# Patient Record
Sex: FEMALE | Race: WHITE | NOT HISPANIC OR LATINO | Employment: UNEMPLOYED | URBAN - METROPOLITAN AREA
[De-identification: names, ages, dates, MRNs, and addresses within clinical notes are randomized per-mention and may not be internally consistent; named-entity substitution may affect disease eponyms.]

---

## 2021-03-25 ENCOUNTER — CONSULT (OUTPATIENT)
Dept: ONCOLOGY | Facility: CLINIC | Age: 51
End: 2021-03-25

## 2021-03-25 ENCOUNTER — LAB (OUTPATIENT)
Dept: LAB | Facility: HOSPITAL | Age: 51
End: 2021-03-25

## 2021-03-25 VITALS
SYSTOLIC BLOOD PRESSURE: 120 MMHG | DIASTOLIC BLOOD PRESSURE: 67 MMHG | BODY MASS INDEX: 36.7 KG/M2 | OXYGEN SATURATION: 95 % | HEIGHT: 64 IN | RESPIRATION RATE: 18 BRPM | TEMPERATURE: 97.5 F | HEART RATE: 90 BPM | WEIGHT: 215 LBS

## 2021-03-25 DIAGNOSIS — Z51.11 ENCOUNTER FOR ANTINEOPLASTIC CHEMOTHERAPY: ICD-10-CM

## 2021-03-25 DIAGNOSIS — C82.85: ICD-10-CM

## 2021-03-25 DIAGNOSIS — C82.85: Primary | Chronic | ICD-10-CM

## 2021-03-25 PROBLEM — C82.95: Chronic | Status: ACTIVE | Noted: 2021-03-25

## 2021-03-25 PROBLEM — R22.41 MASS OF RIGHT THIGH: Status: ACTIVE | Noted: 2021-03-25

## 2021-03-25 PROBLEM — C82.95: Status: ACTIVE | Noted: 2021-03-25

## 2021-03-25 LAB
ALBUMIN SERPL-MCNC: 4.2 G/DL (ref 3.5–5.2)
ALBUMIN/GLOB SERPL: 1.5 G/DL
ALP SERPL-CCNC: 71 U/L (ref 39–117)
ALT SERPL W P-5'-P-CCNC: 12 U/L (ref 1–33)
ANION GAP SERPL CALCULATED.3IONS-SCNC: 10 MMOL/L (ref 5–15)
AST SERPL-CCNC: 13 U/L (ref 1–32)
B2 MICROGLOB SERPL-MCNC: 1.6 MG/L (ref 0.8–2.2)
BILIRUB SERPL-MCNC: <0.2 MG/DL (ref 0–1.2)
BUN SERPL-MCNC: 11 MG/DL (ref 6–20)
BUN/CREAT SERPL: 19.3 (ref 7–25)
CALCIUM SPEC-SCNC: 9.1 MG/DL (ref 8.6–10.5)
CHLORIDE SERPL-SCNC: 102 MMOL/L (ref 98–107)
CO2 SERPL-SCNC: 29 MMOL/L (ref 22–29)
CREAT SERPL-MCNC: 0.57 MG/DL (ref 0.57–1)
CRP SERPL-MCNC: 1.7 MG/DL (ref 0–0.5)
ERYTHROCYTE [DISTWIDTH] IN BLOOD BY AUTOMATED COUNT: 14.6 % (ref 12.3–15.4)
ERYTHROCYTE [SEDIMENTATION RATE] IN BLOOD: 27 MM/HR (ref 0–30)
GFR SERPL CREATININE-BSD FRML MDRD: 111 ML/MIN/1.73
GLOBULIN UR ELPH-MCNC: 2.8 GM/DL
GLUCOSE SERPL-MCNC: 97 MG/DL (ref 65–99)
HCT VFR BLD AUTO: 40.1 % (ref 34–46.6)
HGB BLD-MCNC: 12.8 G/DL (ref 12–15.9)
LDH SERPL-CCNC: 162 U/L (ref 135–214)
LYMPHOCYTES # BLD AUTO: 2.7 10*3/MM3 (ref 0.7–3.1)
LYMPHOCYTES NFR BLD AUTO: 39.7 % (ref 19.6–45.3)
MCH RBC QN AUTO: 29 PG (ref 26.6–33)
MCHC RBC AUTO-ENTMCNC: 31.9 G/DL (ref 31.5–35.7)
MCV RBC AUTO: 90.8 FL (ref 79–97)
MONOCYTES # BLD AUTO: 0.3 10*3/MM3 (ref 0.1–0.9)
MONOCYTES NFR BLD AUTO: 4.2 % (ref 5–12)
NEUTROPHILS NFR BLD AUTO: 3.8 10*3/MM3 (ref 1.7–7)
NEUTROPHILS NFR BLD AUTO: 56.1 % (ref 42.7–76)
PLATELET # BLD AUTO: 366 10*3/MM3 (ref 140–450)
PMV BLD AUTO: 6.5 FL (ref 6–12)
POTASSIUM SERPL-SCNC: 4.1 MMOL/L (ref 3.5–5.2)
PROT SERPL-MCNC: 7 G/DL (ref 6–8.5)
RBC # BLD AUTO: 4.42 10*6/MM3 (ref 3.77–5.28)
SODIUM SERPL-SCNC: 141 MMOL/L (ref 136–145)
URATE SERPL-MCNC: 4.3 MG/DL (ref 2.4–5.7)
WBC # BLD AUTO: 6.8 10*3/MM3 (ref 3.4–10.8)

## 2021-03-25 PROCEDURE — 83615 LACTATE (LD) (LDH) ENZYME: CPT

## 2021-03-25 PROCEDURE — 85025 COMPLETE CBC W/AUTO DIFF WBC: CPT

## 2021-03-25 PROCEDURE — 82232 ASSAY OF BETA-2 PROTEIN: CPT

## 2021-03-25 PROCEDURE — 87350 HEPATITIS BE AG IA: CPT

## 2021-03-25 PROCEDURE — 84550 ASSAY OF BLOOD/URIC ACID: CPT

## 2021-03-25 PROCEDURE — 86704 HEP B CORE ANTIBODY TOTAL: CPT

## 2021-03-25 PROCEDURE — 86707 HEPATITIS BE ANTIBODY: CPT

## 2021-03-25 PROCEDURE — 85652 RBC SED RATE AUTOMATED: CPT

## 2021-03-25 PROCEDURE — 86140 C-REACTIVE PROTEIN: CPT

## 2021-03-25 PROCEDURE — 87340 HEPATITIS B SURFACE AG IA: CPT

## 2021-03-25 PROCEDURE — 99205 OFFICE O/P NEW HI 60 MIN: CPT | Performed by: INTERNAL MEDICINE

## 2021-03-25 PROCEDURE — 36415 COLL VENOUS BLD VENIPUNCTURE: CPT

## 2021-03-25 PROCEDURE — 80053 COMPREHEN METABOLIC PANEL: CPT

## 2021-03-25 PROCEDURE — 99417 PROLNG OP E/M EACH 15 MIN: CPT | Performed by: INTERNAL MEDICINE

## 2021-03-25 PROCEDURE — 86705 HEP B CORE ANTIBODY IGM: CPT

## 2021-03-25 PROCEDURE — 86803 HEPATITIS C AB TEST: CPT

## 2021-03-25 PROCEDURE — 86706 HEP B SURFACE ANTIBODY: CPT

## 2021-03-25 RX ORDER — PIROXICAM 10 MG/1
10 CAPSULE ORAL 2 TIMES DAILY WITH MEALS
COMMUNITY
Start: 2021-02-24 | End: 2022-10-27

## 2021-03-25 RX ORDER — CITALOPRAM 40 MG/1
40 TABLET ORAL DAILY
COMMUNITY
Start: 2021-02-24 | End: 2022-02-01 | Stop reason: ALTCHOICE

## 2021-03-25 RX ORDER — ESTRADIOL 1 MG/1
1 TABLET ORAL DAILY
COMMUNITY
Start: 2021-02-24

## 2021-03-25 RX ORDER — DIAZEPAM 2 MG/1
TABLET ORAL
COMMUNITY
Start: 2020-12-23 | End: 2022-10-27

## 2021-03-25 RX ORDER — BUDESONIDE AND FORMOTEROL FUMARATE DIHYDRATE 160; 4.5 UG/1; UG/1
2 AEROSOL RESPIRATORY (INHALATION) 2 TIMES DAILY
COMMUNITY
Start: 2021-03-17

## 2021-03-25 NOTE — PROGRESS NOTES
CHIEF COMPLAINT: Right thigh lymphoma    REASON FOR REFERRAL: Right thigh lymphoma      RECORDS OBTAINED  Records of the patients history including those obtained from Dr. Gong were reviewed and summarized in detail.    Oncology/Hematology History Overview Note   1.  Right thigh mass  lymphoma    Oncology history timeline:  -2/18/2021 hemoglobin 12.7 with normal white count platelet count differential.  CMP unremarkable.  Urinalysis with microscopic hematuria 20-30 red blood cells per milliliter.  AP single view chest x-ray emergency room [place] showed no acute findings.  CT abdomen pelvis with IV contrast emergency room [place] showed calcified granuloma right lung base, fatty liver infiltration, subcentimeter hypodensity left hepatic lobe too small to categorize, normal size spleen, no adrenal masses, no pancreatic mass, normal kidneys, and no retroperitoneal adenopathy or ascites or pneumoperitoneum.  There was a smooth marginated homogeneous enhancing 4.5 x 5 x 3.3 cm mass in the superficial soft tissues of the anterior upper right thigh.  -2/25/2021 office note from Dr. Gong mentions presentation to emergency room 2/18/2021 complaining of right upper thigh swelling for the preceding week of 2/12/2021.  CT scan abdomen pelvis to include the thighs performed at [facility] emergency room revealing 5 cm smoothly marginated right upper thigh mass below the groin crease suspicious for pathologic lymph node but could be neoplastic of other nature given no prodromal illness, infections, or inflammation.  Recommendation for biopsy was made.  Per Dr. Gong note, she had been sick for the better part of the winter and had been urgent treatment centers told that she had a viral infection flu and coronavirus negative with fevers and chills that subsequently resolved but for fatigue that did not resolve with hypersomnolence as well.  Says she feels hot all the time but no ronny night  sweats.  -3/12/2021 right thigh mass needle core tissue sample extremely small and predominantly crushed limiting adequate evaluation.  Differential diagnosis for this B-cell lymphoma is diffuse large B cell, follicular lymphoma versus other.  Further differentiation could not be performed on this crush small sample.  -3/25/2021 [facility] hematology oncology initial consultation: I, Rodríguez Gerardo, saw for the first time today.  Reviewed the above story with her.  Still fatigued without night sweats or unexplained weight loss.  She has this mass in the soft tissue medial right thigh about 5 cm in size that does seem to move somewhat beneath the skin but also to my exam feels like it is attached to the deeper structures of the thigh as well.  I feel no other adenopathy or hepatosplenomegaly.  I will get a PET scan.  I spoken with Dr. Gong who will look at these images again with the CTs in [place].  If there is clear indication that this is not coming from soft tissue nonnodal structures, then excisional biopsy for adequate tissue diagnosis is important to determine the type of therapy.  This is growing since her biopsy 3/12/2021 but not daily like a very high-grade lymphoma.  To my hand, this does not feel like a typical stas structure and my other concern is, even though this does not venessa with any markers to suggest sarcoma or the like, is to be sure that this is not something other than lymphoma and I have spoken with Dr. Pino who will do desmin stains and other markers before doing an excisional biopsy next Wednesday with Dr. Gong.  If those additional stains suggest sarcomatoid features that can appear in the medial aspect of the thigh such as this, then she probably needs to have excision by Humberto Vera orthopedic oncologist at .  In the meantime, presuming this to be some form of lymphoma which is the highest likelihood based on the pathology from Dr. Pino where this is CD20 positive, CD10  positive, BCL6 40% positive, MUM1 40% positive, CD23 strong and diffuse positive, and BCL-2 positive.  Ki-67 is 50%.  CD30 and only 5%.  C-Myc is weakly expressed in 10% of cells.  BRIAN by TAYLER is negative.  Cyclin D1 negative.  CD5 indeterminate for B and T-cell coexpression.  LEF 1 presumably staining T cells.  Ultimate decision for treatment depends on the specific pathologic subtype and the staging and I will get her prognostic labs going as well.  Given the relatively initial high-grade features of this that might require Adriamycin I will also proceed with echocardiogram.     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  female, referred for right thigh lymphoma history as above.    REVIEW OF SYSTEMS:  Generally fatigued but no fevers or chills or bed drenching night sweats    History reviewed. No pertinent past medical history.  History reviewed. No pertinent surgical history.    Current Outpatient Medications on File Prior to Visit   Medication Sig Dispense Refill   • Advair Diskus 250-50 MCG/DOSE DISKUS      • citalopram (CeleXA) 40 MG tablet Take 40 mg by mouth Daily.     • diazePAM (VALIUM) 2 MG tablet TAKE 1 TABLET 4 TIMES A DAY AS NEEDED FOR ANXIETY     • estradiol (ESTRACE) 1 MG tablet Take 1 mg by mouth Daily.     • piroxicam (FELDENE) 10 MG capsule Take 10 mg by mouth 2 (Two) Times a Day With Meals.     • Symbicort 160-4.5 MCG/ACT inhaler Inhale 2 puffs 2 (Two) Times a Day.       No current facility-administered medications on file prior to visit.       No Known Allergies    Social History     Socioeconomic History   • Marital status: Single     Spouse name: Not on file   • Number of children: Not on file   • Years of education: Not on file   • Highest education level: Not on file   Tobacco Use   • Smoking status: Current  "Every Day Smoker     Packs/day: 1.00     Start date: 1982   • Smokeless tobacco: Never Used       History reviewed. No pertinent family history.    PHYSICAL EXAM:  No palpable cervical axillary or inguinal adenopathy.  Does have 5 cm soft subcutaneous mass medial right thigh that to my hand is not only subcutaneous but feels like it is somewhat attached to the muscular structures.  No palpable hepatosplenomegaly    /67   Pulse 90   Temp 97.5 °F (36.4 °C)   Resp 18   Ht 162.6 cm (64\")   Wt 97.5 kg (215 lb)   SpO2 95%   BMI 36.90 kg/m²     ECOG score: 0           ECOG: (0) Fully Active - Able to Carry On All Pre-disease Performance Without Restriction    No results found for: HGB, HCT, MCV, PLT, WBC, NEUTROABS, LYMPHSABS, MONOSABS, EOSABS, BASOSABS  No results found for: GLUCOSE, BUN, CREATININE, NA, K, CL, CO2, CALCIUM, PROTEINTOT, ALBUMIN, BILITOT, ALKPHOS, AST, ALT      Assessment/Plan     1.  Right thigh mass  lymphoma    Oncology history timeline:  -2/18/2021 hemoglobin 12.7 with normal white count platelet count differential.  CMP unremarkable.  Urinalysis with microscopic hematuria 20-30 red blood cells per milliliter.  AP single view chest x-ray emergency room [place] showed no acute findings.  CT abdomen pelvis with IV contrast emergency room [place] showed calcified granuloma right lung base, fatty liver infiltration, subcentimeter hypodensity left hepatic lobe too small to categorize, normal size spleen, no adrenal masses, no pancreatic mass, normal kidneys, and no retroperitoneal adenopathy or ascites or pneumoperitoneum.  There was a smooth marginated homogeneous enhancing 4.5 x 5 x 3.3 cm mass in the superficial soft tissues of the anterior upper right thigh.  -2/25/2021 office note from Dr. Gong mentions presentation to emergency room 2/18/2021 complaining of right upper thigh swelling for the preceding week of 2/12/2021.  CT scan abdomen pelvis to include the thighs performed at " [place] [facility] room revealing 5 cm smoothly marginated right upper thigh mass below the groin crease suspicious for pathologic lymph node but could be neoplastic of other nature given no prodromal illness, infections, or inflammation.  Recommendation for biopsy was made.  Per Dr. Gong note, she had been sick for the better part of the winter and had been urgent treatment centers told that she had a viral infection flu and coronavirus negative with fevers and chills that subsequently resolved but for fatigue that did not resolve with hypersomnolence as well.  Says she feels hot all the time but no ronny night sweats.  -3/12/2021 right thigh mass needle core tissue sample extremely small and predominantly crushed limiting adequate evaluation.  Differential diagnosis for this B-cell lymphoma is diffuse large B cell, follicular lymphoma versus other.  Further differentiation could not be performed on this crush small sample.  -3/25/2021 [facility] hematology oncology initial consultation: I, Rodríguez Gerardo, saw for the first time today.  Reviewed the above story with her.  Still fatigued without night sweats or unexplained weight loss.  She has this mass in the soft tissue medial right thigh about 5 cm in size that does seem to move somewhat beneath the skin but also to my exam feels like it is attached to the deeper structures of the thigh as well.  I feel no other adenopathy or hepatosplenomegaly.  I will get a PET scan.  I spoken with Dr. Gong who will look at these images again with the CTs in [place].  If there is clear indication that this is not coming from soft tissue nonnodal structures, then excisional biopsy for adequate tissue diagnosis is important to determine the type of therapy.  This is growing since her biopsy 3/12/2021 but not daily like a very high-grade lymphoma.  To my hand, this does not feel like a typical stas structure and my other concern is, even though this does not venessa  with any markers to suggest sarcoma or the like, is to be sure that this is not something other than lymphoma and I have spoken with Dr. Pino who will do desmin stains and other markers before doing an excisional biopsy next Wednesday with Dr. Gong.  If those additional stains suggest sarcomatoid features that can appear in the medial aspect of the thigh such as this, then she probably needs to have excision by Humberto Vera orthopedic oncologist at .  In the meantime, presuming this to be some form of lymphoma which is the highest likelihood based on the pathology from Dr. Pino where this is CD20 positive, CD10 positive, BCL6 40% positive, MUM1 40% positive, CD23 strong and diffuse positive, and BCL-2 positive.  Ki-67 is 50%.  CD30 and only 5%.  C-Myc is weakly expressed in 10% of cells.  BRIAN by TAYLER is negative.  Cyclin D1 negative.  CD5 indeterminate for B and T-cell coexpression.  LEF 1 presumably staining T cells.  Ultimate decision for treatment depends on the specific pathologic subtype and the staging and I will get her prognostic labs going as well.  Given the relatively initial high-grade features of this that might require Adriamycin I will also proceed with echocardiogram.  Total time of care today including reviewing her above data, talking over the work-up with Dr. Gong twice on the phone as well as with Dr. Pino and going over all this with the patient consumed 90 minutes of total patient care time both before, during, and after her visit today.    Rodríguez Gerardo MD    3/25/2021

## 2021-03-26 LAB
HBV CORE AB SERPL QL IA: NEGATIVE
HBV CORE IGM SERPL QL IA: NEGATIVE
HBV E AB SERPL QL IA: NEGATIVE
HBV E AG SERPL QL IA: NEGATIVE
HBV SURFACE AB SER QL: REACTIVE
HBV SURFACE AG SERPL QL IA: NEGATIVE
HCV AB S/CO SERPL IA: <0.1 S/CO RATIO (ref 0–0.9)
LABORATORY COMMENT REPORT: NORMAL

## 2021-03-29 ENCOUNTER — HOSPITAL ENCOUNTER (OUTPATIENT)
Dept: CARDIOLOGY | Facility: HOSPITAL | Age: 51
Discharge: HOME | End: 2021-03-29
Admitting: INTERNAL MEDICINE

## 2021-03-29 VITALS — HEIGHT: 64 IN | BODY MASS INDEX: 36.7 KG/M2 | WEIGHT: 215 LBS

## 2021-03-29 DIAGNOSIS — C82.85: ICD-10-CM

## 2021-03-29 DIAGNOSIS — Z51.11 ENCOUNTER FOR ANTINEOPLASTIC CHEMOTHERAPY: ICD-10-CM

## 2021-03-29 LAB
ASCENDING AORTA: 2.9 CM
BH CV ECHO MEAS - AO MAX PG (FULL): 7.6 MMHG
BH CV ECHO MEAS - AO MAX PG: 14 MMHG
BH CV ECHO MEAS - AO MEAN PG (FULL): 3 MMHG
BH CV ECHO MEAS - AO MEAN PG: 6 MMHG
BH CV ECHO MEAS - AO ROOT AREA (BSA CORRECTED): 1.3
BH CV ECHO MEAS - AO ROOT AREA: 5.7 CM^2
BH CV ECHO MEAS - AO ROOT DIAM: 2.7 CM
BH CV ECHO MEAS - AO V2 MAX: 190 CM/SEC
BH CV ECHO MEAS - AO V2 MEAN: 108 CM/SEC
BH CV ECHO MEAS - AO V2 VTI: 30.7 CM
BH CV ECHO MEAS - ASC AORTA: 2.9 CM
BH CV ECHO MEAS - AVA(I,A): 2.2 CM^2
BH CV ECHO MEAS - AVA(I,D): 2.2 CM^2
BH CV ECHO MEAS - AVA(V,A): 2.1 CM^2
BH CV ECHO MEAS - AVA(V,D): 2.1 CM^2
BH CV ECHO MEAS - BSA(HAYCOCK): 2.1 M^2
BH CV ECHO MEAS - BSA: 2 M^2
BH CV ECHO MEAS - BZI_BMI: 36.9 KILOGRAMS/M^2
BH CV ECHO MEAS - BZI_METRIC_HEIGHT: 162.6 CM
BH CV ECHO MEAS - BZI_METRIC_WEIGHT: 97.5 KG
BH CV ECHO MEAS - EDV(CUBED): 41.1 ML
BH CV ECHO MEAS - EDV(MOD-SP2): 51 ML
BH CV ECHO MEAS - EDV(MOD-SP4): 58 ML
BH CV ECHO MEAS - EDV(TEICH): 49.1 ML
BH CV ECHO MEAS - EF(CUBED): 68.4 %
BH CV ECHO MEAS - EF(MOD-BP): 63 %
BH CV ECHO MEAS - EF(MOD-SP2): 64.7 %
BH CV ECHO MEAS - EF(MOD-SP4): 62.1 %
BH CV ECHO MEAS - EF(TEICH): 61.1 %
BH CV ECHO MEAS - ESV(CUBED): 13 ML
BH CV ECHO MEAS - ESV(MOD-SP2): 18 ML
BH CV ECHO MEAS - ESV(MOD-SP4): 22 ML
BH CV ECHO MEAS - ESV(TEICH): 19.1 ML
BH CV ECHO MEAS - FS: 31.9 %
BH CV ECHO MEAS - IVS/LVPW: 0.92
BH CV ECHO MEAS - IVSD: 1.1 CM
BH CV ECHO MEAS - LA DIMENSION: 2.7 CM
BH CV ECHO MEAS - LA/AO: 1
BH CV ECHO MEAS - LAD MAJOR: 5.3 CM
BH CV ECHO MEAS - LAT PEAK E' VEL: 10 CM/SEC
BH CV ECHO MEAS - LATERAL E/E' RATIO: 10
BH CV ECHO MEAS - LV DIASTOLIC VOL/BSA (35-75): 28.8 ML/M^2
BH CV ECHO MEAS - LV MASS(C)D: 120.5 GRAMS
BH CV ECHO MEAS - LV MASS(C)DI: 59.7 GRAMS/M^2
BH CV ECHO MEAS - LV MAX PG: 6.4 MMHG
BH CV ECHO MEAS - LV MEAN PG: 3 MMHG
BH CV ECHO MEAS - LV SYSTOLIC VOL/BSA (12-30): 10.9 ML/M^2
BH CV ECHO MEAS - LV V1 MAX: 126 CM/SEC
BH CV ECHO MEAS - LV V1 MEAN: 77.4 CM/SEC
BH CV ECHO MEAS - LV V1 VTI: 21.1 CM
BH CV ECHO MEAS - LVIDD: 3.5 CM
BH CV ECHO MEAS - LVIDS: 2.4 CM
BH CV ECHO MEAS - LVLD AP2: 7.3 CM
BH CV ECHO MEAS - LVLD AP4: 7.2 CM
BH CV ECHO MEAS - LVLS AP2: 6.6 CM
BH CV ECHO MEAS - LVLS AP4: 6.3 CM
BH CV ECHO MEAS - LVOT AREA (M): 3.1 CM^2
BH CV ECHO MEAS - LVOT AREA: 3.1 CM^2
BH CV ECHO MEAS - LVOT DIAM: 2 CM
BH CV ECHO MEAS - LVPWD: 1.2 CM
BH CV ECHO MEAS - MED PEAK E' VEL: 10.2 CM/SEC
BH CV ECHO MEAS - MEDIAL E/E' RATIO: 9.8
BH CV ECHO MEAS - MV A MAX VEL: 130 CM/SEC
BH CV ECHO MEAS - MV DEC SLOPE: 372 CM/SEC^2
BH CV ECHO MEAS - MV E MAX VEL: 100 CM/SEC
BH CV ECHO MEAS - MV E/A: 0.77
BH CV ECHO MEAS - MV P1/2T MAX VEL: 101 CM/SEC
BH CV ECHO MEAS - MV P1/2T: 79.5 MSEC
BH CV ECHO MEAS - MVA P1/2T LCG: 2.2 CM^2
BH CV ECHO MEAS - MVA(P1/2T): 2.8 CM^2
BH CV ECHO MEAS - PA ACC SLOPE: 749.5 CM/SEC^2
BH CV ECHO MEAS - PA ACC TIME: 0.12 SEC
BH CV ECHO MEAS - PA PR(ACCEL): 26.1 MMHG
BH CV ECHO MEAS - SI(AO): 87.1 ML/M^2
BH CV ECHO MEAS - SI(CUBED): 13.9 ML/M^2
BH CV ECHO MEAS - SI(LVOT): 32.9 ML/M^2
BH CV ECHO MEAS - SI(MOD-SP2): 16.4 ML/M^2
BH CV ECHO MEAS - SI(MOD-SP4): 17.8 ML/M^2
BH CV ECHO MEAS - SI(TEICH): 14.9 ML/M^2
BH CV ECHO MEAS - SV(AO): 175.8 ML
BH CV ECHO MEAS - SV(CUBED): 28.1 ML
BH CV ECHO MEAS - SV(LVOT): 66.3 ML
BH CV ECHO MEAS - SV(MOD-SP2): 33 ML
BH CV ECHO MEAS - SV(MOD-SP4): 36 ML
BH CV ECHO MEAS - SV(TEICH): 30 ML
BH CV ECHO MEASUREMENTS AVERAGE E/E' RATIO: 9.9
BH CV VAS BP LEFT ARM: NORMAL MMHG
BH CV XLRA - RV BASE: 3.1 CM
BH CV XLRA - RV LENGTH: 6.4 CM
BH CV XLRA - RV MID: 2.5 CM
BH CV XLRA - TDI S': 14.3 CM/SEC
LEFT ATRIUM VOLUME INDEX: 23.3 ML/M^2
LEFT ATRIUM VOLUME: 47 ML
MAXIMAL PREDICTED HEART RATE: 168 BPM
STRESS TARGET HR: 143 BPM

## 2021-03-29 PROCEDURE — 93306 TTE W/DOPPLER COMPLETE: CPT

## 2021-03-29 PROCEDURE — 93306 TTE W/DOPPLER COMPLETE: CPT | Performed by: INTERNAL MEDICINE

## 2021-04-01 ENCOUNTER — HOSPITAL ENCOUNTER (OUTPATIENT)
Dept: PET IMAGING | Facility: HOSPITAL | Age: 51
End: 2021-04-01

## 2021-04-01 ENCOUNTER — DOCUMENTATION (OUTPATIENT)
Dept: ONCOLOGY | Facility: CLINIC | Age: 51
End: 2021-04-01

## 2021-04-02 ENCOUNTER — HOSPITAL ENCOUNTER (OUTPATIENT)
Dept: PET IMAGING | Facility: HOSPITAL | Age: 51
Discharge: HOME | End: 2021-04-02

## 2021-04-02 DIAGNOSIS — Z51.11 ENCOUNTER FOR ANTINEOPLASTIC CHEMOTHERAPY: ICD-10-CM

## 2021-04-02 DIAGNOSIS — C82.85: Chronic | ICD-10-CM

## 2021-04-02 LAB — GLUCOSE BLDC GLUCOMTR-MCNC: 90 MG/DL (ref 70–130)

## 2021-04-02 PROCEDURE — 78815 PET IMAGE W/CT SKULL-THIGH: CPT

## 2021-04-02 PROCEDURE — 82962 GLUCOSE BLOOD TEST: CPT

## 2021-04-02 PROCEDURE — 0 FLUDEOXYGLUCOSE F18 SOLUTION: Performed by: INTERNAL MEDICINE

## 2021-04-02 PROCEDURE — A9552 F18 FDG: HCPCS | Performed by: INTERNAL MEDICINE

## 2021-04-02 RX ADMIN — FLUDEOXYGLUCOSE F18 1 DOSE: 300 INJECTION INTRAVENOUS at 12:34

## 2021-04-05 ENCOUNTER — OFFICE VISIT (OUTPATIENT)
Dept: ONCOLOGY | Facility: CLINIC | Age: 51
End: 2021-04-05

## 2021-04-05 VITALS
SYSTOLIC BLOOD PRESSURE: 137 MMHG | WEIGHT: 214 LBS | RESPIRATION RATE: 16 BRPM | HEART RATE: 89 BPM | OXYGEN SATURATION: 95 % | TEMPERATURE: 98.4 F | HEIGHT: 64 IN | BODY MASS INDEX: 36.54 KG/M2 | DIASTOLIC BLOOD PRESSURE: 72 MMHG

## 2021-04-05 DIAGNOSIS — C83.35 DIFFUSE LARGE B-CELL LYMPHOMA OF LYMPH NODES OF INGUINAL REGION (HCC): Primary | ICD-10-CM

## 2021-04-05 PROCEDURE — 99417 PROLNG OP E/M EACH 15 MIN: CPT | Performed by: INTERNAL MEDICINE

## 2021-04-05 PROCEDURE — 99215 OFFICE O/P EST HI 40 MIN: CPT | Performed by: INTERNAL MEDICINE

## 2021-04-05 RX ORDER — ALLOPURINOL 300 MG/1
300 TABLET ORAL DAILY
Qty: 30 TABLET | Refills: 0 | Status: SHIPPED | OUTPATIENT
Start: 2021-04-05 | End: 2021-04-30

## 2021-04-05 RX ORDER — ACETAMINOPHEN 325 MG/1
650 TABLET ORAL ONCE
Status: CANCELLED | OUTPATIENT
Start: 2021-04-26

## 2021-04-05 RX ORDER — LIDOCAINE AND PRILOCAINE 25; 25 MG/G; MG/G
CREAM TOPICAL AS NEEDED
Qty: 30 G | Refills: 3 | Status: SHIPPED | OUTPATIENT
Start: 2021-04-05 | End: 2021-06-28

## 2021-04-05 RX ORDER — PREDNISONE 50 MG/1
100 TABLET ORAL DAILY
Qty: 10 TABLET | Refills: 5 | Status: SHIPPED | OUTPATIENT
Start: 2021-04-05 | End: 2021-04-10

## 2021-04-05 RX ORDER — SODIUM CHLORIDE 9 MG/ML
250 INJECTION, SOLUTION INTRAVENOUS ONCE
Status: CANCELLED | OUTPATIENT
Start: 2021-04-26

## 2021-04-05 RX ORDER — IBUPROFEN 400 MG/1
TABLET ORAL
COMMUNITY
Start: 2021-03-31 | End: 2022-10-27

## 2021-04-05 RX ORDER — HYDROCODONE BITARTRATE AND ACETAMINOPHEN 7.5; 325 MG/1; MG/1
TABLET ORAL
COMMUNITY
Start: 2021-03-31 | End: 2022-10-27

## 2021-04-05 RX ORDER — MEPERIDINE HYDROCHLORIDE 25 MG/ML
25 INJECTION INTRAMUSCULAR; INTRAVENOUS; SUBCUTANEOUS
Status: CANCELLED | OUTPATIENT
Start: 2021-04-26

## 2021-04-05 RX ORDER — ONDANSETRON HYDROCHLORIDE 8 MG/1
8 TABLET, FILM COATED ORAL 3 TIMES DAILY PRN
Qty: 30 TABLET | Refills: 5 | Status: SHIPPED | OUTPATIENT
Start: 2021-04-05 | End: 2021-06-28

## 2021-04-05 RX ORDER — DOXORUBICIN HYDROCHLORIDE 2 MG/ML
50 INJECTION, SOLUTION INTRAVENOUS ONCE
Status: CANCELLED | OUTPATIENT
Start: 2021-04-26

## 2021-04-05 RX ORDER — FAMOTIDINE 10 MG/ML
20 INJECTION, SOLUTION INTRAVENOUS AS NEEDED
Status: CANCELLED | OUTPATIENT
Start: 2021-04-26

## 2021-04-05 RX ORDER — PALONOSETRON 0.05 MG/ML
0.25 INJECTION, SOLUTION INTRAVENOUS ONCE
Status: CANCELLED | OUTPATIENT
Start: 2021-04-26

## 2021-04-05 RX ORDER — DIPHENHYDRAMINE HYDROCHLORIDE 50 MG/ML
50 INJECTION INTRAMUSCULAR; INTRAVENOUS AS NEEDED
Status: CANCELLED | OUTPATIENT
Start: 2021-04-26

## 2021-04-05 NOTE — PROGRESS NOTES
CHIEF COMPLAINT: Diffuse large B cell non-Hodgkin lymphoma    Problem List:  Oncology/Hematology History Overview Note   1.  Right Medial thigh diffuse large b cell non-hodgkin lymphoma    Oncology history timeline:  -2/18/2021 hemoglobin 12.7 with normal white count platelet count differential.  CMP unremarkable.  Urinalysis with microscopic hematuria 20-30 red blood cells per milliliter.  AP single view chest x-ray emergency room [place] showed no acute findings.  CT abdomen pelvis with IV contrast emergency room [place] showed calcified granuloma right lung base, fatty liver infiltration, subcentimeter hypodensity left hepatic lobe too small to categorize, normal size spleen, no adrenal masses, no pancreatic mass, normal kidneys, and no retroperitoneal adenopathy or ascites or pneumoperitoneum.  There was a smooth marginated homogeneous enhancing 4.5 x 5 x 3.3 cm mass in the superficial soft tissues of the anterior upper right thigh.  -2/25/2021 office note from Dr. Gong mentions presentation to emergency room 2/18/2021 complaining of right upper thigh swelling for the preceding week of 2/12/2021.  CT scan abdomen pelvis to include the thighs performed at [facility] emergency room revealing 5 cm smoothly marginated right upper thigh mass below the groin crease suspicious for pathologic lymph node but could be neoplastic of other nature given no prodromal illness, infections, or inflammation.  Recommendation for biopsy was made.  Per Dr. Gong note, she had been sick for the better part of the winter and had been urgent treatment centers told that she had a viral infection flu and coronavirus negative with fevers and chills that subsequently resolved but for fatigue that did not resolve with hypersomnolence as well.  Says she feels hot all the time but no ronny night sweats.  -3/12/2021 right thigh mass needle core tissue sample extremely small and predominantly crushed limiting adequate  evaluation.  Differential diagnosis for this B-cell lymphoma is diffuse large B cell, follicular lymphoma versus other.  Further differentiation could not be performed on this crush small sample.  -3/25/2021 [facility] hematology oncology initial consultation: I, Rodríguez Gerardo, saw for the first time today.  Reviewed the above story with her.  Still fatigued without night sweats or unexplained weight loss.  She has this mass in the soft tissue medial right thigh about 5 cm in size that does seem to move somewhat beneath the skin but also to my exam feels like it is attached to the deeper structures of the thigh as well.  I feel no other adenopathy or hepatosplenomegaly.  I will get a PET scan.  I spoken with Dr. Gong who will look at these images again with the CTs in [place].  If there is clear indication that this is not coming from soft tissue nonnodal structures, then excisional biopsy for adequate tissue diagnosis is important to determine the type of therapy.  This is growing since her biopsy 3/12/2021 but not daily like a very high-grade lymphoma.  To my hand, this does not feel like a typical stas structure and my other concern is, even though this does not venessa with any markers to suggest sarcoma or the like, is to be sure that this is not something other than lymphoma and I have spoken with Dr. Pino who will do desmin stains and other markers before doing an excisional biopsy next Wednesday with Dr. Gong.  If those additional stains suggest sarcomatoid features that can appear in the medial aspect of the thigh such as this, then she probably needs to have excision by Humberto Vera orthopedic oncologist at .  In the meantime, presuming this to be some form of lymphoma which is the highest likelihood based on the pathology from Dr. Pino where this is CD20 positive, CD10 positive, BCL6 40% positive, MUM1 40% positive, CD23 strong and diffuse positive, and BCL-2 positive.  Ki-67 is 50%.  CD30 and  only 5%.  C-Myc is weakly expressed in 10% of cells.  BRIAN by TAYLER is negative.  Cyclin D1 negative.  CD5 indeterminate for B and T-cell coexpression.  LEF 1 presumably staining T cells.  Ultimate decision for treatment depends on the specific pathologic subtype and the staging and I will get her prognostic labs going as well.  Given the relatively initial high-grade features of this that might require Adriamycin I will also proceed with echocardiogram.  -3/25/2021 CBC normal.  CMP unremarkable.  Sedimentation rate normal 27 with C-reactive protein elevated 1.7 upper limit of normal 0.5.  Beta-2 microglobulin normal 1.6.  Uric acid normal 4.3.  LDH normal 162.  Hepatitis B and C serologies negative.  -3/29/2021 echocardiogram ejection fraction 61 to 65%.  -3/31/21 excisional node biopsy shows diffuse large B cell non-Hodgkin lymphoma  -4/2/2021 PET shows postsurgical changes right proximal thigh and superficial inguinal region adjacent to adenopathy with SUV 4.0.  Right internal iliac lymph node mildly enlarged SUV 2.6 additionally noted.  No other adenopathy outside this region.  No mention of any abnormal marrow signal.  -4/5/2021 [facility], [place], operated by [facility] medical oncology follow-up visit: Per Dr. Arun Garcia pathologist, this is a diffuse large B cell non-Hodgkin lymphoma.  He is not sure whether this is double hit or not yet.  He is not sure if there is a background of follicular lymphoma and hence cannot say whether this is transformed.  Clinically her IPI score is 0 with her age less than 60, normal LDH, ECOG 0, no extranodal involvement on PET, and only stage II at worse per PET with all nodes within the same region.  I will get a bone marrow biopsy.  If this is stage IV, double hit, or has evidence of transition from follicular lymphoma into diffuse large B-cell lymphoma, then I would give R-CHOP x6.  If the marrow is not involved, this is not double hit, and there is no evidence of follicular transformation into diffuse large  "B-cell lymphoma, then I would treat this as a stage II nonbulky diffuse large B-cell lymphoma and consider R-CHOP x3 followed by involved site radiation therapy.  I reviewed her PET and biopsy reports an echocardiogram which shows good ejection fraction.  She will need to get her [employer] Covid vaccine this week, family doctor established for the possibility of prednisone-induced diabetes, CT-guided bone marrow biopsy, chemo preparation visit, and a port placement with Dr. Gong.  We will present her at multidisciplinary tumor board this Friday.  I went into detail regarding the side effects of R-CHOP but she will have these reviewed at her chemo preparation visit.  Though not bulky, I still would treat her with allopurinol.         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 female, here for follow up on management of diffuse large B cell non-Hodgkin lymphoma status post excisional biopsy    History reviewed. No pertinent past medical history.  History reviewed. No pertinent surgical history.    No Known Allergies    Family History and Social History reviewed and changed as necessary    REVIEW OF SYSTEM:   No B symptoms    PHYSICAL EXAM:  No other palpable inguinal axillary cervical adenopathy.  No hepatosplenomegaly.    Vitals:    21   BP: 137/72   Pulse: 89   Resp: 16   Temp: 98.4 °F (36.9 °C)   SpO2: 95%   Weight: 97.1 kg (214 lb)   Height: 162.6 cm (64\")     Vitals:    21   PainSc: 0-No pain          ECOG score: 0           Vitals reviewed.    ECO    Lab Results   Component Value Date    HGB 12.8 2021    HCT 40.1 2021    MCV 90.8 2021     2021    WBC 6.80 2021    NEUTROABS 3.80 2021    LYMPHSABS 2.70 2021    MONOSABS 0.30 2021 "       Lab Results   Component Value Date    GLUCOSE 97 03/25/2021    BUN 11 03/25/2021    CREATININE 0.57 03/25/2021     03/25/2021    K 4.1 03/25/2021     03/25/2021    CO2 29.0 03/25/2021    CALCIUM 9.1 03/25/2021    PROTEINTOT 7.0 03/25/2021    ALBUMIN 4.20 03/25/2021    BILITOT <0.2 03/25/2021    ALKPHOS 71 03/25/2021    AST 13 03/25/2021    ALT 12 03/25/2021             ASSESSMENT & PLAN:  1.  Right Medial thigh diffuse large b cell non-hodgkin lymphoma    Oncology history timeline:  -2/18/2021 hemoglobin 12.7 with normal white count platelet count differential.  CMP unremarkable.  Urinalysis with microscopic hematuria 20-30 red blood cells per milliliter.  AP single view chest x-ray emergency room [place] showed no acute findings.  CT abdomen pelvis with IV contrast emergency room [place] showed calcified granuloma right lung base, fatty liver infiltration, subcentimeter hypodensity left hepatic lobe too small to categorize, normal size spleen, no adrenal masses, no pancreatic mass, normal kidneys, and no retroperitoneal adenopathy or ascites or pneumoperitoneum.  There was a smooth marginated homogeneous enhancing 4.5 x 5 x 3.3 cm mass in the superficial soft tissues of the anterior upper right thigh.  -2/25/2021 office note from Dr. Gong mentions presentation to emergency room 2/18/2021 complaining of right upper thigh swelling for the preceding week of 2/12/2021.  CT scan abdomen pelvis to include the thighs performed at [facility] emergency room revealing 5 cm smoothly marginated right upper thigh mass below the groin crease suspicious for pathologic lymph node but could be neoplastic of other nature given no prodromal illness, infections, or inflammation.  Recommendation for biopsy was made.  Per Dr. Gong note, she had been sick for the better part of the winter and had been urgent treatment centers told that she had a viral infection flu and coronavirus negative with  fevers and chills that subsequently resolved but for fatigue that did not resolve with hypersomnolence as well.  Says she feels hot all the time but no ronny night sweats.  -3/12/2021 right thigh mass needle core tissue sample extremely small and predominantly crushed limiting adequate evaluation.  Differential diagnosis for this B-cell lymphoma is diffuse large B cell, follicular lymphoma versus other.  Further differentiation could not be performed on this crush small sample.  -3/25/2021 [facility], [place], operated by [facility] hematology oncology initial consultation: I, Rodríguez Gerardo, saw for the first time today.  Reviewed the above story with her.  Still fatigued without night sweats or unexplained weight loss.  She has this mass in the soft tissue medial right thigh about 5 cm in size that does seem to move somewhat beneath the skin but also to my exam feels like it is attached to the deeper structures of the thigh as well.  I feel no other adenopathy or hepatosplenomegaly.  I will get a PET scan.  I spoken with Dr. Gong who will look at these images again with the CTs in [place].  If there is clear indication that this is not coming from soft tissue nonnodal structures, then excisional biopsy for adequate tissue diagnosis is important to determine the type of therapy.  This is growing since her biopsy 3/12/2021 but not daily like a very high-grade lymphoma.  To my hand, this does not feel like a typical stas structure and my other concern is, even though this does not venessa with any markers to suggest sarcoma or the like, is to be sure that this is not something other than lymphoma and I have spoken with Dr. Pino who will do desmin stains and other markers before doing an excisional biopsy next Wednesday with Dr. Gong.  If those additional stains suggest sarcomatoid features that can appear in the medial aspect of the thigh such as this, then she probably needs to have excision by Humberto Vera orthopedic oncologist at .  In  the meantime, presuming this to be some form of lymphoma which is the highest likelihood based on the pathology from Dr. Pino where this is CD20 positive, CD10 positive, BCL6 40% positive, MUM1 40% positive, CD23 strong and diffuse positive, and BCL-2 positive.  Ki-67 is 50%.  CD30 and only 5%.  C-Myc is weakly expressed in 10% of cells.  BRIAN by TAYLER is negative.  Cyclin D1 negative.  CD5 indeterminate for B and T-cell coexpression.  LEF 1 presumably staining T cells.  Ultimate decision for treatment depends on the specific pathologic subtype and the staging and I will get her prognostic labs going as well.  Given the relatively initial high-grade features of this that might require Adriamycin I will also proceed with echocardiogram.  -3/25/2021 CBC normal.  CMP unremarkable.  Sedimentation rate normal 27 with C-reactive protein elevated 1.7 upper limit of normal 0.5.  Beta-2 microglobulin normal 1.6.  Uric acid normal 4.3.  LDH normal 162.  Hepatitis B and C serologies negative.  -3/29/2021 echocardiogram ejection fraction 61 to 65%.  -3/31/21 excisional node biopsy shows diffuse large B cell non-Hodgkin lymphoma  -4/2/2021 PET shows postsurgical changes right proximal thigh and superficial inguinal region adjacent to adenopathy with SUV 4.0.  Right internal iliac lymph node mildly enlarged SUV 2.6 additionally noted.  No other adenopathy outside this region.  No mention of any abnormal marrow signal.  -4/5/2021 [facility] medical oncology follow-up visit: Per Dr. Arun Garcia pathologist, this is a diffuse large B cell non-Hodgkin lymphoma.  He is not sure whether this is double hit or not yet.  He is not sure if there is a background of follicular lymphoma and hence cannot say whether this is transformed.  Clinically her IPI score is 0 with her age less than 60, normal LDH, ECOG 0, no extranodal involvement on PET, and only stage II at worse per PET with all nodes within the same region.  I will get a bone marrow  biopsy.  If this is stage IV, double hit, or has evidence of transition from follicular lymphoma into diffuse large B-cell lymphoma, then I would give R-CHOP x6.  If the marrow is not involved, this is not double hit, and there is no evidence of follicular transformation into diffuse large B-cell lymphoma, then I would treat this as a stage II nonbulky diffuse large B-cell lymphoma and consider R-CHOP x3 followed by involved site radiation therapy.  I reviewed her PET and biopsy reports an echocardiogram which shows good ejection fraction.  She will need to get her [employer] Covid vaccine this week, family doctor established for the possibility of prednisone-induced diabetes, CT-guided bone marrow biopsy, chemo preparation visit, and a port placement with Dr. Gong.  We will present her at multidisciplinary tumor board this Friday.  I went into detail regarding the side effects of R-CHOP but she will have these reviewed at her chemo preparation visit.  Though not bulky, I still would treat her with allopurinol.  First course of therapy will occur in our [place] office and if all goes well then the subsequent Rituxan doses can be faster and performed in [place] office. Total time of care today inclusive of time before, during, and after visit, inclusive of time spent discussing with pathologist as well as with patient and my staff to prepare for the above took 80 minutes of patient care time today.  I also left a message regarding this plan with Dr. Gong.  Rodríguez Gerardo MD    04/05/2021

## 2021-04-05 NOTE — H&P (VIEW-ONLY)
CHIEF COMPLAINT: Diffuse large B cell non-Hodgkin lymphoma    Problem List:  Oncology/Hematology History Overview Note   1.  Right Medial thigh diffuse large b cell non-hodgkin lymphoma    Oncology history timeline:  -2/18/2021 hemoglobin 12.7 with normal white count platelet count differential.  CMP unremarkable.  Urinalysis with microscopic hematuria 20-30 red blood cells per milliliter.  AP single view chest x-ray emergency room [place] showed no acute findings.  CT abdomen pelvis with IV contrast emergency room [place] showed calcified granuloma right lung base, fatty liver infiltration, subcentimeter hypodensity left hepatic lobe too small to categorize, normal size spleen, no adrenal masses, no pancreatic mass, normal kidneys, and no retroperitoneal adenopathy or ascites or pneumoperitoneum.  There was a smooth marginated homogeneous enhancing 4.5 x 5 x 3.3 cm mass in the superficial soft tissues of the anterior upper right thigh.  -2/25/2021 office note from Dr. Gong mentions presentation to emergency room 2/18/2021 complaining of right upper thigh swelling for the preceding week of 2/12/2021.  CT scan abdomen pelvis to include the thighs performed at [facility] emergency room revealing 5 cm smoothly marginated right upper thigh mass below the groin crease suspicious for pathologic lymph node but could be neoplastic of other nature given no prodromal illness, infections, or inflammation.  Recommendation for biopsy was made.  Per Dr. Gong note, she had been sick for the better part of the winter and had been urgent treatment centers told that she had a viral infection flu and coronavirus negative with fevers and chills that subsequently resolved but for fatigue that did not resolve with hypersomnolence as well.  Says she feels hot all the time but no ronny night sweats.  -3/12/2021 right thigh mass needle core tissue sample extremely small and predominantly crushed limiting adequate  evaluation.  Differential diagnosis for this B-cell lymphoma is diffuse large B cell, follicular lymphoma versus other.  Further differentiation could not be performed on this crush small sample.  -3/25/2021 [facility] hematology oncology initial consultation: I, Rodríguez Gerardo, saw for the first time today.  Reviewed the above story with her.  Still fatigued without night sweats or unexplained weight loss.  She has this mass in the soft tissue medial right thigh about 5 cm in size that does seem to move somewhat beneath the skin but also to my exam feels like it is attached to the deeper structures of the thigh as well.  I feel no other adenopathy or hepatosplenomegaly.  I will get a PET scan.  I spoken with Dr. Gong who will look at these images again with the CTs in [place].  If there is clear indication that this is not coming from soft tissue nonnodal structures, then excisional biopsy for adequate tissue diagnosis is important to determine the type of therapy.  This is growing since her biopsy 3/12/2021 but not daily like a very high-grade lymphoma.  To my hand, this does not feel like a typical stas structure and my other concern is, even though this does not venessa with any markers to suggest sarcoma or the like, is to be sure that this is not something other than lymphoma and I have spoken with Dr. Pino who will do desmin stains and other markers before doing an excisional biopsy next Wednesday with Dr. Gong.  If those additional stains suggest sarcomatoid features that can appear in the medial aspect of the thigh such as this, then she probably needs to have excision by Humberto Vera orthopedic oncologist at .  In the meantime, presuming this to be some form of lymphoma which is the highest likelihood based on the pathology from Dr. Pino where this is CD20 positive, CD10 positive, BCL6 40% positive, MUM1 40% positive, CD23 strong and diffuse positive, and BCL-2 positive.  Ki-67 is 50%.  CD30 and  only 5%.  C-Myc is weakly expressed in 10% of cells.  BRIAN by TAYLER is negative.  Cyclin D1 negative.  CD5 indeterminate for B and T-cell coexpression.  LEF 1 presumably staining T cells.  Ultimate decision for treatment depends on the specific pathologic subtype and the staging and I will get her prognostic labs going as well.  Given the relatively initial high-grade features of this that might require Adriamycin I will also proceed with echocardiogram.  -3/25/2021 CBC normal.  CMP unremarkable.  Sedimentation rate normal 27 with C-reactive protein elevated 1.7 upper limit of normal 0.5.  Beta-2 microglobulin normal 1.6.  Uric acid normal 4.3.  LDH normal 162.  Hepatitis B and C serologies negative.  -3/29/2021 echocardiogram ejection fraction 61 to 65%.  -3/31/21 excisional node biopsy shows diffuse large B cell non-Hodgkin lymphoma  -4/2/2021 PET shows postsurgical changes right proximal thigh and superficial inguinal region adjacent to adenopathy with SUV 4.0.  Right internal iliac lymph node mildly enlarged SUV 2.6 additionally noted.  No other adenopathy outside this region.  No mention of any abnormal marrow signal.  -4/5/2021 [facility] medical oncology follow-up visit: Per Dr. Arun Garcia pathologist, this is a diffuse large B cell non-Hodgkin lymphoma.  He is not sure whether this is double hit or not yet.  He is not sure if there is a background of follicular lymphoma and hence cannot say whether this is transformed.  Clinically her IPI score is 0 with her age less than 60, normal LDH, ECOG 0, no extranodal involvement on PET, and only stage II at worse per PET with all nodes within the same region.  I will get a bone marrow biopsy.  If this is stage IV, double hit, or has evidence of transition from follicular lymphoma into diffuse large B-cell lymphoma, then I would give R-CHOP x6.  If the marrow is not involved, this is not double hit, and there is no evidence of follicular transformation into diffuse large  "B-cell lymphoma, then I would treat this as a stage II nonbulky diffuse large B-cell lymphoma and consider R-CHOP x3 followed by involved site radiation therapy.  I reviewed her PET and biopsy reports an echocardiogram which shows good ejection fraction.  She will need to get her [employer] Covid vaccine this week, family doctor established for the possibility of prednisone-induced diabetes, CT-guided bone marrow biopsy, chemo preparation visit, and a port placement with Dr. Gong.  We will present her at multidisciplinary tumor board this Friday.  I went into detail regarding the side effects of R-CHOP but she will have these reviewed at her chemo preparation visit.  Though not bulky, I still would treat her with allopurinol.         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 female, here for follow up on management of diffuse large B cell non-Hodgkin lymphoma status post excisional biopsy    History reviewed. No pertinent past medical history.  History reviewed. No pertinent surgical history.    No Known Allergies    Family History and Social History reviewed and changed as necessary    REVIEW OF SYSTEM:   No B symptoms    PHYSICAL EXAM:  No other palpable inguinal axillary cervical adenopathy.  No hepatosplenomegaly.    Vitals:    21   BP: 137/72   Pulse: 89   Resp: 16   Temp: 98.4 °F (36.9 °C)   SpO2: 95%   Weight: 97.1 kg (214 lb)   Height: 162.6 cm (64\")     Vitals:    21   PainSc: 0-No pain          ECOG score: 0           Vitals reviewed.    ECO    Lab Results   Component Value Date    HGB 12.8 2021    HCT 40.1 2021    MCV 90.8 2021     2021    WBC 6.80 2021    NEUTROABS 3.80 2021    LYMPHSABS 2.70 2021    MONOSABS 0.30 2021 "       Lab Results   Component Value Date    GLUCOSE 97 03/25/2021    BUN 11 03/25/2021    CREATININE 0.57 03/25/2021     03/25/2021    K 4.1 03/25/2021     03/25/2021    CO2 29.0 03/25/2021    CALCIUM 9.1 03/25/2021    PROTEINTOT 7.0 03/25/2021    ALBUMIN 4.20 03/25/2021    BILITOT <0.2 03/25/2021    ALKPHOS 71 03/25/2021    AST 13 03/25/2021    ALT 12 03/25/2021             ASSESSMENT & PLAN:  1.  Right Medial thigh diffuse large b cell non-hodgkin lymphoma    Oncology history timeline:  -2/18/2021 hemoglobin 12.7 with normal white count platelet count differential.  CMP unremarkable.  Urinalysis with microscopic hematuria 20-30 red blood cells per milliliter.  AP single view chest x-ray emergency room [place] showed no acute findings.  CT abdomen pelvis with IV contrast emergency room [place] showed calcified granuloma right lung base, fatty liver infiltration, subcentimeter hypodensity left hepatic lobe too small to categorize, normal size spleen, no adrenal masses, no pancreatic mass, normal kidneys, and no retroperitoneal adenopathy or ascites or pneumoperitoneum.  There was a smooth marginated homogeneous enhancing 4.5 x 5 x 3.3 cm mass in the superficial soft tissues of the anterior upper right thigh.  -2/25/2021 office note from Dr. Gong mentions presentation to emergency room 2/18/2021 complaining of right upper thigh swelling for the preceding week of 2/12/2021.  CT scan abdomen pelvis to include the thighs performed at [facility] emergency room revealing 5 cm smoothly marginated right upper thigh mass below the groin crease suspicious for pathologic lymph node but could be neoplastic of other nature given no prodromal illness, infections, or inflammation.  Recommendation for biopsy was made.  Per Dr. Gong note, she had been sick for the better part of the winter and had been urgent treatment centers told that she had a viral infection flu and coronavirus negative with  fevers and chills that subsequently resolved but for fatigue that did not resolve with hypersomnolence as well.  Says she feels hot all the time but no ronny night sweats.  -3/12/2021 right thigh mass needle core tissue sample extremely small and predominantly crushed limiting adequate evaluation.  Differential diagnosis for this B-cell lymphoma is diffuse large B cell, follicular lymphoma versus other.  Further differentiation could not be performed on this crush small sample.  -3/25/2021 [facility] hematology oncology initial consultation: I, Rodríguez Gerardo, saw for the first time today.  Reviewed the above story with her.  Still fatigued without night sweats or unexplained weight loss.  She has this mass in the soft tissue medial right thigh about 5 cm in size that does seem to move somewhat beneath the skin but also to my exam feels like it is attached to the deeper structures of the thigh as well.  I feel no other adenopathy or hepatosplenomegaly.  I will get a PET scan.  I spoken with Dr. Gong who will look at these images again with the CTs in [place].  If there is clear indication that this is not coming from soft tissue nonnodal structures, then excisional biopsy for adequate tissue diagnosis is important to determine the type of therapy.  This is growing since her biopsy 3/12/2021 but not daily like a very high-grade lymphoma.  To my hand, this does not feel like a typical stas structure and my other concern is, even though this does not venessa with any markers to suggest sarcoma or the like, is to be sure that this is not something other than lymphoma and I have spoken with Dr. Pino who will do desmin stains and other markers before doing an excisional biopsy next Wednesday with Dr. Gong.  If those additional stains suggest sarcomatoid features that can appear in the medial aspect of the thigh such as this, then she probably needs to have excision by Humberto Vera orthopedic oncologist at .  In  the meantime, presuming this to be some form of lymphoma which is the highest likelihood based on the pathology from Dr. Pino where this is CD20 positive, CD10 positive, BCL6 40% positive, MUM1 40% positive, CD23 strong and diffuse positive, and BCL-2 positive.  Ki-67 is 50%.  CD30 and only 5%.  C-Myc is weakly expressed in 10% of cells.  BRIAN by TAYLER is negative.  Cyclin D1 negative.  CD5 indeterminate for B and T-cell coexpression.  LEF 1 presumably staining T cells.  Ultimate decision for treatment depends on the specific pathologic subtype and the staging and I will get her prognostic labs going as well.  Given the relatively initial high-grade features of this that might require Adriamycin I will also proceed with echocardiogram.  -3/25/2021 CBC normal.  CMP unremarkable.  Sedimentation rate normal 27 with C-reactive protein elevated 1.7 upper limit of normal 0.5.  Beta-2 microglobulin normal 1.6.  Uric acid normal 4.3.  LDH normal 162.  Hepatitis B and C serologies negative.  -3/29/2021 echocardiogram ejection fraction 61 to 65%.  -3/31/21 excisional node biopsy shows diffuse large B cell non-Hodgkin lymphoma  -4/2/2021 PET shows postsurgical changes right proximal thigh and superficial inguinal region adjacent to adenopathy with SUV 4.0.  Right internal iliac lymph node mildly enlarged SUV 2.6 additionally noted.  No other adenopathy outside this region.  No mention of any abnormal marrow signal.  -4/5/2021 [facility] medical oncology follow-up visit: Per Dr. Arun Garcia pathologist, this is a diffuse large B cell non-Hodgkin lymphoma.  He is not sure whether this is double hit or not yet.  He is not sure if there is a background of follicular lymphoma and hence cannot say whether this is transformed.  Clinically her IPI score is 0 with her age less than 60, normal LDH, ECOG 0, no extranodal involvement on PET, and only stage II at worse per PET with all nodes within the same region.  I will get a bone marrow  biopsy.  If this is stage IV, double hit, or has evidence of transition from follicular lymphoma into diffuse large B-cell lymphoma, then I would give R-CHOP x6.  If the marrow is not involved, this is not double hit, and there is no evidence of follicular transformation into diffuse large B-cell lymphoma, then I would treat this as a stage II nonbulky diffuse large B-cell lymphoma and consider R-CHOP x3 followed by involved site radiation therapy.  I reviewed her PET and biopsy reports an echocardiogram which shows good ejection fraction.  She will need to get her [employer] Covid vaccine this week, family doctor established for the possibility of prednisone-induced diabetes, CT-guided bone marrow biopsy, chemo preparation visit, and a port placement with Dr. Gong.  We will present her at multidisciplinary tumor board this Friday.  I went into detail regarding the side effects of R-CHOP but she will have these reviewed at her chemo preparation visit.  Though not bulky, I still would treat her with allopurinol.  First course of therapy will occur in our [place] office and if all goes well then the subsequent Rituxan doses can be faster and performed in [place] office. Total time of care today inclusive of time before, during, and after visit, inclusive of time spent discussing with pathologist as well as with patient and my staff to prepare for the above took 80 minutes of patient care time today.  I also left a message regarding this plan with Dr. Gong.  Rodríguez Gerardo MD    04/05/2021

## 2021-04-06 ENCOUNTER — TELEPHONE (OUTPATIENT)
Dept: ONCOLOGY | Facility: CLINIC | Age: 51
End: 2021-04-06

## 2021-04-06 NOTE — TELEPHONE ENCOUNTER
Caller: PT    Relationship to patient: PT    Best call back number: [phone]    Chief complaint: PT TO RESCHED 4/7 CHEMO EDU    Type of visit: CHEMO EDU    Requested date: NA    If rescheduling, when is the original appointment: 4/7    Additional notes:

## 2021-04-09 ENCOUNTER — LAB (OUTPATIENT)
Dept: LAB | Facility: HOSPITAL | Age: 51
End: 2021-04-09

## 2021-04-09 ENCOUNTER — OFFICE VISIT (OUTPATIENT)
Dept: ONCOLOGY | Facility: CLINIC | Age: 51
End: 2021-04-09

## 2021-04-09 VITALS
DIASTOLIC BLOOD PRESSURE: 75 MMHG | BODY MASS INDEX: 36.7 KG/M2 | SYSTOLIC BLOOD PRESSURE: 142 MMHG | RESPIRATION RATE: 18 BRPM | TEMPERATURE: 97.7 F | OXYGEN SATURATION: 96 % | HEIGHT: 64 IN | WEIGHT: 215 LBS | HEART RATE: 82 BPM

## 2021-04-09 DIAGNOSIS — C83.35 DIFFUSE LARGE B-CELL LYMPHOMA OF LYMPH NODES OF INGUINAL REGION (HCC): ICD-10-CM

## 2021-04-09 DIAGNOSIS — C83.35 DIFFUSE LARGE B-CELL LYMPHOMA OF LYMPH NODES OF INGUINAL REGION (HCC): Primary | Chronic | ICD-10-CM

## 2021-04-09 LAB
ALBUMIN SERPL-MCNC: 4.2 G/DL (ref 3.5–5.2)
ALBUMIN/GLOB SERPL: 1.4 G/DL
ALP SERPL-CCNC: 74 U/L (ref 39–117)
ALT SERPL W P-5'-P-CCNC: 12 U/L (ref 1–33)
ANION GAP SERPL CALCULATED.3IONS-SCNC: 13 MMOL/L (ref 5–15)
AST SERPL-CCNC: 11 U/L (ref 1–32)
BILIRUB SERPL-MCNC: 0.2 MG/DL (ref 0–1.2)
BUN SERPL-MCNC: 14 MG/DL (ref 6–20)
BUN/CREAT SERPL: 20.9 (ref 7–25)
CALCIUM SPEC-SCNC: 8.5 MG/DL (ref 8.6–10.5)
CHLORIDE SERPL-SCNC: 101 MMOL/L (ref 98–107)
CO2 SERPL-SCNC: 26 MMOL/L (ref 22–29)
CREAT SERPL-MCNC: 0.67 MG/DL (ref 0.57–1)
ERYTHROCYTE [DISTWIDTH] IN BLOOD BY AUTOMATED COUNT: 14.8 % (ref 12.3–15.4)
GFR SERPL CREATININE-BSD FRML MDRD: 92 ML/MIN/1.73
GLOBULIN UR ELPH-MCNC: 3 GM/DL
GLUCOSE SERPL-MCNC: 138 MG/DL (ref 65–99)
HBV SURFACE AB SER RIA-ACNC: REACTIVE
HBV SURFACE AG SERPL QL IA: NORMAL
HCT VFR BLD AUTO: 37.3 % (ref 34–46.6)
HGB BLD-MCNC: 12.4 G/DL (ref 12–15.9)
LYMPHOCYTES # BLD AUTO: 2.9 10*3/MM3 (ref 0.7–3.1)
LYMPHOCYTES NFR BLD AUTO: 35.9 % (ref 19.6–45.3)
MCH RBC QN AUTO: 29.6 PG (ref 26.6–33)
MCHC RBC AUTO-ENTMCNC: 33.1 G/DL (ref 31.5–35.7)
MCV RBC AUTO: 89.5 FL (ref 79–97)
MONOCYTES # BLD AUTO: 0.2 10*3/MM3 (ref 0.1–0.9)
MONOCYTES NFR BLD AUTO: 2.4 % (ref 5–12)
NEUTROPHILS NFR BLD AUTO: 5 10*3/MM3 (ref 1.7–7)
NEUTROPHILS NFR BLD AUTO: 61.7 % (ref 42.7–76)
PLATELET # BLD AUTO: 359 10*3/MM3 (ref 140–450)
PMV BLD AUTO: 6.5 FL (ref 6–12)
POTASSIUM SERPL-SCNC: 3.3 MMOL/L (ref 3.5–5.2)
PROT SERPL-MCNC: 7.2 G/DL (ref 6–8.5)
RBC # BLD AUTO: 4.17 10*6/MM3 (ref 3.77–5.28)
SODIUM SERPL-SCNC: 140 MMOL/L (ref 136–145)
WBC # BLD AUTO: 8.1 10*3/MM3 (ref 3.4–10.8)

## 2021-04-09 PROCEDURE — 36415 COLL VENOUS BLD VENIPUNCTURE: CPT

## 2021-04-09 PROCEDURE — 80053 COMPREHEN METABOLIC PANEL: CPT

## 2021-04-09 PROCEDURE — 87340 HEPATITIS B SURFACE AG IA: CPT

## 2021-04-09 PROCEDURE — 85025 COMPLETE CBC W/AUTO DIFF WBC: CPT

## 2021-04-09 PROCEDURE — 99215 OFFICE O/P EST HI 40 MIN: CPT | Performed by: NURSE PRACTITIONER

## 2021-04-09 PROCEDURE — 86704 HEP B CORE ANTIBODY TOTAL: CPT

## 2021-04-09 PROCEDURE — 86706 HEP B SURFACE ANTIBODY: CPT

## 2021-04-09 NOTE — PROGRESS NOTES
CHEMOTHERAPY PREPARATION    Iliana Gray  [id number]  1970    Subjective   Chief Complaint: Treatment Preparation and Needs Assessment    History of present illness:  Iliana Gray is a 50 y.o. year old female who is here today for chemotherapy preparation and needs assessment. The patient has been diagnosed with Diffuse large b cell lymphoma and is scheduled to begin treatment with Rituxan-CHOP.     Oncology History:    Oncology/Hematology History Overview Note   1.  Right Medial thigh diffuse large b cell non-hodgkin lymphoma    Oncology history timeline:  -2/18/2021 hemoglobin 12.7 with normal white count platelet count differential.  CMP unremarkable.  Urinalysis with microscopic hematuria 20-30 red blood cells per milliliter.  AP single view chest x-ray emergency room [facility] showed no acute findings.  CT abdomen pelvis with IV contrast emergency room [facility] showed calcified granuloma right lung base, fatty liver infiltration, subcentimeter hypodensity left hepatic lobe too small to categorize, normal size spleen, no adrenal masses, no pancreatic mass, normal kidneys, and no retroperitoneal adenopathy or ascites or pneumoperitoneum.  There was a smooth marginated homogeneous enhancing 4.5 x 5 x 3.3 cm mass in the superficial soft tissues of the anterior upper right thigh.  -2/25/2021 office note from Dr. Gong mentions presentation to emergency room 2/18/2021 complaining of right upper thigh swelling for the preceding week of 2/12/2021.  CT scan abdomen pelvis to include the thighs performed at [facility] emergency room revealing 5 cm smoothly marginated right upper thigh mass below the groin crease suspicious for pathologic lymph node but could be neoplastic of other nature given no prodromal illness, infections, or inflammation.  Recommendation for biopsy was made.  Per Dr. Gong note, she had been sick for the better part of the winter and had been urgent treatment  centers told that she had a viral infection flu and coronavirus negative with fevers and chills that subsequently resolved but for fatigue that did not resolve with hypersomnolence as well.  Says she feels hot all the time but no ronny night sweats.  -3/12/2021 right thigh mass needle core tissue sample extremely small and predominantly crushed limiting adequate evaluation.  Differential diagnosis for this B-cell lymphoma is diffuse large B cell, follicular lymphoma versus other.  Further differentiation could not be performed on this crush small sample.  -3/25/2021 [facility] [place] hematology oncology initial consultation: I, Rodríguez Gerardo, saw for the first time today.  Reviewed the above story with her.  Still fatigued without night sweats or unexplained weight loss.  She has this mass in the soft tissue medial right thigh about 5 cm in size that does seem to move somewhat beneath the skin but also to my exam feels like it is attached to the deeper structures of the thigh as well.  I feel no other adenopathy or hepatosplenomegaly.  I will get a PET scan.  I spoken with Dr. Gong who will look at these images again with the CTs in [place].  If there is clear indication that this is not coming from soft tissue nonnodal structures, then excisional biopsy for adequate tissue diagnosis is important to determine the type of therapy.  This is growing since her biopsy 3/12/2021 but not daily like a very high-grade lymphoma.  To my hand, this does not feel like a typical stas structure and my other concern is, even though this does not venessa with any markers to suggest sarcoma or the like, is to be sure that this is not something other than lymphoma and I have spoken with Dr. Pino who will do desmin stains and other markers before doing an excisional biopsy next Wednesday with Dr. Gong.  If those additional stains suggest sarcomatoid features that can appear in the medial aspect of the thigh such as this, then she probably  needs to have excision by Humberto Vera orthopedic oncologist at .  In the meantime, presuming this to be some form of lymphoma which is the highest likelihood based on the pathology from Dr. Pino where this is CD20 positive, CD10 positive, BCL6 40% positive, MUM1 40% positive, CD23 strong and diffuse positive, and BCL-2 positive.  Ki-67 is 50%.  CD30 and only 5%.  C-Myc is weakly expressed in 10% of cells.  BRIAN by TAYLER is negative.  Cyclin D1 negative.  CD5 indeterminate for B and T-cell coexpression.  LEF 1 presumably staining T cells.  Ultimate decision for treatment depends on the specific pathologic subtype and the staging and I will get her prognostic labs going as well.  Given the relatively initial high-grade features of this that might require Adriamycin I will also proceed with echocardiogram.  -3/25/2021 CBC normal.  CMP unremarkable.  Sedimentation rate normal 27 with C-reactive protein elevated 1.7 upper limit of normal 0.5.  Beta-2 microglobulin normal 1.6.  Uric acid normal 4.3.  LDH normal 162.  Hepatitis B and C serologies negative.  -3/29/2021 echocardiogram ejection fraction 61 to 65%.  -3/31/21 excisional node biopsy shows diffuse large B cell non-Hodgkin lymphoma  -4/2/2021 PET shows postsurgical changes right proximal thigh and superficial inguinal region adjacent to adenopathy with SUV 4.0.  Right internal iliac lymph node mildly enlarged SUV 2.6 additionally noted.  No other adenopathy outside this region.  No mention of any abnormal marrow signal.  -4/5/2021 [facility] medical oncology follow-up visit: Per Dr. Arun Garcia pathologist, this is a diffuse large B cell non-Hodgkin lymphoma.  He is not sure whether this is double hit or not yet.  He is not sure if there is a background of follicular lymphoma and hence cannot say whether this is transformed.  Clinically her IPI score is 0 with her age less than 60, normal LDH, ECOG 0, no extranodal involvement on PET, and only stage II at  worse per PET with all nodes within the same region.  I will get a bone marrow biopsy.  If this is stage IV, double hit, or has evidence of transition from follicular lymphoma into diffuse large B-cell lymphoma, then I would give R-CHOP x6.  If the marrow is not involved, this is not double hit, and there is no evidence of follicular transformation into diffuse large B-cell lymphoma, then I would treat this as a stage II nonbulky diffuse large B-cell lymphoma and consider R-CHOP x3 followed by involved site radiation therapy.  I reviewed her PET and biopsy reports an echocardiogram which shows good ejection fraction.  She will need to get her [employer] Covid vaccine this week, family doctor established for the possibility of prednisone-induced diabetes, CT-guided bone marrow biopsy, chemo preparation visit, and a port placement with Dr. Gong.  We will present her at multidisciplinary tumor board this Friday.  I went into detail regarding the side effects of R-CHOP but she will have these reviewed at her chemo preparation visit.  Though not bulky, I still would treat her with allopurinol.  First course of therapy will occur in our [place] office and if all goes well then the subsequent Rituxan doses can be faster and performed in [place] office.         Diffuse large B-cell lymphoma of lymph nodes of inguinal region (CMS/HCC)   3/29/2021 -  Other Event    Echocardiogram  · Left ventricular ejection fraction appears to be 61 - 65%. Left ventricular systolic function is normal.  · Normal global longitudinal strain at -23.5%.  · Left ventricular diastolic function was normal.  · No significant structural or functional valvular disease.     4/2/2021 Imaging    PET/CT IMPRESSION:  Postsurgical changes right proximal thigh and superficial  inguinal region adjacent to adenopathy with abnormal hypermetabolism  maximum SUV 4.0. Right internal iliac lymph node is mildly enlarged with  maximum SUV 2.6  "additionally noted. No soft tissue mass or adenopathy  outside of this region.     4/13/2021 -  Chemotherapy    OP LYMPHOMA R-CHOP RiTUXimab / Cyclophosphamide / DOXOrubicin / VinCRIStine / PredniSONE         The current medication list and allergy list were reviewed and reconciled.     Past Medical History, Past Surgical History, Social History, Family History have been reviewed and are without significant changes except as mentioned.    Review of Systems   Constitutional: Positive for fatigue.   Gastrointestinal: Positive for nausea.       Objective   Physical Exam  Vital Signs: /75   Pulse 82   Temp 97.7 °F (36.5 °C)   Resp 18   Ht 161.9 cm (63.75\")   Wt 97.5 kg (215 lb)   SpO2 96%   BMI 37.19 kg/m²   Vitals:    04/09/21 1109   PainSc: 0-No pain           General Appearance:  alert, cooperative, no apparent distress and appears stated age   Neurologic/Psychiatric: A&O x 3, gait steady, appropriate affect                         ECOG Performance Status: 1 - Symptomatic but completely ambulatory            NEEDS ASSESSMENTS    Genetics  The patient's new diagnosis and family history have been reviewed for genetic counseling needs. A genetic referral is not recommended.         Psychosocial  The patient has completed a PHQ-9 Depression Screening and the Distress Thermometer (DT) today.   PHQ-9 Total Score:  . PHQ-9 results show 5-9 (Mild Depression). The patient scored their distress today as 2 on a scale of 0-10 with 0 being no distress and 10 being extreme distress.   Problems marked by the patient as being an issue for them within the last week include practical problems and physical problems.   Results were reviewed along with psychosocial resources offered by our cancer center. Our oncology social worker will be flagged for a DT score of 4 or above, and a same day call will be made for a score of 9 or 10. A mental health referral is not recommended at this time. The patient is not accepting of a " "referral to [facility].   Copies of patient's questionnaires will be scanned into EMR for details and further reference.    Barriers to care  A barriers form was also completed by the patient today. We discussed services offered by our facility to help her have adequate access to care. The patient was given the name and card for our Oncology Social Worker, Tamia Tavares. Based upon barriers assessment today, the patient will require a follow-up call from the  to further discuss needs.  I placed referral to  as patient requested assistance with gas cards for transportation and information to help her 10 year old granddaughter understand what she is going through.  She is legal guardian and raising her granddaughter.    A copy of the barriers form will also be scanned into EMR for details and further reference.     VAD Assessment  The patient and I discussed planned intervenous chemotherapy as well as other IV treatments that are often needed throughout the course of treatment. These may include, but are not limited to blood transfusions, antibiotics, and IV hydration. The vasculature does not appear to be adequate for multiple peripheral IVs throughout their treatment course. Discussed risks and benefits of VADs. The patient would like to pursue Port-A-Cath insertion prior to initiation of treatment.  Port in place right chest wall.    Advance Care Planning   ACP discussion was declined by the patient. Patient does not have an advance directive, declines further assistance. may consider, I have asked  to re-address at visit  The patient and I discussed advanced care planning, \"Conversations that Matter\".   This service was offered, free of charge, for development of advance directives with a certified ACP facilitator.  The patient does not have an up-to-date advanced directive. This document is not on file with our office. The patient is not interested in an appointment " with one of our facilitators to create or update their advanced directives.         Palliative Care  The patient and I discussed palliative care services. Palliative care is not the same as Hospice care. This is specialized medical care for people living with serious illness with the goal of improving quality of life for the patient and their family. [facility] has partnered with [facility] to offer our patients outpatient palliative care early along with their treatment to assist in coordination of care, symptom management, pain management, and medical decision making.  Oncology criteria for palliative care referral is not met at this time. The patient is not interested in a palliative care consultation.     Additional Referral needs  none      CHEMOTHERAPY EDUCATION    Booklets Given: Chemotherapy and You []  Eating Hints []    Sexuality/Fertility Books []      Chemotherapy/Biotherapy Education Sheets: (list all that apply)  nausea management, acid reflux management, diarrhea management, Cancer resourse contacts information, skin and mouth care and vaccination information                                                                                                                                                                 Chemotherapy Regimen:   Treatment Plans     Name Type Plan Dates Plan Provider         Active    OP LYMPHOMA R-CHOP RiTUXimab / Cyclophosphamide / DOXOrubicin / VinCRIStine / PredniSONE ONCOLOGY TREATMENT  4/5/2021 - Present Rodríguez Gerardo MD                    TOPICS EDUCATION PROVIDED COMMENTS   ANEMIA:  role of RBC, cause, s/s, ways to manage, role of transfusion [x]    THROMBOCYTOPENIA:  role of platelet, cause, s/s, ways to prevent bleeding, things to avoid, when to seek help [x]    NEUTROPENIA:  role of WBC, cause, infection precautions, s/s of infection, when to call MD [x] Discussed Neulasta on body injector   NUTRITION & APPETITE CHANGES:  importance of maintaining  healthy diet & weight, ways to manage to improve intake, dietary consult, exercise regimen [x]    DIARRHEA:  causes, s/s of dehydration, ways to manage, dietary changes, when to call MD [x]    CONSTIPATION:  causes, ways to manage, dietary changes, when to call MD [x]    NAUSEA & VOMITING:  cause, use of antiemetics, dietary changes, when to call MD [x] She has Zofran rx now and I discussed with her that she can take for nausea now prn   MOUTH SORES:  causes, oral care, ways to manage [x]    ALOPECIA:  cause, ways to manage, resources [x]    INFERTILITY & SEXUALITY:  causes, fertility preservation options, sexuality changes, ways to manage, importance of birth control [] History of hysterectomy   NERVOUS SYSTEM CHANGES:  causes, s/s, neuropathies, cognitive changes, ways to manage [x]    PAIN:  causes, ways to manage []    SKIN & NAIL CHANGES:  cause, s/s, ways to manage [x]    ORGAN TOXICITIES:  cause, s/s, need for diagnostic tests, labs, when to notify MD [x]    SURVIVORSHIP:  distress, distress assessment, secondary malignancies, early/late effects, follow-up, social issues, social support []    HOME CARE:  use of spill kits, storing of PO chemo, how to manage bodily fluids [x]    MISCELLANEOUS:  drug interactions, administration, vesicant, et [x]        Assessment and Plan:    Diagnoses and all orders for this visit:    1. Diffuse large B-cell lymphoma of lymph nodes of inguinal region (CMS/HCC) (Primary)  -     Ambulatory Referral to ONC Social Work        Reviewed with patient education allopurinol (she does not remember picking this up, will check when she gets home), prednisone, EMLA cream and Zofran prescriptions sent to pharmacy.      Baseline labs obtained today including Hepatitis B panel, results pending.     I advised the patient that she can take Tylenol or Ibuprofen as needed for aches/pains related to cancer/treatment. I also advised patient she could use Senakot or Miralax as needed for  constipation or Imodium as needed for diarrhea.       I reviewed with the patient the care team members. I also reviewed the option of the urgent care clinic through our oncology office for evaluation and management of symptoms related to treatment.    This was a 55 minute face-to-face visit with 50 minutes spent in  counseling and coordination of care as documented above.   The patient and I have reviewed their new cancer diagnosis and scheduled treatment plan. Needs assessment was completed including genetics, psychosocial needs, barriers to care, VAD evaluation, advanced care planning, and palliative care services. Referrals have been ordered as appropriate based upon our evaluation and patient desires.     Chemotherapy teaching was also completed today as documented above. Adequate time was given to answer all questions to her satisfaction. Patient and family are aware of their care team members and contact information if they have questions or problems throughout the treatment course. Needs assessments and education has been completed. The patient is adequately prepared to begin treatment.     Her bone marrow biopsy is scheduled for Wednesday, we will likely be moving out her follow up and chemotherapy start date 1 week to allow for biopsy and results before beginning therapy.    Pain assessment was performed today as a part of patient’s care. For patients with pain related to surgery, gynecologic malignancy or cancer treatment, the plan is as noted in the assessment/plan.  For patients with pain not related to these issues, they are to seek any further needed care from a more appropriate provider, such as PCP.    I spent 55 minutes caring for Iliana on this date of service. This time includes time spent by me in the following activities: preparing for the visit, obtaining and/or reviewing a separately obtained history, counseling and educating the patient/family/caregiver, referring and communicating with  other health care professionals, documenting information in the medical record and care coordination.     Electronically signed by MAN Suarez on 04/09/21 at 12:37 EDT.

## 2021-04-10 LAB — HBV CORE AB SERPL QL IA: NEGATIVE

## 2021-04-11 ENCOUNTER — APPOINTMENT (OUTPATIENT)
Dept: PREADMISSION TESTING | Facility: HOSPITAL | Age: 51
End: 2021-04-11

## 2021-04-13 ENCOUNTER — TELEPHONE (OUTPATIENT)
Dept: SOCIAL WORK | Facility: HOSPITAL | Age: 51
End: 2021-04-13

## 2021-04-13 ENCOUNTER — TELEPHONE (OUTPATIENT)
Dept: ONCOLOGY | Facility: CLINIC | Age: 51
End: 2021-04-13

## 2021-04-13 NOTE — TELEPHONE ENCOUNTER
Called patient and she states that she thought that appt yesterday was for chemo and had been cancelled.  She is asking if she can use the COVID test that she had done on 4/7 for her port placement or get a rapid test done today. Informed her the COVID test from 4/7 would not be able to be used. Called [facility] to find out if she could have a rapid test done and still keep her appt for tomorrow but they are closed. Discussed with Dr. Gerardo and he states patient will have to get it rescheduled and then chemo will have to be rescheduled.  Called patient back and informed her, she is going to call scheduling tonight and get the appts rescheduled. Will follow up with her tomorrow.

## 2021-04-13 NOTE — TELEPHONE ENCOUNTER
Twila with [facility] left a voicemail patient didn't show up for her COVID test on 4/11/21 and her bone marrow biopsy is tomorrow so this will have to be rescheduled. Please call.

## 2021-04-13 NOTE — TELEPHONE ENCOUNTER
SW called pt to provide assistance with transportation needs. Pt lives in [place] and has been coming to [place] several times a week for various test and appointments.  SW informed her that we can help with a gas card and also connect her with additional resources that can provide more help with transportation expenses.  SW available for ongoing support and resource needs.

## 2021-04-14 ENCOUNTER — HOSPITAL ENCOUNTER (OUTPATIENT)
Dept: CT IMAGING | Facility: HOSPITAL | Age: 51
End: 2021-04-14

## 2021-04-15 ENCOUNTER — MDT ASSESSMENT (OUTPATIENT)
Dept: ONCOLOGY | Facility: CLINIC | Age: 51
End: 2021-04-15

## 2021-04-15 NOTE — PROGRESS NOTES
CANCER CONFERENCE AGENDA  DATE:  2021      SPECIALTY:  Multidisciplinary  PRESENTER:  Rodríguez Gerardo M.D.  SITE:  B-Cell  Lymphoma       HPI:  50 YOWF who presented to an outside ED with right upper thigh swelling with increased fatigue.       REFERRING PROVIDER:  Fam Gong M.D. (General Surgery - [place], [place])       PLACE OF RESIDENCE:  [place], [place]       SOCIAL HISTORY:  Current every day smoker 1 PPD x39 years.  She is not .       FAMILY HISTORY:  Lung cancer (mother & maternal grandmother)       PAST MEDICAL HISTORY:  Non-contributory.       PAST SURGICAL HISTORY:  Total hysterectomy.   NEXT GEN SEQUENCING:      IMAGIN2021 ([facility] - [place], [place]):  CT abdomen/pelvis - There is a 3.3 x 4.5 x 5 cm smoothly-marginated, homogeneously-enhancing mass in the superficial soft tissues of the anterior upper right thigh just below the groin.       2021 ([facility]):  PET w/CT - Mildly enlarged right internal iliac node maximum SUV 2.6 along with superficial inguinal adenopathy right greater than left with postsurgical changes adjacent to hypermetabolic adenopathy right superficial inguinal region maximum SUV 4.0 right and 1.3 left non hypermetabolic left involvement. Edema and fluid collection adjacent to postsurgical changes in the right proximal thigh medially.     CLINICAL STAGE: Waiting for BMB     SURGICAL PROCEDURE:     PATHOLOGY:      TREATMENT PLAN DISCUSSION:      Medical Oncology: RCHOP after BMB. Marrow positive 6 cycles and radiation for residual. Marrow negative 3 cycles-PET-radiation      Radiation Oncology: After chemo      Surgical: Patient will have a bone marrow biopsy next week      Clinical Trials: No      Other Referrals:     Treatment Recommendations/Referrals:     EVIDENCE BASED NATIONAL TREATMENT GUIDELINES:        Please discuss clinical/working stage, TNM, Stage Group, National Treatment Guidelines, and Prognostic Indicators.      Privileged and Confidential Patient  Safety Work Product:  The information contained herein has been compiled as part of the [facility] Patient Safety Evaluation System and is deemed to be a Patient Safety Work Product and is privileged and confidential.  Disclaimer:  Multidisciplinary cancer conferences provide consultative services to formulate an effective treatment plan for patients and include physician representation from medical oncology, radiation oncology, radiology, surgery, and pathology.  AJCC or other appropriate staging is discussed, along with site-specific prognostic indicators and treatment planning used by evidence-based treatment guidelines.  Treatment plans which are discussed during conference may/may not necessarily be followed by the patient's managing physician(s), as there may be other factors taken into consideration which impact the treatment plan.  The specific treatment plan is ultimately determined on an individual basis by the patient and the physician(s) involved in his/her care.

## 2021-04-19 ENCOUNTER — APPOINTMENT (OUTPATIENT)
Dept: PREADMISSION TESTING | Facility: HOSPITAL | Age: 51
End: 2021-04-19

## 2021-04-19 LAB — SARS-COV-2 RNA PNL SPEC NAA+PROBE: NOT DETECTED

## 2021-04-19 PROCEDURE — C9803 HOPD COVID-19 SPEC COLLECT: HCPCS

## 2021-04-19 PROCEDURE — U0004 COV-19 TEST NON-CDC HGH THRU: HCPCS

## 2021-04-21 ENCOUNTER — DOCUMENTATION (OUTPATIENT)
Dept: SOCIAL WORK | Facility: HOSPITAL | Age: 51
End: 2021-04-21

## 2021-04-21 ENCOUNTER — HOSPITAL ENCOUNTER (OUTPATIENT)
Dept: CT IMAGING | Facility: HOSPITAL | Age: 51
Discharge: HOME | End: 2021-04-21
Admitting: INTERNAL MEDICINE

## 2021-04-21 VITALS
OXYGEN SATURATION: 95 % | SYSTOLIC BLOOD PRESSURE: 117 MMHG | WEIGHT: 215 LBS | HEART RATE: 92 BPM | RESPIRATION RATE: 16 BRPM | HEIGHT: 64 IN | DIASTOLIC BLOOD PRESSURE: 71 MMHG | BODY MASS INDEX: 36.7 KG/M2

## 2021-04-21 DIAGNOSIS — C83.35 DIFFUSE LARGE B-CELL LYMPHOMA OF LYMPH NODES OF INGUINAL REGION (HCC): ICD-10-CM

## 2021-04-21 LAB
APTT PPP: 30.3 SECONDS (ref 22–39)
BASOPHILS # BLD AUTO: 0.05 10*3/MM3 (ref 0–0.2)
BASOPHILS NFR BLD AUTO: 0.6 % (ref 0–1.5)
DEPRECATED RDW RBC AUTO: 48.4 FL (ref 37–54)
EOSINOPHIL # BLD AUTO: 0.35 10*3/MM3 (ref 0–0.4)
EOSINOPHIL NFR BLD AUTO: 4.5 % (ref 0.3–6.2)
ERYTHROCYTE [DISTWIDTH] IN BLOOD BY AUTOMATED COUNT: 14.3 % (ref 12.3–15.4)
HCT VFR BLD AUTO: 38.2 % (ref 34–46.6)
HGB BLD-MCNC: 12.2 G/DL (ref 12–15.9)
IMM GRANULOCYTES # BLD AUTO: 0.03 10*3/MM3 (ref 0–0.05)
IMM GRANULOCYTES NFR BLD AUTO: 0.4 % (ref 0–0.5)
INR PPP: 0.99 (ref 0.85–1.16)
LYMPHOCYTES # BLD AUTO: 2.35 10*3/MM3 (ref 0.7–3.1)
LYMPHOCYTES NFR BLD AUTO: 30 % (ref 19.6–45.3)
MCH RBC QN AUTO: 29.8 PG (ref 26.6–33)
MCHC RBC AUTO-ENTMCNC: 31.9 G/DL (ref 31.5–35.7)
MCV RBC AUTO: 93.2 FL (ref 79–97)
MONOCYTES # BLD AUTO: 0.62 10*3/MM3 (ref 0.1–0.9)
MONOCYTES NFR BLD AUTO: 7.9 % (ref 5–12)
NEUTROPHILS NFR BLD AUTO: 4.43 10*3/MM3 (ref 1.7–7)
NEUTROPHILS NFR BLD AUTO: 56.6 % (ref 42.7–76)
NRBC BLD AUTO-RTO: 0 /100 WBC (ref 0–0.2)
PLATELET # BLD AUTO: 316 10*3/MM3 (ref 140–450)
PMV BLD AUTO: 8.7 FL (ref 6–12)
PROTHROMBIN TIME: 12.8 SECONDS (ref 11.4–14.4)
RBC # BLD AUTO: 4.1 10*6/MM3 (ref 3.77–5.28)
WBC # BLD AUTO: 7.83 10*3/MM3 (ref 3.4–10.8)

## 2021-04-21 PROCEDURE — 88341 IMHCHEM/IMCYTCHM EA ADD ANTB: CPT | Performed by: INTERNAL MEDICINE

## 2021-04-21 PROCEDURE — 85610 PROTHROMBIN TIME: CPT | Performed by: RADIOLOGY

## 2021-04-21 PROCEDURE — 99152 MOD SED SAME PHYS/QHP 5/>YRS: CPT

## 2021-04-21 PROCEDURE — 25010000002 FENTANYL CITRATE (PF) 100 MCG/2ML SOLUTION: Performed by: RADIOLOGY

## 2021-04-21 PROCEDURE — 85097 BONE MARROW INTERPRETATION: CPT | Performed by: INTERNAL MEDICINE

## 2021-04-21 PROCEDURE — 88184 FLOWCYTOMETRY/ TC 1 MARKER: CPT

## 2021-04-21 PROCEDURE — 88185 FLOWCYTOMETRY/TC ADD-ON: CPT

## 2021-04-21 PROCEDURE — 88305 TISSUE EXAM BY PATHOLOGIST: CPT | Performed by: INTERNAL MEDICINE

## 2021-04-21 PROCEDURE — 88264 CHROMOSOME ANALYSIS 20-25: CPT

## 2021-04-21 PROCEDURE — 85730 THROMBOPLASTIN TIME PARTIAL: CPT | Performed by: RADIOLOGY

## 2021-04-21 PROCEDURE — 88311 DECALCIFY TISSUE: CPT | Performed by: INTERNAL MEDICINE

## 2021-04-21 PROCEDURE — 88313 SPECIAL STAINS GROUP 2: CPT | Performed by: INTERNAL MEDICINE

## 2021-04-21 PROCEDURE — 88342 IMHCHEM/IMCYTCHM 1ST ANTB: CPT | Performed by: INTERNAL MEDICINE

## 2021-04-21 PROCEDURE — 77012 CT SCAN FOR NEEDLE BIOPSY: CPT

## 2021-04-21 PROCEDURE — 25010000002 MIDAZOLAM PER 1 MG: Performed by: RADIOLOGY

## 2021-04-21 PROCEDURE — 85025 COMPLETE CBC W/AUTO DIFF WBC: CPT | Performed by: RADIOLOGY

## 2021-04-21 PROCEDURE — 88237 TISSUE CULTURE BONE MARROW: CPT

## 2021-04-21 PROCEDURE — 25010000003 LIDOCAINE 1 % SOLUTION: Performed by: RADIOLOGY

## 2021-04-21 RX ORDER — LIDOCAINE HYDROCHLORIDE 10 MG/ML
20 INJECTION, SOLUTION INFILTRATION; PERINEURAL ONCE
Status: COMPLETED | OUTPATIENT
Start: 2021-04-21 | End: 2021-04-21

## 2021-04-21 RX ORDER — MIDAZOLAM HYDROCHLORIDE 1 MG/ML
INJECTION INTRAMUSCULAR; INTRAVENOUS
Status: COMPLETED | OUTPATIENT
Start: 2021-04-21 | End: 2021-04-21

## 2021-04-21 RX ORDER — FENTANYL CITRATE 50 UG/ML
INJECTION, SOLUTION INTRAMUSCULAR; INTRAVENOUS
Status: DISPENSED
Start: 2021-04-21 | End: 2021-04-21

## 2021-04-21 RX ORDER — MIDAZOLAM HYDROCHLORIDE 1 MG/ML
INJECTION INTRAMUSCULAR; INTRAVENOUS
Status: DISPENSED
Start: 2021-04-21 | End: 2021-04-21

## 2021-04-21 RX ORDER — FENTANYL CITRATE 50 UG/ML
INJECTION, SOLUTION INTRAMUSCULAR; INTRAVENOUS
Status: COMPLETED | OUTPATIENT
Start: 2021-04-21 | End: 2021-04-21

## 2021-04-21 RX ADMIN — LIDOCAINE HYDROCHLORIDE 20 ML: 10 INJECTION, SOLUTION INFILTRATION; PERINEURAL at 11:23

## 2021-04-21 RX ADMIN — FENTANYL CITRATE 50 MCG: 0.05 INJECTION, SOLUTION INTRAMUSCULAR; INTRAVENOUS at 11:17

## 2021-04-21 RX ADMIN — MIDAZOLAM HYDROCHLORIDE 2 MG: 1 INJECTION, SOLUTION INTRAMUSCULAR; INTRAVENOUS at 11:15

## 2021-04-21 NOTE — NURSING NOTE
Patient underwent CT guided biopsy with Dr. Zhu performing. Tolerated procedure well with moderate sedation; VSS throughout. Report called to [facility].

## 2021-04-21 NOTE — POST-PROCEDURE NOTE
An immediate patient assessment was done prior to the administration of moderate and/or deep conscious sedation.      Ilianajena Gray      Pre-op Diagnosis:   Right Medial thigh diffuse large b cell non-hodgkin lymphoma  Post-op diagnosis:  Same      Anesthesia: Moderate sedation    ASA SCALE ASSESSMENT:  2-Mild to moderate systemic disease, medically well controlled, with no functional limitation    MALLAMPATI CLASSIFICATION:  2-Able to visualize the soft palate, fauces, uvula. The anterior & posterior tonsilar pillars are hidden by the tongue.    Staff:   Fam Zhu MD      Estimated Blood Loss: Trace    Urine Voided: * No values recorded between 4/21/2021 12:00 AM and 4/21/2021 12:42 PM *    Specimens:                10 mL bone marrow and 11 gauge core      Drains:  None    Findings: Difficult to extract marrow.    Complications: No immediate      Fam Zhu MD     Date: 4/21/2021  Time: 12:42 EDT

## 2021-04-21 NOTE — NURSING NOTE
Pt discharged from ir dept s/p bone marrow biopsy.  Pt tolerated procedure without complications.  Pt indicates readiness for discharge. Discharge instructions reviewed with patient and her daughter both verbalized understanding.  Pt transported to exit via wheelchair per RN.

## 2021-04-21 NOTE — PROGRESS NOTES
Pt called to inform SW that she is in [place] to get her biopsy done and that she would like to have a gas card.  SW provided a $25 gas card to pt.  SW available for ongoing support and resource needs.

## 2021-04-22 ENCOUNTER — DOCUMENTATION (OUTPATIENT)
Dept: NUTRITION | Facility: HOSPITAL | Age: 51
End: 2021-04-22

## 2021-04-22 ENCOUNTER — TELEPHONE (OUTPATIENT)
Dept: INFUSION THERAPY | Facility: HOSPITAL | Age: 51
End: 2021-04-22

## 2021-04-22 NOTE — PROGRESS NOTES
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 (CMS/HCC)   • Encounter for antineoplastic chemotherapy       Food / Nutrition Related History       Hydration Status     Goal:  ounces     Enteral Feeding       Anthropometric Measurements     Height:    Ht Readings from Last 1 Encounters:   21 162.6 cm (64\")       Weight:    Wt Readings from Last 1 Encounters:   21 97.5 kg (215 lb)       BMI:  36.9 - Obese     Weight Change: no weight change reported per chart review    Review of Lab Data (Time Frame - 1 month / 2 month)   Labs reviewed - 21 & 21    Medication Review   MAR reviewed     Nutrition Focused Physical Findings       Nutrition Impact Symptoms   No problems with eating noted per chart review    Physical Activity   Not my normal self, but able to be up and about with fairly normal activities    Current Nutritional Intake     Oral diet:  Regular     Oral nutritional supplements:    Intake: will assess at time of visit     Malnutrition Risk Assessment     Recent weight loss over the past 6 months:  0 = No     Eating poorly because of a decreased appetite:  0 = No    Malnutrition Screening Score:      MST = 0 or 1 Patient not at risk for malnutrition    Nutrition Diagnosis     Problem    Etiology    Signs / Symptoms      Nutrition Intervention   Will plan to met with patient during her chemotherapy infusion appointment for nutritional assessment / education.    Goal   To aid with nutrition impact symptom " management as needed.  To maintain nutritional and hydration status during treatment.    Monitoring / Evaluation   RD will plan to met with patient at upcoming infusion appointment.  RD available to assist prn.    Marie Landeros MS, RD, LD

## 2021-04-26 ENCOUNTER — OFFICE VISIT (OUTPATIENT)
Dept: ONCOLOGY | Facility: CLINIC | Age: 51
End: 2021-04-26

## 2021-04-26 ENCOUNTER — EDUCATION (OUTPATIENT)
Dept: ONCOLOGY | Facility: HOSPITAL | Age: 51
End: 2021-04-26

## 2021-04-26 ENCOUNTER — DOCUMENTATION (OUTPATIENT)
Dept: NUTRITION | Facility: HOSPITAL | Age: 51
End: 2021-04-26

## 2021-04-26 ENCOUNTER — HOSPITAL ENCOUNTER (OUTPATIENT)
Dept: ONCOLOGY | Facility: HOSPITAL | Age: 51
Setting detail: INFUSION SERIES
Discharge: HOME | End: 2021-04-26

## 2021-04-26 VITALS
TEMPERATURE: 97.1 F | BODY MASS INDEX: 36.88 KG/M2 | HEART RATE: 112 BPM | OXYGEN SATURATION: 97 % | DIASTOLIC BLOOD PRESSURE: 71 MMHG | HEIGHT: 64 IN | RESPIRATION RATE: 18 BRPM | SYSTOLIC BLOOD PRESSURE: 133 MMHG | WEIGHT: 216 LBS

## 2021-04-26 VITALS — SYSTOLIC BLOOD PRESSURE: 130 MMHG | TEMPERATURE: 99.1 F | DIASTOLIC BLOOD PRESSURE: 66 MMHG | HEART RATE: 98 BPM

## 2021-04-26 DIAGNOSIS — C83.35 DIFFUSE LARGE B-CELL LYMPHOMA OF LYMPH NODES OF INGUINAL REGION (HCC): Primary | ICD-10-CM

## 2021-04-26 DIAGNOSIS — C83.35 DIFFUSE LARGE B-CELL LYMPHOMA OF LYMPH NODES OF INGUINAL REGION (HCC): Primary | Chronic | ICD-10-CM

## 2021-04-26 DIAGNOSIS — Z51.11 ENCOUNTER FOR ANTINEOPLASTIC CHEMOTHERAPY: ICD-10-CM

## 2021-04-26 DIAGNOSIS — Z45.2 ENCOUNTER FOR CENTRAL LINE CARE: ICD-10-CM

## 2021-04-26 LAB
ALBUMIN SERPL-MCNC: 3.9 G/DL (ref 3.5–5.2)
ALBUMIN/GLOB SERPL: 1.6 G/DL
ALP SERPL-CCNC: 83 U/L (ref 39–117)
ALT SERPL W P-5'-P-CCNC: 10 U/L (ref 1–33)
ANION GAP SERPL CALCULATED.3IONS-SCNC: 11 MMOL/L (ref 5–15)
AST SERPL-CCNC: 13 U/L (ref 1–32)
BILIRUB SERPL-MCNC: 0.2 MG/DL (ref 0–1.2)
BUN SERPL-MCNC: 8 MG/DL (ref 6–20)
BUN/CREAT SERPL: 13.6 (ref 7–25)
CALCIUM SPEC-SCNC: 8.9 MG/DL (ref 8.6–10.5)
CHLORIDE SERPL-SCNC: 103 MMOL/L (ref 98–107)
CO2 SERPL-SCNC: 28 MMOL/L (ref 22–29)
CREAT SERPL-MCNC: 0.59 MG/DL (ref 0.57–1)
GFR SERPL CREATININE-BSD FRML MDRD: 107 ML/MIN/1.73
GLOBULIN UR ELPH-MCNC: 2.4 GM/DL
GLUCOSE SERPL-MCNC: 143 MG/DL (ref 65–99)
POTASSIUM SERPL-SCNC: 3.9 MMOL/L (ref 3.5–5.2)
PROT SERPL-MCNC: 6.3 G/DL (ref 6–8.5)
SODIUM SERPL-SCNC: 142 MMOL/L (ref 136–145)

## 2021-04-26 PROCEDURE — 25010000002 RITUXIMAB 10 MG/ML SOLUTION 50 ML VIAL: Performed by: INTERNAL MEDICINE

## 2021-04-26 PROCEDURE — 25010000002 PALONOSETRON 0.25 MG/5ML SOLUTION PREFILLED SYRINGE: Performed by: INTERNAL MEDICINE

## 2021-04-26 PROCEDURE — 96367 TX/PROPH/DG ADDL SEQ IV INF: CPT

## 2021-04-26 PROCEDURE — 96413 CHEMO IV INFUSION 1 HR: CPT

## 2021-04-26 PROCEDURE — 96411 CHEMO IV PUSH ADDL DRUG: CPT

## 2021-04-26 PROCEDURE — 99215 OFFICE O/P EST HI 40 MIN: CPT | Performed by: INTERNAL MEDICINE

## 2021-04-26 PROCEDURE — 96376 TX/PRO/DX INJ SAME DRUG ADON: CPT

## 2021-04-26 PROCEDURE — 25010000002 DOXORUBICIN PER 10 MG: Performed by: INTERNAL MEDICINE

## 2021-04-26 PROCEDURE — 25010000002 HEPARIN LOCK FLUSH PER 10 UNITS: Performed by: INTERNAL MEDICINE

## 2021-04-26 PROCEDURE — 25010000002 HYDROCORTISONE SODIUM SUCCINATE 100 MG RECONSTITUTED SOLUTION: Performed by: INTERNAL MEDICINE

## 2021-04-26 PROCEDURE — 25010000002 PEGFILGRASTIM 6 MG/0.6ML PREFILLED SYRINGE KIT: Performed by: INTERNAL MEDICINE

## 2021-04-26 PROCEDURE — 96409 CHEMO IV PUSH SNGL DRUG: CPT

## 2021-04-26 PROCEDURE — 25010000002 DEXAMETHASONE SODIUM PHOSPHATE 100 MG/10ML SOLUTION: Performed by: INTERNAL MEDICINE

## 2021-04-26 PROCEDURE — 25010000002 RITUXIMAB 10 MG/ML SOLUTION 10 ML VIAL: Performed by: INTERNAL MEDICINE

## 2021-04-26 PROCEDURE — 25010000002 VINCRISTINE PER 1 MG: Performed by: INTERNAL MEDICINE

## 2021-04-26 PROCEDURE — 96415 CHEMO IV INFUSION ADDL HR: CPT

## 2021-04-26 PROCEDURE — 96417 CHEMO IV INFUS EACH ADDL SEQ: CPT

## 2021-04-26 PROCEDURE — 96375 TX/PRO/DX INJ NEW DRUG ADDON: CPT

## 2021-04-26 PROCEDURE — 25010000002 CYCLOPHOSPHAMIDE PER 100 MG: Performed by: INTERNAL MEDICINE

## 2021-04-26 PROCEDURE — 25010000002 FOSAPREPITANT PER 1 MG: Performed by: INTERNAL MEDICINE

## 2021-04-26 PROCEDURE — 25010000002 DIPHENHYDRAMINE PER 50 MG: Performed by: INTERNAL MEDICINE

## 2021-04-26 PROCEDURE — 80053 COMPREHEN METABOLIC PANEL: CPT

## 2021-04-26 PROCEDURE — 96377 APPLICATON ON-BODY INJECTOR: CPT

## 2021-04-26 RX ORDER — SODIUM CHLORIDE 9 MG/ML
250 INJECTION, SOLUTION INTRAVENOUS ONCE
Status: COMPLETED | OUTPATIENT
Start: 2021-04-26 | End: 2021-04-26

## 2021-04-26 RX ORDER — HEPARIN SODIUM (PORCINE) LOCK FLUSH IV SOLN 100 UNIT/ML 100 UNIT/ML
500 SOLUTION INTRAVENOUS AS NEEDED
Status: DISCONTINUED | OUTPATIENT
Start: 2021-04-26 | End: 2021-04-27 | Stop reason: HOSPADM

## 2021-04-26 RX ORDER — DOXORUBICIN HYDROCHLORIDE 2 MG/ML
50 INJECTION, SOLUTION INTRAVENOUS ONCE
Status: COMPLETED | OUTPATIENT
Start: 2021-04-26 | End: 2021-04-26

## 2021-04-26 RX ORDER — DIPHENHYDRAMINE HYDROCHLORIDE 50 MG/ML
50 INJECTION INTRAMUSCULAR; INTRAVENOUS AS NEEDED
Status: COMPLETED | OUTPATIENT
Start: 2021-04-26 | End: 2021-04-26

## 2021-04-26 RX ORDER — PALONOSETRON 0.05 MG/ML
0.25 INJECTION, SOLUTION INTRAVENOUS ONCE
Status: COMPLETED | OUTPATIENT
Start: 2021-04-26 | End: 2021-04-26

## 2021-04-26 RX ORDER — FAMOTIDINE 10 MG/ML
20 INJECTION, SOLUTION INTRAVENOUS AS NEEDED
Status: COMPLETED | OUTPATIENT
Start: 2021-04-26 | End: 2021-04-26

## 2021-04-26 RX ORDER — MEPERIDINE HYDROCHLORIDE 25 MG/ML
25 INJECTION INTRAMUSCULAR; INTRAVENOUS; SUBCUTANEOUS
Status: DISCONTINUED | OUTPATIENT
Start: 2021-04-26 | End: 2021-04-27 | Stop reason: HOSPADM

## 2021-04-26 RX ORDER — ACETAMINOPHEN 325 MG/1
650 TABLET ORAL ONCE
Status: COMPLETED | OUTPATIENT
Start: 2021-04-26 | End: 2021-04-26

## 2021-04-26 RX ORDER — HEPARIN SODIUM (PORCINE) LOCK FLUSH IV SOLN 100 UNIT/ML 100 UNIT/ML
500 SOLUTION INTRAVENOUS AS NEEDED
Status: CANCELLED | OUTPATIENT
Start: 2021-04-26

## 2021-04-26 RX ADMIN — DEXAMETHASONE SODIUM PHOSPHATE 12 MG: 10 INJECTION, SOLUTION INTRAMUSCULAR; INTRAVENOUS at 15:34

## 2021-04-26 RX ADMIN — PALONOSETRON HYDROCHLORIDE 0.25 MG: 0.05 INJECTION, SOLUTION INTRAVENOUS at 15:32

## 2021-04-26 RX ADMIN — SODIUM CHLORIDE 150 MG: 9 INJECTION, SOLUTION INTRAVENOUS at 15:34

## 2021-04-26 RX ADMIN — ACETAMINOPHEN 650 MG: 325 TABLET ORAL at 08:56

## 2021-04-26 RX ADMIN — HYDROCORTISONE SODIUM SUCCINATE 100 MG: 100 INJECTION, POWDER, FOR SOLUTION INTRAMUSCULAR; INTRAVENOUS at 10:49

## 2021-04-26 RX ADMIN — PEGFILGRASTIM 6 MG: KIT SUBCUTANEOUS at 17:07

## 2021-04-26 RX ADMIN — FAMOTIDINE 20 MG: 10 INJECTION, SOLUTION INTRAVENOUS at 10:50

## 2021-04-26 RX ADMIN — DIPHENHYDRAMINE HYDROCHLORIDE 25 MG: 50 INJECTION INTRAMUSCULAR; INTRAVENOUS at 11:41

## 2021-04-26 RX ADMIN — CYCLOPHOSPHAMIDE 1500 MG: 1 INJECTION, POWDER, FOR SOLUTION INTRAVENOUS; ORAL at 16:33

## 2021-04-26 RX ADMIN — SODIUM CHLORIDE 250 ML: 9 INJECTION, SOLUTION INTRAVENOUS at 08:57

## 2021-04-26 RX ADMIN — VINCRISTINE SULFATE 2 MG: 1 INJECTION, SOLUTION INTRAVENOUS at 16:17

## 2021-04-26 RX ADMIN — DIPHENHYDRAMINE HYDROCHLORIDE 50 MG: 50 INJECTION INTRAMUSCULAR; INTRAVENOUS at 08:57

## 2021-04-26 RX ADMIN — DOXORUBICIN HYDROCHLORIDE 100 MG: 2 INJECTION, SOLUTION INTRAVENOUS at 16:07

## 2021-04-26 RX ADMIN — RITUXIMAB 750 MG: 10 INJECTION, SOLUTION INTRAVENOUS at 09:43

## 2021-04-26 RX ADMIN — Medication 500 UNITS: at 17:08

## 2021-04-26 NOTE — PLAN OF CARE
Outpatient Infusion • [address] [facility] • [address] • [place] [id number] • [phone]      CHEMOTHERAPY EDUCATION SHEET    NAME:  Iliana Gray      : 1970           DATE: 21    Booklets Given: Chemotherapy and You []  Eating Hints []    Sexuality/Fertility Books []     Chemotherapy/Biotherapy Education Sheets: (list all that apply)  Rituximab, cyclophosphamide, doxorubicin, vincristine, prednisone, neulasta onpro                                                                                                                                                                Chemotherapy Regimen:  Rituximab, cyclophosphamide, doxorubicin, vincristine day 1 of 21 day cycles + Neulasta onpro day 2 + prednisone 100mg PO QD days 1-5    TOPICS EDUCATION PROVIDED EDUCATION REINFORCED COMMENTS   ANEMIA:  role of RBC, cause, s/s, ways to manage, role of transfusion [] [x] Discussed role of RBC and risk of anemia. Reviewed s/sxs of anemia, including fatigue.    THROMBOCYTOPENIA:  role of platelet, cause, s/s, ways to prevent bleeding, things to avoid, when to seek help [] [x] Discussed role of platelets and risk of bleeding. Reviewed s/sxs of thrombocytopnia.    NEUTROPENIA:  role of WBC, cause, infection precautions, s/s of infection, when to call MD [] [x] Discussed role of WBC and risk of infection. Counseled on ways to prevent infection and to call MD if temp is >100.4F. Discussed appropriate hand hygiene and avoiding sick contacts.    NUTRITION & APPETITE CHANGES:  importance of maintaining healthy diet & weight, ways to manage to improve intake, dietary consult, exercise regimen [] [x] Discussed risk of decreased appetite.    DIARRHEA:  causes, s/s of dehydration, ways to manage, dietary changes, when to call MD [] [x] Discussed risk of diarrhea and dehydration. Can use OTC loperamide, but call MD if not relieved in 24 hours.   CONSTIPATION:  causes, ways to manage, dietary changes, when to call MD  [] [x] Discussed risk of constipation. Reviewed role of OTC stool softeners and stimulants as needed.    NAUSEA & VOMITING:  cause, use of antiemetics, dietary changes, when to call MD [] [x] Discussed risk of N/V and counseled on use of prn ondansetron.    MOUTH SORES:  causes, oral care, ways to manage [] [x] Discussed risk of mouth sores. Told patient to use a soft bristled tooth brush and that they could use a salt and soda rinse for prevention.    ALOPECIA:  cause, ways to manage, resources [] [x] Discussed risk of hair loss.   INFERTILITY & SEXUALITY:  causes, fertility preservation options, sexuality changes, ways to manage, importance of birth control [] [x] Discussed safe sex practices.    NERVOUS SYSTEM CHANGES:  causes, s/s, neuropathies, cognitive changes, ways to manage [] [x] Discussed risk of peripheral neuropathy as pt received multiple treatments. Informed when to call MD if sxs interrupt daily activities.    PAIN:  causes, ways to manage [] [x] Discussed risk of bone aurora and using claritin to decrease the risk/pain.????   SKIN & NAIL CHANGES:  cause, s/s, ways to manage [] []    ORGAN TOXICITIES:  cause, s/s, need for diagnostic tests, labs, when to notify MD [] [x] Discussed risk of hemorrhagic cystitis, drinking plenty of water, s/sxs, and when to call MD. Discussed red bodily fluids from doxorubicin and differentiated from blood in the urine from cyclophosphamide.   SURVIVORSHIP:  distress, distress assessment, secondary malignancies, early/late effects, follow-up, social issues, social support [] []    HOME CARE:  use of spill kits, storing of PO chemo, how to manage bodily fluids [] [x] Counseled on management of soiled linens and proper flush technique. Discussed how to manage all the side effects at home and advised when to contact the MD office.    MISCELLANEOUS:  drug interactions, administration, vesicant, et [] [x] Discussed length of infusion. Provided pt with a personalized calendar.       Referrals:        Notes:   Discussed aforementioned material with patient in clinic. All questions and concerns addressed. Provided patient with my contact information and instructions to call should additional questions arise.  Provided patient with personalized treatment calendar.       Michelle MarieD Candidate 2021

## 2021-04-26 NOTE — PROGRESS NOTES
"Pt c/o's of itching in ears and throat burning.  Denies any other symptoms.  Rituxan stopped.  Gave emergency meds Solucortef and Pepcid.  Notified Dr. Gerardo as to such with orders to continue to watch and rechallenge if patient improves.[id number] Notified Dr. Gerardo pt improved but still has nasal congestion and \"burning in her throat\".  Per Dr. Gerardo will give 25mg of benadryl.[id number] Pt denies symptoms Rituxan restarted at half the previous rate.  Pt completed infusion without incident.  "

## 2021-04-26 NOTE — PROGRESS NOTES
CHIEF COMPLAINT: Diffuse large B-cell lymphoma    Problem List:  Oncology/Hematology History Overview Note   1.  Right Medial thigh stage IIa nonbulky less than 7.5 cm diffuse large b cell non-hodgkin lymphoma with component of background follicular lymphoma plan [facility]    Oncology history timeline:  -2/18/2021 hemoglobin 12.7 with normal white count platelet count differential.  CMP unremarkable.  Urinalysis with microscopic hematuria 20-30 red blood cells per milliliter.  AP single view chest x-ray emergency room [place] showed no acute findings.  CT abdomen pelvis with IV contrast emergency room [place] showed calcified granuloma right lung base, fatty liver infiltration, subcentimeter hypodensity left hepatic lobe too small to categorize, normal size spleen, no adrenal masses, no pancreatic mass, normal kidneys, and no retroperitoneal adenopathy or ascites or pneumoperitoneum.  There was a smooth marginated homogeneous enhancing 4.5 x 5 x 3.3 cm mass in the superficial soft tissues of the anterior upper right thigh.  -2/25/2021 office note from Dr. Gong mentions presentation to emergency room 2/18/2021 complaining of right upper thigh swelling for the preceding week of 2/12/2021.  CT scan abdomen pelvis to include the thighs performed at [place] regional emergency room revealing 5 cm smoothly marginated right upper thigh mass below the groin crease suspicious for pathologic lymph node but could be neoplastic of other nature given no prodromal illness, infections, or inflammation.  Recommendation for biopsy was made.  Per Dr. Gong note, she had been sick for the better part of the winter and had been urgent treatment centers told that she had a viral infection flu and coronavirus negative with fevers and chills that subsequently resolved but for fatigue that did not resolve with hypersomnolence as well.  Says she feels hot all the time but no ronny night sweats.  -3/12/2021  right thigh mass needle core tissue sample extremely small and predominantly crushed limiting adequate evaluation.  Differential diagnosis for this B-cell lymphoma is diffuse large B cell, follicular lymphoma versus other.  Further differentiation could not be performed on this crush small sample.  -3/25/2021 [facility] hematology oncology initial consultation: I, Rodríguez Gerardo, saw for the first time today.  Reviewed the above story with her.  Still fatigued without night sweats or unexplained weight loss.  She has this mass in the soft tissue medial right thigh about 5 cm in size that does seem to move somewhat beneath the skin but also to my exam feels like it is attached to the deeper structures of the thigh as well.  I feel no other adenopathy or hepatosplenomegaly.  I will get a PET scan.  I spoken with Dr. Gong who will look at these images again with the CTs in [place].  If there is clear indication that this is not coming from soft tissue nonnodal structures, then excisional biopsy for adequate tissue diagnosis is important to determine the type of therapy.  This is growing since her biopsy 3/12/2021 but not daily like a very high-grade lymphoma.  To my hand, this does not feel like a typical stas structure and my other concern is, even though this does not venessa with any markers to suggest sarcoma or the like, is to be sure that this is not something other than lymphoma and I have spoken with Dr. Pino who will do desmin stains and other markers before doing an excisional biopsy next Wednesday with Dr. Gong.  If those additional stains suggest sarcomatoid features that can appear in the medial aspect of the thigh such as this, then she probably needs to have excision by Humberto Vera orthopedic oncologist at .  In the meantime, presuming this to be some form of lymphoma which is the highest likelihood based on the pathology from Dr. Pino where this is CD20 positive, CD10 positive, BCL6 40%  positive, MUM1 40% positive, CD23 strong and diffuse positive, and BCL-2 positive.  Ki-67 is 50%.  CD30 and only 5%.  C-Myc is weakly expressed in 10% of cells.  BRIAN by TAYLER is negative.  Cyclin D1 negative.  CD5 indeterminate for B and T-cell coexpression.  LEF 1 presumably staining T cells.  Ultimate decision for treatment depends on the specific pathologic subtype and the staging and I will get her prognostic labs going as well.  Given the relatively initial high-grade features of this that might require Adriamycin I will also proceed with echocardiogram.  -3/25/2021 CBC normal.  CMP unremarkable.  Sedimentation rate normal 27 with C-reactive protein elevated 1.7 upper limit of normal 0.5.  Beta-2 microglobulin normal 1.6.  Uric acid normal 4.3.  LDH normal 162.  Hepatitis B and C serologies negative.  -3/29/2021 echocardiogram ejection fraction 61 to 65%.  -3/31/21 excisional node biopsy shows diffuse large B cell non-Hodgkin lymphoma  -4/2/2021 PET shows postsurgical changes right proximal thigh and superficial inguinal region adjacent to adenopathy with SUV 4.0.  Right internal iliac lymph node mildly enlarged SUV 2.6 additionally noted.  No other adenopathy outside this region.  No mention of any abnormal marrow signal.  -4/5/2021 [facility] medical oncology follow-up visit: Per Dr. Arun Garcia pathologist, this is a diffuse large B cell non-Hodgkin lymphoma.  He is not sure whether this is double hit or not yet.  He is not sure if there is a background of follicular lymphoma and hence cannot say whether this is transformed.  Clinically her IPI score is 0 with her age less than 60, normal LDH, ECOG 0, no extranodal involvement on PET, and only stage II at worse per PET with all nodes within the same region.  I will get a bone marrow biopsy.  If this is stage IV, double hit, or has evidence of transition from follicular lymphoma into diffuse large B-cell lymphoma, then I would give R-CHOP x6.  If the marrow is  not involved, this is not double hit, and there is no evidence of follicular transformation into diffuse large B-cell lymphoma, then I would treat this as a stage II nonbulky diffuse large B-cell lymphoma and consider R-CHOP x3 followed by involved site radiation therapy.  I reviewed her PET and biopsy reports and echocardiogram which shows good ejection fraction.  She will need to get her [employer] Covid vaccine this week, family doctor established for the possibility of prednisone-induced diabetes, CT-guided bone marrow biopsy, chemo preparation visit, and a port placement with Dr. Gong.  We will present her at multidisciplinary tumor board this Friday.  I went into detail regarding the side effects of R-CHOP but she will have these reviewed at her chemo preparation visit.  Though not bulky, I still would treat her with allopurinol.  First course of therapy will occur in our [place] office and if all goes well then the subsequent Rituxan doses can be faster and performed in [place] office.    -4/14/2021 pathology update Dr. Garcia: Node pathology negative for double hit by FISH    -4/21/2021 bone marrow biopsy negative for lymphomatous involvement with scant stainable iron.      -4/26/2021 [facility] medical oncology follow-up visit: Given lack of marrow involvement, negative for double hit, and possible background follicular lymphoma , I will treat her as stage IIa nonbulky (original tumor 5 cm on CT, I.  E.  Less than 7.5 cm) diffuse large B-cell lymphoma right proximal thigh SUV 4.0 mass excisionally biopsied with mild right internal iliac node enlargement SUV 2.6 as well.  We will treat her with planned R-CHOP x3 followed by involved site radiation therapy.  This was consensus of multidisciplinary tumor board 4/16/2021 as well.  First course [place].  Second course will occur in [place].  With the small component of background follicular lymphoma, there is potential risk of not only relapse of  diffuse large B-cell lymphoma but later relapse from follicular lymphoma and we will keep that in mind in terms of following her up past the usual 3 years post definitive therapy presuming we get a complete remission on subsequent imaging and exams.         Diffuse large B-cell lymphoma of lymph nodes of inguinal region (CMS/HCC)   3/29/2021 -  Other Event    Echocardiogram  · Left ventricular ejection fraction appears to be 61 - 65%. Left ventricular systolic function is normal.  · Normal global longitudinal strain at -23.5%.  · Left ventricular diastolic function was normal.  · No significant structural or functional valvular disease.     4/2/2021 Imaging    PET/CT IMPRESSION:  Postsurgical changes right proximal thigh and superficial  inguinal region adjacent to adenopathy with abnormal hypermetabolism  maximum SUV 4.0. Right internal iliac lymph node is mildly enlarged with  maximum SUV 2.6 additionally noted. No soft tissue mass or adenopathy  outside of this region.     4/21/2021 Biopsy    Marrow biopsy negative     4/26/2021 -  Chemotherapy    OP LYMPHOMA R-CHOP RiTUXimab / Cyclophosphamide / DOXOrubicin / VinCRIStine / PredniSONE     4/26/2021 Cancer Staged    Staging form: Hodgkin And Non-Hodgkin Lymphoma, AJCC 8th Edition  - Clinical: Stage II (Diffuse large B-cell lymphoma) - Signed by Rodríguez Gerardo MD on 4/26/2021         HISTORY OF PRESENT ILLNESS:  The patient is a 50 y.o. female, here for follow up on management of diffuse large B-cell lymphoma status post staging    Past Medical History:   Diagnosis Date   • Anxiety    • Arthritis    • Asthma    • Diffuse large B cell lymphoma (CMS/HCC)    • Sleep apnea     cpap     Past Surgical History:   Procedure Laterality Date   • HYSTERECTOMY     • TUMOR EXCISION Right 04/2021    lymph node removal/biopsy/right inner thigh   • VENOUS ACCESS DEVICE (PORT) INSERTION Right 04/2021       No Known Allergies    Family History and Social History reviewed and changed  "as necessary    REVIEW OF SYSTEM:   No B symptoms    PHYSICAL EXAM:  There is a palpable mass in the medial thigh where there is secondary intention healing from incisional biopsy    Vitals:    04/26/21 0744   BP: 133/71   Pulse: 112   Resp: 18   Temp: 97.1 °F (36.2 °C)   SpO2: 97%   Weight: 98 kg (216 lb)   Height: 162.6 cm (64\")     Vitals:    04/26/21 0744   PainSc: 0-No pain          ECOG score: 0           Vitals reviewed.    ECOG: (0) Fully Active - Able to Carry On All Pre-disease Performance Without Restriction    Lab Results   Component Value Date    HGB 12.2 04/21/2021    HCT 38.2 04/21/2021    MCV 93.2 04/21/2021     04/21/2021    WBC 7.83 04/21/2021    NEUTROABS 4.43 04/21/2021    LYMPHSABS 2.35 04/21/2021    MONOSABS 0.62 04/21/2021    EOSABS 0.35 04/21/2021    BASOSABS 0.05 04/21/2021       Lab Results   Component Value Date    GLUCOSE 138 (H) 04/09/2021    BUN 14 04/09/2021    CREATININE 0.67 04/09/2021     04/09/2021    K 3.3 (L) 04/09/2021     04/09/2021    CO2 26.0 04/09/2021    CALCIUM 8.5 (L) 04/09/2021    PROTEINTOT 7.2 04/09/2021    ALBUMIN 4.20 04/09/2021    BILITOT 0.2 04/09/2021    ALKPHOS 74 04/09/2021    AST 11 04/09/2021    ALT 12 04/09/2021             ASSESSMENT & PLAN:  1.  Right Medial thigh stage IIa nonbulky less than 7.5 cm diffuse large b cell non-hodgkin lymphoma with component of background follicular lymphoma plan for R-CHOP x3 followed by [facility]    Oncology history timeline:  -2/18/2021 hemoglobin 12.7 with normal white count platelet count differential.  CMP unremarkable.  Urinalysis with microscopic hematuria 20-30 red blood cells per milliliter.  AP single view chest x-ray emergency room [place] showed no acute findings.  CT abdomen pelvis with IV contrast emergency room [place] showed calcified granuloma right lung base, fatty liver infiltration, subcentimeter hypodensity left hepatic lobe too small to categorize, normal size spleen, no adrenal masses, " no pancreatic mass, normal kidneys, and no retroperitoneal adenopathy or ascites or pneumoperitoneum.  There was a smooth marginated homogeneous enhancing 4.5 x 5 x 3.3 cm mass in the superficial soft tissues of the anterior upper right thigh.  -2/25/2021 office note from Dr. Gong mentions presentation to emergency room 2/18/2021 complaining of right upper thigh swelling for the preceding week of 2/12/2021.  CT scan abdomen pelvis to include the thighs performed at [facility] emergency room revealing 5 cm smoothly marginated right upper thigh mass below the groin crease suspicious for pathologic lymph node but could be neoplastic of other nature given no prodromal illness, infections, or inflammation.  Recommendation for biopsy was made.  Per Dr. Gong note, she had been sick for the better part of the winter and had been urgent treatment centers told that she had a viral infection flu and coronavirus negative with fevers and chills that subsequently resolved but for fatigue that did not resolve with hypersomnolence as well.  Says she feels hot all the time but no ronny night sweats.  -3/12/2021 right thigh mass needle core tissue sample extremely small and predominantly crushed limiting adequate evaluation.  Differential diagnosis for this B-cell lymphoma is diffuse large B cell, follicular lymphoma versus other.  Further differentiation could not be performed on this crush small sample.  -3/25/2021 [facility] hematology oncology initial consultation: I, Rodríguez Gerardo, saw for the first time today.  Reviewed the above story with her.  Still fatigued without night sweats or unexplained weight loss.  She has this mass in the soft tissue medial right thigh about 5 cm in size that does seem to move somewhat beneath the skin but also to my exam feels like it is attached to the deeper structures of the thigh as well.  I feel no other adenopathy or hepatosplenomegaly.  I will get a PET scan.  I spoken with   Farideh who will look at these images again with the CTs in [place].  If there is clear indication that this is not coming from soft tissue nonnodal structures, then excisional biopsy for adequate tissue diagnosis is important to determine the type of therapy.  This is growing since her biopsy 3/12/2021 but not daily like a very high-grade lymphoma.  To my hand, this does not feel like a typical stas structure and my other concern is, even though this does not venessa with any markers to suggest sarcoma or the like, is to be sure that this is not something other than lymphoma and I have spoken with Dr. Pino who will do desmin stains and other markers before doing an excisional biopsy next Wednesday with Dr. Gong.  If those additional stains suggest sarcomatoid features that can appear in the medial aspect of the thigh such as this, then she probably needs to have excision by Humberto Vera orthopedic oncologist at .  In the meantime, presuming this to be some form of lymphoma which is the highest likelihood based on the pathology from Dr. Pino where this is CD20 positive, CD10 positive, BCL6 40% positive, MUM1 40% positive, CD23 strong and diffuse positive, and BCL-2 positive.  Ki-67 is 50%.  CD30 and only 5%.  C-Myc is weakly expressed in 10% of cells.  BRIAN by TAYLER is negative.  Cyclin D1 negative.  CD5 indeterminate for B and T-cell coexpression.  LEF 1 presumably staining T cells.  Ultimate decision for treatment depends on the specific pathologic subtype and the staging and I will get her prognostic labs going as well.  Given the relatively initial high-grade features of this that might require Adriamycin I will also proceed with echocardiogram.  -3/25/2021 CBC normal.  CMP unremarkable.  Sedimentation rate normal 27 with C-reactive protein elevated 1.7 upper limit of normal 0.5.  Beta-2 microglobulin normal 1.6.  Uric acid normal 4.3.  LDH normal 162.  Hepatitis B and C serologies  negative.  -3/29/2021 echocardiogram ejection fraction 61 to 65%.  -3/31/21 excisional node biopsy shows diffuse large B cell non-Hodgkin lymphoma  -4/2/2021 PET shows postsurgical changes right proximal thigh and superficial inguinal region adjacent to adenopathy with SUV 4.0.  Right internal iliac lymph node mildly enlarged SUV 2.6 additionally noted.  No other adenopathy outside this region.  No mention of any abnormal marrow signal.  -4/5/2021 [facility] oncology follow-up visit: Per Dr. Arun Garcia pathologist, this is a diffuse large B cell non-Hodgkin lymphoma.  He is not sure whether this is double hit or not yet.  He is not sure if there is a background of follicular lymphoma and hence cannot say whether this is transformed.  Clinically her IPI score is 0 with her age less than 60, normal LDH, ECOG 0, no extranodal involvement on PET, and only stage II at worse per PET with all nodes within the same region.  I will get a bone marrow biopsy.  If this is stage IV, double hit, or has evidence of transition from follicular lymphoma into diffuse large B-cell lymphoma, then I would give R-CHOP x6.  If the marrow is not involved, this is not double hit, and there is no evidence of follicular transformation into diffuse large B-cell lymphoma, then I would treat this as a stage II nonbulky diffuse large B-cell lymphoma and consider R-CHOP x3 followed by involved site radiation therapy.  I reviewed her PET and biopsy reports and echocardiogram which shows good ejection fraction.  She will need to get her [employer] Covid vaccine this week, family doctor established for the possibility of prednisone-induced diabetes, CT-guided bone marrow biopsy, chemo preparation visit, and a port placement with Dr. Gong.  We will present her at multidisciplinary tumor board this Friday.  I went into detail regarding the side effects of R-CHOP but she will have these reviewed at her chemo preparation visit.  Though  not bulky, I still would treat her with allopurinol.  First course of therapy will occur in our [place] office and if all goes well then the subsequent Rituxan doses can be faster and performed in [place] office.    -4/14/2021 pathology update Dr. Garcia: Node pathology negative for double hit by FISH    -4/21/2021 bone marrow biopsy negative for lymphomatous involvement with scant stainable iron.      -4/26/2021 [facility] oncology follow-up visit: Given lack of marrow involvement, negative for double hit, and possible background follicular lymphoma , I will treat her as stage IIa nonbulky (original tumor 5 cm on CT, I.  E.  Less than 7.5 cm) diffuse large B-cell lymphoma right proximal thigh SUV 4.0 mass incisionally biopsied (with palpable residua) with mild right internal iliac node enlargement SUV 2.6 as well.  We will treat her with planned R-CHOP x3 followed by involved site radiation therapy.  This was consensus of multidisciplinary tumor board 4/16/2021 as well.  First course [place].  Second course will occur in [place].  With the small component of background follicular lymphoma, there is potential risk of not only relapse of diffuse large B-cell lymphoma but later relapse from follicular lymphoma and we will keep that in mind in terms of following her up past the usual 3 years post definitive therapy presuming we get a complete remission on subsequent imaging and exams.  We will arrange radiation in [place] and I have made referral.  This will not start until after chemotherapy.    Total time of care today inclusive of time spent prior to visit reviewing recommendations from the [facility] clinic, bone marrow biopsy, discussion with Dr. Garcia regarding the node and marrow biopsy and the FISH results on the node and during the visit relating all this information to her including the significance of the background of follicular lymphoma as well as after the visit arranging for and signing for the  treatments and ensuring her understanding of her chemo preparation visit took 60 minutes of patient care time total today    Rodríguez Gerardo MD    04/26/2021

## 2021-04-27 NOTE — PROGRESS NOTES
Onc Nutrition    Patient: Iliana Gray  YOB: 1970    Cancer Dx: Diffuse large b cell lymphoma   Chemotherapy: R-CHOP - every 21 days x 3 cycles (cycle 1 today)  Radiation: once chemo is complete in [place]    Weight - 215#     Met with patient during her initial chemotherapy infusion appointment.  She reports her appetite and oral intake have been normal.  She denies significant nutritional complaints at this time.    Discussed the importance of good nutrition during her treatment course focusing on adequate calorie, protein, nutrient and fluid intake.  Advised her to be consuming smaller more frequent meals/snacks throughout the day to aid with potential nausea management.  Emphasized the importance of protein and its role in the diet; reviewed high protein foods; and recommended she have a protein source at each meal/snack.  Also emphasized the importance of hydration; reviewed good hydrating fluid options; and recommended she drink ~96 ounces daily.  Reviewed the ACS nutrition booklet and suggested she use it as needed to aid with potential nutrition impact symptom management.      Answered her questions and she voiced understanding of information discussed.  She denies the need for further written diet materials at this time.  RD's contact information provided and encouraged to call with questions.  Will follow up as indicated.    Marei Landeros RD  04/27/21

## 2021-04-29 DIAGNOSIS — Z51.11 ENCOUNTER FOR ANTINEOPLASTIC CHEMOTHERAPY: ICD-10-CM

## 2021-04-29 DIAGNOSIS — C83.35 DIFFUSE LARGE B-CELL LYMPHOMA OF LYMPH NODES OF INGUINAL REGION (HCC): Primary | ICD-10-CM

## 2021-04-29 RX ORDER — PROCHLORPERAZINE MALEATE 10 MG
10 TABLET ORAL EVERY 6 HOURS PRN
Qty: 30 TABLET | Refills: 1 | Status: SHIPPED | OUTPATIENT
Start: 2021-04-29 | End: 2022-10-27

## 2021-04-29 NOTE — TELEPHONE ENCOUNTER
Patient called triage reporting she started chemo on Monday for the 1st round and that she is having intense nausea, no vomiting.  Called her to discuss.  She reports she is eating and drinking ok right now, bowels are moving ok but that the zofran is not lasting long enough.  Advised her I discussed with stefan Massey who advised we would send in compazine for her to take and that she can alternate between that and the zofran to keep her nausea under control.  Advised her to call us back if she gets to having vomiting that the medication is not helping or any other concerns.     Please notify patient with received her lab results and her thyroid function tests are within normal limits so she will be staying on the same levothyroxine dosing.  This will be sent into her pharmacist.  Her levothyroxine medication was sent to up [place] mail service pharmacy She needs to adhere to low-fat high-fiber diet closer and exercise regularly in order to improve her cholesterol numbers:  Her total cholesterol was mildly elevated at 224 and her LDL was mildly elevated at 124.  Please try adhering to low-fat high-fiber diet closer and include regular exercise regimen.  She can also add Metamucil once a day which can help lower her cholesterol.  Please continue the same simvastatin dosing at 20 mg nightly.  Arthritic changes were noted in her wrist and thumb on her x-ray; no fracture dislocation was noted in the ankle or wrist some foot spurring was noted.  Please keep your follow-up appointment to go over the labs and x-rays in more detail; I would like her to repeat her cholesterol panel and blood chemistry a few days before her 02/12/2020 appointment for reassessment

## 2021-04-30 DIAGNOSIS — C83.35 DIFFUSE LARGE B-CELL LYMPHOMA OF LYMPH NODES OF INGUINAL REGION (HCC): ICD-10-CM

## 2021-04-30 LAB
CYTO UR: NORMAL
LAB AP ASPIRATE SMEAR: NORMAL
LAB AP CASE REPORT: NORMAL
LAB AP CBC AND DIFFERENTIAL: NORMAL
LAB AP CLINICAL INFORMATION: NORMAL
LAB AP CLOT SECTION: NORMAL
LAB AP CORE BIOPSY: NORMAL
LAB AP CYTOGENETICS REPORT,ADDENDUM: NORMAL
LAB AP DIAGNOSIS COMMENT: NORMAL
LAB AP FLOW CYTOMETRY SUMMARY: NORMAL
PATH REPORT.FINAL DX SPEC: NORMAL
PATH REPORT.GROSS SPEC: NORMAL

## 2021-04-30 RX ORDER — ALLOPURINOL 300 MG/1
TABLET ORAL
Qty: 30 TABLET | Refills: 2 | Status: SHIPPED | OUTPATIENT
Start: 2021-04-30 | End: 2021-06-28

## 2021-05-13 ENCOUNTER — TELEPHONE (OUTPATIENT)
Dept: ONCOLOGY | Facility: CLINIC | Age: 51
End: 2021-05-13

## 2021-05-13 NOTE — TELEPHONE ENCOUNTER
Received call from patient on triage.  Called to discuss.  She advised that she has been having a runny nose, that it is clear, cough, non productive, no fevers and has been going on for about a week.  Denies chest pain, shortness of breath.  Advised that she is not taking any allergy medication.  Discussed with stefan Colbert who advised she can try some OTC allergy medication.  I recommended she either try claritin or zyrtec and to call us back if she starts to notice color to her drainage, starts having a productive cough or gets to feeling worse.  Patient verbalized understanding.

## 2021-05-13 NOTE — TELEPHONE ENCOUNTER
Patient called she is having bad sinus issues and thinks she is heading into getting a sinus infection. She wants to know what she can take or if something can be called in? Please call to discuss.

## 2021-05-13 NOTE — TELEPHONE ENCOUNTER
MAN Fisher and she wants her referred to the triage nurse for assistance. Called the patient back and explained for immediate assistance with any symptoms we have a APRN to take care of those needs. I gave her the number [phone] and press option #1 for assistance. Patient voiced understanding.

## 2021-05-18 ENCOUNTER — HOSPITAL ENCOUNTER (OUTPATIENT)
Dept: ONCOLOGY | Facility: HOSPITAL | Age: 51
Setting detail: INFUSION SERIES
Discharge: HOME | End: 2021-05-18

## 2021-05-18 ENCOUNTER — OFFICE VISIT (OUTPATIENT)
Dept: ONCOLOGY | Facility: CLINIC | Age: 51
End: 2021-05-18

## 2021-05-18 VITALS — TEMPERATURE: 98 F | SYSTOLIC BLOOD PRESSURE: 131 MMHG | HEART RATE: 86 BPM | DIASTOLIC BLOOD PRESSURE: 66 MMHG

## 2021-05-18 VITALS
HEART RATE: 97 BPM | RESPIRATION RATE: 18 BRPM | OXYGEN SATURATION: 96 % | BODY MASS INDEX: 36.19 KG/M2 | HEIGHT: 64 IN | SYSTOLIC BLOOD PRESSURE: 148 MMHG | DIASTOLIC BLOOD PRESSURE: 63 MMHG | TEMPERATURE: 97.5 F | WEIGHT: 212 LBS

## 2021-05-18 DIAGNOSIS — C83.35 DIFFUSE LARGE B-CELL LYMPHOMA OF LYMPH NODES OF INGUINAL REGION (HCC): Primary | ICD-10-CM

## 2021-05-18 DIAGNOSIS — T45.1X5A CHEMOTHERAPY-INDUCED NAUSEA: ICD-10-CM

## 2021-05-18 DIAGNOSIS — R11.0 CHEMOTHERAPY-INDUCED NAUSEA: ICD-10-CM

## 2021-05-18 DIAGNOSIS — Z45.2 ENCOUNTER FOR CENTRAL LINE CARE: ICD-10-CM

## 2021-05-18 DIAGNOSIS — J01.10 ACUTE NON-RECURRENT FRONTAL SINUSITIS: ICD-10-CM

## 2021-05-18 LAB
ALBUMIN SERPL-MCNC: 4 G/DL (ref 3.5–5.2)
ALBUMIN/GLOB SERPL: 1.9 G/DL
ALP SERPL-CCNC: 77 U/L (ref 39–117)
ALT SERPL W P-5'-P-CCNC: 12 U/L (ref 1–33)
ANION GAP SERPL CALCULATED.3IONS-SCNC: 9 MMOL/L (ref 5–15)
AST SERPL-CCNC: 14 U/L (ref 1–32)
BILIRUB SERPL-MCNC: 0.2 MG/DL (ref 0–1.2)
BUN SERPL-MCNC: 9 MG/DL (ref 6–20)
BUN/CREAT SERPL: 15.8 (ref 7–25)
CALCIUM SPEC-SCNC: 9.2 MG/DL (ref 8.6–10.5)
CHLORIDE SERPL-SCNC: 104 MMOL/L (ref 98–107)
CO2 SERPL-SCNC: 30 MMOL/L (ref 22–29)
CREAT SERPL-MCNC: 0.57 MG/DL (ref 0.57–1)
ERYTHROCYTE [DISTWIDTH] IN BLOOD BY AUTOMATED COUNT: 15.7 % (ref 12.3–15.4)
GFR SERPL CREATININE-BSD FRML MDRD: 111 ML/MIN/1.73
GLOBULIN UR ELPH-MCNC: 2.1 GM/DL
GLUCOSE SERPL-MCNC: 133 MG/DL (ref 65–99)
HCT VFR BLD AUTO: 35 % (ref 34–46.6)
HGB BLD-MCNC: 11.3 G/DL (ref 12–15.9)
LYMPHOCYTES # BLD AUTO: 2.3 10*3/MM3 (ref 0.7–3.1)
LYMPHOCYTES NFR BLD AUTO: 35.5 % (ref 19.6–45.3)
MCH RBC QN AUTO: 28.5 PG (ref 26.6–33)
MCHC RBC AUTO-ENTMCNC: 32.4 G/DL (ref 31.5–35.7)
MCV RBC AUTO: 88.1 FL (ref 79–97)
MONOCYTES # BLD AUTO: 0.6 10*3/MM3 (ref 0.1–0.9)
MONOCYTES NFR BLD AUTO: 8.5 % (ref 5–12)
NEUTROPHILS NFR BLD AUTO: 3.7 10*3/MM3 (ref 1.7–7)
NEUTROPHILS NFR BLD AUTO: 56 % (ref 42.7–76)
PLATELET # BLD AUTO: 404 10*3/MM3 (ref 140–450)
PMV BLD AUTO: 6.4 FL (ref 6–12)
POTASSIUM SERPL-SCNC: 3.8 MMOL/L (ref 3.5–5.2)
PROT SERPL-MCNC: 6.1 G/DL (ref 6–8.5)
RBC # BLD AUTO: 3.98 10*6/MM3 (ref 3.77–5.28)
SODIUM SERPL-SCNC: 143 MMOL/L (ref 136–145)
WBC # BLD AUTO: 6.6 10*3/MM3 (ref 3.4–10.8)

## 2021-05-18 PROCEDURE — 25010000002 HEPARIN LOCK FLUSH PER 10 UNITS: Performed by: INTERNAL MEDICINE

## 2021-05-18 PROCEDURE — 25010000002 LORAZEPAM PER 2 MG: Performed by: NURSE PRACTITIONER

## 2021-05-18 PROCEDURE — 96375 TX/PRO/DX INJ NEW DRUG ADDON: CPT

## 2021-05-18 PROCEDURE — 25010000002 PEGFILGRASTIM 6 MG/0.6ML PREFILLED SYRINGE KIT: Performed by: NURSE PRACTITIONER

## 2021-05-18 PROCEDURE — 25010000002 PALONOSETRON 0.25 MG/5ML SOLUTION PREFILLED SYRINGE: Performed by: NURSE PRACTITIONER

## 2021-05-18 PROCEDURE — 25010000002 DEXAMETHASONE SODIUM PHOSPHATE 100 MG/10ML SOLUTION: Performed by: NURSE PRACTITIONER

## 2021-05-18 PROCEDURE — 25010000002 RITUXIMAB 10 MG/ML SOLUTION 10 ML VIAL: Performed by: NURSE PRACTITIONER

## 2021-05-18 PROCEDURE — 96367 TX/PROPH/DG ADDL SEQ IV INF: CPT

## 2021-05-18 PROCEDURE — 96415 CHEMO IV INFUSION ADDL HR: CPT

## 2021-05-18 PROCEDURE — 99214 OFFICE O/P EST MOD 30 MIN: CPT | Performed by: NURSE PRACTITIONER

## 2021-05-18 PROCEDURE — 96417 CHEMO IV INFUS EACH ADDL SEQ: CPT

## 2021-05-18 PROCEDURE — 96376 TX/PRO/DX INJ SAME DRUG ADON: CPT

## 2021-05-18 PROCEDURE — 96366 THER/PROPH/DIAG IV INF ADDON: CPT

## 2021-05-18 PROCEDURE — 80053 COMPREHEN METABOLIC PANEL: CPT | Performed by: INTERNAL MEDICINE

## 2021-05-18 PROCEDURE — 25010000002 DOXORUBICIN PER 10 MG: Performed by: NURSE PRACTITIONER

## 2021-05-18 PROCEDURE — 25010000002 CYCLOPHOSPHAMIDE PER 100 MG: Performed by: NURSE PRACTITIONER

## 2021-05-18 PROCEDURE — 85025 COMPLETE CBC W/AUTO DIFF WBC: CPT | Performed by: INTERNAL MEDICINE

## 2021-05-18 PROCEDURE — 25010000002 VINCRISTINE PER 1 MG: Performed by: NURSE PRACTITIONER

## 2021-05-18 PROCEDURE — 25010000002 DIPHENHYDRAMINE PER 50 MG: Performed by: NURSE PRACTITIONER

## 2021-05-18 PROCEDURE — 96411 CHEMO IV PUSH ADDL DRUG: CPT

## 2021-05-18 PROCEDURE — 96413 CHEMO IV INFUSION 1 HR: CPT

## 2021-05-18 PROCEDURE — 25010000002 FOSAPREPITANT PER 1 MG: Performed by: NURSE PRACTITIONER

## 2021-05-18 PROCEDURE — 25010000002 RITUXIMAB 10 MG/ML SOLUTION 50 ML VIAL: Performed by: NURSE PRACTITIONER

## 2021-05-18 PROCEDURE — 96377 APPLICATON ON-BODY INJECTOR: CPT

## 2021-05-18 RX ORDER — SODIUM CHLORIDE 9 MG/ML
250 INJECTION, SOLUTION INTRAVENOUS ONCE
Status: COMPLETED | OUTPATIENT
Start: 2021-05-18 | End: 2021-05-18

## 2021-05-18 RX ORDER — DIPHENHYDRAMINE HYDROCHLORIDE 50 MG/ML
50 INJECTION INTRAMUSCULAR; INTRAVENOUS AS NEEDED
Status: CANCELLED | OUTPATIENT
Start: 2021-05-18

## 2021-05-18 RX ORDER — PALONOSETRON 0.05 MG/ML
0.25 INJECTION, SOLUTION INTRAVENOUS ONCE
Status: COMPLETED | OUTPATIENT
Start: 2021-05-18 | End: 2021-05-18

## 2021-05-18 RX ORDER — DOXORUBICIN HYDROCHLORIDE 2 MG/ML
50 INJECTION, SOLUTION INTRAVENOUS ONCE
Status: CANCELLED | OUTPATIENT
Start: 2021-05-18

## 2021-05-18 RX ORDER — FAMOTIDINE 10 MG/ML
20 INJECTION, SOLUTION INTRAVENOUS AS NEEDED
Status: CANCELLED | OUTPATIENT
Start: 2021-05-18

## 2021-05-18 RX ORDER — ACETAMINOPHEN 325 MG/1
650 TABLET ORAL ONCE
Status: CANCELLED | OUTPATIENT
Start: 2021-05-18

## 2021-05-18 RX ORDER — SODIUM CHLORIDE 9 MG/ML
250 INJECTION, SOLUTION INTRAVENOUS ONCE
Status: CANCELLED | OUTPATIENT
Start: 2021-05-18

## 2021-05-18 RX ORDER — ACETAMINOPHEN 325 MG/1
650 TABLET ORAL ONCE
Status: COMPLETED | OUTPATIENT
Start: 2021-05-18 | End: 2021-05-18

## 2021-05-18 RX ORDER — DOXORUBICIN HYDROCHLORIDE 2 MG/ML
50 INJECTION, SOLUTION INTRAVENOUS ONCE
Status: COMPLETED | OUTPATIENT
Start: 2021-05-18 | End: 2021-05-18

## 2021-05-18 RX ORDER — MEPERIDINE HYDROCHLORIDE 25 MG/ML
25 INJECTION INTRAMUSCULAR; INTRAVENOUS; SUBCUTANEOUS
Status: CANCELLED | OUTPATIENT
Start: 2021-05-18

## 2021-05-18 RX ORDER — LORAZEPAM 2 MG/ML
1 INJECTION INTRAMUSCULAR EVERY 4 HOURS PRN
Status: CANCELLED
Start: 2021-05-18

## 2021-05-18 RX ORDER — PALONOSETRON 0.05 MG/ML
0.25 INJECTION, SOLUTION INTRAVENOUS ONCE
Status: CANCELLED | OUTPATIENT
Start: 2021-05-18

## 2021-05-18 RX ORDER — AMOXICILLIN AND CLAVULANATE POTASSIUM 875; 125 MG/1; MG/1
1 TABLET, FILM COATED ORAL 2 TIMES DAILY
Qty: 20 TABLET | Refills: 0 | Status: SHIPPED | OUTPATIENT
Start: 2021-05-18 | End: 2021-05-28

## 2021-05-18 RX ORDER — HEPARIN SODIUM (PORCINE) LOCK FLUSH IV SOLN 100 UNIT/ML 100 UNIT/ML
500 SOLUTION INTRAVENOUS AS NEEDED
Status: DISCONTINUED | OUTPATIENT
Start: 2021-05-18 | End: 2021-05-20 | Stop reason: HOSPADM

## 2021-05-18 RX ORDER — HEPARIN SODIUM (PORCINE) LOCK FLUSH IV SOLN 100 UNIT/ML 100 UNIT/ML
500 SOLUTION INTRAVENOUS AS NEEDED
Status: CANCELLED | OUTPATIENT
Start: 2021-05-18

## 2021-05-18 RX ORDER — ALBUTEROL SULFATE 90 UG/1
AEROSOL, METERED RESPIRATORY (INHALATION)
COMMUNITY
Start: 2021-05-17

## 2021-05-18 RX ORDER — LORAZEPAM 2 MG/ML
1 INJECTION INTRAMUSCULAR EVERY 4 HOURS PRN
Status: DISCONTINUED | OUTPATIENT
Start: 2021-05-18 | End: 2021-05-20 | Stop reason: HOSPADM

## 2021-05-18 RX ORDER — OLANZAPINE 10 MG/1
10 TABLET ORAL NIGHTLY
Qty: 30 TABLET | Refills: 0 | Status: SHIPPED | OUTPATIENT
Start: 2021-05-18 | End: 2021-05-20 | Stop reason: SDUPTHER

## 2021-05-18 RX ORDER — PREDNISONE 50 MG/1
TABLET ORAL
COMMUNITY
Start: 2021-05-17 | End: 2022-10-27

## 2021-05-18 RX ADMIN — PALONOSETRON HYDROCHLORIDE 0.25 MG: 0.05 INJECTION, SOLUTION INTRAVENOUS at 13:25

## 2021-05-18 RX ADMIN — VINCRISTINE SULFATE 2 MG: 1 INJECTION, SOLUTION INTRAVENOUS at 14:12

## 2021-05-18 RX ADMIN — SODIUM CHLORIDE 150 MG: 9 INJECTION, SOLUTION INTRAVENOUS at 13:29

## 2021-05-18 RX ADMIN — DIPHENHYDRAMINE HYDROCHLORIDE 50 MG: 50 INJECTION INTRAMUSCULAR; INTRAVENOUS at 09:30

## 2021-05-18 RX ADMIN — RITUXIMAB 750 MG: 10 INJECTION, SOLUTION INTRAVENOUS at 09:49

## 2021-05-18 RX ADMIN — DOXORUBICIN HYDROCHLORIDE 100 MG: 2 INJECTION, SOLUTION INTRAVENOUS at 14:04

## 2021-05-18 RX ADMIN — DEXAMETHASONE SODIUM PHOSPHATE 12 MG: 10 INJECTION, SOLUTION INTRAMUSCULAR; INTRAVENOUS at 13:28

## 2021-05-18 RX ADMIN — HEPARIN 500 UNITS: 100 SYRINGE at 15:01

## 2021-05-18 RX ADMIN — CYCLOPHOSPHAMIDE 1500 MG: 1 INJECTION, POWDER, FOR SOLUTION INTRAVENOUS; ORAL at 14:27

## 2021-05-18 RX ADMIN — ACETAMINOPHEN 650 MG: 325 TABLET ORAL at 09:23

## 2021-05-18 RX ADMIN — PEGFILGRASTIM 6 MG: KIT SUBCUTANEOUS at 14:59

## 2021-05-18 RX ADMIN — LORAZEPAM 1 MG: 2 INJECTION INTRAMUSCULAR; INTRAVENOUS at 13:25

## 2021-05-18 RX ADMIN — SODIUM CHLORIDE 250 ML: 9 INJECTION, SOLUTION INTRAVENOUS at 09:23

## 2021-05-18 NOTE — PROGRESS NOTES
CHIEF COMPLAINT:  1.  Sinus congestion   2.  Nausea   3.  Diffuse large B-cell lymphoma    Problem List:  Oncology/Hematology History Overview Note   1.  Right Medial thigh stage IIa nonbulky less than 7.5 cm diffuse large b cell non-hodgkin lymphoma with component of background follicular lymphoma plan for [facility]    Oncology history timeline:  -2/18/2021 hemoglobin 12.7 with normal white count platelet count differential.  CMP unremarkable.  Urinalysis with microscopic hematuria 20-30 red blood cells per milliliter.  AP single view chest x-ray emergency room [place] showed no acute findings.  CT abdomen pelvis with IV contrast emergency room [place] showed calcified granuloma right lung base, fatty liver infiltration, subcentimeter hypodensity left hepatic lobe too small to categorize, normal size spleen, no adrenal masses, no pancreatic mass, normal kidneys, and no retroperitoneal adenopathy or ascites or pneumoperitoneum.  There was a smooth marginated homogeneous enhancing 4.5 x 5 x 3.3 cm mass in the superficial soft tissues of the anterior upper right thigh.  -2/25/2021 office note from Dr. Gong mentions presentation to emergency room 2/18/2021 complaining of right upper thigh swelling for the preceding week of 2/12/2021.  CT scan abdomen pelvis to include the thighs performed at [facility] emergency room revealing 5 cm smoothly marginated right upper thigh mass below the groin crease suspicious for pathologic lymph node but could be neoplastic of other nature given no prodromal illness, infections, or inflammation.  Recommendation for biopsy was made.  Per Dr. Gong note, she had been sick for the better part of the winter and had been urgent treatment centers told that she had a viral infection flu and coronavirus negative with fevers and chills that subsequently resolved but for fatigue that did not resolve with hypersomnolence as well.  Says she feels hot all the time  but no ronny night sweats.  -3/12/2021 right thigh mass needle core tissue sample extremely small and predominantly crushed limiting adequate evaluation.  Differential diagnosis for this B-cell lymphoma is diffuse large B cell, follicular lymphoma versus other.  Further differentiation could not be performed on this crush small sample.  -3/25/2021 [facility] hematology oncology initial consultation: I, Rodríguez Gerardo, saw for the first time today.  Reviewed the above story with her.  Still fatigued without night sweats or unexplained weight loss.  She has this mass in the soft tissue medial right thigh about 5 cm in size that does seem to move somewhat beneath the skin but also to my exam feels like it is attached to the deeper structures of the thigh as well.  I feel no other adenopathy or hepatosplenomegaly.  I will get a PET scan.  I spoken with Dr. Gong who will look at these images again with the CTs in [place].  If there is clear indication that this is not coming from soft tissue nonnodal structures, then excisional biopsy for adequate tissue diagnosis is important to determine the type of therapy.  This is growing since her biopsy 3/12/2021 but not daily like a very high-grade lymphoma.  To my hand, this does not feel like a typical stas structure and my other concern is, even though this does not venessa with any markers to suggest sarcoma or the like, is to be sure that this is not something other than lymphoma and I have spoken with Dr. Pino who will do desmin stains and other markers before doing an excisional biopsy next Wednesday with Dr. Gong.  If those additional stains suggest sarcomatoid features that can appear in the medial aspect of the thigh such as this, then she probably needs to have excision by Humberto Vera orthopedic oncologist at .  In the meantime, presuming this to be some form of lymphoma which is the highest likelihood based on the pathology from Dr. Pino where this is CD20  positive, CD10 positive, BCL6 40% positive, MUM1 40% positive, CD23 strong and diffuse positive, and BCL-2 positive.  Ki-67 is 50%.  CD30 and only 5%.  C-Myc is weakly expressed in 10% of cells.  BRIAN by TAYLER is negative.  Cyclin D1 negative.  CD5 indeterminate for B and T-cell coexpression.  LEF 1 presumably staining T cells.  Ultimate decision for treatment depends on the specific pathologic subtype and the staging and I will get her prognostic labs going as well.  Given the relatively initial high-grade features of this that might require Adriamycin I will also proceed with echocardiogram.  -3/25/2021 CBC normal.  CMP unremarkable.  Sedimentation rate normal 27 with C-reactive protein elevated 1.7 upper limit of normal 0.5.  Beta-2 microglobulin normal 1.6.  Uric acid normal 4.3.  LDH normal 162.  Hepatitis B and C serologies negative.  -3/29/2021 echocardiogram ejection fraction 61 to 65%.  -3/31/21 excisional node biopsy shows diffuse large B cell non-Hodgkin lymphoma  -4/2/2021 PET shows postsurgical changes right proximal thigh and superficial inguinal region adjacent to adenopathy with SUV 4.0.  Right internal iliac lymph node mildly enlarged SUV 2.6 additionally noted.  No other adenopathy outside this region.  No mention of any abnormal marrow signal.  -4/5/2021 [facility] medical oncology follow-up visit: Per Dr. Arun Garcia pathologist, this is a diffuse large B cell non-Hodgkin lymphoma.  He is not sure whether this is double hit or not yet.  He is not sure if there is a background of follicular lymphoma and hence cannot say whether this is transformed.  Clinically her IPI score is 0 with her age less than 60, normal LDH, ECOG 0, no extranodal involvement on PET, and only stage II at worse per PET with all nodes within the same region.  I will get a bone marrow biopsy.  If this is stage IV, double hit, or has evidence of transition from follicular lymphoma into diffuse large B-cell lymphoma, then I would  give R-CHOP x6.  If the marrow is not involved, this is not double hit, and there is no evidence of follicular transformation into diffuse large B-cell lymphoma, then I would treat this as a stage II nonbulky diffuse large B-cell lymphoma and consider R-CHOP x3 followed by involved site radiation therapy.  I reviewed her PET and biopsy reports and echocardiogram which shows good ejection fraction.  She will need to get her [employer] Covid vaccine this week, family doctor established for the possibility of prednisone-induced diabetes, CT-guided bone marrow biopsy, chemo preparation visit, and a port placement with Dr. Gong.  We will present her at multidisciplinary tumor board this Friday.  I went into detail regarding the side effects of R-CHOP but she will have these reviewed at her chemo preparation visit.  Though not bulky, I still would treat her with allopurinol.  First course of therapy will occur in our [place] office and if all goes well then the subsequent Rituxan doses can be faster and performed in [place] office.    -4/14/2021 pathology update Dr. Garcia: Node pathology negative for double hit by FISH    -4/21/2021 bone marrow biopsy negative for lymphomatous involvement with scant stainable iron.      -4/26/2021 [facility] medical oncology follow-up visit: Given lack of marrow involvement, negative for double hit, and possible background follicular lymphoma , I will treat her as stage IIa nonbulky (original tumor 5 cm on CT, I.  E.  Less than 7.5 cm) diffuse large B-cell lymphoma right proximal thigh SUV 4.0 mass incisionally biopsied (with palpable residua) with mild right internal iliac node enlargement SUV 2.6 as well.  We will treat her with planned R-CHOP x3 followed by involved site radiation therapy.  This was consensus of multidisciplinary tumor board 4/16/2021 as well.  First course [place].  Second course will occur in [place].  With the small component of background  follicular lymphoma, there is potential risk of not only relapse of diffuse large B-cell lymphoma but later relapse from follicular lymphoma and we will keep that in mind in terms of following her up past the usual 3 years post definitive therapy presuming we get a complete remission on subsequent imaging and exams.  We will arrange radiation in [place] and I have made referral.  This will not start until after chemotherapy.         Diffuse large B-cell lymphoma of lymph nodes of inguinal region (CMS/HCC)   3/29/2021 -  Other Event    Echocardiogram  · Left ventricular ejection fraction appears to be 61 - 65%. Left ventricular systolic function is normal.  · Normal global longitudinal strain at -23.5%.  · Left ventricular diastolic function was normal.  · No significant structural or functional valvular disease.     4/2/2021 Imaging    PET/CT IMPRESSION:  Postsurgical changes right proximal thigh and superficial  inguinal region adjacent to adenopathy with abnormal hypermetabolism  maximum SUV 4.0. Right internal iliac lymph node is mildly enlarged with  maximum SUV 2.6 additionally noted. No soft tissue mass or adenopathy  outside of this region.     4/21/2021 Biopsy    Marrow biopsy negative     4/26/2021 -  Chemotherapy    OP LYMPHOMA R-CHOP RiTUXimab / Cyclophosphamide / DOXOrubicin / VinCRIStine / PredniSONE     4/26/2021 Cancer Staged    Staging form: Hodgkin And Non-Hodgkin Lymphoma, AJCC 8th Edition  - Clinical: Stage II (Diffuse large B-cell lymphoma) - Signed by Rodríguez Gerardo MD on 4/26/2021         HISTORY OF PRESENT ILLNESS:  The patient is a 50 y.o. female, here for follow up on management of diffuse large B-cell lymphoma. Iliana reports that she had nausea that was fairly significant for about 6 days after her first treatment. She took Zofran which was not helping and called our office, we sent a prescription for Compazine which helped but did not alleviate her nausea totally. After about 6 days she  "began feeling better however she has since developed what she feels is a sinus infection. She has been taking Zyrtec with not much relief. She has a frontal headache, sinus congestion. No fevers that she is aware of. She is also reporting nausea upon arrival today, she is anxious. Clinically she states that she can no longer feel the mass in her right upper thigh, she does have some firmness where she is healing from her incisional biopsy.    Past Medical History:   Diagnosis Date   • Anxiety    • Arthritis    • Asthma    • Diffuse large B cell lymphoma (CMS/HCC)    • Sleep apnea     cpap     Past Surgical History:   Procedure Laterality Date   • HYSTERECTOMY     • TUMOR EXCISION Right 04/2021    lymph node removal/biopsy/right inner thigh   • VENOUS ACCESS DEVICE (PORT) INSERTION Right 04/2021       No Known Allergies    Family History and Social History reviewed and changed as necessary    REVIEW OF SYSTEM:   Positive for frontal headache and sinus congestion  Positive for nausea    PHYSICAL EXAM:  No palpable mass in the medial thigh as previous, some firmness at site of incisional biopsy that is healing. Band-Aid in place.    Vitals:    05/18/21 0811   BP: 148/63   Pulse: 97   Resp: 18   Temp: 97.5 °F (36.4 °C)   SpO2: 96%   Weight: 96.2 kg (212 lb)   Height: 162.6 cm (64\")     Vitals:    05/18/21 0811   PainSc: 0-No pain          ECOG score: 0         Labs reviewed.  Vitals reviewed.    ECOG: (0) Fully Active - Able to Carry On All Pre-disease Performance Without Restriction    Lab Results   Component Value Date    HGB 11.3 (L) 05/18/2021    HCT 35.0 05/18/2021    MCV 88.1 05/18/2021     05/18/2021    WBC 6.60 05/18/2021    NEUTROABS 3.70 05/18/2021    LYMPHSABS 2.30 05/18/2021    MONOSABS 0.60 05/18/2021    EOSABS 0.35 04/21/2021    BASOSABS 0.05 04/21/2021       Lab Results   Component Value Date    GLUCOSE 143 (H) 04/26/2021    BUN 8 04/26/2021    CREATININE 0.59 04/26/2021     04/26/2021    K " 3.9 04/26/2021     04/26/2021    CO2 28.0 04/26/2021    CALCIUM 8.9 04/26/2021    PROTEINTOT 6.3 04/26/2021    ALBUMIN 3.90 04/26/2021    BILITOT 0.2 04/26/2021    ALKPHOS 83 04/26/2021    AST 13 04/26/2021    ALT 10 04/26/2021             ASSESSMENT & PLAN:  1.  Right Medial thigh stage IIa nonbulky less than 7.5 cm diffuse large b cell non-hodgkin lymphoma with component of background follicular lymphoma plan for R-CHOP x3 followed by [facility]  -4/14/2021 pathology update Dr. Garcia: Node pathology negative for double hit by FISH  -4/21/2021 bone marrow biopsy negative for lymphomatous involvement with scant stainable iron.  2.  Nausea secondary to chemotherapy  3.  Acute sinus infection    Discussion: She is tolerated her first course of treatment with R-CHOP fairly well. She had significant nausea for about 6 days for which she took Zofran and additionally Compazine which helped but she remained nauseated. I have sent a prescription for Zyprexa and instructed patient to take that nightly, she will let us know if nausea persists. She also has some nausea today that is likely anticipatory nausea, I have added Ativan 1 mg to her treatment today and again in 3 weeks. She had mild reaction with her first Rituxan controlled with additional medications therefore we will continue to treat in [place] and not transfer her care to [place]. We will also continue with Rituxan traditional and did not go with the rapid infusion. We will continue today with cycle #2 of a planned 3 courses. Would repeat her PET scan after course 3 and assuming she has had a good response which clinically today she seems to have had a good response as I cannot feel the mass in her right upper thigh, then we would go with involved field radiation as outlined by Dr. Gerardo.    Plan of care outlined by Dr. Gerardo is as follows:   -4/26/2021 [facility] medical oncology follow-up visit: Given lack of marrow involvement, negative for double hit, and  possible background follicular lymphoma , I will treat her as stage IIa nonbulky (original tumor 5 cm on CT, I.  E.  Less than 7.5 cm) diffuse large B-cell lymphoma right proximal thigh SUV 4.0 mass incisionally biopsied (with palpable residua) with mild right internal iliac node enlargement SUV 2.6 as well.  We will treat her with planned R-CHOP x3 followed by involved site radiation therapy.  This was consensus of multidisciplinary tumor board 4/16/2021 as well.  First course [place].  Second course will occur in [place].  With the small component of background follicular lymphoma, there is potential risk of not only relapse of diffuse large B-cell lymphoma but later relapse from follicular lymphoma and we will keep that in mind in terms of following her up past the usual 3 years post definitive therapy presuming we get a complete remission on subsequent imaging and exams.  We will arrange radiation in [place] and I have made referral.  This will not start until after chemotherapy.    Return to clinic in 3 weeks for follow-up.    This was a level 4, moderate MDM visit with 1 or more chronic illness with management of side effects of treatment with nausea, drug therapy requiring intensive monitoring for toxicity with chemotherapy. Also management of acute sinus infection and prescription of antibiotics. Review of labs and ordering of upcoming labs.  Anitra Shelley, APRN    05/18/2021

## 2021-05-20 ENCOUNTER — TELEPHONE (OUTPATIENT)
Dept: ONCOLOGY | Facility: CLINIC | Age: 51
End: 2021-05-20

## 2021-05-20 DIAGNOSIS — C83.35 DIFFUSE LARGE B-CELL LYMPHOMA OF LYMPH NODES OF INGUINAL REGION (HCC): ICD-10-CM

## 2021-05-20 DIAGNOSIS — T45.1X5A CHEMOTHERAPY-INDUCED NAUSEA: ICD-10-CM

## 2021-05-20 DIAGNOSIS — R11.0 CHEMOTHERAPY-INDUCED NAUSEA: ICD-10-CM

## 2021-05-20 RX ORDER — OLANZAPINE 10 MG/1
10 TABLET ORAL NIGHTLY
Qty: 30 TABLET | Refills: 0 | Status: SHIPPED | OUTPATIENT
Start: 2021-05-20

## 2021-05-20 NOTE — TELEPHONE ENCOUNTER
Can see what $ cost using [employer] for olanzapine.  If she cannot pay for, if she is not taking compazine can send in rx for phenergan 12.5 mg po q 6 hr prn, #20, 1 rf.

## 2021-05-20 NOTE — TELEPHONE ENCOUNTER
Found on [facility] that [facility] has medicine for $13.66 for 30. Called patient to see if this would be acceptable. Patient stated yes and to send the script there. I text her the coupon for [facility] to her phone so she can get this price for her medicine. Patient voiced verbal understanding. Sent to MAN Ayoub to co-sign new script to send to [facility].

## 2021-05-20 NOTE — TELEPHONE ENCOUNTER
Caller: PT    Relationship: SELF    Best call back number: [phone]  What medication are you requesting: A DIFFERENT MED FROM OLANZapine (zyPREXA) 10 MG tablet [37184] (Order [id number])    OLANZapine (zyPREXA) 10 MG tablet [[id number]] (Order [id number])    AS HER INS WILL NOT APPROVE THIS MEDICATION     What are your current symptoms: N/A    How long have you been experiencing symptoms: N/A  Have you had these symptoms before:    [] Yes  [x] No    Have you been treated for these symptoms before:   [] Yes  [x] No    If a prescription is needed, what is your preferred pharmacy and phone number:    Pharmacy    [id number] - [place], [place] - [address] - [phone]  - [phone]    [address] [place] [address]   Phone:  [phone]  Fax:  [phone]     Additional notes:  HER INS WILL NOT COVER THE MEDICATION PRESCRIBED SO SHE IS REQUESTING TO SEE IF YOU CAN PROVIDE A DIFFERENT MEDICATION

## 2021-06-08 ENCOUNTER — OFFICE VISIT (OUTPATIENT)
Dept: ONCOLOGY | Facility: CLINIC | Age: 51
End: 2021-06-08

## 2021-06-08 ENCOUNTER — HOSPITAL ENCOUNTER (OUTPATIENT)
Dept: ONCOLOGY | Facility: HOSPITAL | Age: 51
Setting detail: INFUSION SERIES
Discharge: HOME | End: 2021-06-08

## 2021-06-08 ENCOUNTER — TELEPHONE (OUTPATIENT)
Dept: ONCOLOGY | Facility: CLINIC | Age: 51
End: 2021-06-08

## 2021-06-08 VITALS
RESPIRATION RATE: 17 BRPM | HEART RATE: 84 BPM | TEMPERATURE: 97.4 F | SYSTOLIC BLOOD PRESSURE: 125 MMHG | DIASTOLIC BLOOD PRESSURE: 55 MMHG

## 2021-06-08 VITALS
OXYGEN SATURATION: 96 % | DIASTOLIC BLOOD PRESSURE: 78 MMHG | WEIGHT: 217 LBS | BODY MASS INDEX: 37.05 KG/M2 | HEART RATE: 105 BPM | HEIGHT: 64 IN | TEMPERATURE: 98.2 F | RESPIRATION RATE: 18 BRPM | SYSTOLIC BLOOD PRESSURE: 132 MMHG

## 2021-06-08 DIAGNOSIS — Z51.11 ENCOUNTER FOR ANTINEOPLASTIC CHEMOTHERAPY: Primary | ICD-10-CM

## 2021-06-08 DIAGNOSIS — C83.35 DIFFUSE LARGE B-CELL LYMPHOMA OF LYMPH NODES OF INGUINAL REGION (HCC): Primary | Chronic | ICD-10-CM

## 2021-06-08 DIAGNOSIS — C83.35 DIFFUSE LARGE B-CELL LYMPHOMA OF LYMPH NODES OF INGUINAL REGION (HCC): ICD-10-CM

## 2021-06-08 DIAGNOSIS — Z45.2 ENCOUNTER FOR CENTRAL LINE CARE: ICD-10-CM

## 2021-06-08 LAB
ALBUMIN SERPL-MCNC: 4 G/DL (ref 3.5–5.2)
ALBUMIN/GLOB SERPL: 2 G/DL
ALP SERPL-CCNC: 69 U/L (ref 39–117)
ALT SERPL W P-5'-P-CCNC: 15 U/L (ref 1–33)
ANION GAP SERPL CALCULATED.3IONS-SCNC: 9 MMOL/L (ref 5–15)
AST SERPL-CCNC: 15 U/L (ref 1–32)
BILIRUB SERPL-MCNC: <0.2 MG/DL (ref 0–1.2)
BUN SERPL-MCNC: 11 MG/DL (ref 6–20)
BUN/CREAT SERPL: 19 (ref 7–25)
CALCIUM SPEC-SCNC: 9.3 MG/DL (ref 8.6–10.5)
CHLORIDE SERPL-SCNC: 103 MMOL/L (ref 98–107)
CO2 SERPL-SCNC: 29 MMOL/L (ref 22–29)
CREAT SERPL-MCNC: 0.58 MG/DL (ref 0.57–1)
ERYTHROCYTE [DISTWIDTH] IN BLOOD BY AUTOMATED COUNT: 17.4 % (ref 12.3–15.4)
GFR SERPL CREATININE-BSD FRML MDRD: 109 ML/MIN/1.73
GLOBULIN UR ELPH-MCNC: 2 GM/DL
GLUCOSE SERPL-MCNC: 155 MG/DL (ref 65–99)
HCT VFR BLD AUTO: 32.5 % (ref 34–46.6)
HGB BLD-MCNC: 10.6 G/DL (ref 12–15.9)
LYMPHOCYTES # BLD AUTO: 2 10*3/MM3 (ref 0.7–3.1)
LYMPHOCYTES NFR BLD AUTO: 32.1 % (ref 19.6–45.3)
MCH RBC QN AUTO: 29 PG (ref 26.6–33)
MCHC RBC AUTO-ENTMCNC: 32.6 G/DL (ref 31.5–35.7)
MCV RBC AUTO: 88.9 FL (ref 79–97)
MONOCYTES # BLD AUTO: 0.3 10*3/MM3 (ref 0.1–0.9)
MONOCYTES NFR BLD AUTO: 4.1 % (ref 5–12)
NEUTROPHILS NFR BLD AUTO: 4 10*3/MM3 (ref 1.7–7)
NEUTROPHILS NFR BLD AUTO: 63.8 % (ref 42.7–76)
PLATELET # BLD AUTO: 455 10*3/MM3 (ref 140–450)
PMV BLD AUTO: 6.4 FL (ref 6–12)
POTASSIUM SERPL-SCNC: 4 MMOL/L (ref 3.5–5.2)
PROT SERPL-MCNC: 6 G/DL (ref 6–8.5)
RBC # BLD AUTO: 3.66 10*6/MM3 (ref 3.77–5.28)
SODIUM SERPL-SCNC: 141 MMOL/L (ref 136–145)
WBC # BLD AUTO: 6.3 10*3/MM3 (ref 3.4–10.8)

## 2021-06-08 PROCEDURE — 25010000002 RITUXIMAB 10 MG/ML SOLUTION 10 ML VIAL: Performed by: NURSE PRACTITIONER

## 2021-06-08 PROCEDURE — 25010000002 HEPARIN LOCK FLUSH PER 10 UNITS: Performed by: INTERNAL MEDICINE

## 2021-06-08 PROCEDURE — 96375 TX/PRO/DX INJ NEW DRUG ADDON: CPT

## 2021-06-08 PROCEDURE — 96417 CHEMO IV INFUS EACH ADDL SEQ: CPT

## 2021-06-08 PROCEDURE — 25010000002 FOSAPREPITANT PER 1 MG: Performed by: NURSE PRACTITIONER

## 2021-06-08 PROCEDURE — 25010000002 DOXORUBICIN PER 10 MG: Performed by: NURSE PRACTITIONER

## 2021-06-08 PROCEDURE — 96415 CHEMO IV INFUSION ADDL HR: CPT

## 2021-06-08 PROCEDURE — 25010000002 DEXAMETHASONE SODIUM PHOSPHATE 100 MG/10ML SOLUTION: Performed by: NURSE PRACTITIONER

## 2021-06-08 PROCEDURE — 25010000002 PALONOSETRON 0.25 MG/5ML SOLUTION PREFILLED SYRINGE: Performed by: NURSE PRACTITIONER

## 2021-06-08 PROCEDURE — 25010000002 RITUXIMAB 10 MG/ML SOLUTION 50 ML VIAL: Performed by: NURSE PRACTITIONER

## 2021-06-08 PROCEDURE — 80053 COMPREHEN METABOLIC PANEL: CPT

## 2021-06-08 PROCEDURE — 25010000002 DIPHENHYDRAMINE PER 50 MG: Performed by: NURSE PRACTITIONER

## 2021-06-08 PROCEDURE — 99214 OFFICE O/P EST MOD 30 MIN: CPT | Performed by: NURSE PRACTITIONER

## 2021-06-08 PROCEDURE — 96377 APPLICATON ON-BODY INJECTOR: CPT

## 2021-06-08 PROCEDURE — 85025 COMPLETE CBC W/AUTO DIFF WBC: CPT

## 2021-06-08 PROCEDURE — 25010000002 CYCLOPHOSPHAMIDE PER 100 MG: Performed by: NURSE PRACTITIONER

## 2021-06-08 PROCEDURE — 96367 TX/PROPH/DG ADDL SEQ IV INF: CPT

## 2021-06-08 PROCEDURE — 25010000002 LORAZEPAM PER 2 MG: Performed by: NURSE PRACTITIONER

## 2021-06-08 PROCEDURE — 96413 CHEMO IV INFUSION 1 HR: CPT

## 2021-06-08 PROCEDURE — 25010000002 PEGFILGRASTIM 6 MG/0.6ML PREFILLED SYRINGE KIT: Performed by: NURSE PRACTITIONER

## 2021-06-08 PROCEDURE — 96411 CHEMO IV PUSH ADDL DRUG: CPT

## 2021-06-08 PROCEDURE — 25010000002 VINCRISTINE PER 1 MG: Performed by: NURSE PRACTITIONER

## 2021-06-08 RX ORDER — HEPARIN SODIUM (PORCINE) LOCK FLUSH IV SOLN 100 UNIT/ML 100 UNIT/ML
500 SOLUTION INTRAVENOUS AS NEEDED
Status: CANCELLED | OUTPATIENT
Start: 2021-06-08

## 2021-06-08 RX ORDER — LORAZEPAM 2 MG/ML
1 INJECTION INTRAMUSCULAR EVERY 4 HOURS PRN
Status: DISCONTINUED | OUTPATIENT
Start: 2021-06-08 | End: 2021-06-09 | Stop reason: HOSPADM

## 2021-06-08 RX ORDER — HEPARIN SODIUM (PORCINE) LOCK FLUSH IV SOLN 100 UNIT/ML 100 UNIT/ML
500 SOLUTION INTRAVENOUS AS NEEDED
Status: DISCONTINUED | OUTPATIENT
Start: 2021-06-08 | End: 2021-06-09 | Stop reason: HOSPADM

## 2021-06-08 RX ORDER — FAMOTIDINE 10 MG/ML
20 INJECTION, SOLUTION INTRAVENOUS AS NEEDED
Status: CANCELLED | OUTPATIENT
Start: 2021-06-08

## 2021-06-08 RX ORDER — MEPERIDINE HYDROCHLORIDE 25 MG/ML
25 INJECTION INTRAMUSCULAR; INTRAVENOUS; SUBCUTANEOUS
Status: CANCELLED | OUTPATIENT
Start: 2021-06-08

## 2021-06-08 RX ORDER — DIPHENHYDRAMINE HYDROCHLORIDE 50 MG/ML
50 INJECTION INTRAMUSCULAR; INTRAVENOUS AS NEEDED
Status: CANCELLED | OUTPATIENT
Start: 2021-06-08

## 2021-06-08 RX ORDER — SODIUM CHLORIDE 9 MG/ML
250 INJECTION, SOLUTION INTRAVENOUS ONCE
Status: CANCELLED | OUTPATIENT
Start: 2021-06-08

## 2021-06-08 RX ORDER — LORAZEPAM 2 MG/ML
1 INJECTION INTRAMUSCULAR EVERY 4 HOURS PRN
Status: CANCELLED
Start: 2021-06-08

## 2021-06-08 RX ORDER — ACETAMINOPHEN 325 MG/1
650 TABLET ORAL ONCE
Status: CANCELLED | OUTPATIENT
Start: 2021-06-08

## 2021-06-08 RX ORDER — PALONOSETRON 0.05 MG/ML
0.25 INJECTION, SOLUTION INTRAVENOUS ONCE
Status: CANCELLED | OUTPATIENT
Start: 2021-06-08

## 2021-06-08 RX ORDER — DOXORUBICIN HYDROCHLORIDE 2 MG/ML
50 INJECTION, SOLUTION INTRAVENOUS ONCE
Status: CANCELLED | OUTPATIENT
Start: 2021-06-08

## 2021-06-08 RX ORDER — ACETAMINOPHEN 325 MG/1
650 TABLET ORAL ONCE
Status: COMPLETED | OUTPATIENT
Start: 2021-06-08 | End: 2021-06-08

## 2021-06-08 RX ORDER — SODIUM CHLORIDE 9 MG/ML
250 INJECTION, SOLUTION INTRAVENOUS ONCE
Status: COMPLETED | OUTPATIENT
Start: 2021-06-08 | End: 2021-06-08

## 2021-06-08 RX ORDER — DOXORUBICIN HYDROCHLORIDE 2 MG/ML
50 INJECTION, SOLUTION INTRAVENOUS ONCE
Status: COMPLETED | OUTPATIENT
Start: 2021-06-08 | End: 2021-06-08

## 2021-06-08 RX ORDER — PALONOSETRON 0.05 MG/ML
0.25 INJECTION, SOLUTION INTRAVENOUS ONCE
Status: COMPLETED | OUTPATIENT
Start: 2021-06-08 | End: 2021-06-08

## 2021-06-08 RX ADMIN — DOXORUBICIN HYDROCHLORIDE 100 MG: 2 INJECTION, SOLUTION INTRAVENOUS at 13:41

## 2021-06-08 RX ADMIN — SODIUM CHLORIDE 250 ML: 9 INJECTION, SOLUTION INTRAVENOUS at 09:18

## 2021-06-08 RX ADMIN — HEPARIN 500 UNITS: 100 SYRINGE at 14:44

## 2021-06-08 RX ADMIN — DEXAMETHASONE SODIUM PHOSPHATE 12 MG: 10 INJECTION, SOLUTION INTRAMUSCULAR; INTRAVENOUS at 12:42

## 2021-06-08 RX ADMIN — DIPHENHYDRAMINE HYDROCHLORIDE 50 MG: 50 INJECTION INTRAMUSCULAR; INTRAVENOUS at 09:16

## 2021-06-08 RX ADMIN — ACETAMINOPHEN 650 MG: 325 TABLET ORAL at 09:15

## 2021-06-08 RX ADMIN — CYCLOPHOSPHAMIDE 1500 MG: 1 INJECTION, POWDER, FOR SOLUTION INTRAVENOUS; ORAL at 14:09

## 2021-06-08 RX ADMIN — VINCRISTINE SULFATE 2 MG: 1 INJECTION, SOLUTION INTRAVENOUS at 13:54

## 2021-06-08 RX ADMIN — PEGFILGRASTIM 6 MG: KIT SUBCUTANEOUS at 14:46

## 2021-06-08 RX ADMIN — SODIUM CHLORIDE 150 MG: 9 INJECTION, SOLUTION INTRAVENOUS at 12:42

## 2021-06-08 RX ADMIN — RITUXIMAB 750 MG: 10 INJECTION, SOLUTION INTRAVENOUS at 10:05

## 2021-06-08 RX ADMIN — PALONOSETRON HYDROCHLORIDE 0.25 MG: 0.05 INJECTION, SOLUTION INTRAVENOUS at 12:37

## 2021-06-08 RX ADMIN — LORAZEPAM 1 MG: 2 INJECTION INTRAMUSCULAR; INTRAVENOUS at 12:39

## 2021-06-08 NOTE — PROGRESS NOTES
CHIEF COMPLAINT:  1.  Diffuse large B-cell lymphoma  2.  Fatigue    Problem List:  Oncology/Hematology History Overview Note   1.  Right Medial thigh stage IIa nonbulky less than 7.5 cm diffuse large b cell non-hodgkin lymphoma with component of background follicular lymphoma plan for R-CHOP x3 followed by [place] [facility]    Oncology history timeline:  -2/18/2021 hemoglobin 12.7 with normal white count platelet count differential.  CMP unremarkable.  Urinalysis with microscopic hematuria 20-30 red blood cells per milliliter.  AP single view chest x-ray emergency room [place] showed no acute findings.  CT abdomen pelvis with IV contrast emergency room [place] showed calcified granuloma right lung base, fatty liver infiltration, subcentimeter hypodensity left hepatic lobe too small to categorize, normal size spleen, no adrenal masses, no pancreatic mass, normal kidneys, and no retroperitoneal adenopathy or ascites or pneumoperitoneum.  There was a smooth marginated homogeneous enhancing 4.5 x 5 x 3.3 cm mass in the superficial soft tissues of the anterior upper right thigh.  -2/25/2021 office note from Dr. Gong mentions presentation to emergency room 2/18/2021 complaining of right upper thigh swelling for the preceding week of 2/12/2021.  CT scan abdomen pelvis to include the thighs performed at [facility] emergency room revealing 5 cm smoothly marginated right upper thigh mass below the groin crease suspicious for pathologic lymph node but could be neoplastic of other nature given no prodromal illness, infections, or inflammation.  Recommendation for biopsy was made.  Per Dr. Gong note, she had been sick for the better part of the winter and had been urgent treatment centers told that she had a viral infection flu and coronavirus negative with fevers and chills that subsequently resolved but for fatigue that did not resolve with hypersomnolence as well.  Says she feels hot all the time but no ronny night  sweats.  -3/12/2021 right thigh mass needle core tissue sample extremely small and predominantly crushed limiting adequate evaluation.  Differential diagnosis for this B-cell lymphoma is diffuse large B cell, follicular lymphoma versus other.  Further differentiation could not be performed on this crush small sample.  -3/25/2021 [facility] hematology oncology initial consultation: I, Rodríguez Gerardo, saw for the first time today.  Reviewed the above story with her.  Still fatigued without night sweats or unexplained weight loss.  She has this mass in the soft tissue medial right thigh about 5 cm in size that does seem to move somewhat beneath the skin but also to my exam feels like it is attached to the deeper structures of the thigh as well.  I feel no other adenopathy or hepatosplenomegaly.  I will get a PET scan.  I spoken with Dr. Gong who will look at these images again with the CTs in [place].  If there is clear indication that this is not coming from soft tissue nonnodal structures, then excisional biopsy for adequate tissue diagnosis is important to determine the type of therapy.  This is growing since her biopsy 3/12/2021 but not daily like a very high-grade lymphoma.  To my hand, this does not feel like a typical stas structure and my other concern is, even though this does not venessa with any markers to suggest sarcoma or the like, is to be sure that this is not something other than lymphoma and I have spoken with Dr. Pino who will do desmin stains and other markers before doing an excisional biopsy next Wednesday with Dr. Gong.  If those additional stains suggest sarcomatoid features that can appear in the medial aspect of the thigh such as this, then she probably needs to have excision by Humberto Vera orthopedic oncologist at .  In the meantime, presuming this to be some form of lymphoma which is the highest likelihood based on the pathology from Dr. Pino where this is CD20 positive, CD10  positive, BCL6 40% positive, MUM1 40% positive, CD23 strong and diffuse positive, and BCL-2 positive.  Ki-67 is 50%.  CD30 and only 5%.  C-Myc is weakly expressed in 10% of cells.  BRIAN by TAYLER is negative.  Cyclin D1 negative.  CD5 indeterminate for B and T-cell coexpression.  LEF 1 presumably staining T cells.  Ultimate decision for treatment depends on the specific pathologic subtype and the staging and I will get her prognostic labs going as well.  Given the relatively initial high-grade features of this that might require Adriamycin I will also proceed with echocardiogram.  -3/25/2021 CBC normal.  CMP unremarkable.  Sedimentation rate normal 27 with C-reactive protein elevated 1.7 upper limit of normal 0.5.  Beta-2 microglobulin normal 1.6.  Uric acid normal 4.3.  LDH normal 162.  Hepatitis B and C serologies negative.  -3/29/2021 echocardiogram ejection fraction 61 to 65%.  -3/31/21 excisional node biopsy shows diffuse large B cell non-Hodgkin lymphoma  -4/2/2021 PET shows postsurgical changes right proximal thigh and superficial inguinal region adjacent to adenopathy with SUV 4.0.  Right internal iliac lymph node mildly enlarged SUV 2.6 additionally noted.  No other adenopathy outside this region.  No mention of any abnormal marrow signal.  -4/5/2021 [facility] medical oncology follow-up visit: Per Dr. Arun Garcia pathologist, this is a diffuse large B cell non-Hodgkin lymphoma.  He is not sure whether this is double hit or not yet.  He is not sure if there is a background of follicular lymphoma and hence cannot say whether this is transformed.  Clinically her IPI score is 0 with her age less than 60, normal LDH, ECOG 0, no extranodal involvement on PET, and only stage II at worse per PET with all nodes within the same region.  I will get a bone marrow biopsy.  If this is stage IV, double hit, or has evidence of transition from follicular lymphoma into diffuse large B-cell lymphoma, then I would give R-CHOP x6.   If the marrow is not involved, this is not double hit, and there is no evidence of follicular transformation into diffuse large B-cell lymphoma, then I would treat this as a stage II nonbulky diffuse large B-cell lymphoma and consider R-CHOP x3 followed by involved site radiation therapy.  I reviewed her PET and biopsy reports and echocardiogram which shows good ejection fraction.  She will need to get her [employer] Covid vaccine this week, family doctor established for the possibility of prednisone-induced diabetes, CT-guided bone marrow biopsy, chemo preparation visit, and a port placement with Dr. Gong.  We will present her at multidisciplinary tumor board this Friday.  I went into detail regarding the side effects of R-CHOP but she will have these reviewed at her chemo preparation visit.  Though not bulky, I still would treat her with allopurinol.  First course of therapy will occur in our [place] office and if all goes well then the subsequent Rituxan doses can be faster and performed in [place] office.    -4/14/2021 pathology update Dr. Garcia: Node pathology negative for double hit by FISH    -4/21/2021 bone marrow biopsy negative for lymphomatous involvement with scant stainable iron.      -4/26/2021 [facility] medical oncology follow-up visit: Given lack of marrow involvement, negative for double hit, and possible background follicular lymphoma , I will treat her as stage IIa nonbulky (original tumor 5 cm on CT, I.  E.  Less than 7.5 cm) diffuse large B-cell lymphoma right proximal thigh SUV 4.0 mass incisionally biopsied (with palpable residua) with mild right internal iliac node enlargement SUV 2.6 as well.  We will treat her with planned R-CHOP x3 followed by involved site radiation therapy.  This was consensus of multidisciplinary tumor board 4/16/2021 as well.  First course [place].  Second course will occur in [place].  With the small component of background follicular lymphoma, there  is potential risk of not only relapse of diffuse large B-cell lymphoma but later relapse from follicular lymphoma and we will keep that in mind in terms of following her up past the usual 3 years post definitive therapy presuming we get a complete remission on subsequent imaging and exams.  We will arrange radiation in [place] and I have made referral.  This will not start until after chemotherapy.    -5/18/2021 [facility] oncology clinic follow-up: Had moderate amount of nausea after her first cycle, will add Zyprexa to Zofran and Compazine that she is already taking. She was also anxious, added Ativan to her care plan premeds. Prescription for Augmentin sent in for sinus infection. Clinically good response with reduction of right medial thigh mass. We will continue treatment in [place] due to mild infusion reaction with first Rituxan but she was able to complete treatment with additional medications. Today is cycle #2 of a planned 3 courses and then will repeat restaging scan to assess for response and proceed to radiation as outlined above.     Diffuse large B-cell lymphoma of lymph nodes of inguinal region (CMS/HCC)   3/29/2021 -  Other Event    Echocardiogram  · Left ventricular ejection fraction appears to be 61 - 65%. Left ventricular systolic function is normal.  · Normal global longitudinal strain at -23.5%.  · Left ventricular diastolic function was normal.  · No significant structural or functional valvular disease.     4/2/2021 Imaging    PET/CT IMPRESSION:  Postsurgical changes right proximal thigh and superficial  inguinal region adjacent to adenopathy with abnormal hypermetabolism  maximum SUV 4.0. Right internal iliac lymph node is mildly enlarged with  maximum SUV 2.6 additionally noted. No soft tissue mass or adenopathy  outside of this region.     4/21/2021 Biopsy    Marrow biopsy negative     4/26/2021 -  Chemotherapy    OP LYMPHOMA R-CHOP RiTUXimab / Cyclophosphamide / DOXOrubicin / VinCRIStine  "/ PredniSONE     4/26/2021 Cancer Staged    Staging form: Hodgkin And Non-Hodgkin Lymphoma, AJCC 8th Edition  - Clinical: Stage II (Diffuse large B-cell lymphoma) - Signed by Rodríguez Gerardo MD on 4/26/2021         HISTORY OF PRESENT ILLNESS:  The patient is a 50 y.o. female, here for follow up on management of diffuse large B-cell lymphoma.  She tolerated last cycle and had no issues with the Rituxan.  She reports feeling bad this past week but cannot pinpoint any specific symptoms.  She does have increased fatigue and has been sleeping more.  She still has some intermittent nausea that is controlled with the Zofran and Compazine.  She only took 1-2 doses of Zyprexa and stated she did not feel like she needed it.  She is eating and drinking fluids.  Her weight is up 5 pounds.  She reports a mild headache today.  She denies fever, cough, or any acute symptoms.    Past Medical History:   Diagnosis Date   • Anxiety    • Arthritis    • Asthma    • Diffuse large B cell lymphoma (CMS/HCC)    • Sleep apnea     cpap     Past Surgical History:   Procedure Laterality Date   • HYSTERECTOMY     • TUMOR EXCISION Right 04/2021    lymph node removal/biopsy/right inner thigh   • VENOUS ACCESS DEVICE (PORT) INSERTION Right 04/2021       No Known Allergies    Family History and Social History reviewed and changed as necessary    REVIEW OF SYSTEM:   Positive headache  Positive for nausea  Positive for fatigue    PHYSICAL EXAM:  No palpable mass in the medial thigh as previous, some firmness at site of incisional biopsy that is healing.   Lungs clear    Vitals:    06/08/21 0757   BP: 132/78   Pulse: 105   Resp: 18   Temp: 98.2 °F (36.8 °C)   SpO2: 96%   Weight: 98.4 kg (217 lb)   Height: 162.6 cm (64\")     Vitals:    06/08/21 0757   PainSc: 0-No pain  Comment: +H/A, nausea and increased fatigue         Labs reviewed.  Vitals reviewed.    ECOG: (0) Fully Active - Able to Carry On All Pre-disease Performance Without Restriction    Lab " Results   Component Value Date    HGB 10.6 (L) 06/08/2021    HCT 32.5 (L) 06/08/2021    MCV 88.9 06/08/2021     (H) 06/08/2021    WBC 6.30 06/08/2021    NEUTROABS 4.00 06/08/2021    LYMPHSABS 2.00 06/08/2021    MONOSABS 0.30 06/08/2021    EOSABS 0.35 04/21/2021    BASOSABS 0.05 04/21/2021       Lab Results   Component Value Date    GLUCOSE 133 (H) 05/18/2021    BUN 9 05/18/2021    CREATININE 0.57 05/18/2021     05/18/2021    K 3.8 05/18/2021     05/18/2021    CO2 30.0 (H) 05/18/2021    CALCIUM 9.2 05/18/2021    PROTEINTOT 6.1 05/18/2021    ALBUMIN 4.00 05/18/2021    BILITOT 0.2 05/18/2021    ALKPHOS 77 05/18/2021    AST 14 05/18/2021    ALT 12 05/18/2021             ASSESSMENT & PLAN:  1.  Right Medial thigh stage IIa nonbulky less than 7.5 cm diffuse large b cell non-hodgkin lymphoma with component of background follicular lymphoma plan for R-CHOP x3 followed by [facility]  -4/14/2021 pathology update Dr. Garcia: Node pathology negative for double hit by FISH  -4/21/2021 bone marrow biopsy negative for lymphomatous involvement with scant stainable iron.  2.  Nausea secondary to chemotherapy  3.  Acute sinus infection-resolved    Discussion:  She tolerated her last cycle of R-CHOP fairly well.  She states nausea was not as bad as it was with the first cycle.  She had prescription for Zyprexa but only took a couple doses.  She stated she did not feel like she needed it as nausea was controlled with as needed medications.  She reports feeling bad for the past week but no specific symptoms except increased fatigue.  We will proceed with cycle 3 today if counts are adequate.  We will continue with Rituxan traditional and not change to the rapid infusion.  We will repeat PET scan prior to return in 3 weeks and assuming she has had a good response (which she has clinically as I cannot feel the mass in her right upper thigh), then we would go with involved field radiation as outlined by Dr. Gerardo.      Plan of  care outlined by Dr. Gerardo is as follows:   -4/26/2021 [facility] medical oncology follow-up visit: Given lack of marrow involvement, negative for double hit, and possible background follicular lymphoma , I will treat her as stage IIa nonbulky (original tumor 5 cm on CT, I.  E.  Less than 7.5 cm) diffuse large B-cell lymphoma right proximal thigh SUV 4.0 mass incisionally biopsied (with palpable residua) with mild right internal iliac node enlargement SUV 2.6 as well.  We will treat her with planned R-CHOP x3 followed by involved site radiation therapy.  This was consensus of multidisciplinary tumor board 4/16/2021 as well.  First course [place].  Second course will occur in [place].  With the small component of background follicular lymphoma, there is potential risk of not only relapse of diffuse large B-cell lymphoma but later relapse from follicular lymphoma and we will keep that in mind in terms of following her up past the usual 3 years post definitive therapy presuming we get a complete remission on subsequent imaging and exams.  We will arrange radiation in [place] and I have made referral.  This will not start until after chemotherapy.    Return to clinic in 3 weeks for follow-up.    This was a level 4, moderate MDM visit with 1 or more chronic illness with management of side effects of treatment with nausea, drug therapy requiring intensive monitoring for toxicity with chemotherapy. Review of labs and ordering of upcoming labs.    Symone Rutherofrd, MAN   06/08/2021

## 2021-06-08 NOTE — TELEPHONE ENCOUNTER
Discussed with Symone she would like patient to take prednisone when she gets home.  Also discussed if patient could be changed to the rapid infusion today but she ouwld like to keep her on the regular rate for infusion. Called and notified Bobbi.

## 2021-06-08 NOTE — TELEPHONE ENCOUNTER
----- Message from Bobbi Cowart RN sent at 6/8/2021  9:05 AM EDT -----  Regarding: JEANNA/patient just informed me that she did not take her Prednisone today and does not have them with her  Patient just informed me that she did not take her Prednisone today. Please advise if you want any additional steroids today.Thanks Bobbi [phone]

## 2021-06-22 ENCOUNTER — HOSPITAL ENCOUNTER (OUTPATIENT)
Dept: PET IMAGING | Facility: HOSPITAL | Age: 51
Discharge: HOME | End: 2021-06-22

## 2021-06-22 DIAGNOSIS — C83.35 DIFFUSE LARGE B-CELL LYMPHOMA OF LYMPH NODES OF INGUINAL REGION (HCC): Chronic | ICD-10-CM

## 2021-06-22 LAB — GLUCOSE BLDC GLUCOMTR-MCNC: 122 MG/DL (ref 70–130)

## 2021-06-22 PROCEDURE — A9552 F18 FDG: HCPCS | Performed by: NURSE PRACTITIONER

## 2021-06-22 PROCEDURE — 78815 PET IMAGE W/CT SKULL-THIGH: CPT

## 2021-06-22 PROCEDURE — 82962 GLUCOSE BLOOD TEST: CPT

## 2021-06-22 PROCEDURE — 0 FLUDEOXYGLUCOSE F18 SOLUTION: Performed by: NURSE PRACTITIONER

## 2021-06-22 RX ADMIN — FLUDEOXYGLUCOSE F18 1 DOSE: 300 INJECTION INTRAVENOUS at 12:12

## 2021-06-28 ENCOUNTER — APPOINTMENT (OUTPATIENT)
Dept: ONCOLOGY | Facility: HOSPITAL | Age: 51
End: 2021-06-28

## 2021-06-28 ENCOUNTER — OFFICE VISIT (OUTPATIENT)
Dept: ONCOLOGY | Facility: CLINIC | Age: 51
End: 2021-06-28

## 2021-06-28 VITALS
TEMPERATURE: 97.3 F | OXYGEN SATURATION: 95 % | HEIGHT: 64 IN | BODY MASS INDEX: 37.73 KG/M2 | WEIGHT: 221 LBS | DIASTOLIC BLOOD PRESSURE: 73 MMHG | RESPIRATION RATE: 18 BRPM | HEART RATE: 100 BPM | SYSTOLIC BLOOD PRESSURE: 142 MMHG

## 2021-06-28 DIAGNOSIS — C83.35 DIFFUSE LARGE B-CELL LYMPHOMA OF LYMPH NODES OF INGUINAL REGION (HCC): Primary | Chronic | ICD-10-CM

## 2021-06-28 PROCEDURE — 99214 OFFICE O/P EST MOD 30 MIN: CPT | Performed by: INTERNAL MEDICINE

## 2021-06-28 RX ORDER — CETIRIZINE HYDROCHLORIDE 10 MG/1
10 TABLET ORAL DAILY
COMMUNITY
Start: 2021-05-13 | End: 2022-09-27

## 2021-06-28 NOTE — PROGRESS NOTES
CHIEF COMPLAINT: Follow-up lymphoma status post 3 courses Rituxan CHOP    Problem List:  Oncology/Hematology History Overview Note   1.  Right Medial thigh stage IIa nonbulky less than 7.5 cm diffuse large b cell non-hodgkin lymphoma with component of background follicular lymphoma plan for R-CHOP x3 followed by ISRT.  Had CR after 3 courses of R-CHOP x3 ending 6/8/2021 per PET 6/22/2021.    Oncology history timeline:  -2/18/2021 hemoglobin 12.7 with normal white count platelet count differential.  CMP unremarkable.  Urinalysis with microscopic hematuria 20-30 red blood cells per milliliter.  AP single view chest x-ray emergency room [place] showed no acute findings.  CT abdomen pelvis with IV contrast emergency room [facility] showed calcified granuloma right lung base, fatty liver infiltration, subcentimeter hypodensity left hepatic lobe too small to categorize, normal size spleen, no adrenal masses, no pancreatic mass, normal kidneys, and no retroperitoneal adenopathy or ascites or pneumoperitoneum.  There was a smooth marginated homogeneous enhancing 4.5 x 5 x 3.3 cm mass in the superficial soft tissues of the anterior upper right thigh.  -2/25/2021 office note from Dr. Gong mentions presentation to emergency room 2/18/2021 complaining of right upper thigh swelling for the preceding week of 2/12/2021.  CT scan abdomen pelvis to include the thighs performed at [facility] emergency room revealing 5 cm smoothly marginated right upper thigh mass below the groin crease suspicious for pathologic lymph node but could be neoplastic of other nature given no prodromal illness, infections, or inflammation.  Recommendation for biopsy was made.  Per Dr. Gong note, she had been sick for the better part of the winter and had been urgent treatment centers told that she had a viral infection flu and coronavirus negative with fevers and chills that subsequently resolved but for fatigue that did not resolve with  hypersomnolence as well.  Says she feels hot all the time but no ronny night sweats.  -3/12/2021 right thigh mass needle core tissue sample extremely small and predominantly crushed limiting adequate evaluation.  Differential diagnosis for this B-cell lymphoma is diffuse large B cell, follicular lymphoma versus other.  Further differentiation could not be performed on this crush small sample.  -3/25/2021 [facility] hematology oncology initial consultation: I, Rodríguez Gerardo, saw for the first time today.  Reviewed the above story with her.  Still fatigued without night sweats or unexplained weight loss.  She has this mass in the soft tissue medial right thigh about 5 cm in size that does seem to move somewhat beneath the skin but also to my exam feels like it is attached to the deeper structures of the thigh as well.  I feel no other adenopathy or hepatosplenomegaly.  I will get a PET scan.  I spoken with Dr. Gong who will look at these images again with the CTs in [place].  If there is clear indication that this is not coming from soft tissue nonnodal structures, then excisional biopsy for adequate tissue diagnosis is important to determine the type of therapy.  This is growing since her biopsy 3/12/2021 but not daily like a very high-grade lymphoma.  To my hand, this does not feel like a typical stas structure and my other concern is, even though this does not venessa with any markers to suggest sarcoma or the like, is to be sure that this is not something other than lymphoma and I have spoken with Dr. Pino who will do desmin stains and other markers before doing an excisional biopsy next Wednesday with Dr. Gong.  If those additional stains suggest sarcomatoid features that can appear in the medial aspect of the thigh such as this, then she probably needs to have excision by Humberto Vera orthopedic oncologist at .  In the meantime, presuming this to be some form of lymphoma which is the highest likelihood  based on the pathology from Dr. Pino where this is CD20 positive, CD10 positive, BCL6 40% positive, MUM1 40% positive, CD23 strong and diffuse positive, and BCL-2 positive.  Ki-67 is 50%.  CD30 and only 5%.  C-Myc is weakly expressed in 10% of cells.  BRIAN by TAYLER is negative.  Cyclin D1 negative.  CD5 indeterminate for B and T-cell coexpression.  LEF 1 presumably staining T cells.  Ultimate decision for treatment depends on the specific pathologic subtype and the staging and I will get her prognostic labs going as well.  Given the relatively initial high-grade features of this that might require Adriamycin I will also proceed with echocardiogram.  -3/25/2021 CBC normal.  CMP unremarkable.  Sedimentation rate normal 27 with C-reactive protein elevated 1.7 upper limit of normal 0.5.  Beta-2 microglobulin normal 1.6.  Uric acid normal 4.3.  LDH normal 162.  Hepatitis B and C serologies negative.  -3/29/2021 echocardiogram ejection fraction 61 to 65%.  -3/31/21 excisional node biopsy shows diffuse large B cell non-Hodgkin lymphoma  -4/2/2021 PET shows postsurgical changes right proximal thigh and superficial inguinal region adjacent to adenopathy with SUV 4.0.  Right internal iliac lymph node mildly enlarged SUV 2.6 additionally noted.  No other adenopathy outside this region.  No mention of any abnormal marrow signal.  -4/5/2021 [facility] medical oncology follow-up visit: Per Dr. Arun Garcia pathologist, this is a diffuse large B cell non-Hodgkin lymphoma.  He is not sure whether this is double hit or not yet.  He is not sure if there is a background of follicular lymphoma and hence cannot say whether this is transformed.  Clinically her IPI score is 0 with her age less than 60, normal LDH, ECOG 0, no extranodal involvement on PET, and only stage II at worse per PET with all nodes within the same region.  I will get a bone marrow biopsy.  If this is stage IV, double hit, or has evidence of transition from follicular  lymphoma into diffuse large B-cell lymphoma, then I would give R-CHOP x6.  If the marrow is not involved, this is not double hit, and there is no evidence of follicular transformation into diffuse large B-cell lymphoma, then I would treat this as a stage II nonbulky diffuse large B-cell lymphoma and consider R-CHOP x3 followed by involved site radiation therapy.  I reviewed her PET and biopsy reports and echocardiogram which shows good ejection fraction.  She will need to get her [employer] Covid vaccine this week, family doctor established for the possibility of prednisone-induced diabetes, CT-guided bone marrow biopsy, chemo preparation visit, and a port placement with Dr. Gong.  We will present her at multidisciplinary tumor board this Friday.  I went into detail regarding the side effects of R-CHOP but she will have these reviewed at her chemo preparation visit.  Though not bulky, I still would treat her with allopurinol.  First course of therapy will occur in our [place] office and if all goes well then the subsequent Rituxan doses can be faster and performed in [place] office.    -4/14/2021 pathology update Dr. Garcia: Node pathology negative for double hit by FISH    -4/21/2021 bone marrow biopsy negative for lymphomatous involvement with scant stainable iron.      -4/26/2021 [facility] medical oncology follow-up visit: Given lack of marrow involvement, negative for double hit, and possible background follicular lymphoma , I will treat her as stage IIa nonbulky (original tumor 5 cm on CT, I.  E.  Less than 7.5 cm) diffuse large B-cell lymphoma right proximal thigh SUV 4.0 mass incisionally biopsied (with palpable residua) with mild right internal iliac node enlargement SUV 2.6 as well.  We will treat her with planned R-CHOP x3 followed by involved site radiation therapy.  This was consensus of multidisciplinary tumor board 4/16/2021 as well.  First course [place].  Second course will occur in  [place].  With the small component of background follicular lymphoma, there is potential risk of not only relapse of diffuse large B-cell lymphoma but later relapse from follicular lymphoma and we will keep that in mind in terms of following her up past the usual 3 years post definitive therapy presuming we get a complete remission on subsequent imaging and exams.  We will arrange radiation in [place] and I have made referral.  This will not start until after chemotherapy.    -5/13/2021 radiation oncology consultation Dr. Jl Infante for preparation of future involved field radiation therapy    -5/18/2021 [facility] oncology clinic follow-up: Had moderate amount of nausea after her first cycle, will add Zyprexa to Zofran and Compazine that she is already taking. She was also anxious, added Ativan to her care plan premeds. Prescription for Augmentin sent in for sinus infection. Clinically good response with reduction of right medial thigh mass. We will continue treatment in [place] due to mild infusion reaction with first Rituxan but she was able to complete treatment with additional medications. Today is cycle #2 of a planned 3 courses and then will repeat restaging scan to assess for response and proceed to radiation as outlined above.    -6/8/2021 course #3 Rituxan CHOP    -6/22/2021 PET shows resolution of right inguinal and iliac adenopathy.  Negative PET/CT.    -6/28/2021 [facility] medical oncology follow-up visit: I reviewed images and reports of PET as above.  Status post Rituxan CHOP, PET is essentially negative with complete clinical remission.With the small component of background follicular lymphoma, there is potential risk of not only relapse of diffuse large B-cell lymphoma but later relapse from follicular lymphoma and we will keep that in mind in terms of following her up past the usual 3 years post definitive therapy presuming we get a complete remission on subsequent imaging and exams.  We will  arrange radiation in [place] and Dr. Infante saw in mid May.  I called Dr. Infante to get her in to start involved field radiation and we will repeat PET and labs prior to return in 3 months for survivorship visit with my nurse practitioner who will lay out the follow-up according to NCCN guidelines.  Other than for modest anemia hemoglobin in the tens and glucose 150s, no other remarkable findings on CBC and CMP 6/8/2021.     Diffuse large B-cell lymphoma of lymph nodes of inguinal region (CMS/HCC)   3/29/2021 -  Other Event    Echocardiogram  · Left ventricular ejection fraction appears to be 61 - 65%. Left ventricular systolic function is normal.  · Normal global longitudinal strain at -23.5%.  · Left ventricular diastolic function was normal.  · No significant structural or functional valvular disease.     4/2/2021 Imaging    PET/CT IMPRESSION:  Postsurgical changes right proximal thigh and superficial  inguinal region adjacent to adenopathy with abnormal hypermetabolism  maximum SUV 4.0. Right internal iliac lymph node is mildly enlarged with  maximum SUV 2.6 additionally noted. No soft tissue mass or adenopathy  outside of this region.     4/21/2021 Biopsy    Marrow biopsy negative     4/26/2021 -  Chemotherapy    Completed course #3 6/8/2021  OP LYMPHOMA R-CHOP RiTUXimab / Cyclophosphamide / DOXOrubicin / VinCRIStine / PredniSONE     4/26/2021 Cancer Staged    Staging form: Hodgkin And Non-Hodgkin Lymphoma, AJCC 8th Edition  - Clinical: Stage II (Diffuse large B-cell lymphoma) - Signed by Rodríguez Gerardo MD on 4/26/2021 6/22/2021 Imaging     PET shows resolution of right inguinal and iliac adenopathy.  Negative PET/CT.         HISTORY OF PRESENT ILLNESS:  The patient is a 50 y.o. female, here for follow up on management of lymphoma status post 3 courses of Rituxan CHOP with clinical CR on PET.    Past Medical History:   Diagnosis Date   • Anxiety    • Arthritis    • Asthma    • Diffuse large B cell  "lymphoma (CMS/HCC)    • Sleep apnea     cpap     Past Surgical History:   Procedure Laterality Date   • HYSTERECTOMY     • TUMOR EXCISION Right 04/2021    lymph node removal/biopsy/right inner thigh   • VENOUS ACCESS DEVICE (PORT) INSERTION Right 04/2021       No Known Allergies    Family History and Social History reviewed and changed as necessary    REVIEW OF SYSTEM:   No new complaints    PHYSICAL EXAM:  Lungs clear.  No palpable cervical axillary or inguinal adenopathy.    Vitals:    06/28/21 0803   BP: 142/73   Pulse: 100   Resp: 18   Temp: 97.3 °F (36.3 °C)   SpO2: 95%   Weight: 100 kg (221 lb)   Height: 162.6 cm (64\")     Vitals:    06/28/21 0803   PainSc: 0-No pain          ECOG score: 0           Vitals reviewed.    ECOG: (0) Fully Active - Able to Carry On All Pre-disease Performance Without Restriction    Lab Results   Component Value Date    HGB 10.6 (L) 06/08/2021    HCT 32.5 (L) 06/08/2021    MCV 88.9 06/08/2021     (H) 06/08/2021    WBC 6.30 06/08/2021    NEUTROABS 4.00 06/08/2021    LYMPHSABS 2.00 06/08/2021    MONOSABS 0.30 06/08/2021    EOSABS 0.35 04/21/2021    BASOSABS 0.05 04/21/2021       Lab Results   Component Value Date    GLUCOSE 155 (H) 06/08/2021    BUN 11 06/08/2021    CREATININE 0.58 06/08/2021     06/08/2021    K 4.0 06/08/2021     06/08/2021    CO2 29.0 06/08/2021    CALCIUM 9.3 06/08/2021    PROTEINTOT 6.0 06/08/2021    ALBUMIN 4.00 06/08/2021    BILITOT <0.2 06/08/2021    ALKPHOS 69 06/08/2021    AST 15 06/08/2021    ALT 15 06/08/2021             ASSESSMENT & PLAN:  1.  Right Medial thigh stage IIa nonbulky less than 7.5 cm diffuse large b cell non-hodgkin lymphoma with component of background follicular lymphoma plan for R-CHOP x3 followed by ISRT.  Had CR after 3 courses of R-CHOP x3 ending 6/8/2021 per PET 6/22/2021.    Oncology history timeline:  -2/18/2021 hemoglobin 12.7 with normal white count platelet count differential.  CMP unremarkable.  Urinalysis with " microscopic hematuria 20-30 red blood cells per milliliter.  AP single view chest x-ray emergency room [place] showed no acute findings.  CT abdomen pelvis with IV contrast emergency room [place] showed calcified granuloma right lung base, fatty liver infiltration, subcentimeter hypodensity left hepatic lobe too small to categorize, normal size spleen, no adrenal masses, no pancreatic mass, normal kidneys, and no retroperitoneal adenopathy or ascites or pneumoperitoneum.  There was a smooth marginated homogeneous enhancing 4.5 x 5 x 3.3 cm mass in the superficial soft tissues of the anterior upper right thigh.  -2/25/2021 office note from Dr. Gong mentions presentation to emergency room 2/18/2021 complaining of right upper thigh swelling for the preceding week of 2/12/2021.  CT scan abdomen pelvis to include the thighs performed at [facility] emergency room revealing 5 cm smoothly marginated right upper thigh mass below the groin crease suspicious for pathologic lymph node but could be neoplastic of other nature given no prodromal illness, infections, or inflammation.  Recommendation for biopsy was made.  Per Dr. Gong note, she had been sick for the better part of the winter and had been urgent treatment centers told that she had a viral infection flu and coronavirus negative with fevers and chills that subsequently resolved but for fatigue that did not resolve with hypersomnolence as well.  Says she feels hot all the time but no ronny night sweats.  -3/12/2021 right thigh mass needle core tissue sample extremely small and predominantly crushed limiting adequate evaluation.  Differential diagnosis for this B-cell lymphoma is diffuse large B cell, follicular lymphoma versus other.  Further differentiation could not be performed on this crush small sample.  -3/25/2021 [facility] hematology oncology initial consultation: I, Rodríguez Gerardo, saw for the first time today.  Reviewed the above story with her.   Still fatigued without night sweats or unexplained weight loss.  She has this mass in the soft tissue medial right thigh about 5 cm in size that does seem to move somewhat beneath the skin but also to my exam feels like it is attached to the deeper structures of the thigh as well.  I feel no other adenopathy or hepatosplenomegaly.  I will get a PET scan.  I spoken with Dr. Gong who will look at these images again with the CTs in [place].  If there is clear indication that this is not coming from soft tissue nonnodal structures, then excisional biopsy for adequate tissue diagnosis is important to determine the type of therapy.  This is growing since her biopsy 3/12/2021 but not daily like a very high-grade lymphoma.  To my hand, this does not feel like a typical stas structure and my other concern is, even though this does not venessa with any markers to suggest sarcoma or the like, is to be sure that this is not something other than lymphoma and I have spoken with Dr. Pino who will do desmin stains and other markers before doing an excisional biopsy next Wednesday with Dr. Gong.  If those additional stains suggest sarcomatoid features that can appear in the medial aspect of the thigh such as this, then she probably needs to have excision by Humberto Vera orthopedic oncologist at .  In the meantime, presuming this to be some form of lymphoma which is the highest likelihood based on the pathology from Dr. Pino where this is CD20 positive, CD10 positive, BCL6 40% positive, MUM1 40% positive, CD23 strong and diffuse positive, and BCL-2 positive.  Ki-67 is 50%.  CD30 and only 5%.  C-Myc is weakly expressed in 10% of cells.  BRIAN by TAYLER is negative.  Cyclin D1 negative.  CD5 indeterminate for B and T-cell coexpression.  LEF 1 presumably staining T cells.  Ultimate decision for treatment depends on the specific pathologic subtype and the staging and I will get her prognostic labs going as well.  Given the  relatively initial high-grade features of this that might require Adriamycin I will also proceed with echocardiogram.  -3/25/2021 CBC normal.  CMP unremarkable.  Sedimentation rate normal 27 with C-reactive protein elevated 1.7 upper limit of normal 0.5.  Beta-2 microglobulin normal 1.6.  Uric acid normal 4.3.  LDH normal 162.  Hepatitis B and C serologies negative.  -3/29/2021 echocardiogram ejection fraction 61 to 65%.  -3/31/21 excisional node biopsy shows diffuse large B cell non-Hodgkin lymphoma  -4/2/2021 PET shows postsurgical changes right proximal thigh and superficial inguinal region adjacent to adenopathy with SUV 4.0.  Right internal iliac lymph node mildly enlarged SUV 2.6 additionally noted.  No other adenopathy outside this region.  No mention of any abnormal marrow signal.  -4/5/2021 [facility] medical oncology follow-up visit: Per Dr. Arun Garcia pathologist, this is a diffuse large B cell non-Hodgkin lymphoma.  He is not sure whether this is double hit or not yet.  He is not sure if there is a background of follicular lymphoma and hence cannot say whether this is transformed.  Clinically her IPI score is 0 with her age less than 60, normal LDH, ECOG 0, no extranodal involvement on PET, and only stage II at worse per PET with all nodes within the same region.  I will get a bone marrow biopsy.  If this is stage IV, double hit, or has evidence of transition from follicular lymphoma into diffuse large B-cell lymphoma, then I would give R-CHOP x6.  If the marrow is not involved, this is not double hit, and there is no evidence of follicular transformation into diffuse large B-cell lymphoma, then I would treat this as a stage II nonbulky diffuse large B-cell lymphoma and consider R-CHOP x3 followed by involved site radiation therapy.  I reviewed her PET and biopsy reports and echocardiogram which shows good ejection fraction.  She will need to get her [employer] vaccine this week, family doctor  established for the possibility of prednisone-induced diabetes, CT-guided bone marrow biopsy, chemo preparation visit, and a port placement with Dr. Gong.  We will present her at multidisciplinary tumor board this Friday.  I went into detail regarding the side effects of R-CHOP but she will have these reviewed at her chemo preparation visit.  Though not bulky, I still would treat her with allopurinol.  First course of therapy will occur in our [place] office and if all goes well then the subsequent Rituxan doses can be faster and performed in [place] office.    -4/14/2021 pathology update Dr. Garcia: Node pathology negative for double hit by FISH    -4/21/2021 bone marrow biopsy negative for lymphomatous involvement with scant stainable iron.      -4/26/2021 [facility] oncology follow-up visit: Given lack of marrow involvement, negative for double hit, and possible background follicular lymphoma , I will treat her as stage IIa nonbulky (original tumor 5 cm on CT, I.  E.  Less than 7.5 cm) diffuse large B-cell lymphoma right proximal thigh SUV 4.0 mass incisionally biopsied (with palpable residua) with mild right internal iliac node enlargement SUV 2.6 as well.  We will treat her with planned R-CHOP x3 followed by involved site radiation therapy.  This was consensus of multidisciplinary tumor board 4/16/2021 as well.  First course [place].  Second course will occur in [place].  With the small component of background follicular lymphoma, there is potential risk of not only relapse of diffuse large B-cell lymphoma but later relapse from follicular lymphoma and we will keep that in mind in terms of following her up past the usual 3 years post definitive therapy presuming we get a complete remission on subsequent imaging and exams.  We will arrange radiation in [place] and I have made referral.  This will not start until after chemotherapy.    -5/13/2021 radiation oncology consultation Dr. Jl Infante  for preparation of future involved field radiation therapy    -5/18/2021 [facility] oncology clinic follow-up: Had moderate amount of nausea after her first cycle, will add Zyprexa to Zofran and Compazine that she is already taking. She was also anxious, added Ativan to her care plan premeds. Prescription for Augmentin sent in for sinus infection. Clinically good response with reduction of right medial thigh mass. We will continue treatment in [place] due to mild infusion reaction with first Rituxan but she was able to complete treatment with additional medications. Today is cycle #2 of a planned 3 courses and then will repeat restaging scan to assess for response and proceed to radiation as outlined above.    -6/8/2021 course #3 Rituxan CHOP    -6/22/2021 PET shows resolution of right inguinal and iliac adenopathy.  Negative PET/CT.    -6/28/2021 [facility] medical oncology follow-up visit: I reviewed images and reports of PET as above.  Status post Rituxan CHOP, PET is essentially negative with complete clinical remission.With the small component of background follicular lymphoma, there is potential risk of not only relapse of diffuse large B-cell lymphoma but later relapse from follicular lymphoma and we will keep that in mind in terms of following her up past the usual 3 years post definitive therapy presuming we get a complete remission on subsequent imaging and exams.  We will arrange radiation in [place] and Dr. Infante saw in mid May.  I called Dr. Infante to get her in to start involved field radiation and we will repeat PET and labs prior to return in 3 months for survivorship visit with my nurse practitioner who will lay out the follow-up according to NCCN guidelines.  Other than for modest anemia hemoglobin in the tens and glucose 150s, no other remarkable findings on CBC and CMP 6/8/2021.    Total time of care today inclusive of time spent today prior to arrival reviewing images and reports of current  negative PET and discussing care with radiation oncology to coordinate and reviewing her labs and chemistries as outlined above and during visit translating this plan to the patient and for the follow-up plan and after visit arranging for such took 30 minutes of patient care time throughout the day today.  Rodríguez Gerardo MD    06/28/2021

## 2021-07-19 DIAGNOSIS — C83.35 DIFFUSE LARGE B-CELL LYMPHOMA OF LYMPH NODES OF INGUINAL REGION (HCC): ICD-10-CM

## 2021-07-19 RX ORDER — ALLOPURINOL 300 MG/1
TABLET ORAL
Qty: 30 TABLET | Refills: 2 | OUTPATIENT
Start: 2021-07-19

## 2021-07-29 ENCOUNTER — INFUSION (OUTPATIENT)
Dept: ONCOLOGY | Facility: HOSPITAL | Age: 51
End: 2021-07-29

## 2021-07-29 VITALS
DIASTOLIC BLOOD PRESSURE: 62 MMHG | SYSTOLIC BLOOD PRESSURE: 136 MMHG | HEART RATE: 104 BPM | RESPIRATION RATE: 17 BRPM | TEMPERATURE: 97.2 F | BODY MASS INDEX: 37.87 KG/M2 | WEIGHT: 220.6 LBS

## 2021-07-29 DIAGNOSIS — Z45.2 ENCOUNTER FOR CENTRAL LINE CARE: Primary | ICD-10-CM

## 2021-07-29 PROCEDURE — 25010000002 HEPARIN LOCK FLUSH PER 10 UNITS: Performed by: INTERNAL MEDICINE

## 2021-07-29 PROCEDURE — 96523 IRRIG DRUG DELIVERY DEVICE: CPT

## 2021-07-29 RX ORDER — HEPARIN SODIUM (PORCINE) LOCK FLUSH IV SOLN 100 UNIT/ML 100 UNIT/ML
500 SOLUTION INTRAVENOUS AS NEEDED
Status: CANCELLED | OUTPATIENT
Start: 2021-07-29

## 2021-07-29 RX ORDER — HEPARIN SODIUM (PORCINE) LOCK FLUSH IV SOLN 100 UNIT/ML 100 UNIT/ML
500 SOLUTION INTRAVENOUS AS NEEDED
Status: DISCONTINUED | OUTPATIENT
Start: 2021-07-29 | End: 2021-07-29 | Stop reason: HOSPADM

## 2021-07-29 RX ADMIN — HEPARIN 500 UNITS: 100 SYRINGE at 09:50

## 2021-08-26 ENCOUNTER — INFUSION (OUTPATIENT)
Dept: ONCOLOGY | Facility: HOSPITAL | Age: 51
End: 2021-08-26

## 2021-08-26 VITALS
WEIGHT: 220.8 LBS | BODY MASS INDEX: 37.9 KG/M2 | SYSTOLIC BLOOD PRESSURE: 136 MMHG | TEMPERATURE: 98.6 F | HEART RATE: 99 BPM | RESPIRATION RATE: 16 BRPM | DIASTOLIC BLOOD PRESSURE: 60 MMHG

## 2021-08-26 DIAGNOSIS — Z45.2 ENCOUNTER FOR CENTRAL LINE CARE: Primary | ICD-10-CM

## 2021-08-26 PROCEDURE — 25010000002 HEPARIN LOCK FLUSH PER 10 UNITS: Performed by: INTERNAL MEDICINE

## 2021-08-26 PROCEDURE — 96523 IRRIG DRUG DELIVERY DEVICE: CPT

## 2021-08-26 RX ORDER — HEPARIN SODIUM (PORCINE) LOCK FLUSH IV SOLN 100 UNIT/ML 100 UNIT/ML
500 SOLUTION INTRAVENOUS AS NEEDED
Status: DISCONTINUED | OUTPATIENT
Start: 2021-08-26 | End: 2021-08-26 | Stop reason: HOSPADM

## 2021-08-26 RX ORDER — HEPARIN SODIUM (PORCINE) LOCK FLUSH IV SOLN 100 UNIT/ML 100 UNIT/ML
500 SOLUTION INTRAVENOUS AS NEEDED
Status: CANCELLED | OUTPATIENT
Start: 2021-08-26

## 2021-08-26 RX ADMIN — HEPARIN 500 UNITS: 100 SYRINGE at 09:50

## 2021-09-28 ENCOUNTER — INFUSION (OUTPATIENT)
Dept: ONCOLOGY | Facility: HOSPITAL | Age: 51
End: 2021-09-28

## 2021-09-28 VITALS
WEIGHT: 216.4 LBS | RESPIRATION RATE: 18 BRPM | SYSTOLIC BLOOD PRESSURE: 153 MMHG | TEMPERATURE: 98.1 F | BODY MASS INDEX: 37.14 KG/M2 | HEART RATE: 100 BPM | DIASTOLIC BLOOD PRESSURE: 73 MMHG

## 2021-09-28 DIAGNOSIS — Z45.2 ENCOUNTER FOR CENTRAL LINE CARE: ICD-10-CM

## 2021-09-28 DIAGNOSIS — C83.35 DIFFUSE LARGE B-CELL LYMPHOMA OF LYMPH NODES OF INGUINAL REGION (HCC): Primary | Chronic | ICD-10-CM

## 2021-09-28 PROCEDURE — 25010000002 HEPARIN LOCK FLUSH PER 10 UNITS: Performed by: INTERNAL MEDICINE

## 2021-09-28 PROCEDURE — 36591 DRAW BLOOD OFF VENOUS DEVICE: CPT

## 2021-09-28 RX ORDER — HEPARIN SODIUM (PORCINE) LOCK FLUSH IV SOLN 100 UNIT/ML 100 UNIT/ML
500 SOLUTION INTRAVENOUS AS NEEDED
OUTPATIENT
Start: 2021-09-28

## 2021-09-28 RX ORDER — HEPARIN SODIUM (PORCINE) LOCK FLUSH IV SOLN 100 UNIT/ML 100 UNIT/ML
500 SOLUTION INTRAVENOUS AS NEEDED
Status: DISCONTINUED | OUTPATIENT
Start: 2021-09-28 | End: 2021-09-28 | Stop reason: HOSPADM

## 2021-09-28 RX ADMIN — HEPARIN 500 UNITS: 100 SYRINGE at 13:40

## 2021-09-29 LAB
ALBUMIN SERPL-MCNC: 4.1 G/DL (ref 3.8–4.9)
ALBUMIN/GLOB SERPL: 1.7 {RATIO} (ref 1.2–2.2)
ALP SERPL-CCNC: 81 IU/L (ref 44–121)
ALT SERPL-CCNC: 22 IU/L (ref 0–32)
AST SERPL-CCNC: 22 IU/L (ref 0–40)
BASOPHILS # BLD AUTO: 0 X10E3/UL (ref 0–0.2)
BASOPHILS NFR BLD AUTO: 0 %
BILIRUB SERPL-MCNC: <0.2 MG/DL (ref 0–1.2)
BUN SERPL-MCNC: 12 MG/DL (ref 6–24)
BUN/CREAT SERPL: 19 (ref 9–23)
CALCIUM SERPL-MCNC: 9.2 MG/DL (ref 8.7–10.2)
CHLORIDE SERPL-SCNC: 103 MMOL/L (ref 96–106)
CO2 SERPL-SCNC: 19 MMOL/L (ref 20–29)
CREAT SERPL-MCNC: 0.63 MG/DL (ref 0.57–1)
EOSINOPHIL # BLD AUTO: 0.1 X10E3/UL (ref 0–0.4)
EOSINOPHIL NFR BLD AUTO: 2 %
ERYTHROCYTE [DISTWIDTH] IN BLOOD BY AUTOMATED COUNT: 13.4 % (ref 11.7–15.4)
GLOBULIN SER CALC-MCNC: 2.4 G/DL (ref 1.5–4.5)
GLUCOSE SERPL-MCNC: 138 MG/DL (ref 65–99)
HCT VFR BLD AUTO: 36 % (ref 34–46.6)
HGB BLD-MCNC: 12.2 G/DL (ref 11.1–15.9)
IMM GRANULOCYTES # BLD AUTO: 0 X10E3/UL (ref 0–0.1)
IMM GRANULOCYTES NFR BLD AUTO: 0 %
LYMPHOCYTES # BLD AUTO: 1.6 X10E3/UL (ref 0.7–3.1)
LYMPHOCYTES NFR BLD AUTO: 32 %
MCH RBC QN AUTO: 31 PG (ref 26.6–33)
MCHC RBC AUTO-ENTMCNC: 33.9 G/DL (ref 31.5–35.7)
MCV RBC AUTO: 91 FL (ref 79–97)
MONOCYTES # BLD AUTO: 0.3 X10E3/UL (ref 0.1–0.9)
MONOCYTES NFR BLD AUTO: 7 %
NEUTROPHILS # BLD AUTO: 2.9 X10E3/UL (ref 1.4–7)
NEUTROPHILS NFR BLD AUTO: 59 %
PLATELET # BLD AUTO: 322 X10E3/UL (ref 150–450)
POTASSIUM SERPL-SCNC: 3.7 MMOL/L (ref 3.5–5.2)
PROT SERPL-MCNC: 6.5 G/DL (ref 6–8.5)
RBC # BLD AUTO: 3.94 X10E6/UL (ref 3.77–5.28)
SODIUM SERPL-SCNC: 141 MMOL/L (ref 134–144)
WBC # BLD AUTO: 5 X10E3/UL (ref 3.4–10.8)

## 2021-10-14 ENCOUNTER — HOSPITAL ENCOUNTER (OUTPATIENT)
Dept: PET IMAGING | Facility: HOSPITAL | Age: 51
Discharge: HOME | End: 2021-10-14

## 2021-10-14 DIAGNOSIS — C83.35 DIFFUSE LARGE B-CELL LYMPHOMA OF LYMPH NODES OF INGUINAL REGION (HCC): Chronic | ICD-10-CM

## 2021-10-14 LAB — GLUCOSE BLDC GLUCOMTR-MCNC: 111 MG/DL (ref 70–130)

## 2021-10-14 PROCEDURE — A9552 F18 FDG: HCPCS | Performed by: INTERNAL MEDICINE

## 2021-10-14 PROCEDURE — 0 FLUDEOXYGLUCOSE F18 SOLUTION: Performed by: INTERNAL MEDICINE

## 2021-10-14 PROCEDURE — 78815 PET IMAGE W/CT SKULL-THIGH: CPT

## 2021-10-14 PROCEDURE — 82962 GLUCOSE BLOOD TEST: CPT

## 2021-10-14 RX ADMIN — FLUDEOXYGLUCOSE F18 1 DOSE: 300 INJECTION INTRAVENOUS at 09:56

## 2021-10-21 ENCOUNTER — TELEPHONE (OUTPATIENT)
Dept: ONCOLOGY | Facility: CLINIC | Age: 51
End: 2021-10-21

## 2021-10-21 ENCOUNTER — CLINICAL SUPPORT (OUTPATIENT)
Dept: ONCOLOGY | Facility: CLINIC | Age: 51
End: 2021-10-21

## 2021-10-21 VITALS
HEART RATE: 94 BPM | WEIGHT: 222 LBS | HEIGHT: 64 IN | OXYGEN SATURATION: 92 % | RESPIRATION RATE: 18 BRPM | DIASTOLIC BLOOD PRESSURE: 66 MMHG | SYSTOLIC BLOOD PRESSURE: 129 MMHG | TEMPERATURE: 97.6 F | BODY MASS INDEX: 37.9 KG/M2

## 2021-10-21 DIAGNOSIS — C83.35 DIFFUSE LARGE B-CELL LYMPHOMA OF LYMPH NODES OF INGUINAL REGION (HCC): Primary | Chronic | ICD-10-CM

## 2021-10-21 PROCEDURE — 99214 OFFICE O/P EST MOD 30 MIN: CPT | Performed by: NURSE PRACTITIONER

## 2021-10-21 RX ORDER — ONDANSETRON HYDROCHLORIDE 8 MG/1
TABLET, FILM COATED ORAL
COMMUNITY
Start: 2021-09-18

## 2021-10-21 NOTE — PROGRESS NOTES
MEDICAL ONCOLOGY CANCER SURVIVORSHIP VISIT    Iliana Gray  [id number]  1970    Chief Complaint: Non-hodgkin lymphoma      History of present illness:  Iliana Gray is a 51 y.o. year old female who is here today for the Cancer Survivorship visit, see oncology history below.  Iliana currently is feeling well other than for fatigue.  She completed radiation in August with no complications.  She has no new or concerning symptoms, no adenopathy that she is aware of.     Cancer History:   Oncology/Hematology History Overview Note   1.  Right Medial thigh stage IIa nonbulky less than 7.5 cm diffuse large b cell non-hodgkin lymphoma with component of background follicular lymphoma plan for R-CHOP x3 followed by ISRT.  Had CR after 3 courses of R-CHOP x3 ending 6/8/2021 per PET 6/22/2021.    Oncology history timeline:  -2/18/2021 hemoglobin 12.7 with normal white count platelet count differential.  CMP unremarkable.  Urinalysis with microscopic hematuria 20-30 red blood cells per milliliter.  AP single view chest x-ray emergency room [place] showed no acute findings.  CT abdomen pelvis with IV contrast emergency room [place] showed calcified granuloma right lung base, fatty liver infiltration, subcentimeter hypodensity left hepatic lobe too small to categorize, normal size spleen, no adrenal masses, no pancreatic mass, normal kidneys, and no retroperitoneal adenopathy or ascites or pneumoperitoneum.  There was a smooth marginated homogeneous enhancing 4.5 x 5 x 3.3 cm mass in the superficial soft tissues of the anterior upper right thigh.  -2/25/2021 office note from Dr. Gong mentions presentation to emergency room 2/18/2021 complaining of right upper thigh swelling for the preceding week of 2/12/2021.  CT scan abdomen pelvis to include the thighs [facility] revealing 5 cm smoothly marginated right upper thigh mass below the groin crease suspicious for  pathologic lymph node but could be neoplastic of other nature given no prodromal illness, infections, or inflammation.  Recommendation for biopsy was made.  Per Dr. Gong note, she had been sick for the better part of the winter and had been urgent treatment centers told that she had a viral infection flu and coronavirus negative with fevers and chills that subsequently resolved but for fatigue that did not resolve with hypersomnolence as well.  Says she feels hot all the time but no ronny night sweats.  -3/12/2021 right thigh mass needle core tissue sample extremely small and predominantly crushed limiting adequate evaluation.  Differential diagnosis for this B-cell lymphoma is diffuse large B cell, follicular lymphoma versus other.  Further differentiation could not be performed on this crush small sample.  -3/25/2021 [facility] hematology oncology initial consultation: I, Rodríguez Gerardo, saw for the first time today.  Reviewed the above story with her.  Still fatigued without night sweats or unexplained weight loss.  She has this mass in the soft tissue medial right thigh about 5 cm in size that does seem to move somewhat beneath the skin but also to my exam feels like it is attached to the deeper structures of the thigh as well.  I feel no other adenopathy or hepatosplenomegaly.  I will get a PET scan.  I spoken with Dr. Gong who will look at these images again with the CTs in [place].  If there is clear indication that this is not coming from soft tissue nonnodal structures, then excisional biopsy for adequate tissue diagnosis is important to determine the type of therapy.  This is growing since her biopsy 3/12/2021 but not daily like a very high-grade lymphoma.  To my hand, this does not feel like a typical stas structure and my other concern is, even though this does not venessa with any markers to suggest sarcoma or the like, is to be sure that this is not something other than lymphoma and I have spoken with  Dr. Pino who will do desmin stains and other markers before doing an excisional biopsy next Wednesday with Dr. Gong.  If those additional stains suggest sarcomatoid features that can appear in the medial aspect of the thigh such as this, then she probably needs to have excision by Humberto Vera orthopedic oncologist at .  In the meantime, presuming this to be some form of lymphoma which is the highest likelihood based on the pathology from Dr. Pino where this is CD20 positive, CD10 positive, BCL6 40% positive, MUM1 40% positive, CD23 strong and diffuse positive, and BCL-2 positive.  Ki-67 is 50%.  CD30 and only 5%.  C-Myc is weakly expressed in 10% of cells.  BRIAN by TAYLER is negative.  Cyclin D1 negative.  CD5 indeterminate for B and T-cell coexpression.  LEF 1 presumably staining T cells.  Ultimate decision for treatment depends on the specific pathologic subtype and the staging and I will get her prognostic labs going as well.  Given the relatively initial high-grade features of this that might require Adriamycin I will also proceed with echocardiogram.  -3/25/2021 CBC normal.  CMP unremarkable.  Sedimentation rate normal 27 with C-reactive protein elevated 1.7 upper limit of normal 0.5.  Beta-2 microglobulin normal 1.6.  Uric acid normal 4.3.  LDH normal 162.  Hepatitis B and C serologies negative.  -3/29/2021 echocardiogram ejection fraction 61 to 65%.  -3/31/21 excisional node biopsy shows diffuse large B cell non-Hodgkin lymphoma  -4/2/2021 PET shows postsurgical changes right proximal thigh and superficial inguinal region adjacent to adenopathy with SUV 4.0.  Right internal iliac lymph node mildly enlarged SUV 2.6 additionally noted.  No other adenopathy outside this region.  No mention of any abnormal marrow signal.  -4/5/2021 [facility] oncology follow-up visit: Per Dr. Arun Garcia pathologist, this is a diffuse large B cell non-Hodgkin lymphoma.  He is not sure whether this is double hit  or not yet.  He is not sure if there is a background of follicular lymphoma and hence cannot say whether this is transformed.  Clinically her IPI score is 0 with her age less than 60, normal LDH, ECOG 0, no extranodal involvement on PET, and only stage II at worse per PET with all nodes within the same region.  I will get a bone marrow biopsy.  If this is stage IV, double hit, or has evidence of transition from follicular lymphoma into diffuse large B-cell lymphoma, then I would give R-CHOP x6.  If the marrow is not involved, this is not double hit, and there is no evidence of follicular transformation into diffuse large B-cell lymphoma, then I would treat this as a stage II nonbulky diffuse large B-cell lymphoma and consider R-CHOP x3 followed by involved site radiation therapy.  I reviewed her PET and biopsy reports and echocardiogram which shows good ejection fraction.  She will need to get her [employer] Covid vaccine this week, family doctor established for the possibility of prednisone-induced diabetes, CT-guided bone marrow biopsy, chemo preparation visit, and a port placement with Dr. Gong.  We will present her at multidisciplinary tumor board this Friday.  I went into detail regarding the side effects of R-CHOP but she will have these reviewed at her chemo preparation visit.  Though not bulky, I still would treat her with allopurinol.  First course of therapy will occur in our [place] office and if all goes well then the subsequent Rituxan doses can be faster and performed in [place] office.    -4/14/2021 pathology update Dr. Garcia: Node pathology negative for double hit by FISH    -4/21/2021 bone marrow biopsy negative for lymphomatous involvement with scant stainable iron.      -4/26/2021 [facility] medical oncology follow-up visit: Given lack of marrow involvement, negative for double hit, and possible background follicular lymphoma , I will treat her as stage IIa nonbulky (original tumor 5  cm on CT, I.  E.  Less than 7.5 cm) diffuse large B-cell lymphoma right proximal thigh SUV 4.0 mass incisionally biopsied (with palpable residua) with mild right internal iliac node enlargement SUV 2.6 as well.  We will treat her with planned R-CHOP x3 followed by involved site radiation therapy.  This was consensus of multidisciplinary tumor board 4/16/2021 as well.  First course [place].  Second course will occur in [place].  With the small component of background follicular lymphoma, there is potential risk of not only relapse of diffuse large B-cell lymphoma but later relapse from follicular lymphoma and we will keep that in mind in terms of following her up past the usual 3 years post definitive therapy presuming we get a complete remission on subsequent imaging and exams.  We will arrange radiation in [place] and I have made referral.  This will not start until after chemotherapy.    -5/13/2021 radiation oncology consultation Dr. Jl Infante for preparation of future involved field radiation therapy    -5/18/2021 [facility] onc[facility] clinic follow-up: Had moderate amount of nausea after her first cycle, will add Zyprexa to Zofran and Compazine that she is already taking. She was also anxious, added Ativan to her care plan premeds. Prescription for Augmentin sent in for sinus infection. Clinically good response with reduction of right medial thigh mass. We will continue treatment in [place] due to mild infusion reaction with first Rituxan but she was able to complete treatment with additional medications. Today is cycle #2 of a planned 3 courses and then will repeat restaging scan to assess for response and proceed to radiation as outlined above.    -6/8/2021 course #3 Rituxan CHOP    -6/22/2021 PET shows resolution of right inguinal and iliac adenopathy.  Negative PET/CT.    -6/28/2021 [facility] medical oncology follow-up visit: I reviewed images and reports of PET as above.  Status post Rituxan CHOP, PET  is essentially negative with complete clinical remission.With the small component of background follicular lymphoma, there is potential risk of not only relapse of diffuse large B-cell lymphoma but later relapse from follicular lymphoma and we will keep that in mind in terms of following her up past the usual 3 years post definitive therapy presuming we get a complete remission on subsequent imaging and exams.  We will arrange radiation in [place] and Dr. Infante saw in mid May.  I called Dr. Infante to get her in to start involved field radiation and we will repeat PET and labs prior to return in 3 months for survivorship visit with my nurse practitioner who will lay out the follow-up according to NCCN guidelines.  Other than for modest anemia hemoglobin in the tens and glucose 150s, no other remarkable findings on CBC and CMP 6/8/2021.     Diffuse large B-cell lymphoma of lymph nodes of inguinal region (HCC)   3/29/2021 -  Other Event    Echocardiogram  · Left ventricular ejection fraction appears to be 61 - 65%. Left ventricular systolic function is normal.  · Normal global longitudinal strain at -23.5%.  · Left ventricular diastolic function was normal.  · No significant structural or functional valvular disease.     4/2/2021 Imaging    PET/CT IMPRESSION:  Postsurgical changes right proximal thigh and superficial  inguinal region adjacent to adenopathy with abnormal hypermetabolism  maximum SUV 4.0. Right internal iliac lymph node is mildly enlarged with  maximum SUV 2.6 additionally noted. No soft tissue mass or adenopathy  outside of this region.     4/21/2021 Biopsy    Marrow biopsy negative     4/26/2021 - 6/28/2021 Chemotherapy    Completed course #3 6/8/2021  OP LYMPHOMA R-CHOP RiTUXimab / Cyclophosphamide / DOXOrubicin / VinCRIStine / PredniSONE     4/26/2021 Cancer Staged    Staging form: Hodgkin And Non-Hodgkin Lymphoma, AJCC 8th Edition  - Clinical: Stage II (Diffuse large B-cell lymphoma) - Signed  "by Rodríguez Gerardo MD on 4/26/2021 6/22/2021 Imaging     PET shows resolution of right inguinal and iliac adenopathy.  Negative PET/CT.     7/22/2021 - 8/16/2021 Radiation    Radiation OncologyTreatment Course:  Iliana Gray received 36 cGy in 18 fractions to right pelvic and inguinofemoral nodes (involved field) under the care of Dr. Jl Infante.         Past Medical History:   Diagnosis Date   • Anxiety    • Arthritis    • Asthma    • Diffuse large B cell lymphoma (HCC)    • Sleep apnea     cpap       Past Surgical History:   Procedure Laterality Date   • HYSTERECTOMY     • TUMOR EXCISION Right 04/2021    lymph node removal/biopsy/right inner thigh   • VENOUS ACCESS DEVICE (PORT) INSERTION Right 04/2021       MEDICATIONS: The current medication list was reviewed and reconciled.     Allergies:  has No Known Allergies.    History reviewed. No pertinent family history.      Review of Systems  Positive for mild fatigue otherwise no new concerns    Physical Exam  Vital Signs: /66   Pulse 94   Temp 97.6 °F (36.4 °C)   Resp 18   Ht 162.6 cm (64\")   Wt 101 kg (222 lb)   SpO2 92%   BMI 38.11 kg/m²    General Appearance:  alert, cooperative, no apparent distress and appears stated age   Neurologic/Psychiatric: A&O x 3, gait steady, appropriate affect       Neck: Supple, symmetrical, trachea midline, no adenopathy;  No thyromegaly, masses, or tenderness       Lungs:   Clear to auscultation bilaterally; respirations regular, even, and unlabored bilaterally   Heart:  Regular rate and rhythm, no murmurs appreciated   Abdomen:   Soft, non-tender and non-distended   Lymph nodes: No cervical, supraclavicular, axillary or inguinal adenopathy noted         ECOG Performance Status: (0) Fully Active - Able to Carry On All Pre-disease Performance Without Restriction        Assessment and Plan:  Diagnoses and all orders for this visit:    1. Diffuse large B-cell lymphoma of lymph nodes of inguinal region (HCC) " (Primary)  -     CBC & Differential; Future  -     Comprehensive Metabolic Panel; Future  -     CT Chest With Contrast Diagnostic; Future  -     CT Abdomen Pelvis With Contrast; Future  -     Ambulatory Referral to General Surgery        Discussion:    PET scan from 10/14/2021 was negative along with normal CBC and CMP.  She completed radiation in August without complications.  We will get her back to Dr. Gong for port removal.  We will continue surveillance per NCCN guidelines as follows:  -H&P and labs every 3-6 months for 5 years and then annually or as clinically indicated.    -We will do surveillance imaging post completion of treatment with CT chest, abdomen and pelvis with contrast every 6 months for 2 years and then annually or as clinically indicated.    The patient and I have reviewed [facility] personal Survivorship Care Plan in detail. We discussed diagnosis, pathology, histology, all treatments, and ongoing surveillance recommendations. All questions were answered to her satisfaction. The patient is in agreement with our plan for ongoing surveillance as outlined in the plan. A copy of this document was provided at the completion of our visit.  A copy has also been sent to the patient's primary care provider.    Return to clinic in 6 months for ongoing cancer surveillance.    I spent 30 minutes caring for Iliana on this date of service. This time includes time spent by me in the following activities: preparing for the visit, reviewing tests, performing a medically appropriate examination and/or evaluation, counseling and educating the patient/family/caregiver, ordering medications, tests, or procedures and documenting information in the medical record.     Anitra Shelley, APRN  10/21/2021

## 2021-10-21 NOTE — TELEPHONE ENCOUNTER
PT called back [phone] per Tiffanie PT has not been seen there since 2006.  MAN Ayoub notified.  [id number]  ~Shell

## 2021-10-21 NOTE — TELEPHONE ENCOUNTER
PT will look for her appt card and call back with MD information and I will add to Epic.  [phone]  ~Shell

## 2021-10-21 NOTE — TELEPHONE ENCOUNTER
----- Message from MAN Coleman sent at 10/21/2021  1:45 PM EDT -----  Regarding: RE: question  Call Iliana and see if she can recall who she saw.  I thought she told me today it was someone new but she could have been elaborating :)  Thanks.  ----- Message -----  From: Ny Gambino MA  Sent: 10/21/2021   1:21 PM EDT  To: MAN Coleman  Subject: RE: question                                     FYI...She hasn't been seen since 2006.    ~Ny  ----- Message -----  From: Anitra Shelley APRN  Sent: 10/21/2021  12:27 PM EDT  To: Ny Gambino MA  Subject: question                                         Please call [facility] and see who her PCP is and put in Epic.  She could not remember who she was, it was one of their PA's.  Thank you.  Anitra

## 2021-11-01 ENCOUNTER — TELEPHONE (OUTPATIENT)
Dept: ONCOLOGY | Facility: CLINIC | Age: 51
End: 2021-11-01

## 2021-11-01 NOTE — TELEPHONE ENCOUNTER
Provider: MIGUEL    Caller:PRINCESS    Relationship to Patient: SELF    Phone Number: [phone]    Reason for Call: PT CALLED TO CONFIRM SCAN APPT 11/17 AND ALSO CONFIRM IF SHE IS TO RECEIVE SCAN EVERY 6 MONTHS. PLEASE CALL PT BACK TO CLARIFY.

## 2021-11-01 NOTE — TELEPHONE ENCOUNTER
Called and left message for patient informing her she does have a scan scheduled for 11/17/21 but scans should not be scheduled until April 2022 and this would be worked on and she would be contacted back with a new appt. Jaden- Can you work on getting this scheduling fixed?

## 2021-11-17 ENCOUNTER — APPOINTMENT (OUTPATIENT)
Dept: CT IMAGING | Facility: HOSPITAL | Age: 51
End: 2021-11-17

## 2022-01-18 ENCOUNTER — TELEPHONE (OUTPATIENT)
Dept: ONCOLOGY | Facility: CLINIC | Age: 52
End: 2022-01-18

## 2022-01-18 NOTE — TELEPHONE ENCOUNTER
"Called patient back, she reports \" flu like symptoms\" headache, upset stomach, fatigue that has increased in frequency over the last couple of months.  She has tested negative for flu and COVID. She denies fever or night sweats. This is how she initially presented at original diagnosis. Anitra, please advise.   "

## 2022-01-18 NOTE — TELEPHONE ENCOUNTER
Pt calling in stating she has been sick constantly in the last couple of months. She has been tested for covid and flu and both were negative. PT states this is how she used to feel when she was first diagnosed with lymphoma. Pt also wants to know if she needs to be seen before the month of April?, because April is when her ct is scheduled. Advised pt, Caterina can follow up on this.

## 2022-01-18 NOTE — TELEPHONE ENCOUNTER
Contacted patient to inform her that Anitra would like to get scans sooner and have her follow up after scans. Patient verbalized understanding.

## 2022-01-28 ENCOUNTER — HOSPITAL ENCOUNTER (OUTPATIENT)
Dept: CT IMAGING | Facility: HOSPITAL | Age: 52
Discharge: HOME | End: 2022-01-28

## 2022-01-28 ENCOUNTER — LAB (OUTPATIENT)
Dept: LAB | Facility: HOSPITAL | Age: 52
End: 2022-01-28

## 2022-01-28 DIAGNOSIS — C83.35 DIFFUSE LARGE B-CELL LYMPHOMA OF LYMPH NODES OF INGUINAL REGION: ICD-10-CM

## 2022-01-28 DIAGNOSIS — C83.35 DIFFUSE LARGE B-CELL LYMPHOMA OF LYMPH NODES OF INGUINAL REGION: Chronic | ICD-10-CM

## 2022-01-28 LAB
ALBUMIN SERPL-MCNC: 4.3 G/DL (ref 3.5–5.2)
ALBUMIN/GLOB SERPL: 1.6 G/DL
ALP SERPL-CCNC: 73 U/L (ref 39–117)
ALT SERPL W P-5'-P-CCNC: 16 U/L (ref 1–33)
ANION GAP SERPL CALCULATED.3IONS-SCNC: 11 MMOL/L (ref 5–15)
AST SERPL-CCNC: 18 U/L (ref 1–32)
BASOPHILS # BLD AUTO: 0.03 10*3/MM3 (ref 0–0.2)
BASOPHILS NFR BLD AUTO: 0.5 % (ref 0–1.5)
BILIRUB SERPL-MCNC: 0.2 MG/DL (ref 0–1.2)
BUN SERPL-MCNC: 12 MG/DL (ref 6–20)
BUN/CREAT SERPL: 18.5 (ref 7–25)
CALCIUM SPEC-SCNC: 9.4 MG/DL (ref 8.6–10.5)
CHLORIDE SERPL-SCNC: 100 MMOL/L (ref 98–107)
CO2 SERPL-SCNC: 27 MMOL/L (ref 22–29)
CREAT SERPL-MCNC: 0.65 MG/DL (ref 0.57–1)
DEPRECATED RDW RBC AUTO: 49.1 FL (ref 37–54)
EOSINOPHIL # BLD AUTO: 0.12 10*3/MM3 (ref 0–0.4)
EOSINOPHIL NFR BLD AUTO: 2.1 % (ref 0.3–6.2)
ERYTHROCYTE [DISTWIDTH] IN BLOOD BY AUTOMATED COUNT: 14.9 % (ref 12.3–15.4)
GFR SERPL CREATININE-BSD FRML MDRD: 95 ML/MIN/1.73
GLOBULIN UR ELPH-MCNC: 2.7 GM/DL
GLUCOSE SERPL-MCNC: 94 MG/DL (ref 65–99)
HCT VFR BLD AUTO: 42.1 % (ref 34–46.6)
HGB BLD-MCNC: 13.6 G/DL (ref 12–15.9)
IMM GRANULOCYTES # BLD AUTO: 0.02 10*3/MM3 (ref 0–0.05)
IMM GRANULOCYTES NFR BLD AUTO: 0.3 % (ref 0–0.5)
LYMPHOCYTES # BLD AUTO: 2.01 10*3/MM3 (ref 0.7–3.1)
LYMPHOCYTES NFR BLD AUTO: 34.4 % (ref 19.6–45.3)
MCH RBC QN AUTO: 29.3 PG (ref 26.6–33)
MCHC RBC AUTO-ENTMCNC: 32.3 G/DL (ref 31.5–35.7)
MCV RBC AUTO: 90.7 FL (ref 79–97)
MONOCYTES # BLD AUTO: 0.46 10*3/MM3 (ref 0.1–0.9)
MONOCYTES NFR BLD AUTO: 7.9 % (ref 5–12)
NEUTROPHILS NFR BLD AUTO: 3.21 10*3/MM3 (ref 1.7–7)
NEUTROPHILS NFR BLD AUTO: 54.8 % (ref 42.7–76)
NRBC BLD AUTO-RTO: 0 /100 WBC (ref 0–0.2)
PLATELET # BLD AUTO: 334 10*3/MM3 (ref 140–450)
PMV BLD AUTO: 9 FL (ref 6–12)
POTASSIUM SERPL-SCNC: 4.1 MMOL/L (ref 3.5–5.2)
PROT SERPL-MCNC: 7 G/DL (ref 6–8.5)
RBC # BLD AUTO: 4.64 10*6/MM3 (ref 3.77–5.28)
SODIUM SERPL-SCNC: 138 MMOL/L (ref 136–145)
WBC NRBC COR # BLD: 5.85 10*3/MM3 (ref 3.4–10.8)

## 2022-01-28 PROCEDURE — 71260 CT THORAX DX C+: CPT

## 2022-01-28 PROCEDURE — 80053 COMPREHEN METABOLIC PANEL: CPT

## 2022-01-28 PROCEDURE — 85025 COMPLETE CBC W/AUTO DIFF WBC: CPT

## 2022-01-28 PROCEDURE — 25010000002 IOPAMIDOL 61 % SOLUTION: Performed by: NURSE PRACTITIONER

## 2022-01-28 PROCEDURE — 36415 COLL VENOUS BLD VENIPUNCTURE: CPT

## 2022-01-28 PROCEDURE — 74177 CT ABD & PELVIS W/CONTRAST: CPT

## 2022-01-28 RX ADMIN — IOPAMIDOL 85 ML: 612 INJECTION, SOLUTION INTRAVENOUS at 15:18

## 2022-02-01 ENCOUNTER — OFFICE VISIT (OUTPATIENT)
Dept: ONCOLOGY | Facility: CLINIC | Age: 52
End: 2022-02-01

## 2022-02-01 VITALS
HEIGHT: 64 IN | TEMPERATURE: 97.6 F | WEIGHT: 216 LBS | SYSTOLIC BLOOD PRESSURE: 127 MMHG | RESPIRATION RATE: 18 BRPM | OXYGEN SATURATION: 96 % | DIASTOLIC BLOOD PRESSURE: 69 MMHG | HEART RATE: 89 BPM | BODY MASS INDEX: 36.88 KG/M2

## 2022-02-01 DIAGNOSIS — C83.35 DIFFUSE LARGE B-CELL LYMPHOMA OF LYMPH NODES OF INGUINAL REGION: Primary | Chronic | ICD-10-CM

## 2022-02-01 PROCEDURE — 99214 OFFICE O/P EST MOD 30 MIN: CPT | Performed by: INTERNAL MEDICINE

## 2022-02-01 RX ORDER — DIAZEPAM 5 MG/1
5 TABLET ORAL 3 TIMES DAILY PRN
COMMUNITY
Start: 2022-01-21

## 2022-02-01 RX ORDER — ESCITALOPRAM OXALATE 10 MG/1
TABLET ORAL
COMMUNITY
Start: 2021-12-31 | End: 2022-10-11

## 2022-02-01 NOTE — PROGRESS NOTES
CHIEF COMPLAINT: Follow-up nonbulky stage IIa diffuse large B-cell lymphoma status post R-CHOP x3 followed by involved site radiation therapy with CR after 3 cycles of R-CHOP.    Problem List:  Oncology/Hematology History Overview Note   1.  Right Medial thigh stage IIa nonbulky less than 7.5 cm diffuse large b cell non-hodgkin lymphoma with component of background follicular lymphoma plan for R-CHOP x3 followed by ISRT.  Had CR after 3 courses of R-CHOP x3 ending 6/8/2021 per PET 6/22/2021.    Oncology history timeline:  -2/18/2021 hemoglobin 12.7 with normal white count platelet count differential.  CMP unremarkable.  Urinalysis with microscopic hematuria 20-30 red blood cells per milliliter.  AP single view chest x-ray emergency room [place] showed no acute findings.  CT abdomen pelvis with IV contrast emergency room [place] showed calcified granuloma right lung base, fatty liver infiltration, subcentimeter hypodensity left hepatic lobe too small to categorize, normal size spleen, no adrenal masses, no pancreatic mass, normal kidneys, and no retroperitoneal adenopathy or ascites or pneumoperitoneum.  There was a smooth marginated homogeneous enhancing 4.5 x 5 x 3.3 cm mass in the superficial soft tissues of the anterior upper right thigh.  -2/25/2021 office note from Dr. Gong mentions presentation to emergency room 2/18/2021 complaining of right upper thigh swelling for the preceding week of 2/12/2021.  CT scan abdomen pelvis to include the thighs performed at [place] [facility] emergency room revealing 5 cm smoothly marginated right upper thigh mass below the groin crease suspicious for pathologic lymph node but could be neoplastic of other nature given no prodromal illness, infections, or inflammation.  Recommendation for biopsy was made.  Per Dr. Gong note, she had been sick for the better part of the winter and had been urgent treatment centers told that she had a viral infection flu and  coronavirus negative with fevers and chills that subsequently resolved but for fatigue that did not resolve with hypersomnolence as well.  Says she feels hot all the time but no ronny night sweats.  -3/12/2021 right thigh mass needle core tissue sample extremely small and predominantly crushed limiting adequate evaluation.  Differential diagnosis for this B-cell lymphoma is diffuse large B cell, follicular lymphoma versus other.  Further differentiation could not be performed on this crush small sample.  -3/25/2021 [facility] hematology oncology initial consultation: I, Rodríguez Gerardo, saw for the first time today.  Reviewed the above story with her.  Still fatigued without night sweats or unexplained weight loss.  She has this mass in the soft tissue medial right thigh about 5 cm in size that does seem to move somewhat beneath the skin but also to my exam feels like it is attached to the deeper structures of the thigh as well.  I feel no other adenopathy or hepatosplenomegaly.  I will get a PET scan.  I spoken with Dr. Gong who will look at these images again with the CTs in [place].  If there is clear indication that this is not coming from soft tissue nonnodal structures, then excisional biopsy for adequate tissue diagnosis is important to determine the type of therapy.  This is growing since her biopsy 3/12/2021 but not daily like a very high-grade lymphoma.  To my hand, this does not feel like a typical stas structure and my other concern is, even though this does not venessa with any markers to suggest sarcoma or the like, is to be sure that this is not something other than lymphoma and I have spoken with Dr. Pino who will do desmin stains and other markers before doing an excisional biopsy next Wednesday with Dr. Gong.  If those additional stains suggest sarcomatoid features that can appear in the medial aspect of the thigh such as this, then she probably needs to have excision by Humberto Vera  orthopedic oncologist at .  In the meantime, presuming this to be some form of lymphoma which is the highest likelihood based on the pathology from Dr. Pino where this is CD20 positive, CD10 positive, BCL6 40% positive, MUM1 40% positive, CD23 strong and diffuse positive, and BCL-2 positive.  Ki-67 is 50%.  CD30 and only 5%.  C-Myc is weakly expressed in 10% of cells.  BRIAN by TAYLER is negative.  Cyclin D1 negative.  CD5 indeterminate for B and T-cell coexpression.  LEF 1 presumably staining T cells.  Ultimate decision for treatment depends on the specific pathologic subtype and the staging and I will get her prognostic labs going as well.  Given the relatively initial high-grade features of this that might require Adriamycin I will also proceed with echocardiogram.  -3/25/2021 CBC normal.  CMP unremarkable.  Sedimentation rate normal 27 with C-reactive protein elevated 1.7 upper limit of normal 0.5.  Beta-2 microglobulin normal 1.6.  Uric acid normal 4.3.  LDH normal 162.  Hepatitis B and C serologies negative.  -3/29/2021 echocardiogram ejection fraction 61 to 65%.  -3/31/21 excisional node biopsy shows diffuse large B cell non-Hodgkin lymphoma  -4/2/2021 PET shows postsurgical changes right proximal thigh and superficial inguinal region adjacent to adenopathy with SUV 4.0.  Right internal iliac lymph node mildly enlarged SUV 2.6 additionally noted.  No other adenopathy outside this region.  No mention of any abnormal marrow signal.  -4/5/2021 [facility] medical oncology follow-up visit: Per Dr. Arun Garcia pathologist, this is a diffuse large B cell non-Hodgkin lymphoma.  He is not sure whether this is double hit or not yet.  He is not sure if there is a background of follicular lymphoma and hence cannot say whether this is transformed.  Clinically her IPI score is 0 with her age less than 60, normal LDH, ECOG 0, no extranodal involvement on PET, and only stage II at worse per PET with all nodes within the same  region.  I will get a bone marrow biopsy.  If this is stage IV, double hit, or has evidence of transition from follicular lymphoma into diffuse large B-cell lymphoma, then I would give R-CHOP x6.  If the marrow is not involved, this is not double hit, and there is no evidence of follicular transformation into diffuse large B-cell lymphoma, then I would treat this as a stage II nonbulky diffuse large B-cell lymphoma and consider R-CHOP x3 followed by involved site radiation therapy.  I reviewed her PET and biopsy reports and echocardiogram which shows good ejection fraction.  She will need to get her [employer] Covid vaccine this week, family doctor established for the possibility of prednisone-induced diabetes, CT-guided bone marrow biopsy, chemo preparation visit, and a port placement with Dr. Gong.  We will present her at multidisciplinary tumor board this Friday.  I went into detail regarding the side effects of R-CHOP but she will have these reviewed at her chemo preparation visit.  Though not bulky, I still would treat her with allopurinol.  First course of therapy will occur in our [place] office and if all goes well then the subsequent Rituxan doses can be faster and performed in [place] office.    -4/14/2021 pathology update Dr. Garcia: Node pathology negative for double hit by FISH    -4/21/2021 bone marrow biopsy negative for lymphomatous involvement with scant stainable iron.      -4/26/2021 [facility] medical oncology follow-up visit: Given lack of marrow involvement, negative for double hit, and possible background follicular lymphoma , I will treat her as stage IIa nonbulky (original tumor 5 cm on CT, I.  E.  Less than 7.5 cm) diffuse large B-cell lymphoma right proximal thigh SUV 4.0 mass incisionally biopsied (with palpable residua) with mild right internal iliac node enlargement SUV 2.6 as well.  We will treat her with planned R-CHOP x3 followed by involved site radiation therapy.  This  was consensus of multidisciplinary tumor board 4/16/2021 as well.  First course [place].  Second course will occur in [place].  With the small component of background follicular lymphoma, there is potential risk of not only relapse of diffuse large B-cell lymphoma but later relapse from follicular lymphoma and we will keep that in mind in terms of following her up past the usual 3 years post definitive therapy presuming we get a complete remission on subsequent imaging and exams.  We will arrange radiation in [place] and I have made referral.  This will not start until after chemotherapy.    -5/13/2021 radiation oncology consultation Dr. Jl Infante for preparation of future involved field radiation therapy    -5/18/2021 [facility] oncology clinic follow-up: Had moderate amount of nausea after her first cycle, will add Zyprexa to Zofran and Compazine that she is already taking. She was also anxious, added Ativan to her care plan premeds. Prescription for Augmentin sent in for sinus infection. Clinically good response with reduction of right medial thigh mass. We will continue treatment in [place] due to mild infusion reaction with first Rituxan but she was able to complete treatment with additional medications. Today is cycle #2 of a planned 3 courses and then will repeat restaging scan to assess for response and proceed to radiation as outlined above.    -6/8/2021 course #3 Rituxan CHOP    -6/22/2021 PET shows resolution of right inguinal and iliac adenopathy.  Negative PET/CT.    -6/28/2021 [facility] medical oncology follow-up visit: I reviewed images and reports of PET as above.  Status post Rituxan CHOP, PET is essentially negative with complete clinical remission.With the small component of background follicular lymphoma, there is potential risk of not only relapse of diffuse large B-cell lymphoma but later relapse from follicular lymphoma and we will keep that in mind in terms of following her up past the  usual 3 years post definitive therapy presuming we get a complete remission on subsequent imaging and exams.  We will arrange radiation in [place] and Dr. Infante saw in mid May.  I called Dr. Infante to get her in to start involved field radiation and we will repeat PET and labs prior to return in 3 months for survivorship visit with my nurse practitioner who will lay out the follow-up according to NCCN guidelines.  Other than for modest anemia hemoglobin in the tens and glucose 150s, no other remarkable findings on CBC and CMP 6/8/2021.    -6/8/2021 completed course #3 of R-CHOP with resolution of right inguinal and iliac adenopathy on 6/22/2021 PET    -8/16/2021 completed 36 Gray in 18 fractions involved field right pelvic/inguinal femoral nodes Dr. Infante    -10/21/2021 [facility] Oncology clinic follow-up/survivorship visit: PET scan from 10/14/2021 was negative along with normal CBC and CMP.  She completed radiation in August without complications.  We will get her back to Dr. Gong for port removal.  We will continue surveillance per NCCN guidelines as follows:  -H&P and labs every 3-6 months for 5 years and then annually or as clinically indicated.    -We will do surveillance imaging post completion of treatment with CT chest, abdomen and pelvis with contrast every 6 months for 2 years and then annually or as clinically indicated.    -1/28/2022 CT chest abdomen pelvis with contrast shows no adenopathy chest abdomen pelvis and no acute process.  Hepatic steatosis with sigmoid diverticulosis.  Hemoglobin 13.6 with normal CBC and differential.  CMP entirely normal with creatinine 0.65.     Diffuse large B-cell lymphoma of lymph nodes of inguinal region (HCC)   3/29/2021 -  Other Event    Echocardiogram  · Left ventricular ejection fraction appears to be 61 - 65%. Left ventricular systolic function is normal.  · Normal global longitudinal strain at -23.5%.  · Left ventricular diastolic function was  normal.  · No significant structural or functional valvular disease.     4/2/2021 Imaging    PET/CT IMPRESSION:  Postsurgical changes right proximal thigh and superficial  inguinal region adjacent to adenopathy with abnormal hypermetabolism  maximum SUV 4.0. Right internal iliac lymph node is mildly enlarged with  maximum SUV 2.6 additionally noted. No soft tissue mass or adenopathy  outside of this region.     4/21/2021 Biopsy    Marrow biopsy negative     4/26/2021 - 6/28/2021 Chemotherapy    Completed course #3 6/8/2021  OP LYMPHOMA R-CHOP RiTUXimab / Cyclophosphamide / DOXOrubicin / VinCRIStine / PredniSONE     4/26/2021 Cancer Staged    Staging form: Hodgkin And Non-Hodgkin Lymphoma, AJCC 8th Edition  - Clinical: Stage II (Diffuse large B-cell lymphoma) - Signed by Rodríguez Gerardo MD on 4/26/2021 6/22/2021 Imaging     PET shows resolution of right inguinal and iliac adenopathy.  Negative PET/CT.     7/22/2021 - 8/16/2021 Radiation    Radiation OncologyTreatment Course:  Iliana Gray received 36 cGy in 18 fractions to right pelvic and inguinofemoral nodes (involved field) under the care of Dr. Jl Infante.     10/14/2021 Imaging    PET/CT: Stable negative PET/CT with no new hypermetabolic adenopathy to suggest lymphomatous disease progression.     1/28/2022 Imaging    CT chest abdomen pelvis with contrast shows no adenopathy chest abdomen pelvis and no acute process.  Hepatic steatosis with sigmoid diverticulosis.  Hemoglobin 13.6 with normal CBC and differential.  CMP entirely normal with creatinine 0.65         HISTORY OF PRESENT ILLNESS:  The patient is a 51 y.o. female, here for follow up on management of follow-up nonbulky diffuse large B cell stage IIa cancer status post R-CHOP x3 with clinical CR on PET followed by involved field radiation therapy.  Now back for routine imaging follow-up without evidence of recurrence on current CT chest abdomen pelvis and unremarkable CBC and CMP.    Past  "Medical History:   Diagnosis Date   • Anxiety    • Arthritis    • Asthma    • Diffuse large B cell lymphoma (HCC)    • Sleep apnea     cpap     Past Surgical History:   Procedure Laterality Date   • HYSTERECTOMY     • TUMOR EXCISION Right 04/2021    lymph node removal/biopsy/right inner thigh   • VENOUS ACCESS DEVICE (PORT) INSERTION Right 04/2021       No Known Allergies    Family History and Social History reviewed and changed as necessary    REVIEW OF SYSTEM:   No new somatic complaints.    PHYSICAL EXAM:  No palpable cervical axillary or inguinal adenopathy.  No palpable hepatosplenomegaly.  No rashes.    Vitals:    02/01/22 1138   BP: 127/69   Pulse: 89   Resp: 18   Temp: 97.6 °F (36.4 °C)   SpO2: 96%   Weight: 98 kg (216 lb)   Height: 162.6 cm (64\")     Vitals:    02/01/22 1138   PainSc: 0-No pain          ECOG score: 0           Vitals reviewed.    ECOG: (0) Fully Active - Able to Carry On All Pre-disease Performance Without Restriction    Lab Results   Component Value Date    HGB 13.6 01/28/2022    HCT 42.1 01/28/2022    MCV 90.7 01/28/2022     01/28/2022    WBC 5.85 01/28/2022    NEUTROABS 3.21 01/28/2022    LYMPHSABS 2.01 01/28/2022    MONOSABS 0.46 01/28/2022    EOSABS 0.12 01/28/2022    BASOSABS 0.03 01/28/2022       Lab Results   Component Value Date    GLUCOSE 94 01/28/2022    BUN 12 01/28/2022    CREATININE 0.65 01/28/2022     01/28/2022    K 4.1 01/28/2022     01/28/2022    CO2 27.0 01/28/2022    CALCIUM 9.4 01/28/2022    PROTEINTOT 7.0 01/28/2022    ALBUMIN 4.30 01/28/2022    BILITOT 0.2 01/28/2022    ALKPHOS 73 01/28/2022    AST 18 01/28/2022    ALT 16 01/28/2022             ASSESSMENT & PLAN:  1.  Right Medial thigh stage IIa nonbulky less than 7.5 cm diffuse large b cell non-hodgkin lymphoma with component of background follicular lymphoma plan for R-CHOP x3 followed by ISRT.  Had CR after 3 courses of R-CHOP x3 ending 6/8/2021 per PET 6/22/2021.    Oncology history " timeline:  -2/18/2021 hemoglobin 12.7 with normal white count platelet count differential.  CMP unremarkable.  Urinalysis with microscopic hematuria 20-30 red blood cells per milliliter.  AP single view chest x-ray emergency room [place] showed no acute findings.  CT abdomen pelvis with IV contrast emergency room [place] showed calcified granuloma right lung base, fatty liver infiltration, subcentimeter hypodensity left hepatic lobe too small to categorize, normal size spleen, no adrenal masses, no pancreatic mass, normal kidneys, and no retroperitoneal adenopathy or ascites or pneumoperitoneum.  There was a smooth marginated homogeneous enhancing 4.5 x 5 x 3.3 cm mass in the superficial soft tissues of the anterior upper right thigh.  -2/25/2021 office note from Dr. Gong mentions presentation to emergency room 2/18/2021 complaining of right upper thigh swelling for the preceding week of 2/12/2021.  CT scan abdomen pelvis to include the thighs performed at [facility] emergency room revealing 5 cm smoothly marginated right upper thigh mass below the groin crease suspicious for pathologic lymph node but could be neoplastic of other nature given no prodromal illness, infections, or inflammation.  Recommendation for biopsy was made.  Per Dr. Gong note, she had been sick for the better part of the winter and had been urgent treatment centers told that she had a viral infection flu and coronavirus negative with fevers and chills that subsequently resolved but for fatigue that did not resolve with hypersomnolence as well.  Says she feels hot all the time but no ronny night sweats.  -3/12/2021 right thigh mass needle core tissue sample extremely small and predominantly crushed limiting adequate evaluation.  Differential diagnosis for this B-cell lymphoma is diffuse large B cell, follicular lymphoma versus other.  Further differentiation could not be performed on this crush small sample.  -3/25/2021  [facility] hematology oncology initial consultation: I, Rodríguez Gerardo, saw for the first time today.  Reviewed the above story with her.  Still fatigued without night sweats or unexplained weight loss.  She has this mass in the soft tissue medial right thigh about 5 cm in size that does seem to move somewhat beneath the skin but also to my exam feels like it is attached to the deeper structures of the thigh as well.  I feel no other adenopathy or hepatosplenomegaly.  I will get a PET scan.  I spoken with Dr. Gong who will look at these images again with the CTs in [place].  If there is clear indication that this is not coming from soft tissue nonnodal structures, then excisional biopsy for adequate tissue diagnosis is important to determine the type of therapy.  This is growing since her biopsy 3/12/2021 but not daily like a very high-grade lymphoma.  To my hand, this does not feel like a typical stas structure and my other concern is, even though this does not venessa with any markers to suggest sarcoma or the like, is to be sure that this is not something other than lymphoma and I have spoken with Dr. Pino who will do desmin stains and other markers before doing an excisional biopsy next Wednesday with Dr. Gong.  If those additional stains suggest sarcomatoid features that can appear in the medial aspect of the thigh such as this, then she probably needs to have excision by Humberto Vera orthopedic oncologist at .  In the meantime, presuming this to be some form of lymphoma which is the highest likelihood based on the pathology from Dr. Pino where this is CD20 positive, CD10 positive, BCL6 40% positive, MUM1 40% positive, CD23 strong and diffuse positive, and BCL-2 positive.  Ki-67 is 50%.  CD30 and only 5%.  C-Myc is weakly expressed in 10% of cells.  BRIAN by TAYLER is negative.  Cyclin D1 negative.  CD5 indeterminate for B and T-cell coexpression.  LEF 1 presumably staining T cells.  Ultimate decision  for treatment depends on the specific pathologic subtype and the staging and I will get her prognostic labs going as well.  Given the relatively initial high-grade features of this that might require Adriamycin I will also proceed with echocardiogram.  -3/25/2021 CBC normal.  CMP unremarkable.  Sedimentation rate normal 27 with C-reactive protein elevated 1.7 upper limit of normal 0.5.  Beta-2 microglobulin normal 1.6.  Uric acid normal 4.3.  LDH normal 162.  Hepatitis B and C serologies negative.  -3/29/2021 echocardiogram ejection fraction 61 to 65%.  -3/31/21 excisional node biopsy shows diffuse large B cell non-Hodgkin lymphoma  -4/2/2021 PET shows postsurgical changes right proximal thigh and superficial inguinal region adjacent to adenopathy with SUV 4.0.  Right internal iliac lymph node mildly enlarged SUV 2.6 additionally noted.  No other adenopathy outside this region.  No mention of any abnormal marrow signal.  -4/5/2021 [facility] oncology follow-up visit: Per Dr. Arun Garcia pathologist, this is a diffuse large B cell non-Hodgkin lymphoma.  He is not sure whether this is double hit or not yet.  He is not sure if there is a background of follicular lymphoma and hence cannot say whether this is transformed.  Clinically her IPI score is 0 with her age less than 60, normal LDH, ECOG 0, no extranodal involvement on PET, and only stage II at worse per PET with all nodes within the same region.  I will get a bone marrow biopsy.  If this is stage IV, double hit, or has evidence of transition from follicular lymphoma into diffuse large B-cell lymphoma, then I would give R-CHOP x6.  If the marrow is not involved, this is not double hit, and there is no evidence of follicular transformation into diffuse large B-cell lymphoma, then I would treat this as a stage II nonbulky diffuse large B-cell lymphoma and consider R-CHOP x3 followed by involved site radiation therapy.  I reviewed her PET and biopsy reports and  echocardiogram which shows good ejection fraction.  She will need to get her [employer] Covid vaccine this week, family doctor established for the possibility of prednisone-induced diabetes, CT-guided bone marrow biopsy, chemo preparation visit, and a port placement with Dr. Gong.  We will present her at multidisciplinary tumor board this Friday.  I went into detail regarding the side effects of R-CHOP but she will have these reviewed at her chemo preparation visit.  Though not bulky, I still would treat her with allopurinol.  First course of therapy will occur in our [place] office and if all goes well then the subsequent Rituxan doses can be faster and performed in [place] office.    -4/14/2021 pathology update Dr. Garcia: Node pathology negative for double hit by FISH    -4/21/2021 bone marrow biopsy negative for lymphomatous involvement with scant stainable iron.      -4/26/2021 [facility] medical oncology follow-up visit: Given lack of marrow involvement, negative for double hit, and possible background follicular lymphoma , I will treat her as stage IIa nonbulky (original tumor 5 cm on CT, I.  E.  Less than 7.5 cm) diffuse large B-cell lymphoma right proximal thigh SUV 4.0 mass incisionally biopsied (with palpable residua) with mild right internal iliac node enlargement SUV 2.6 as well.  We will treat her with planned R-CHOP x3 followed by involved site radiation therapy.  This was consensus of multidisciplinary tumor board 4/16/2021 as well.  First course [place].  Second course will occur in [place].  With the small component of background follicular lymphoma, there is potential risk of not only relapse of diffuse large B-cell lymphoma but later relapse from follicular lymphoma and we will keep that in mind in terms of following her up past the usual 3 years post definitive therapy presuming we get a complete remission on subsequent imaging and exams.  We will arrange radiation in [place]  and I have made referral.  This will not start until after chemotherapy.    -5/13/2021 radiation oncology consultation Dr. Jl Infante for preparation of future involved field radiation therapy    -5/18/2021 [facility] oncology clinic follow-up: Had moderate amount of nausea after her first cycle, will add Zyprexa to Zofran and Compazine that she is already taking. She was also anxious, added Ativan to her care plan premeds. Prescription for Augmentin sent in for sinus infection. Clinically good response with reduction of right medial thigh mass. We will continue treatment in [place] due to mild infusion reaction with first Rituxan but she was able to complete treatment with additional medications. Today is cycle #2 of a planned 3 courses and then will repeat restaging scan to assess for response and proceed to radiation as outlined above.    -6/8/2021 course #3 Rituxan CHOP    -6/22/2021 PET shows resolution of right inguinal and iliac adenopathy.  Negative PET/CT.    -6/28/2021 [facility] medical oncology follow-up visit: I reviewed images and reports of PET as above.  Status post Rituxan CHOP, PET is essentially negative with complete clinical remission.With the small component of background follicular lymphoma, there is potential risk of not only relapse of diffuse large B-cell lymphoma but later relapse from follicular lymphoma and we will keep that in mind in terms of following her up past the usual 3 years post definitive therapy presuming we get a complete remission on subsequent imaging and exams.  We will arrange radiation in [place] and Dr. Infante saw in mid May.  I called Dr. Infante to get her in to start involved field radiation and we will repeat PET and labs prior to return in 3 months for survivorship visit with my nurse practitioner who will lay out the follow-up according to NCCN guidelines.  Other than for modest anemia hemoglobin in the tens and glucose 150s, no other remarkable findings on CBC  and CMP 6/8/2021.    -6/8/2021 completed course #3 of R-CHOP with resolution of right inguinal and iliac adenopathy on 6/22/2021 PET    -8/16/2021 completed 36 Gray in 18 fractions involved field right pelvic/inguinal femoral nodes Dr. Infante    -10/21/2021 [facility] Oncology clinic follow-up/survivorship visit: PET scan from 10/14/2021 was negative along with normal CBC and CMP.  She completed radiation in August without complications.  We will get her back to Dr. Gong for port removal.  We will continue surveillance per NCCN guidelines as follows:  -H&P and labs every 3-6 months for 5 years and then annually or as clinically indicated.    -We will do surveillance imaging post completion of treatment with CT chest, abdomen and pelvis with contrast every 6 months for 2 years and then annually or as clinically indicated.    -1/28/2022 CT chest abdomen pelvis with contrast shows no adenopathy chest abdomen pelvis and no acute process.  Hepatic steatosis with sigmoid diverticulosis.  Hemoglobin 13.6 with normal CBC and differential.  CMP entirely normal with creatinine 0.65.    -2/1/2022 [facility] medical oncology follow-up visit: I reviewed images and reports of scans as outlined above as well as labs.  No evidence of recurrence.  As outlined by my nurse practitioner survivorship visit, she will get an H&P every 3 to 6 months out to April 2026 and then annually thereafter and we will do surveillance CT chest abdomen pelvis every 6 months until April 2023 and then annually as clinically indicated.  As I have stated previously, we would want to continue to follow her clinically past the usual 2 years of radiographic follow-up for signs or symptoms of follicular lymphoma as there was a small background follicular lymphomatous component that could recur late.  She will see my nurse practitioner back in 6 months with CTs just prior to return.  No further CBC or CMP in the absence of signs or symptoms given normalization  of her counts.    Total time of care inclusive of time spent today prior to patient's arrival reviewing interval notes from my nurse practitioner as well as scans and labs as outlined above and images thereof and during visit translating this to the patient and after visit instituting the plan as outlined above took 30 minutes of patient care time throughout the day today.  Rodríguez Gerardo MD    02/01/2022

## 2022-04-12 ENCOUNTER — APPOINTMENT (OUTPATIENT)
Dept: CT IMAGING | Facility: HOSPITAL | Age: 52
End: 2022-04-12

## 2022-07-28 ENCOUNTER — HOSPITAL ENCOUNTER (OUTPATIENT)
Dept: CT IMAGING | Facility: HOSPITAL | Age: 52
Discharge: HOME | End: 2022-07-28
Admitting: INTERNAL MEDICINE

## 2022-07-28 DIAGNOSIS — C83.35 DIFFUSE LARGE B-CELL LYMPHOMA OF LYMPH NODES OF INGUINAL REGION: Chronic | ICD-10-CM

## 2022-07-28 PROCEDURE — 71260 CT THORAX DX C+: CPT

## 2022-07-28 PROCEDURE — 25010000002 IOPAMIDOL 61 % SOLUTION: Performed by: INTERNAL MEDICINE

## 2022-07-28 PROCEDURE — 74177 CT ABD & PELVIS W/CONTRAST: CPT

## 2022-07-28 RX ADMIN — IOPAMIDOL 98 ML: 612 INJECTION, SOLUTION INTRAVENOUS at 10:33

## 2022-08-04 ENCOUNTER — OFFICE VISIT (OUTPATIENT)
Dept: ONCOLOGY | Facility: CLINIC | Age: 52
End: 2022-08-04

## 2022-08-04 VITALS
OXYGEN SATURATION: 94 % | DIASTOLIC BLOOD PRESSURE: 60 MMHG | BODY MASS INDEX: 37.22 KG/M2 | TEMPERATURE: 98.6 F | RESPIRATION RATE: 20 BRPM | HEART RATE: 91 BPM | HEIGHT: 64 IN | SYSTOLIC BLOOD PRESSURE: 124 MMHG | WEIGHT: 218 LBS

## 2022-08-04 DIAGNOSIS — Z85.72 HISTORY OF DIFFUSE LARGE B-CELL LYMPHOMA: Primary | ICD-10-CM

## 2022-08-04 DIAGNOSIS — R15.2 DEFECATION URGENCY: ICD-10-CM

## 2022-08-04 DIAGNOSIS — R21 RASH IN ADULT: ICD-10-CM

## 2022-08-04 DIAGNOSIS — R53.82 CHRONIC FATIGUE: ICD-10-CM

## 2022-08-04 DIAGNOSIS — R11.0 NAUSEA: ICD-10-CM

## 2022-08-04 PROCEDURE — 99215 OFFICE O/P EST HI 40 MIN: CPT | Performed by: NURSE PRACTITIONER

## 2022-08-04 NOTE — PROGRESS NOTES
CHIEF COMPLAINT:  1.  Fatigue   2.  Itchy rash on hands and feet   3.  Nausea   4.  Loose stool and urgency   5.  History of lymphoma      Problem List:  Oncology/Hematology History Overview Note   1.  Right Medial thigh stage IIa nonbulky less than 7.5 cm diffuse large b cell non-hodgkin lymphoma with component of background follicular lymphoma plan for R-CHOP x3 followed by ISRT.  Had CR after 3 courses of R-CHOP x3 ending 6/8/2021 per PET 6/22/2021.    Oncology history timeline:  -2/18/2021 hemoglobin 12.7 with normal white count platelet count differential.  CMP unremarkable.  Urinalysis with microscopic hematuria 20-30 red blood cells per milliliter.  AP single view chest x-ray emergency room [place] showed no acute findings.  CT abdomen pelvis with IV contrast emergency room [place] showed calcified granuloma right lung base, fatty liver infiltration, subcentimeter hypodensity left hepatic lobe too small to categorize, normal size spleen, no adrenal masses, no pancreatic mass, normal kidneys, and no retroperitoneal adenopathy or ascites or pneumoperitoneum.  There was a smooth marginated homogeneous enhancing 4.5 x 5 x 3.3 cm mass in the superficial soft tissues of the anterior upper right thigh.  -2/25/2021 office note from Dr. Gong mentions presentation to emergency room 2/18/2021 complaining of right upper thigh swelling for the preceding week of 2/12/2021.  CT scan abdomen pelvis to include the thighs performed at [facility] emergency room revealing 5 cm smoothly marginated right upper thigh mass below the groin crease suspicious for pathologic lymph node but could be neoplastic of other nature given no prodromal illness, infections, or inflammation.  Recommendation for biopsy was made.  Per Dr. Gong note, she had been sick for the better part of the winter and had been urgent treatment centers told that she had a viral infection flu and coronavirus negative with fevers and chills that  subsequently resolved but for fatigue that did not resolve with hypersomnolence as well.  Says she feels hot all the time but no ronny night sweats.  -3/12/2021 right thigh mass needle core tissue sample extremely small and predominantly crushed limiting adequate evaluation.  Differential diagnosis for this B-cell lymphoma is diffuse large B cell, follicular lymphoma versus other.  Further differentiation could not be performed on this crush small sample.  -3/25/2021 [facility] hematology oncology initial consultation: I, Rodríguez Gerardo, saw for the first time today.  Reviewed the above story with her.  Still fatigued without night sweats or unexplained weight loss.  She has this mass in the soft tissue medial right thigh about 5 cm in size that does seem to move somewhat beneath the skin but also to my exam feels like it is attached to the deeper structures of the thigh as well.  I feel no other adenopathy or hepatosplenomegaly.  I will get a PET scan.  I spoken with Dr. Gong who will look at these images again with the CTs in [place].  If there is clear indication that this is not coming from soft tissue nonnodal structures, then excisional biopsy for adequate tissue diagnosis is important to determine the type of therapy.  This is growing since her biopsy 3/12/2021 but not daily like a very high-grade lymphoma.  To my hand, this does not feel like a typical stas structure and my other concern is, even though this does not venessa with any markers to suggest sarcoma or the like, is to be sure that this is not something other than lymphoma and I have spoken with Dr. Pino who will do desmin stains and other markers before doing an excisional biopsy next Wednesday with Dr. Gong.  If those additional stains suggest sarcomatoid features that can appear in the medial aspect of the thigh such as this, then she probably needs to have excision by Humberto Vera orthopedic oncologist at .  In the meantime, presuming  this to be some form of lymphoma which is the highest likelihood based on the pathology from Dr. Pino where this is CD20 positive, CD10 positive, BCL6 40% positive, MUM1 40% positive, CD23 strong and diffuse positive, and BCL-2 positive.  Ki-67 is 50%.  CD30 and only 5%.  C-Myc is weakly expressed in 10% of cells.  BRIAN by TAYLER is negative.  Cyclin D1 negative.  CD5 indeterminate for B and T-cell coexpression.  LEF 1 presumably staining T cells.  Ultimate decision for treatment depends on the specific pathologic subtype and the staging and I will get her prognostic labs going as well.  Given the relatively initial high-grade features of this that might require Adriamycin I will also proceed with echocardiogram.  -3/25/2021 CBC normal.  CMP unremarkable.  Sedimentation rate normal 27 with C-reactive protein elevated 1.7 upper limit of normal 0.5.  Beta-2 microglobulin normal 1.6.  Uric acid normal 4.3.  LDH normal 162.  Hepatitis B and C serologies negative.  -3/29/2021 echocardiogram ejection fraction 61 to 65%.  -3/31/21 excisional node biopsy shows diffuse large B cell non-Hodgkin lymphoma  -4/2/2021 PET shows postsurgical changes right proximal thigh and superficial inguinal region adjacent to adenopathy with SUV 4.0.  Right internal iliac lymph node mildly enlarged SUV 2.6 additionally noted.  No other adenopathy outside this region.  No mention of any abnormal marrow signal.  -4/5/2021 [facility] medical oncology follow-up visit: Per Dr. Arun Garcia pathologist, this is a diffuse large B cell non-Hodgkin lymphoma.  He is not sure whether this is double hit or not yet.  He is not sure if there is a background of follicular lymphoma and hence cannot say whether this is transformed.  Clinically her IPI score is 0 with her age less than 60, normal LDH, ECOG 0, no extranodal involvement on PET, and only stage II at worse per PET with all nodes within the same region.  I will get a bone marrow biopsy.  If this is stage  IV, double hit, or has evidence of transition from follicular lymphoma into diffuse large B-cell lymphoma, then I would give R-CHOP x6.  If the marrow is not involved, this is not double hit, and there is no evidence of follicular transformation into diffuse large B-cell lymphoma, then I would treat this as a stage II nonbulky diffuse large B-cell lymphoma and consider R-CHOP x3 followed by involved site radiation therapy.  I reviewed her PET and biopsy reports and echocardiogram which shows good ejection fraction.  She will need to get her [employer] Covid vaccine this week, family doctor established for the possibility of prednisone-induced diabetes, CT-guided bone marrow biopsy, chemo preparation visit, and a port placement with Dr. Gong.  We will present her at multidisciplinary tumor board this Friday.  I went into detail regarding the side effects of R-CHOP but she will have these reviewed at her chemo preparation visit.  Though not bulky, I still would treat her with allopurinol.  First course of therapy will occur in our [place] office and if all goes well then the subsequent Rituxan doses can be faster and performed in [place] office.    -4/14/2021 pathology update Dr. Garcia: Node pathology negative for double hit by FISH    -4/21/2021 bone marrow biopsy negative for lymphomatous involvement with scant stainable iron.      -4/26/2021 [facility] medical oncology follow-up visit: Given lack of marrow involvement, negative for double hit, and possible background follicular lymphoma , I will treat her as stage IIa nonbulky (original tumor 5 cm on CT, I.  E.  Less than 7.5 cm) diffuse large B-cell lymphoma right proximal thigh SUV 4.0 mass incisionally biopsied (with palpable residua) with mild right internal iliac node enlargement SUV 2.6 as well.  We will treat her with planned R-CHOP x3 followed by involved site radiation therapy.  This was consensus of multidisciplinary tumor board 4/16/2021 as  well.  First course [place].  Second course will occur in [place].  With the small component of background follicular lymphoma, there is potential risk of not only relapse of diffuse large B-cell lymphoma but later relapse from follicular lymphoma and we will keep that in mind in terms of following her up past the usual 3 years post definitive therapy presuming we get a complete remission on subsequent imaging and exams.  We will arrange radiation in [place] and I have made referral.  This will not start until after chemotherapy.    -5/13/2021 radiation oncology consultation Dr. Jl Infante for preparation of future involved field radiation therapy    -5/18/2021 [facility] oncology clinic follow-up: Had moderate amount of nausea after her first cycle, will add Zyprexa to Zofran and Compazine that she is already taking. She was also anxious, added Ativan to her care plan premeds. Prescription for Augmentin sent in for sinus infection. Clinically good response with reduction of right medial thigh mass. We will continue treatment in [place] due to mild infusion reaction with first Rituxan but she was able to complete treatment with additional medications. Today is cycle #2 of a planned 3 courses and then will repeat restaging scan to assess for response and proceed to radiation as outlined above.    -6/8/2021 course #3 Rituxan CHOP    -6/22/2021 PET shows resolution of right inguinal and iliac adenopathy.  Negative PET/CT.    -6/28/2021 [facility] medical oncology follow-up visit: I reviewed images and reports of PET as above.  Status post Rituxan CHOP, PET is essentially negative with complete clinical remission.With the small component of background follicular lymphoma, there is potential risk of not only relapse of diffuse large B-cell lymphoma but later relapse from follicular lymphoma and we will keep that in mind in terms of following her up past the usual 3 years post definitive therapy presuming we get a  complete remission on subsequent imaging and exams.  We will arrange radiation in [place] and Dr. Infante saw in mid May.  I called Dr. Infante to get her in to start involved field radiation and we will repeat PET and labs prior to return in 3 months for survivorship visit with my nurse practitioner who will lay out the follow-up according to NCCN guidelines.  Other than for modest anemia hemoglobin in the tens and glucose 150s, no other remarkable findings on CBC and CMP 6/8/2021.    -6/8/2021 completed course #3 of R-CHOP with resolution of right inguinal and iliac adenopathy on 6/22/2021 PET    -8/16/2021 completed 36 Gray in 18 fractions involved field right pelvic/inguinal femoral nodes Dr. Infante    -10/21/2021 [facility] follow-up/survivorship visit: PET scan from 10/14/2021 was negative along with normal CBC and CMP.  She completed radiation in August without complications.  We will get her back to Dr. Gong for port removal.  We will continue surveillance per NCCN guidelines as follows:  -H&P and labs every 3-6 months for 5 years and then annually or as clinically indicated.    -We will do surveillance imaging post completion of treatment with CT chest, abdomen and pelvis with contrast every 6 months for 2 years and then annually or as clinically indicated.    -1/28/2022 CT chest abdomen pelvis with contrast shows no adenopathy chest abdomen pelvis and no acute process.  Hepatic steatosis with sigmoid diverticulosis.  Hemoglobin 13.6 with normal CBC and differential.  CMP entirely normal with creatinine 0.65.    -2/1/2022 [facility] medical oncology follow-up visit: I reviewed images and reports of scans as outlined above as well as labs.  No evidence of recurrence.  As outlined by my nurse practitioner survivorship visit, she will get an H&P every 3 to 6 months out to April 2026 and then annually thereafter and we will do surveillance CT chest abdomen pelvis every 6 months until April 2023  and then annually as clinically indicated.  As I have stated previously, we would want to continue to follow her clinically past the usual 2 years of radiographic follow-up for signs or symptoms of follicular lymphoma as there was a small background follicular lymphomatous component that could recur late.  She will see my nurse practitioner back in 6 months with CTs just prior to return.  No further CBC or CMP in the absence of signs or symptoms given normalization of her counts.    -7/28/2022 CT chest, abdomen and pelvis with no evidence of lymphadenopathy in the chest, abdomen or pelvis, diffuse hepatic steatosis, sigmoid diverticulosis.     Diffuse large B-cell lymphoma of lymph nodes of inguinal region (HCC)   3/29/2021 -  Other Event    Echocardiogram  · Left ventricular ejection fraction appears to be 61 - 65%. Left ventricular systolic function is normal.  · Normal global longitudinal strain at -23.5%.  · Left ventricular diastolic function was normal.  · No significant structural or functional valvular disease.     4/2/2021 Imaging    PET/CT IMPRESSION:  Postsurgical changes right proximal thigh and superficial  inguinal region adjacent to adenopathy with abnormal hypermetabolism  maximum SUV 4.0. Right internal iliac lymph node is mildly enlarged with  maximum SUV 2.6 additionally noted. No soft tissue mass or adenopathy  outside of this region.     4/21/2021 Biopsy    Marrow biopsy negative     4/26/2021 - 6/28/2021 Chemotherapy    Completed course #3 6/8/2021  OP LYMPHOMA R-CHOP RiTUXimab / Cyclophosphamide / DOXOrubicin / VinCRIStine / PredniSONE     4/26/2021 Cancer Staged    Staging form: Hodgkin And Non-Hodgkin Lymphoma, AJCC 8th Edition  - Clinical: Stage II (Diffuse large B-cell lymphoma) - Signed by Rodríguez Gerardo MD on 4/26/2021 6/22/2021 Imaging     PET shows resolution of right inguinal and iliac adenopathy.  Negative PET/CT.     7/22/2021 - 8/16/2021 Radiation    Radiation OncologyTreatment  "Course:  Iliana Gray received 36 cGy in 18 fractions to right pelvic and inguinofemoral nodes (involved field) under the care of Dr. Jl Infante.     10/14/2021 Imaging    PET/CT: Stable negative PET/CT with no new hypermetabolic adenopathy to suggest lymphomatous disease progression.     1/28/2022 Imaging    CT chest abdomen pelvis with contrast shows no adenopathy chest abdomen pelvis and no acute process.  Hepatic steatosis with sigmoid diverticulosis.  Hemoglobin 13.6 with normal CBC and differential.  CMP entirely normal with creatinine 0.65     7/28/2022 Imaging    CT chest, abdomen and pelvis:  1. No evidence of lymphadenopathy in the chest abdomen or pelvis.  2. Diffuse hepatic steatosis.  3. Sigmoid diverticulosis.  4. Hysterectomy  5. Postoperative changes of prior right inguinal lymph node resection.         HISTORY OF PRESENT ILLNESS:  The patient is a 51 y.o. female, here for follow up on management of history of nonbulky diffuse large B cell stage IIa cancer status post R-CHOP x3 with clinical CR on PET followed by involved field radiation therapy, completed all treatment 8/2021.  Iliana reports that she has not felt well for the past year even though she has been off of all treatment for her lymphoma.  She states that she is \"tired of being tired\".  She reports a \"sick feeling\" most of the time.  She has very little energy, she reports mild nausea.  Appetite is unchanged, her weight is stable.  She has had no fevers or chills, no bed drenching night sweats.  No lymphadenopathy that she is aware of although she did think that she felt something under her left arm more recently.  She states that she is up-to-date on mammography this year and it was normal.  She has mild abdominal discomfort, she reports loose stool with urgency.  No blood in her stool or dark tarry stool.  No recent labs.  She also reports ongoing itchy rash/patch on the top of her hands and top of her feet.  Has not seen her " "PCP nor has she seen a dermatologist.  She reports that she has more bad days than good days.    Past Medical History:   Diagnosis Date   • Anxiety    • Arthritis    • Asthma    • Diffuse large B cell lymphoma (HCC)    • Sleep apnea     cpap     Past Surgical History:   Procedure Laterality Date   • HYSTERECTOMY     • TUMOR EXCISION Right 04/2021    lymph node removal/biopsy/right inner thigh   • VENOUS ACCESS DEVICE (PORT) INSERTION Right 04/2021       No Known Allergies    Family History and Social History reviewed and changed as necessary    REVIEW OF SYSTEM:   Positive for fatigue  Positive for rash  Positive for nausea  Positive for loose stool and urgency    PHYSICAL EXAM:  Well-developed, well-nourished female in no distress.  BMI 37.4  No cervical, supraclavicular, axillary or inguinal adenopathy palpable on exam  Abdomen is protruding, firm, nontender  Skin with plaque type lesions on the dorsum of the right hand and right foot, some erythema that appears to be from the trauma of scratching    Vitals:    08/04/22 1324   BP: 124/60   Pulse: 91   Resp: 20   Temp: 98.6 °F (37 °C)   SpO2: 94%   Weight: 98.9 kg (218 lb)   Height: 162.6 cm (64\")     Vitals:    08/04/22 1324   PainSc: 0-No pain          ECOG score: 0           Vitals reviewed.  CT scans from 7/28/2022 reviewed.    ECOG: (0) Fully Active - Able to Carry On All Pre-disease Performance Without Restriction    Lab Results   Component Value Date    HGB 13.6 01/28/2022    HCT 42.1 01/28/2022    MCV 90.7 01/28/2022     01/28/2022    WBC 5.85 01/28/2022    NEUTROABS 3.21 01/28/2022    LYMPHSABS 2.01 01/28/2022    MONOSABS 0.46 01/28/2022    EOSABS 0.12 01/28/2022    BASOSABS 0.03 01/28/2022       Lab Results   Component Value Date    GLUCOSE 94 01/28/2022    BUN 12 01/28/2022    CREATININE 0.65 01/28/2022     01/28/2022    K 4.1 01/28/2022     01/28/2022    CO2 27.0 01/28/2022    CALCIUM 9.4 01/28/2022    PROTEINTOT 7.0 01/28/2022    " ALBUMIN 4.30 01/28/2022    BILITOT 0.2 01/28/2022    ALKPHOS 73 01/28/2022    AST 18 01/28/2022    ALT 16 01/28/2022     CT Chest With Contrast Diagnostic    Result Date: 7/28/2022   1. No evidence of lymphadenopathy in the chest abdomen or pelvis. 2. Diffuse hepatic steatosis. 3. Sigmoid diverticulosis. 4. Hysterectomy 5. Postoperative changes of prior right inguinal lymph node resection.  This report was finalized on 7/28/2022 11:01 AM by Orlando Langston MD.      CT Abdomen Pelvis With Contrast    Result Date: 7/28/2022   1. No evidence of lymphadenopathy in the chest abdomen or pelvis. 2. Diffuse hepatic steatosis. 3. Sigmoid diverticulosis. 4. Hysterectomy 5. Postoperative changes of prior right inguinal lymph node resection.  This report was finalized on 7/28/2022 11:01 AM by Orlando Langston MD.            ASSESSMENT & PLAN:  1.  History of lymphoma: right medial thigh stage IIa nonbulky less than 7.5 cm diffuse large b cell non-hodgkin lymphoma with component of background follicular lymphoma plan for R-CHOP x3 followed by ISRT.  Had CR after 3 courses of R-CHOP x3 ending 6/8/2021 per PET 6/22/2021, completed radiation to the right pelvic and inguinal femoral nodes 8/16/2021.  2.  Skin rash, possible eczema versus psoriasis  3.  Chronic fatigue  4.  Nausea  5.  Loose stool with urgency      Oncology history timeline:  -2/18/2021 hemoglobin 12.7 with normal white count platelet count differential.  CMP unremarkable.  Urinalysis with microscopic hematuria 20-30 red blood cells per milliliter.  AP single view chest x-ray emergency room [place] showed no acute findings.  CT abdomen pelvis with IV contrast emergency room [place] showed calcified granuloma right lung base, fatty liver infiltration, subcentimeter hypodensity left hepatic lobe too small to categorize, normal size spleen, no adrenal masses, no pancreatic mass, normal kidneys, and no retroperitoneal adenopathy or ascites or pneumoperitoneum.  There was a  smooth marginated homogeneous enhancing 4.5 x 5 x 3.3 cm mass in the superficial soft tissues of the anterior upper right thigh.  -2/25/2021 office note from Dr. Gong mentions presentation to emergency room 2/18/2021 complaining of right upper thigh swelling for the preceding week of 2/12/2021.  CT scan abdomen pelvis to include the thighs performed at [facility] emergency room revealing 5 cm smoothly marginated right upper thigh mass below the groin crease suspicious for pathologic lymph node but could be neoplastic of other nature given no prodromal illness, infections, or inflammation.  Recommendation for biopsy was made.  Per Dr. Gong note, she had been sick for the better part of the winter and had been urgent treatment centers told that she had a viral infection flu and coronavirus negative with fevers and chills that subsequently resolved but for fatigue that did not resolve with hypersomnolence as well.  Says she feels hot all the time but no ronny night sweats.  -3/12/2021 right thigh mass needle core tissue sample extremely small and predominantly crushed limiting adequate evaluation.  Differential diagnosis for this B-cell lymphoma is diffuse large B cell, follicular lymphoma versus other.  Further differentiation could not be performed on this crush small sample.  -3/25/2021 [facility] hematology oncology initial consultation: I, Rodríguez Gerardo, saw for the first time today.  Reviewed the above story with her.  Still fatigued without night sweats or unexplained weight loss.  She has this mass in the soft tissue medial right thigh about 5 cm in size that does seem to move somewhat beneath the skin but also to my exam feels like it is attached to the deeper structures of the thigh as well.  I feel no other adenopathy or hepatosplenomegaly.  I will get a PET scan.  I spoken with Dr. Gong who will look at these images again with the CTs in [place].  If there is clear indication that this is not  coming from soft tissue nonnodal structures, then excisional biopsy for adequate tissue diagnosis is important to determine the type of therapy.  This is growing since her biopsy 3/12/2021 but not daily like a very high-grade lymphoma.  To my hand, this does not feel like a typical stas structure and my other concern is, even though this does not venessa with any markers to suggest sarcoma or the like, is to be sure that this is not something other than lymphoma and I have spoken with Dr. Pino who will do desmin stains and other markers before doing an excisional biopsy next Wednesday with Dr. Gong.  If those additional stains suggest sarcomatoid features that can appear in the medial aspect of the thigh such as this, then she probably needs to have excision by Humberto Vera orthopedic oncologist at .  In the meantime, presuming this to be some form of lymphoma which is the highest likelihood based on the pathology from Dr. Pino where this is CD20 positive, CD10 positive, BCL6 40% positive, MUM1 40% positive, CD23 strong and diffuse positive, and BCL-2 positive.  Ki-67 is 50%.  CD30 and only 5%.  C-Myc is weakly expressed in 10% of cells.  BRIAN by TAYLER is negative.  Cyclin D1 negative.  CD5 indeterminate for B and T-cell coexpression.  LEF 1 presumably staining T cells.  Ultimate decision for treatment depends on the specific pathologic subtype and the staging and I will get her prognostic labs going as well.  Given the relatively initial high-grade features of this that might require Adriamycin I will also proceed with echocardiogram.  -3/25/2021 CBC normal.  CMP unremarkable.  Sedimentation rate normal 27 with C-reactive protein elevated 1.7 upper limit of normal 0.5.  Beta-2 microglobulin normal 1.6.  Uric acid normal 4.3.  LDH normal 162.  Hepatitis B and C serologies negative.  -3/29/2021 echocardiogram ejection fraction 61 to 65%.  -3/31/21 excisional node biopsy shows diffuse large B cell  non-Hodgkin lymphoma  -4/2/2021 PET shows postsurgical changes right proximal thigh and superficial inguinal region adjacent to adenopathy with SUV 4.0.  Right internal iliac lymph node mildly enlarged SUV 2.6 additionally noted.  No other adenopathy outside this region.  No mention of any abnormal marrow signal.  -4/5/2021 [facility] medical oncology follow-up visit: Per Dr. Arun Garcia pathologist, this is a diffuse large B cell non-Hodgkin lymphoma.  He is not sure whether this is double hit or not yet.  He is not sure if there is a background of follicular lymphoma and hence cannot say whether this is transformed.  Clinically her IPI score is 0 with her age less than 60, normal LDH, ECOG 0, no extranodal involvement on PET, and only stage II at worse per PET with all nodes within the same region.  I will get a bone marrow biopsy.  If this is stage IV, double hit, or has evidence of transition from follicular lymphoma into diffuse large B-cell lymphoma, then I would give R-CHOP x6.  If the marrow is not involved, this is not double hit, and there is no evidence of follicular transformation into diffuse large B-cell lymphoma, then I would treat this as a stage II nonbulky diffuse large B-cell lymphoma and consider R-CHOP x3 followed by involved site radiation therapy.  I reviewed her PET and biopsy reports and echocardiogram which shows good ejection fraction.  She will need to get her [employer] Covid vaccine this week, family doctor established for the possibility of prednisone-induced diabetes, CT-guided bone marrow biopsy, chemo preparation visit, and a port placement with Dr. Gong.  We will present her at multidisciplinary tumor board this Friday.  I went into detail regarding the side effects of R-CHOP but she will have these reviewed at her chemo preparation visit.  Though not bulky, I still would treat her with allopurinol.  First course of therapy will occur in our [place] office and if all goes  well then the subsequent Rituxan doses can be faster and performed in [place] office.    -4/14/2021 pathology update Dr. Garcia: Node pathology negative for double hit by FISH    -4/21/2021 bone marrow biopsy negative for lymphomatous involvement with scant stainable iron.      -4/26/2021 [facility] follow-up visit: Given lack of marrow involvement, negative for double hit, and possible background follicular lymphoma , I will treat her as stage IIa nonbulky (original tumor 5 cm on CT, I.  E.  Less than 7.5 cm) diffuse large B-cell lymphoma right proximal thigh SUV 4.0 mass incisionally biopsied (with palpable residua) with mild right internal iliac node enlargement SUV 2.6 as well.  We will treat her with planned R-CHOP x3 followed by involved site radiation therapy.  This was consensus of multidisciplinary tumor board 4/16/2021 as well.  First course [place].  Second course will occur in [place].  With the small component of background follicular lymphoma, there is potential risk of not only relapse of diffuse large B-cell lymphoma but later relapse from follicular lymphoma and we will keep that in mind in terms of following her up past the usual 3 years post definitive therapy presuming we get a complete remission on subsequent imaging and exams.  We will arrange radiation in [place] and I have made referral.  This will not start until after chemotherapy.    -5/13/2021 radiation oncology consultation Dr. Jl Infante for preparation of future involved field radiation therapy    -5/18/2021 [facility] clinic follow-up: Had moderate amount of nausea after her first cycle, will add Zyprexa to Zofran and Compazine that she is already taking. She was also anxious, added Ativan to her care plan premeds. Prescription for Augmentin sent in for sinus infection. Clinically good response with reduction of right medial thigh mass. We will continue treatment in [place] due to mild infusion reaction  with first Rituxan but she was able to complete treatment with additional medications. Today is cycle #2 of a planned 3 courses and then will repeat restaging scan to assess for response and proceed to radiation as outlined above.    -6/8/2021 course #3 Rituxan CHOP    -6/22/2021 PET shows resolution of right inguinal and iliac adenopathy.  Negative PET/CT.    -6/28/2021 [facility] medical oncology follow-up visit: I reviewed images and reports of PET as above.  Status post Rituxan CHOP, PET is essentially negative with complete clinical remission.With the small component of background follicular lymphoma, there is potential risk of not only relapse of diffuse large B-cell lymphoma but later relapse from follicular lymphoma and we will keep that in mind in terms of following her up past the usual 3 years post definitive therapy presuming we get a complete remission on subsequent imaging and exams.  We will arrange radiation in [place] and Dr. Infante saw in mid May.  I called Dr. Infante to get her in to start involved field radiation and we will repeat PET and labs prior to return in 3 months for survivorship visit with my nurse practitioner who will lay out the follow-up according to NCCN guidelines.  Other than for modest anemia hemoglobin in the tens and glucose 150s, no other remarkable findings on CBC and CMP 6/8/2021.    -6/8/2021 completed course #3 of R-CHOP with resolution of right inguinal and iliac adenopathy on 6/22/2021 PET    -8/16/2021 completed 36 Gray in 18 fractions involved field right pelvic/inguinal femoral nodes Dr. Infante    -10/21/2021 [facility] Oncology clinic follow-up/survivorship visit: PET scan from 10/14/2021 was negative along with normal CBC and CMP.  She completed radiation in August without complications.  We will get her back to Dr. Gong for port removal.  We will continue surveillance per NCCN guidelines as follows:  -H&P and labs every 3-6 months for 5 years and then annually  or as clinically indicated.    -We will do surveillance imaging post completion of treatment with CT chest, abdomen and pelvis with contrast every 6 months for 2 years and then annually or as clinically indicated.    -1/28/2022 CT chest abdomen pelvis with contrast shows no adenopathy chest abdomen pelvis and no acute process.  Hepatic steatosis with sigmoid diverticulosis.  Hemoglobin 13.6 with normal CBC and differential.  CMP entirely normal with creatinine 0.65.    -2/1/2022 [facility] medical oncology follow-up visit: I reviewed images and reports of scans as outlined above as well as labs.  No evidence of recurrence.  As outlined by my nurse practitioner survivorship visit, she will get an H&P every 3 to 6 months out to April 2026 and then annually thereafter and we will do surveillance CT chest abdomen pelvis every 6 months until April 2023 and then annually as clinically indicated.  As I have stated previously, we would want to continue to follow her clinically past the usual 2 years of radiographic follow-up for signs or symptoms of follicular lymphoma as there was a small background follicular lymphomatous component that could recur late.  She will see my nurse practitioner back in 6 months with CTs just prior to return.  No further CBC or CMP in the absence of signs or symptoms given normalization of her counts.    - 8/4/2022 [facility] Oncology clinic follow-up: Iliana has been feeling poorly since treatment ended a year ago for her history of lymphoma.  Current CT chest, abdomen and pelvis show no evidence of disease recurrence.  Her symptoms unfortunately have not improved since treatment ended and she is having more bad days than good.  She has a rash on the dorsum of her hands and foot that appear to be possibly eczema versus psoriasis.  We will get her to dermatology for further evaluation, I have asked her to call her insurance company to let us know the name of a provider that she would like to see and  then we can put in a referral.  She has ongoing fatigue that has not improved with time, she is unable to do most activities without having to rest for the remainder of the day afterwards.  She also has nausea and loose stool with urgency.  I will get her to gastroenterology for an EGD and colonoscopy to rule out any GI involvement of her lymphoma as it did have follicular component.  I will also get labs today with CBC, CMP, we will also check HARRIS in light of her rash, will check inflammatory markers with sedimentation rate and CRP and an LDH.  We will see her back in a few months to go over her labs and GI evaluation.  She does have a history of hepatic steatosis shown on all prior CTs therefore would benefit from GI evaluation regarding that also, has had normal LFTs on prior labs.      I spent 40 minutes caring for Iliana on this date of service. This time includes time spent by me in the following activities: preparing for the visit, reviewing tests, performing a medically appropriate examination and/or evaluation, counseling and educating the patient/family/caregiver, ordering medications, tests, or procedures, referring and communicating with other health care professionals, documenting information in the medical record, care coordination and plan of care discussed with Dr. Gerardo today also.     Anitra Shelley, APRN    08/04/2022

## 2022-08-12 ENCOUNTER — TELEPHONE (OUTPATIENT)
Dept: ONCOLOGY | Facility: CLINIC | Age: 52
End: 2022-08-12

## 2022-08-12 NOTE — TELEPHONE ENCOUNTER
Caller: Iliana Gray    Relationship: Self    Best call back number: [phone]    What is the best time to reach you: ANYTIME    Who are you requesting to speak with (clinical staff, provider,  specific staff member): JAMIE BONILLA APRN OR NURSE    What was the call regarding: PT STATED SHE HAD TO CONTACT HER INS CO TO FIND A DERMATOLOGIST THAT ACCEPTED HER INS. SHE FOUND ONE AND CALLED TO CONFIRM THEY ARE ACCEPTING NEW PTS. SHE JUST NEEDS A NEW REF SENT.    PLEASE SEND TO: DR AHMET TSANG WITH [facility] PHONE # [phone] AND FAX # [phone].     PT WOULD LIKE A CALLBACK TO CONF THAT REFERRAL WAS SENT.     Do you require a callback: YES

## 2022-09-01 DIAGNOSIS — Z12.11 ENCOUNTER FOR SCREENING COLONOSCOPY: Primary | ICD-10-CM

## 2022-09-19 ENCOUNTER — OUTSIDE FACILITY SERVICE (OUTPATIENT)
Dept: GASTROENTEROLOGY | Facility: CLINIC | Age: 52
End: 2022-09-19

## 2022-09-19 PROCEDURE — 43239 EGD BIOPSY SINGLE/MULTIPLE: CPT | Performed by: INTERNAL MEDICINE

## 2022-09-19 PROCEDURE — 45390 COLONOSCOPY W/RESECTION: CPT | Performed by: INTERNAL MEDICINE

## 2022-09-19 PROCEDURE — 45385 COLONOSCOPY W/LESION REMOVAL: CPT | Performed by: INTERNAL MEDICINE

## 2022-09-19 PROCEDURE — 88305 TISSUE EXAM BY PATHOLOGIST: CPT | Performed by: INTERNAL MEDICINE

## 2022-09-19 PROCEDURE — 45388 COLONOSCOPY W/ABLATION: CPT | Performed by: INTERNAL MEDICINE

## 2022-09-20 ENCOUNTER — LAB REQUISITION (OUTPATIENT)
Dept: LAB | Facility: HOSPITAL | Age: 52
End: 2022-09-20

## 2022-09-20 DIAGNOSIS — R11.0 NAUSEA: ICD-10-CM

## 2022-09-20 DIAGNOSIS — D12.2 BENIGN NEOPLASM OF ASCENDING COLON: ICD-10-CM

## 2022-09-20 DIAGNOSIS — D12.5 BENIGN NEOPLASM OF SIGMOID COLON: ICD-10-CM

## 2022-09-20 DIAGNOSIS — K44.9 DIAPHRAGMATIC HERNIA WITHOUT OBSTRUCTION OR GANGRENE: ICD-10-CM

## 2022-09-20 DIAGNOSIS — D12.3 BENIGN NEOPLASM OF TRANSVERSE COLON: ICD-10-CM

## 2022-09-20 DIAGNOSIS — K31.89 OTHER DISEASES OF STOMACH AND DUODENUM: ICD-10-CM

## 2022-09-20 DIAGNOSIS — R14.0 ABDOMINAL DISTENSION (GASEOUS): ICD-10-CM

## 2022-09-20 DIAGNOSIS — Z12.11 ENCOUNTER FOR SCREENING FOR MALIGNANT NEOPLASM OF COLON: ICD-10-CM

## 2022-09-20 DIAGNOSIS — K64.8 OTHER HEMORRHOIDS: ICD-10-CM

## 2022-09-20 DIAGNOSIS — K21.00 GASTRO-ESOPHAGEAL REFLUX DISEASE WITH ESOPHAGITIS, WITHOUT BLEEDING: ICD-10-CM

## 2022-09-21 LAB
CYTO UR: NORMAL
LAB AP CASE REPORT: NORMAL
LAB AP CLINICAL INFORMATION: NORMAL
PATH REPORT.FINAL DX SPEC: NORMAL
PATH REPORT.GROSS SPEC: NORMAL

## 2022-09-26 ENCOUNTER — TELEPHONE (OUTPATIENT)
Dept: ONCOLOGY | Facility: CLINIC | Age: 52
End: 2022-09-26

## 2022-09-26 NOTE — TELEPHONE ENCOUNTER
----- Message from Sandra ADEEL Quiroz sent at 9/26/2022 11:59 AM EDT -----  Regarding: MIGUEL-CAN THIS BE A VIDEO VISIT PT HAS COVID  Contact: [phone]  Patient called she has an appointment tomorrow and she tested positive for COVID today can this be a video visit? She really wants to get results.

## 2022-09-26 NOTE — TELEPHONE ENCOUNTER
Called and confirmed patient is mildly symptomatic. Will switch appt to mychart video visit. Patient verbalized understanding.

## 2022-09-27 ENCOUNTER — TELEMEDICINE (OUTPATIENT)
Dept: ONCOLOGY | Facility: CLINIC | Age: 52
End: 2022-09-27

## 2022-09-27 VITALS — HEIGHT: 64 IN | WEIGHT: 220 LBS | BODY MASS INDEX: 37.56 KG/M2

## 2022-09-27 DIAGNOSIS — R93.89 ABNORMAL CT SCAN: ICD-10-CM

## 2022-09-27 DIAGNOSIS — R53.83 OTHER FATIGUE: ICD-10-CM

## 2022-09-27 DIAGNOSIS — C83.35 DIFFUSE LARGE B-CELL LYMPHOMA OF LYMPH NODES OF INGUINAL REGION: Primary | Chronic | ICD-10-CM

## 2022-09-27 PROCEDURE — 99214 OFFICE O/P EST MOD 30 MIN: CPT | Performed by: INTERNAL MEDICINE

## 2022-09-27 RX ORDER — LEVOCETIRIZINE DIHYDROCHLORIDE 5 MG/1
5 TABLET, FILM COATED ORAL DAILY
COMMUNITY
Start: 2022-08-18

## 2022-09-27 NOTE — PROGRESS NOTES
Telehealth follow-up visit  This was an audio and video enabled telemedicine encounter.  Done for COVID-19 risk reduction.  Verbal consent given.  Video attempted and failed.  Switch to audio.  CHIEF COMPLAINT: Generalized waxing and waning severe fatigue as bad as she had from the beginning of her diagnosis    Problem List:  Oncology/Hematology History Overview Note   1.  Right Medial thigh stage IIa nonbulky less than 7.5 cm diffuse large b cell non-hodgkin lymphoma with component of background follicular lymphoma plan for R-CHOP x3 followed by ISRT.  Had CR after 3 courses of R-CHOP x3 ending 6/8/2021 per PET 6/22/2021.  Fatigue and rash (eczema per dermatology) starting around August 2022 with negative CT chest abdomen pelvis.    Oncology history timeline:  -2/18/2021 hemoglobin 12.7 with normal white count platelet count differential.  CMP unremarkable.  Urinalysis with microscopic hematuria 20-30 red blood cells per milliliter.  AP single view chest x-ray emergency room [place] showed no acute findings.  CT abdomen pelvis with IV contrast emergency room [place] showed calcified granuloma right lung base, fatty liver infiltration, subcentimeter hypodensity left hepatic lobe too small to categorize, normal size spleen, no adrenal masses, no pancreatic mass, normal kidneys, and no retroperitoneal adenopathy or ascites or pneumoperitoneum.  There was a smooth marginated homogeneous enhancing 4.5 x 5 x 3.3 cm mass in the superficial soft tissues of the anterior upper right thigh.  -2/25/2021 office note from Dr. Gong mentions presentation to emergency room 2/18/2021 complaining of right upper thigh swelling for the preceding week of 2/12/2021.  CT scan abdomen pelvis to include the thighs performed at [facility] emergency room revealing 5 cm smoothly marginated right upper thigh mass below the groin crease suspicious for pathologic lymph node but could be neoplastic of other nature given no  prodromal illness, infections, or inflammation.  Recommendation for biopsy was made.  Per Dr. Gong note, she had been sick for the better part of the winter and had been urgent treatment centers told that she had a viral infection flu and coronavirus negative with fevers and chills that subsequently resolved but for fatigue that did not resolve with hypersomnolence as well.  Says she feels hot all the time but no ronny night sweats.  -3/12/2021 right thigh mass needle core tissue sample extremely small and predominantly crushed limiting adequate evaluation.  Differential diagnosis for this B-cell lymphoma is diffuse large B cell, follicular lymphoma versus other.  Further differentiation could not be performed on this crush small sample.  -3/25/2021 [facility] hematology oncology initial consultation: I, Rodríguez Gerardo, saw for the first time today.  Reviewed the above story with her.  Still fatigued without night sweats or unexplained weight loss.  She has this mass in the soft tissue medial right thigh about 5 cm in size that does seem to move somewhat beneath the skin but also to my exam feels like it is attached to the deeper structures of the thigh as well.  I feel no other adenopathy or hepatosplenomegaly.  I will get a PET scan.  I spoken with Dr. Gong who will look at these images again with the CTs in [place].  If there is clear indication that this is not coming from soft tissue nonnodal structures, then excisional biopsy for adequate tissue diagnosis is important to determine the type of therapy.  This is growing since her biopsy 3/12/2021 but not daily like a very high-grade lymphoma.  To my hand, this does not feel like a typical stas structure and my other concern is, even though this does not venessa with any markers to suggest sarcoma or the like, is to be sure that this is not something other than lymphoma and I have spoken with Dr. Pino who will do desmin stains and other markers before doing an  excisional biopsy next Wednesday with Dr. Gong.  If those additional stains suggest sarcomatoid features that can appear in the medial aspect of the thigh such as this, then she probably needs to have excision by Humberto Vera orthopedic oncologist at .  In the meantime, presuming this to be some form of lymphoma which is the highest likelihood based on the pathology from Dr. Pino where this is CD20 positive, CD10 positive, BCL6 40% positive, MUM1 40% positive, CD23 strong and diffuse positive, and BCL-2 positive.  Ki-67 is 50%.  CD30 and only 5%.  C-Myc is weakly expressed in 10% of cells.  BRIAN by TAYLER is negative.  Cyclin D1 negative.  CD5 indeterminate for B and T-cell coexpression.  LEF 1 presumably staining T cells.  Ultimate decision for treatment depends on the specific pathologic subtype and the staging and I will get her prognostic labs going as well.  Given the relatively initial high-grade features of this that might require Adriamycin I will also proceed with echocardiogram.  -3/25/2021 CBC normal.  CMP unremarkable.  Sedimentation rate normal 27 with C-reactive protein elevated 1.7 upper limit of normal 0.5.  Beta-2 microglobulin normal 1.6.  Uric acid normal 4.3.  LDH normal 162.  Hepatitis B and C serologies negative.  -3/29/2021 echocardiogram ejection fraction 61 to 65%.  -3/31/21 excisional node biopsy shows diffuse large B cell non-Hodgkin lymphoma  -4/2/2021 PET shows postsurgical changes right proximal thigh and superficial inguinal region adjacent to adenopathy with SUV 4.0.  Right internal iliac lymph node mildly enlarged SUV 2.6 additionally noted.  No other adenopathy outside this region.  No mention of any abnormal marrow signal.  -4/5/2021 [facility] medical oncology follow-up visit: Per Dr. Arun Garcia pathologist, this is a diffuse large B cell non-Hodgkin lymphoma.  He is not sure whether this is double hit or not yet.  He is not sure if there is a background of follicular  lymphoma and hence cannot say whether this is transformed.  Clinically her IPI score is 0 with her age less than 60, normal LDH, ECOG 0, no extranodal involvement on PET, and only stage II at worse per PET with all nodes within the same region.  I will get a bone marrow biopsy.  If this is stage IV, double hit, or has evidence of transition from follicular lymphoma into diffuse large B-cell lymphoma, then I would give R-CHOP x6.  If the marrow is not involved, this is not double hit, and there is no evidence of follicular transformation into diffuse large B-cell lymphoma, then I would treat this as a stage II nonbulky diffuse large B-cell lymphoma and consider R-CHOP x3 followed by involved site radiation therapy.  I reviewed her PET and biopsy reports and echocardiogram which shows good ejection fraction.  She will need to get her [employer] Covid vaccine this week, family doctor established for the possibility of prednisone-induced diabetes, CT-guided bone marrow biopsy, chemo preparation visit, and a port placement with Dr. Gong.  We will present her at multidisciplinary tumor board this Friday.  I went into detail regarding the side effects of R-CHOP but she will have these reviewed at her chemo preparation visit.  Though not bulky, I still would treat her with allopurinol.  First course of therapy will occur in our [place] office and if all goes well then the subsequent Rituxan doses can be faster and performed in [place] office.    -4/14/2021 pathology update Dr. Garcia: Node pathology negative for double hit by FISH    -4/21/2021 bone marrow biopsy negative for lymphomatous involvement with scant stainable iron.      -4/26/2021 [facility] medical oncology follow-up visit: Given lack of marrow involvement, negative for double hit, and possible background follicular lymphoma , I will treat her as stage IIa nonbulky (original tumor 5 cm on CT, I.  E.  Less than 7.5 cm) diffuse large B-cell lymphoma  right proximal thigh SUV 4.0 mass incisionally biopsied (with palpable residua) with mild right internal iliac node enlargement SUV 2.6 as well.  We will treat her with planned R-CHOP x3 followed by involved site radiation therapy.  This was consensus of multidisciplinary tumor board 4/16/2021 as well.  First course [place].  Second course will occur in [place].  With the small component of background follicular lymphoma, there is potential risk of not only relapse of diffuse large B-cell lymphoma but later relapse from follicular lymphoma and we will keep that in mind in terms of following her up past the usual 3 years post definitive therapy presuming we get a complete remission on subsequent imaging and exams.  We will arrange radiation in [place] and I have made referral.  This will not start until after chemotherapy.    -5/13/2021 radiation oncology consultation Dr. Jl Infante for preparation of future involved field radiation therapy    -5/18/2021 [facility] follow-up: Had moderate amount of nausea after her first cycle, will add Zyprexa to Zofran and Compazine that she is already taking. She was also anxious, added Ativan to her care plan premeds. Prescription for Augmentin sent in for sinus infection. Clinically good response with reduction of right medial thigh mass. We will continue treatment in [place] due to mild infusion reaction with first Rituxan but she was able to complete treatment with additional medications. Today is cycle #2 of a planned 3 courses and then will repeat restaging scan to assess for response and proceed to radiation as outlined above.    -6/8/2021 course #3 Rituxan CHOP    -6/22/2021 PET shows resolution of right inguinal and iliac adenopathy.  Negative PET/CT.    -6/28/2021 [facility] medical oncology follow-up visit: I reviewed images and reports of PET as above.  Status post Rituxan CHOP, PET is essentially negative with complete clinical remission.With the  small component of background follicular lymphoma, there is potential risk of not only relapse of diffuse large B-cell lymphoma but later relapse from follicular lymphoma and we will keep that in mind in terms of following her up past the usual 3 years post definitive therapy presuming we get a complete remission on subsequent imaging and exams.  We will arrange radiation in [place] and Dr. Infante saw in mid May.  I called Dr. Infante to get her in to start involved field radiation and we will repeat PET and labs prior to return in 3 months for survivorship visit with my nurse practitioner who will lay out the follow-up according to NCCN guidelines.  Other than for modest anemia hemoglobin in the tens and glucose 150s, no other remarkable findings on CBC and CMP 6/8/2021.    -6/8/2021 completed course #3 of R-CHOP with resolution of right inguinal and iliac adenopathy on 6/22/2021 PET    -8/16/2021 completed 36 Gray in 18 fractions involved field right pelvic/inguinal femoral nodes Dr. Infante    -10/21/2021 [facility] follow-up/survivorship visit: PET scan from 10/14/2021 was negative along with normal CBC and CMP.  She completed radiation in August without complications.  We will get her back to Dr. Gong for port removal.  We will continue surveillance per NCCN guidelines as follows:  -H&P and labs every 3-6 months for 5 years and then annually or as clinically indicated.    -We will do surveillance imaging post completion of treatment with CT chest, abdomen and pelvis with contrast every 6 months for 2 years and then annually or as clinically indicated.    -1/28/2022 CT chest abdomen pelvis with contrast shows no adenopathy chest abdomen pelvis and no acute process.  Hepatic steatosis with sigmoid diverticulosis.  Hemoglobin 13.6 with normal CBC and differential.  CMP entirely normal with creatinine 0.65.    -2/1/2022 [facility] oncology follow-up visit: I reviewed images and reports of  scans as outlined above as well as labs.  No evidence of recurrence.  As outlined by my nurse practitioner survivorship visit, she will get an H&P every 3 to 6 months out to April 2026 and then annually thereafter and we will do surveillance CT chest abdomen pelvis every 6 months until April 2023 and then annually as clinically indicated.  As I have stated previously, we would want to continue to follow her clinically past the usual 2 years of radiographic follow-up for signs or symptoms of follicular lymphoma as there was a small background follicular lymphomatous component that could recur late.  She will see my nurse practitioner back in 6 months with CTs just prior to return.  No further CBC or CMP in the absence of signs or symptoms given normalization of her counts.    -7/28/2022 CT chest, abdomen and pelvis with no evidence of lymphadenopathy in the chest, abdomen or pelvis, diffuse hepatic steatosis, sigmoid diverticulosis.    - 8/4/2022 [facility] follow-up: Iliana has been feeling poorly since treatment ended a year ago for her history of lymphoma.  Current CT chest, abdomen and pelvis show no evidence of disease recurrence.  Her symptoms unfortunately have not improved since treatment ended and she is having more bad days than good.  She has a rash on the dorsum of her hands and foot that appear to be possibly eczema versus psoriasis.  We will get her to dermatology for further evaluation, I have asked her to call her insurance company to let us know the name of a provider that she would like to see and then we can put in a referral.  She has ongoing fatigue that has not improved with time, she is unable to do most activities without having to rest for the remainder of the day afterwards.  She also has nausea and loose stool with urgency.  I will get her to gastroenterology for an EGD and colonoscopy to rule out any GI involvement of her lymphoma as it did have follicular component.  I will  also get labs today with CBC, CMP, we will also check HARRIS in light of her rash, will check inflammatory markers with sedimentation rate and CRP and an LDH.  We will see her back in a few months to go over her labs and GI evaluation.  She does have a history of hepatic steatosis shown on all prior CTs therefore would benefit from GI evaluation regarding that also, has had normal LFTs on prior labs.      - 8/4/2022 data: CMP unremarkable.  LDH normal 174.  CRP mildly elevated 1.5 upper limit 0.9.  Sedimentation rate normal 9.  CBC normal with normal differential.  HARRIS negative    -9/19/2022 EGD and colonoscopy Mani [place] random colon biopsy negative.  Transverse colon polyp sessile serrated adenoma.  Duodenal biopsy with normal architecture.  Stomach biopsy mild chronic inactive gastritis with no dysplasia or metaplasia or H. pylori.  There was a lot of stool in the entire colon interfering with visualization.  Internal hemorrhoids.  EGD showed no gross lesions.    -9/27/2022 [facility] oncology follow-up: No clear-cut evidence of lymphoma on CTs or EGD and colonoscopy and labs are unremarkable and negative HARRIS.  Her dermatologist told her she had eczema.  Nonetheless she is still periodically so fatigued that she ends up going to the bed for 2 or 3 days and then rebounds and feels normal for a while and then this waxing and waning of performance status and fatigue continues.  We will get a PET through her thighs as that was the original site of her disease.  I will also have her get EBV and CMV and HIV and thyroid functions.     Diffuse large B-cell lymphoma of lymph nodes of inguinal region (HCC)   3/29/2021 -  Other Event    Echocardiogram  · Left ventricular ejection fraction appears to be 61 - 65%. Left ventricular systolic function is normal.  · Normal global longitudinal strain at -23.5%.  · Left ventricular diastolic function was normal.  · No significant structural or functional valvular disease.      4/2/2021 Imaging    PET/CT IMPRESSION:  Postsurgical changes right proximal thigh and superficial  inguinal region adjacent to adenopathy with abnormal hypermetabolism  maximum SUV 4.0. Right internal iliac lymph node is mildly enlarged with  maximum SUV 2.6 additionally noted. No soft tissue mass or adenopathy  outside of this region.     4/21/2021 Biopsy    Marrow biopsy negative     4/26/2021 - 6/28/2021 Chemotherapy    Completed course #3 6/8/2021  OP LYMPHOMA R-CHOP RiTUXimab / Cyclophosphamide / DOXOrubicin / VinCRIStine / PredniSONE     4/26/2021 Cancer Staged    Staging form: Hodgkin And Non-Hodgkin Lymphoma, AJCC 8th Edition  - Clinical: Stage II (Diffuse large B-cell lymphoma) - Signed by Rodríguez Gerardo MD on 4/26/2021 6/22/2021 Imaging     PET shows resolution of right inguinal and iliac adenopathy.  Negative PET/CT.     7/22/2021 - 8/16/2021 Radiation    Radiation OncologyTreatment Course:  Iliana Gray received 36 cGy in 18 fractions to right pelvic and inguinofemoral nodes (involved field) under the care of Dr. Jl Infante.     10/14/2021 Imaging    PET/CT: Stable negative PET/CT with no new hypermetabolic adenopathy to suggest lymphomatous disease progression.     1/28/2022 Imaging    CT chest abdomen pelvis with contrast shows no adenopathy chest abdomen pelvis and no acute process.  Hepatic steatosis with sigmoid diverticulosis.  Hemoglobin 13.6 with normal CBC and differential.  CMP entirely normal with creatinine 0.65     7/28/2022 Imaging    CT chest, abdomen and pelvis:  1. No evidence of lymphadenopathy in the chest abdomen or pelvis.  2. Diffuse hepatic steatosis.  3. Sigmoid diverticulosis.  4. Hysterectomy  5. Postoperative changes of prior right inguinal lymph node resection.         HISTORY OF PRESENT ILLNESS:  The patient is a 52 y.o. female, here for follow up on management of history of lymphoma with eczema and severe fatigue waxing and waning    Past Medical History:  "  Diagnosis Date   • Anxiety    • Arthritis    • Asthma    • Diffuse large B cell lymphoma (HCC)    • Sleep apnea     cpap     Past Surgical History:   Procedure Laterality Date   • HYSTERECTOMY     • TUMOR EXCISION Right 04/2021    lymph node removal/biopsy/right inner thigh   • VENOUS ACCESS DEVICE (PORT) INSERTION Right 04/2021       No Known Allergies    Family History and Social History reviewed and changed as necessary    REVIEW OF SYSTEM:   Other than the severe cryptogenic and debilitating fatigue no new complaints.  No bed drenching night sweats.  No fevers or chills.    PHYSICAL EXAM:  Phone visit    Vitals:    09/27/22 0954   Weight: 99.8 kg (220 lb)   Height: 162.6 cm (64\")     Vitals:    09/27/22 0954   PainSc: 0-No pain          Vitals reviewed.        Lab Results   Component Value Date    HGB 13.6 01/28/2022    HCT 42.1 01/28/2022    MCV 90.7 01/28/2022     01/28/2022    WBC 5.85 01/28/2022    NEUTROABS 3.21 01/28/2022    LYMPHSABS 2.01 01/28/2022    MONOSABS 0.46 01/28/2022    EOSABS 0.12 01/28/2022    BASOSABS 0.03 01/28/2022       Lab Results   Component Value Date    GLUCOSE 94 01/28/2022    BUN 12 01/28/2022    CREATININE 0.65 01/28/2022     01/28/2022    K 4.1 01/28/2022     01/28/2022    CO2 27.0 01/28/2022    CALCIUM 9.4 01/28/2022    PROTEINTOT 7.0 01/28/2022    ALBUMIN 4.30 01/28/2022    BILITOT 0.2 01/28/2022    ALKPHOS 73 01/28/2022    AST 18 01/28/2022    ALT 16 01/28/2022             ASSESSMENT & PLAN:  1.  Right Medial thigh stage IIa nonbulky less than 7.5 cm diffuse large b cell non-hodgkin lymphoma with component of background follicular lymphoma plan for R-CHOP x3 followed by ISRT.  Had CR after 3 courses of R-CHOP x3 ending 6/8/2021 per PET 6/22/2021.  Fatigue and rash starting around August 2022 with negative CT chest abdomen pelvis.    Oncology history timeline:  -2/18/2021 hemoglobin 12.7 with normal white count platelet count differential.  CMP unremarkable.  " Urinalysis with microscopic hematuria 20-30 red blood cells per milliliter.  AP single view chest x-ray emergency room [place] showed no acute findings.  CT abdomen pelvis with IV contrast emergency room [place] showed calcified granuloma right lung base, fatty liver infiltration, subcentimeter hypodensity left hepatic lobe too small to categorize, normal size spleen, no adrenal masses, no pancreatic mass, normal kidneys, and no retroperitoneal adenopathy or ascites or pneumoperitoneum.  There was a smooth marginated homogeneous enhancing 4.5 x 5 x 3.3 cm mass in the superficial soft tissues of the anterior upper right thigh.  -2/25/2021 office note from Dr. Gong mentions presentation to emergency room 2/18/2021 complaining of right upper thigh swelling for the preceding week of 2/12/2021.  CT scan abdomen pelvis to include the thighs performed at [facility] emergency room revealing 5 cm smoothly marginated right upper thigh mass below the groin crease suspicious for pathologic lymph node but could be neoplastic of other nature given no prodromal illness, infections, or inflammation.  Recommendation for biopsy was made.  Per Dr. Gong note, she had been sick for the better part of the winter and had been urgent treatment centers told that she had a viral infection flu and coronavirus negative with fevers and chills that subsequently resolved but for fatigue that did not resolve with hypersomnolence as well.  Says she feels hot all the time but no ronny night sweats.  -3/12/2021 right thigh mass needle core tissue sample extremely small and predominantly crushed limiting adequate evaluation.  Differential diagnosis for this B-cell lymphoma is diffuse large B cell, follicular lymphoma versus other.  Further differentiation could not be performed on this crush small sample.  -3/25/2021 [facility] hematology oncology initial consultation: I, Rodríguez Gerardo, saw for the first time today.  Reviewed the above  story with her.  Still fatigued without night sweats or unexplained weight loss.  She has this mass in the soft tissue medial right thigh about 5 cm in size that does seem to move somewhat beneath the skin but also to my exam feels like it is attached to the deeper structures of the thigh as well.  I feel no other adenopathy or hepatosplenomegaly.  I will get a PET scan.  I spoken with Dr. Gong who will look at these images again with the CTs in [place].  If there is clear indication that this is not coming from soft tissue nonnodal structures, then excisional biopsy for adequate tissue diagnosis is important to determine the type of therapy.  This is growing since her biopsy 3/12/2021 but not daily like a very high-grade lymphoma.  To my hand, this does not feel like a typical stas structure and my other concern is, even though this does not venessa with any markers to suggest sarcoma or the like, is to be sure that this is not something other than lymphoma and I have spoken with Dr. Pino who will do desmin stains and other markers before doing an excisional biopsy next Wednesday with Dr. Gong.  If those additional stains suggest sarcomatoid features that can appear in the medial aspect of the thigh such as this, then she probably needs to have excision by Humberto Vera orthopedic oncologist at .  In the meantime, presuming this to be some form of lymphoma which is the highest likelihood based on the pathology from Dr. Pino where this is CD20 positive, CD10 positive, BCL6 40% positive, MUM1 40% positive, CD23 strong and diffuse positive, and BCL-2 positive.  Ki-67 is 50%.  CD30 and only 5%.  C-Myc is weakly expressed in 10% of cells.  BRIAN by TAYLER is negative.  Cyclin D1 negative.  CD5 indeterminate for B and T-cell coexpression.  LEF 1 presumably staining T cells.  Ultimate decision for treatment depends on the specific pathologic subtype and the staging and I will get her prognostic labs going as  well.  Given the relatively initial high-grade features of this that might require Adriamycin I will also proceed with echocardiogram.  -3/25/2021 CBC normal.  CMP unremarkable.  Sedimentation rate normal 27 with C-reactive protein elevated 1.7 upper limit of normal 0.5.  Beta-2 microglobulin normal 1.6.  Uric acid normal 4.3.  LDH normal 162.  Hepatitis B and C serologies negative.  -3/29/2021 echocardiogram ejection fraction 61 to 65%.  -3/31/21 excisional node biopsy shows diffuse large B cell non-Hodgkin lymphoma  -4/2/2021 PET shows postsurgical changes right proximal thigh and superficial inguinal region adjacent to adenopathy with SUV 4.0.  Right internal iliac lymph node mildly enlarged SUV 2.6 additionally noted.  No other adenopathy outside this region.  No mention of any abnormal marrow signal.  -4/5/2021 [facility] medical oncology follow-up visit: Per Dr. Arun Garcia pathologist, this is a diffuse large B cell non-Hodgkin lymphoma.  He is not sure whether this is double hit or not yet.  He is not sure if there is a background of follicular lymphoma and hence cannot say whether this is transformed.  Clinically her IPI score is 0 with her age less than 60, normal LDH, ECOG 0, no extranodal involvement on PET, and only stage II at worse per PET with all nodes within the same region.  I will get a bone marrow biopsy.  If this is stage IV, double hit, or has evidence of transition from follicular lymphoma into diffuse large B-cell lymphoma, then I would give R-CHOP x6.  If the marrow is not involved, this is not double hit, and there is no evidence of follicular transformation into diffuse large B-cell lymphoma, then I would treat this as a stage II nonbulky diffuse large B-cell lymphoma and consider R-CHOP x3 followed by involved site radiation therapy.  I reviewed her PET and biopsy reports and echocardiogram which shows good ejection fraction.  She will need to get her Ivaluaid vaccine this  week, family doctor established for the possibility of prednisone-induced diabetes, CT-guided bone marrow biopsy, chemo preparation visit, and a port placement with Dr. Gong.  We will present her at multidisciplinary tumor board this Friday.  I went into detail regarding the side effects of R-CHOP but she will have these reviewed at her chemo preparation visit.  Though not bulky, I still would treat her with allopurinol.  First course of therapy will occur in our [place] office and if all goes well then the subsequent Rituxan doses can be faster and performed in [place] office.    -4/14/2021 pathology update Dr. Garcia: Node pathology negative for double hit by FISH    -4/21/2021 bone marrow biopsy negative for lymphomatous involvement with scant stainable iron.      -4/26/2021 [facility] oncology follow-up visit: Given lack of marrow involvement, negative for double hit, and possible background follicular lymphoma , I will treat her as stage IIa nonbulky (original tumor 5 cm on CT, I.  E.  Less than 7.5 cm) diffuse large B-cell lymphoma right proximal thigh SUV 4.0 mass incisionally biopsied (with palpable residua) with mild right internal iliac node enlargement SUV 2.6 as well.  We will treat her with planned R-CHOP x3 followed by involved site radiation therapy.  This was consensus of multidisciplinary tumor board 4/16/2021 as well.  First course [place].  Second course will occur in [place].  With the small component of background follicular lymphoma, there is potential risk of not only relapse of diffuse large B-cell lymphoma but later relapse from follicular lymphoma and we will keep that in mind in terms of following her up past the usual 3 years post definitive therapy presuming we get a complete remission on subsequent imaging and exams.  We will arrange radiation in [place] and I have made referral.  This will not start until after chemotherapy.    -5/13/2021 radiation oncology  consultation Dr. Jl Infante for preparation of future involved field radiation therapy    -5/18/2021 [facility] oncology clinic follow-up: Had moderate amount of nausea after her first cycle, will add Zyprexa to Zofran and Compazine that she is already taking. She was also anxious, added Ativan to her care plan premeds. Prescription for Augmentin sent in for sinus infection. Clinically good response with reduction of right medial thigh mass. We will continue treatment in [place] due to mild infusion reaction with first Rituxan but she was able to complete treatment with additional medications. Today is cycle #2 of a planned 3 courses and then will repeat restaging scan to assess for response and proceed to radiation as outlined above.    -6/8/2021 course #3 Rituxan CHOP    -6/22/2021 PET shows resolution of right inguinal and iliac adenopathy.  Negative PET/CT.    -6/28/2021 [facility] medical oncology follow-up visit: I reviewed images and reports of PET as above.  Status post Rituxan CHOP, PET is essentially negative with complete clinical remission.With the small component of background follicular lymphoma, there is potential risk of not only relapse of diffuse large B-cell lymphoma but later relapse from follicular lymphoma and we will keep that in mind in terms of following her up past the usual 3 years post definitive therapy presuming we get a complete remission on subsequent imaging and exams.  We will arrange radiation in [place] and Dr. Infante saw in mid May.  I called Dr. Infante to get her in to start involved field radiation and we will repeat PET and labs prior to return in 3 months for survivorship visit with my nurse practitioner who will lay out the follow-up according to NCCN guidelines.  Other than for modest anemia hemoglobin in the tens and glucose 150s, no other remarkable findings on CBC and CMP 6/8/2021.    -6/8/2021 completed course #3 of R-CHOP with resolution of right inguinal and iliac  adenopathy on 6/22/2021 PET    -8/16/2021 completed 36 Gray in 18 fractions involved field right pelvic/inguinal femoral nodes Dr. Infante    -10/21/2021 [facility] Oncology clinic follow-up/survivorship visit: PET scan from 10/14/2021 was negative along with normal CBC and CMP.  She completed radiation in August without complications.  We will get her back to Dr. Gong for port removal.  We will continue surveillance per NCCN guidelines as follows:  -H&P and labs every 3-6 months for 5 years and then annually or as clinically indicated.    -We will do surveillance imaging post completion of treatment with CT chest, abdomen and pelvis with contrast every 6 months for 2 years and then annually or as clinically indicated.    -1/28/2022 CT chest abdomen pelvis with contrast shows no adenopathy chest abdomen pelvis and no acute process.  Hepatic steatosis with sigmoid diverticulosis.  Hemoglobin 13.6 with normal CBC and differential.  CMP entirely normal with creatinine 0.65.    -2/1/2022 [facility] medical oncology follow-up visit: I reviewed images and reports of scans as outlined above as well as labs.  No evidence of recurrence.  As outlined by my nurse practitioner survivorship visit, she will get an H&P every 3 to 6 months out to April 2026 and then annually thereafter and we will do surveillance CT chest abdomen pelvis every 6 months until April 2023 and then annually as clinically indicated.  As I have stated previously, we would want to continue to follow her clinically past the usual 2 years of radiographic follow-up for signs or symptoms of follicular lymphoma as there was a small background follicular lymphomatous component that could recur late.  She will see my nurse practitioner back in 6 months with CTs just prior to return.  No further CBC or CMP in the absence of signs or symptoms given normalization of her counts.    -7/28/2022 CT chest, abdomen and pelvis with no evidence of lymphadenopathy in the  chest, abdomen or pelvis, diffuse hepatic steatosis, sigmoid diverticulosis.    - 8/4/2022 [facility] follow-up: Iliana has been feeling poorly since treatment ended a year ago for her history of lymphoma.  Current CT chest, abdomen and pelvis show no evidence of disease recurrence.  Her symptoms unfortunately have not improved since treatment ended and she is having more bad days than good.  She has a rash on the dorsum of her hands and foot that appear to be possibly eczema versus psoriasis.  We will get her to dermatology for further evaluation, I have asked her to call her insurance company to let us know the name of a provider that she would like to see and then we can put in a referral.  She has ongoing fatigue that has not improved with time, she is unable to do most activities without having to rest for the remainder of the day afterwards.  She also has nausea and loose stool with urgency.  I will get her to gastroenterology for an EGD and colonoscopy to rule out any GI involvement of her lymphoma as it did have follicular component.  I will also get labs today with CBC, CMP, we will also check HARRIS in light of her rash, will check inflammatory markers with sedimentation rate and CRP and an LDH.  We will see her back in a few months to go over her labs and GI evaluation.  She does have a history of hepatic steatosis shown on all prior CTs therefore would benefit from GI evaluation regarding that also, has had normal LFTs on prior labs.      - 8/4/2022 data: CMP unremarkable.  LDH normal 174.  CRP mildly elevated 1.5 upper limit 0.9.  Sedimentation rate normal 9.  CBC normal with normal differential.  HARRIS negative    -9/19/2022 EGD and colonoscopy Mani [facility] random colon biopsy negative.  Transverse colon polyp sessile serrated adenoma.  Duodenal biopsy with normal architecture.  Stomach biopsy mild chronic inactive gastritis with no dysplasia or metaplasia or H. pylori.  There was a lot of stool  in the entire colon interfering with visualization.  Internal hemorrhoids.  EGD showed no gross lesions.    -9/27/2022 [facility] oncology follow-up: No clear-cut evidence of lymphoma on CTs or EGD and colonoscopy and labs are unremarkable and negative HARRIS.  Her dermatologist told her she had eczema.  Nonetheless she is still periodically so fatigued that she ends up going to the bed for 2 or 3 days and then rebounds and feels normal for a while and then this waxing and waning of performance status and fatigue continues.  We will get a PET through her thighs as that was the original site of her disease.  I will also have her get EBV and CMV and HIV and thyroid function.    Total time of care today inclusive of time spent today prior to her arrival reviewing interval labs and endoscopy reports and during visit translating that and interviewing her as to ongoing signs or symptoms and after visit instituting these plans took 30 minutes of patient care time throughout the day today.    09/27/2022

## 2022-10-06 ENCOUNTER — TELEPHONE (OUTPATIENT)
Dept: ONCOLOGY | Facility: CLINIC | Age: 52
End: 2022-10-06

## 2022-10-06 NOTE — TELEPHONE ENCOUNTER
Caller: Iliana Gray    Relationship to patient: Self    Best call back number: [phone]    Type of visit: FOLLOW UP    Requested date: ANY IN [place]    If rescheduling, when is the original appointment: 10/11     Additional notes: PLEASE CALL ONCE R/S.

## 2022-10-07 ENCOUNTER — HOSPITAL ENCOUNTER (OUTPATIENT)
Dept: PET IMAGING | Facility: HOSPITAL | Age: 52
Discharge: HOME | End: 2022-10-07

## 2022-10-07 DIAGNOSIS — R93.89 ABNORMAL CT SCAN: ICD-10-CM

## 2022-10-07 DIAGNOSIS — C83.35 DIFFUSE LARGE B-CELL LYMPHOMA OF LYMPH NODES OF INGUINAL REGION: Chronic | ICD-10-CM

## 2022-10-07 LAB — GLUCOSE BLDC GLUCOMTR-MCNC: 118 MG/DL (ref 70–130)

## 2022-10-07 PROCEDURE — 78816 PET IMAGE W/CT FULL BODY: CPT

## 2022-10-07 PROCEDURE — 82962 GLUCOSE BLOOD TEST: CPT

## 2022-10-07 PROCEDURE — 0 FLUDEOXYGLUCOSE F18 SOLUTION: Performed by: INTERNAL MEDICINE

## 2022-10-07 PROCEDURE — A9552 F18 FDG: HCPCS | Performed by: INTERNAL MEDICINE

## 2022-10-07 RX ADMIN — FLUDEOXYGLUCOSE F18 1 DOSE: 300 INJECTION INTRAVENOUS at 12:46

## 2022-10-11 ENCOUNTER — OFFICE VISIT (OUTPATIENT)
Dept: ONCOLOGY | Facility: CLINIC | Age: 52
End: 2022-10-11

## 2022-10-11 VITALS
SYSTOLIC BLOOD PRESSURE: 141 MMHG | OXYGEN SATURATION: 97 % | DIASTOLIC BLOOD PRESSURE: 90 MMHG | BODY MASS INDEX: 36.7 KG/M2 | TEMPERATURE: 98.9 F | HEIGHT: 64 IN | WEIGHT: 215 LBS | HEART RATE: 96 BPM | RESPIRATION RATE: 18 BRPM

## 2022-10-11 DIAGNOSIS — R22.40 MASS OF THIGH, UNSPECIFIED LATERALITY: ICD-10-CM

## 2022-10-11 DIAGNOSIS — C83.35 DIFFUSE LARGE B-CELL LYMPHOMA OF LYMPH NODES OF INGUINAL REGION: Primary | Chronic | ICD-10-CM

## 2022-10-11 PROCEDURE — 99214 OFFICE O/P EST MOD 30 MIN: CPT | Performed by: INTERNAL MEDICINE

## 2022-10-11 RX ORDER — CITALOPRAM 20 MG/1
20 TABLET ORAL DAILY
COMMUNITY
Start: 2022-10-02

## 2022-10-11 NOTE — PROGRESS NOTES
CHIEF COMPLAINT: Eczema and fatigue    Problem List:  Oncology/Hematology History Overview Note   1.  Right Medial thigh stage IIa nonbulky less than 7.5 cm diffuse large b cell non-hodgkin lymphoma with component of background follicular lymphoma plan for R-CHOP x3 followed by ISRT.  Had CR after 3 courses of R-CHOP x3 ending 6/8/2021 per PET 6/22/2021.  Fatigue and rash (eczema per dermatology) starting around August 2022 with negative CT chest abdomen pelvis.    Oncology history timeline:  -2/18/2021 hemoglobin 12.7 with normal white count platelet count differential.  CMP unremarkable.  Urinalysis with microscopic hematuria 20-30 red blood cells per milliliter.  AP single view chest x-ray emergency room [place] showed no acute findings.  CT abdomen pelvis with IV contrast emergency room [place] showed calcified granuloma right lung base, fatty liver infiltration, subcentimeter hypodensity left hepatic lobe too small to categorize, normal size spleen, no adrenal masses, no pancreatic mass, normal kidneys, and no retroperitoneal adenopathy or ascites or pneumoperitoneum.  There was a smooth marginated homogeneous enhancing 4.5 x 5 x 3.3 cm mass in the superficial soft tissues of the anterior upper right thigh.  -2/25/2021 office note from Dr. Gong mentions presentation to emergency room 2/18/2021 complaining of right upper thigh swelling for the preceding week of 2/12/2021.  CT scan abdomen pelvis to include the thighs performed at [facility] emergency room revealing 5 cm smoothly marginated right upper thigh mass below the groin crease suspicious for pathologic lymph node but could be neoplastic of other nature given no prodromal illness, infections, or inflammation.  Recommendation for biopsy was made.  Per Dr. Gong note, she had been sick for the better part of the winter and had been urgent treatment centers told that she had a viral infection flu and coronavirus negative with fevers and  chills that subsequently resolved but for fatigue that did not resolve with hypersomnolence as well.  Says she feels hot all the time but no ronny night sweats.  -3/12/2021 right thigh mass needle core tissue sample extremely small and predominantly crushed limiting adequate evaluation.  Differential diagnosis for this B-cell lymphoma is diffuse large B cell, follicular lymphoma versus other.  Further differentiation could not be performed on this crush small sample.  -3/25/2021 [facility] hematology oncology initial consultation: I, Rodríguez Gerardo, saw for the first time today.  Reviewed the above story with her.  Still fatigued without night sweats or unexplained weight loss.  She has this mass in the soft tissue medial right thigh about 5 cm in size that does seem to move somewhat beneath the skin but also to my exam feels like it is attached to the deeper structures of the thigh as well.  I feel no other adenopathy or hepatosplenomegaly.  I will get a PET scan.  I spoken with Dr. Gong who will look at these images again with the CTs in [place].  If there is clear indication that this is not coming from soft tissue nonnodal structures, then excisional biopsy for adequate tissue diagnosis is important to determine the type of therapy.  This is growing since her biopsy 3/12/2021 but not daily like a very high-grade lymphoma.  To my hand, this does not feel like a typical stas structure and my other concern is, even though this does not venessa with any markers to suggest sarcoma or the like, is to be sure that this is not something other than lymphoma and I have spoken with Dr. Pion who will do desmin stains and other markers before doing an excisional biopsy next Wednesday with Dr. Gong.  If those additional stains suggest sarcomatoid features that can appear in the medial aspect of the thigh such as this, then she probably needs to have excision by Humberto Vera orthopedic oncologist at .  In the  meantime, presuming this to be some form of lymphoma which is the highest likelihood based on the pathology from Dr. Pino where this is CD20 positive, CD10 positive, BCL6 40% positive, MUM1 40% positive, CD23 strong and diffuse positive, and BCL-2 positive.  Ki-67 is 50%.  CD30 and only 5%.  C-Myc is weakly expressed in 10% of cells.  BRIAN by TAYLER is negative.  Cyclin D1 negative.  CD5 indeterminate for B and T-cell coexpression.  LEF 1 presumably staining T cells.  Ultimate decision for treatment depends on the specific pathologic subtype and the staging and I will get her prognostic labs going as well.  Given the relatively initial high-grade features of this that might require Adriamycin I will also proceed with echocardiogram.  -3/25/2021 CBC normal.  CMP unremarkable.  Sedimentation rate normal 27 with C-reactive protein elevated 1.7 upper limit of normal 0.5.  Beta-2 microglobulin normal 1.6.  Uric acid normal 4.3.  LDH normal 162.  Hepatitis B and C serologies negative.  -3/29/2021 echocardiogram ejection fraction 61 to 65%.  -3/31/21 excisional node biopsy shows diffuse large B cell non-Hodgkin lymphoma  -4/2/2021 PET shows postsurgical changes right proximal thigh and superficial inguinal region adjacent to adenopathy with SUV 4.0.  Right internal iliac lymph node mildly enlarged SUV 2.6 additionally noted.  No other adenopathy outside this region.  No mention of any abnormal marrow signal.  -4/5/2021 [facility] medical oncology follow-up visit: Per Dr. Arun Garcia pathologist, this is a diffuse large B cell non-Hodgkin lymphoma.  He is not sure whether this is double hit or not yet.  He is not sure if there is a background of follicular lymphoma and hence cannot say whether this is transformed.  Clinically her IPI score is 0 with her age less than 60, normal LDH, ECOG 0, no extranodal involvement on PET, and only stage II at worse per PET with all nodes within the same region.  I will get a bone marrow  biopsy.  If this is stage IV, double hit, or has evidence of transition from follicular lymphoma into diffuse large B-cell lymphoma, then I would give R-CHOP x6.  If the marrow is not involved, this is not double hit, and there is no evidence of follicular transformation into diffuse large B-cell lymphoma, then I would treat this as a stage II nonbulky diffuse large B-cell lymphoma and consider R-CHOP x3 followed by involved site radiation therapy.  I reviewed her PET and biopsy reports and echocardiogram which shows good ejection fraction.  She will need to get her [employer] Covid vaccine this week, family doctor established for the possibility of prednisone-induced diabetes, CT-guided bone marrow biopsy, chemo preparation visit, and a port placement with Dr. Gong.  We will present her at multidisciplinary tumor board this Friday.  I went into detail regarding the side effects of R-CHOP but she will have these reviewed at her chemo preparation visit.  Though not bulky, I still would treat her with allopurinol.  First course of therapy will occur in our [place] office and if all goes well then the subsequent Rituxan doses can be faster and performed in [place] office.    -4/14/2021 pathology update Dr. Garcia: Node pathology negative for double hit by FISH    -4/21/2021 bone marrow biopsy negative for lymphomatous involvement with scant stainable iron.      -4/26/2021 [facility] medical oncology follow-up visit: Given lack of marrow involvement, negative for double hit, and possible background follicular lymphoma , I will treat her as stage IIa nonbulky (original tumor 5 cm on CT, I.  E.  Less than 7.5 cm) diffuse large B-cell lymphoma right proximal thigh SUV 4.0 mass incisionally biopsied (with palpable residua) with mild right internal iliac node enlargement SUV 2.6 as well.  We will treat her with planned R-CHOP x3 followed by involved site radiation therapy.  This was consensus of multidisciplinary  tumor board 4/16/2021 as well.  First course [place].  Second course will occur in [place].  With the small component of background follicular lymphoma, there is potential risk of not only relapse of diffuse large B-cell lymphoma but later relapse from follicular lymphoma and we will keep that in mind in terms of following her up past the usual 3 years post definitive therapy presuming we get a complete remission on subsequent imaging and exams.  We will arrange radiation in [place] and I have made referral.  This will not start until after chemotherapy.    -5/13/2021 radiation oncology consultation Dr. Jl Infante for preparation of future involved field radiation therapy    -5/18/2021 [facility] oncology clinic follow-up: Had moderate amount of nausea after her first cycle, will add Zyprexa to Zofran and Compazine that she is already taking. She was also anxious, added Ativan to her care plan premeds. Prescription for Augmentin sent in for sinus infection. Clinically good response with reduction of right medial thigh mass. We will continue treatment in [place] due to mild infusion reaction with first Rituxan but she was able to complete treatment with additional medications. Today is cycle #2 of a planned 3 courses and then will repeat restaging scan to assess for response and proceed to radiation as outlined above.    -6/8/2021 course #3 Rituxan CHOP    -6/22/2021 PET shows resolution of right inguinal and iliac adenopathy.  Negative PET/CT.    -6/28/2021 [facility] medical oncology follow-up visit: I reviewed images and reports of PET as above.  Status post Rituxan CHOP, PET is essentially negative with complete clinical remission.With the small component of background follicular lymphoma, there is potential risk of not only relapse of diffuse large B-cell lymphoma but later relapse from follicular lymphoma and we will keep that in mind in terms of following her up past the usual 3 years post definitive  therapy presuming we get a complete remission on subsequent imaging and exams.  We will arrange radiation in [place] and Dr. Infante saw in mid May.  I called Dr. Infante to get her in to start involved field radiation and we will repeat PET and labs prior to return in 3 months for survivorship visit with my nurse practitioner who will lay out the follow-up according to NCCN guidelines.  Other than for modest anemia hemoglobin in the tens and glucose 150s, no other remarkable findings on CBC and CMP 6/8/2021.    -6/8/2021 completed course #3 of R-CHOP with resolution of right inguinal and iliac adenopathy on 6/22/2021 PET    -8/16/2021 completed 36 Gray in 18 fractions involved field right pelvic/inguinal femoral nodes Dr. Infante    -10/21/2021 [facility] Oncology clinic follow-up/survivorship visit: PET scan from 10/14/2021 was negative along with normal CBC and CMP.  She completed radiation in August without complications.  We will get her back to Dr. Gong for port removal.  We will continue surveillance per NCCN guidelines as follows:  -H&P and labs every 3-6 months for 5 years and then annually or as clinically indicated.    -We will do surveillance imaging post completion of treatment with CT chest, abdomen and pelvis with contrast every 6 months for 2 years and then annually or as clinically indicated.    -1/28/2022 CT chest abdomen pelvis with contrast shows no adenopathy chest abdomen pelvis and no acute process.  Hepatic steatosis with sigmoid diverticulosis.  Hemoglobin 13.6 with normal CBC and differential.  CMP entirely normal with creatinine 0.65.    -2/1/2022 [facility] medical oncology follow-up visit: I reviewed images and reports of scans as outlined above as well as labs.  No evidence of recurrence.  As outlined by my nurse practitioner survivorship visit, she will get an H&P every 3 to 6 months out to April 2026 and then annually thereafter and we will do surveillance CT chest abdomen pelvis  every 6 months until April 2023 and then annually as clinically indicated.  As I have stated previously, we would want to continue to follow her clinically past the usual 2 years of radiographic follow-up for signs or symptoms of follicular lymphoma as there was a small background follicular lymphomatous component that could recur late.  She will see my nurse practitioner back in 6 months with CTs just prior to return.  No further CBC or CMP in the absence of signs or symptoms given normalization of her counts.    -7/28/2022 CT chest, abdomen and pelvis with no evidence of lymphadenopathy in the chest, abdomen or pelvis, diffuse hepatic steatosis, sigmoid diverticulosis.    - 8/4/2022 [facility] Oncology clinic follow-up: Iliana has been feeling poorly since treatment ended a year ago for her history of lymphoma.  Current CT chest, abdomen and pelvis show no evidence of disease recurrence.  Her symptoms unfortunately have not improved since treatment ended and she is having more bad days than good.  She has a rash on the dorsum of her hands and foot that appear to be possibly eczema versus psoriasis.  We will get her to dermatology for further evaluation, I have asked her to call her insurance company to let us know the name of a provider that she would like to see and then we can put in a referral.  She has ongoing fatigue that has not improved with time, she is unable to do most activities without having to rest for the remainder of the day afterwards.  She also has nausea and loose stool with urgency.  I will get her to gastroenterology for an EGD and colonoscopy to rule out any GI involvement of her lymphoma as it did have follicular component.  I will also get labs today with CBC, CMP, we will also check HARRIS in light of her rash, will check inflammatory markers with sedimentation rate and CRP and an LDH.  We will see her back in a few months to go over her labs and GI evaluation.  She does have a history of  hepatic steatosis shown on all prior CTs therefore would benefit from GI evaluation regarding that also, has had normal LFTs on prior labs.      - 8/4/2022 data: CMP unremarkable.  LDH normal 174.  CRP mildly elevated 1.5 upper limit 0.9.  Sedimentation rate normal 9.  CBC normal with normal differential.  HARRIS negative    -9/19/2022 EGD and colonoscopy Mani Cleveland random colon biopsy negative.  Transverse colon polyp sessile serrated adenoma.  Duodenal biopsy with normal architecture.  Stomach biopsy mild chronic inactive gastritis with no dysplasia or metaplasia or H. pylori.  There was a lot of stool in the entire colon interfering with visualization.  Internal hemorrhoids.  EGD showed no gross lesions.    -9/27/2022 [facility] medical oncology follow-up: No clear-cut evidence of lymphoma on CTs or EGD and colonoscopy and labs are unremarkable and negative HARRIS.  Her dermatologist told her she had eczema.  Nonetheless she is still periodically so fatigued that she ends up going to the bed for 2 or 3 days and then rebounds and feels normal for a while and then this waxing and waning of performance status and fatigue continues.  We will get a PET through her thighs as that was the original site of her disease.  I will also have her get EBV and CMV and HIV and thyroid functions.    -10/7/2022 whole-body PET is essentially negative.  There is a solitary borderline focus SUV 2.8 near the left lung apex without CT correlate favored to be infectious/inflammatory change.  No hypermetabolism in particular of the lower extremities where her original disease resided.    -10/11/2022 [facility] medical oncology follow-up: States she had COVID September 24 which was before I last saw her but did not know that when last I saw her.  She did not get her EBV, CMV, HIV, thyroid functions but says she is now getting over the COVID as well as some sinusitis and she thinks that the fatigue.  Her PET was entirely negative..  We will continue  to check her whole-body PET until April 2023 which is when we will see her back and then we will do that annually only as clinically indicated on annual H&P.     Diffuse large B-cell lymphoma of lymph nodes of inguinal region (HCC)   3/29/2021 -  Other Event    Echocardiogram  · Left ventricular ejection fraction appears to be 61 - 65%. Left ventricular systolic function is normal.  · Normal global longitudinal strain at -23.5%.  · Left ventricular diastolic function was normal.  · No significant structural or functional valvular disease.     4/2/2021 Imaging    PET/CT IMPRESSION:  Postsurgical changes right proximal thigh and superficial  inguinal region adjacent to adenopathy with abnormal hypermetabolism  maximum SUV 4.0. Right internal iliac lymph node is mildly enlarged with  maximum SUV 2.6 additionally noted. No soft tissue mass or adenopathy  outside of this region.     4/21/2021 Biopsy    Marrow biopsy negative     4/26/2021 - 6/28/2021 Chemotherapy    Completed course #3 6/8/2021  OP LYMPHOMA R-CHOP RiTUXimab / Cyclophosphamide / DOXOrubicin / VinCRIStine / PredniSONE     4/26/2021 Cancer Staged    Staging form: Hodgkin And Non-Hodgkin Lymphoma, AJCC 8th Edition  - Clinical: Stage II (Diffuse large B-cell lymphoma) - Signed by Rodríguez Gerardo MD on 4/26/2021 6/22/2021 Imaging     PET shows resolution of right inguinal and iliac adenopathy.  Negative PET/CT.     7/22/2021 - 8/16/2021 Radiation    Radiation OncologyTreatment Course:  Iliana Gray received 36 cGy in 18 fractions to right pelvic and inguinofemoral nodes (involved field) under the care of Dr. Jl Infante.     10/14/2021 Imaging    PET/CT: Stable negative PET/CT with no new hypermetabolic adenopathy to suggest lymphomatous disease progression.     1/28/2022 Imaging    CT chest abdomen pelvis with contrast shows no adenopathy chest abdomen pelvis and no acute process.  Hepatic steatosis with sigmoid diverticulosis.  Hemoglobin 13.6  "with normal CBC and differential.  CMP entirely normal with creatinine 0.65     7/28/2022 Imaging    CT chest, abdomen and pelvis:  1. No evidence of lymphadenopathy in the chest abdomen or pelvis.  2. Diffuse hepatic steatosis.  3. Sigmoid diverticulosis.  4. Hysterectomy  5. Postoperative changes of prior right inguinal lymph node resection.         HISTORY OF PRESENT ILLNESS:  The patient is a 52 y.o. female, here for follow up on management of eczema and fatigue    Past Medical History:   Diagnosis Date   • Anxiety    • Arthritis    • Asthma    • Diffuse large B cell lymphoma (HCC)    • Sleep apnea     cpap     Past Surgical History:   Procedure Laterality Date   • HYSTERECTOMY     • TUMOR EXCISION Right 04/2021    lymph node removal/biopsy/right inner thigh   • VENOUS ACCESS DEVICE (PORT) INSERTION Right 04/2021       No Known Allergies    Family History and Social History reviewed and changed as necessary    REVIEW OF SYSTEM:   Eczema and fatigue    PHYSICAL EXAM:  Eczema on her dorsum of her hands and forearms.  No palpable cervical axillary or inguinal adenopathy.    Vitals:    10/11/22 1033   BP: 141/90   Pulse: 96   Resp: 18   Temp: 98.9 °F (37.2 °C)   SpO2: 97%   Weight: 97.5 kg (215 lb)   Height: 162.6 cm (64\")     Vitals:    10/11/22 1033   PainSc: 0-No pain          ECOG score: 0           Vitals reviewed.    ECOG: (0) Fully Active - Able to Carry On All Pre-disease Performance Without Restriction    Lab Results   Component Value Date    HGB 13.6 01/28/2022    HCT 42.1 01/28/2022    MCV 90.7 01/28/2022     01/28/2022    WBC 5.85 01/28/2022    NEUTROABS 3.21 01/28/2022    LYMPHSABS 2.01 01/28/2022    MONOSABS 0.46 01/28/2022    EOSABS 0.12 01/28/2022    BASOSABS 0.03 01/28/2022       Lab Results   Component Value Date    GLUCOSE 94 01/28/2022    BUN 12 01/28/2022    CREATININE 0.65 01/28/2022     01/28/2022    K 4.1 01/28/2022     01/28/2022    CO2 27.0 01/28/2022    CALCIUM 9.4 " 01/28/2022    PROTEINTOT 7.0 01/28/2022    ALBUMIN 4.30 01/28/2022    BILITOT 0.2 01/28/2022    ALKPHOS 73 01/28/2022    AST 18 01/28/2022    ALT 16 01/28/2022             ASSESSMENT & PLAN:  1.  Right Medial thigh stage IIa nonbulky less than 7.5 cm diffuse large b cell non-hodgkin lymphoma with component of background follicular lymphoma plan for R-CHOP x3 followed by ISRT.  Had CR after 3 courses of R-CHOP x3 ending 6/8/2021 per PET 6/22/2021.  Fatigue and rash (eczema per dermatology) starting around August 2022 with negative CT chest abdomen pelvis.    Oncology history timeline:  -2/18/2021 hemoglobin 12.7 with normal white count platelet count differential.  CMP unremarkable.  Urinalysis with microscopic hematuria 20-30 red blood cells per milliliter.  AP single view chest x-ray emergency room [place] showed no acute findings.  CT abdomen pelvis with IV contrast emergency room [place] [facility] showed calcified granuloma right lung base, fatty liver infiltration, subcentimeter hypodensity left hepatic lobe too small to categorize, normal size spleen, no adrenal masses, no pancreatic mass, normal kidneys, and no retroperitoneal adenopathy or ascites or pneumoperitoneum.  There was a smooth marginated homogeneous enhancing 4.5 x 5 x 3.3 cm mass in the superficial soft tissues of the anterior upper right thigh.  -2/25/2021 office note from Dr. Gong mentions presentation to emergency room 2/18/2021 complaining of right upper thigh swelling for the preceding week of 2/12/2021.  CT scan abdomen pelvis to include the thighs performed at [facility] emergency room revealing 5 cm smoothly marginated right upper thigh mass below the groin crease suspicious for pathologic lymph node but could be neoplastic of other nature given no prodromal illness, infections, or inflammation.  Recommendation for biopsy was made.  Per Dr. Gong note, she had been sick for the better part of the winter and had been urgent  treatment centers told that she had a viral infection flu and coronavirus negative with fevers and chills that subsequently resolved but for fatigue that did not resolve with hypersomnolence as well.  Says she feels hot all the time but no ronny night sweats.  -3/12/2021 right thigh mass needle core tissue sample extremely small and predominantly crushed limiting adequate evaluation.  Differential diagnosis for this B-cell lymphoma is diffuse large B cell, follicular lymphoma versus other.  Further differentiation could not be performed on this crush small sample.  -3/25/2021 [facility] hematology oncology initial consultation: I, Rodríguez Gerardo, saw for the first time today.  Reviewed the above story with her.  Still fatigued without night sweats or unexplained weight loss.  She has this mass in the soft tissue medial right thigh about 5 cm in size that does seem to move somewhat beneath the skin but also to my exam feels like it is attached to the deeper structures of the thigh as well.  I feel no other adenopathy or hepatosplenomegaly.  I will get a PET scan.  I spoken with Dr. Gong who will look at these images again with the CTs in [place].  If there is clear indication that this is not coming from soft tissue nonnodal structures, then excisional biopsy for adequate tissue diagnosis is important to determine the type of therapy.  This is growing since her biopsy 3/12/2021 but not daily like a very high-grade lymphoma.  To my hand, this does not feel like a typical stas structure and my other concern is, even though this does not venessa with any markers to suggest sarcoma or the like, is to be sure that this is not something other than lymphoma and I have spoken with Dr. Pino who will do desmin stains and other markers before doing an excisional biopsy next Wednesday with Dr. Gong.  If those additional stains suggest sarcomatoid features that can appear in the medial aspect of the thigh such as this, then she  probably needs to have excision by Humberto Vera orthopedic oncologist at .  In the meantime, presuming this to be some form of lymphoma which is the highest likelihood based on the pathology from Dr. Pino where this is CD20 positive, CD10 positive, BCL6 40% positive, MUM1 40% positive, CD23 strong and diffuse positive, and BCL-2 positive.  Ki-67 is 50%.  CD30 and only 5%.  C-Myc is weakly expressed in 10% of cells.  BRIAN by TAYLER is negative.  Cyclin D1 negative.  CD5 indeterminate for B and T-cell coexpression.  LEF 1 presumably staining T cells.  Ultimate decision for treatment depends on the specific pathologic subtype and the staging and I will get her prognostic labs going as well.  Given the relatively initial high-grade features of this that might require Adriamycin I will also proceed with echocardiogram.  -3/25/2021 CBC normal.  CMP unremarkable.  Sedimentation rate normal 27 with C-reactive protein elevated 1.7 upper limit of normal 0.5.  Beta-2 microglobulin normal 1.6.  Uric acid normal 4.3.  LDH normal 162.  Hepatitis B and C serologies negative.  -3/29/2021 echocardiogram ejection fraction 61 to 65%.  -3/31/21 excisional node biopsy shows diffuse large B cell non-Hodgkin lymphoma  -4/2/2021 PET shows postsurgical changes right proximal thigh and superficial inguinal region adjacent to adenopathy with SUV 4.0.  Right internal iliac lymph node mildly enlarged SUV 2.6 additionally noted.  No other adenopathy outside this region.  No mention of any abnormal marrow signal.  -4/5/2021 [facility] medical oncology follow-up visit: Per Dr. Arun Garcia pathologist, this is a diffuse large B cell non-Hodgkin lymphoma.  He is not sure whether this is double hit or not yet.  He is not sure if there is a background of follicular lymphoma and hence cannot say whether this is transformed.  Clinically her IPI score is 0 with her age less than 60, normal LDH, ECOG 0, no extranodal involvement on PET, and only stage  II at worse per PET with all nodes within the same region.  I will get a bone marrow biopsy.  If this is stage IV, double hit, or has evidence of transition from follicular lymphoma into diffuse large B-cell lymphoma, then I would give R-CHOP x6.  If the marrow is not involved, this is not double hit, and there is no evidence of follicular transformation into diffuse large B-cell lymphoma, then I would treat this as a stage II nonbulky diffuse large B-cell lymphoma and consider R-CHOP x3 followed by involved site radiation therapy.  I reviewed her PET and biopsy reports and echocardiogram which shows good ejection fraction.  She will need to get her [employer] Covid vaccine this week, family doctor established for the possibility of prednisone-induced diabetes, CT-guided bone marrow biopsy, chemo preparation visit, and a port placement with Dr. Gong.  We will present her at multidisciplinary tumor board this Friday.  I went into detail regarding the side effects of R-CHOP but she will have these reviewed at her chemo preparation visit.  Though not bulky, I still would treat her with allopurinol.  First course of therapy will occur in our [place] office and if all goes well then the subsequent Rituxan doses can be faster and performed in [place] office.    -4/14/2021 pathology update Dr. Garcia: Node pathology negative for double hit by FISH    -4/21/2021 bone marrow biopsy negative for lymphomatous involvement with scant stainable iron.      -4/26/2021 [facility] medical oncology follow-up visit: Given lack of marrow involvement, negative for double hit, and possible background follicular lymphoma , I will treat her as stage IIa nonbulky (original tumor 5 cm on CT, I.  E.  Less than 7.5 cm) diffuse large B-cell lymphoma right proximal thigh SUV 4.0 mass incisionally biopsied (with palpable residua) with mild right internal iliac node enlargement SUV 2.6 as well.  We will treat her with planned R-CHOP x3  followed by involved site radiation therapy.  This was consensus of multidisciplinary tumor board 4/16/2021 as well.  First course [place].  Second course will occur in [place].  With the small component of background follicular lymphoma, there is potential risk of not only relapse of diffuse large B-cell lymphoma but later relapse from follicular lymphoma and we will keep that in mind in terms of following her up past the usual 3 years post definitive therapy presuming we get a complete remission on subsequent imaging and exams.  We will arrange radiation in [place] and I have made referral.  This will not start until after chemotherapy.    -5/13/2021 radiation oncology consultation Dr. Jl Infanet for preparation of future involved field radiation therapy    -5/18/2021 [facility] oncology clinic follow-up: Had moderate amount of nausea after her first cycle, will add Zyprexa to Zofran and Compazine that she is already taking. She was also anxious, added Ativan to her care plan premeds. Prescription for Augmentin sent in for sinus infection. Clinically good response with reduction of right medial thigh mass. We will continue treatment in [place] due to mild infusion reaction with first Rituxan but she was able to complete treatment with additional medications. Today is cycle #2 of a planned 3 courses and then will repeat restaging scan to assess for response and proceed to radiation as outlined above.    -6/8/2021 course #3 Rituxan CHOP    -6/22/2021 PET shows resolution of right inguinal and iliac adenopathy.  Negative PET/CT.    -6/28/2021 [facility] medical oncology follow-up visit: I reviewed images and reports of PET as above.  Status post Rituxan CHOP, PET is essentially negative with complete clinical remission.With the small component of background follicular lymphoma, there is potential risk of not only relapse of diffuse large B-cell lymphoma but later relapse from follicular lymphoma and we will keep  that in mind in terms of following her up past the usual 3 years post definitive therapy presuming we get a complete remission on subsequent imaging and exams.  We will arrange radiation in [place] and Dr. Infante saw in mid May.  I called Dr. Infante to get her in to start involved field radiation and we will repeat PET and labs prior to return in 3 months for survivorship visit with my nurse practitioner who will lay out the follow-up according to NCCN guidelines.  Other than for modest anemia hemoglobin in the tens and glucose 150s, no other remarkable findings on CBC and CMP 6/8/2021.    -6/8/2021 completed course #3 of R-CHOP with resolution of right inguinal and iliac adenopathy on 6/22/2021 PET    -8/16/2021 completed 36 Gray in 18 fractions involved field right pelvic/inguinal femoral nodes Dr. Infante    -10/21/2021 [facility] Oncology clinic follow-up/survivorship visit: PET scan from 10/14/2021 was negative along with normal CBC and CMP.  She completed radiation in August without complications.  We will get her back to Dr. Gong for port removal.  We will continue surveillance per NCCN guidelines as follows:  -H&P and labs every 3-6 months for 5 years and then annually or as clinically indicated.    -We will do surveillance imaging post completion of treatment with CT chest, abdomen and pelvis with contrast every 6 months for 2 years and then annually or as clinically indicated.    -1/28/2022 CT chest abdomen pelvis with contrast shows no adenopathy chest abdomen pelvis and no acute process.  Hepatic steatosis with sigmoid diverticulosis.  Hemoglobin 13.6 with normal CBC and differential.  CMP entirely normal with creatinine 0.65.    -2/1/2022 [facility] medical oncology follow-up visit: I reviewed images and reports of scans as outlined above as well as labs.  No evidence of recurrence.  As outlined by my nurse practitioner survivorship visit, she will get an H&P every 3 to 6 months out to April 2026 and  then annually thereafter and we will do surveillance CT chest abdomen pelvis every 6 months until April 2023 and then annually as clinically indicated.  As I have stated previously, we would want to continue to follow her clinically past the usual 2 years of radiographic follow-up for signs or symptoms of follicular lymphoma as there was a small background follicular lymphomatous component that could recur late.  She will see my nurse practitioner back in 6 months with CTs just prior to return.  No further CBC or CMP in the absence of signs or symptoms given normalization of her counts.    -7/28/2022 CT chest, abdomen and pelvis with no evidence of lymphadenopathy in the chest, abdomen or pelvis, diffuse hepatic steatosis, sigmoid diverticulosis.    - 8/4/2022 [facility] Oncology clinic follow-up: Iliana has been feeling poorly since treatment ended a year ago for her history of lymphoma.  Current CT chest, abdomen and pelvis show no evidence of disease recurrence.  Her symptoms unfortunately have not improved since treatment ended and she is having more bad days than good.  She has a rash on the dorsum of her hands and foot that appear to be possibly eczema versus psoriasis.  We will get her to dermatology for further evaluation, I have asked her to call her insurance company to let us know the name of a provider that she would like to see and then we can put in a referral.  She has ongoing fatigue that has not improved with time, she is unable to do most activities without having to rest for the remainder of the day afterwards.  She also has nausea and loose stool with urgency.  I will get her to gastroenterology for an EGD and colonoscopy to rule out any GI involvement of her lymphoma as it did have follicular component.  I will also get labs today with CBC, CMP, we will also check HARRIS in light of her rash, will check inflammatory markers with sedimentation rate and CRP and an LDH.  We will see her back in a few  months to go over her labs and GI evaluation.  She does have a history of hepatic steatosis shown on all prior CTs therefore would benefit from GI evaluation regarding that also, has had normal LFTs on prior labs.      - 8/4/2022 data: CMP unremarkable.  LDH normal 174.  CRP mildly elevated 1.5 upper limit 0.9.  Sedimentation rate normal 9.  CBC normal with normal differential.  HARRIS negative    -9/19/2022 EGD and colonoscopy [facility] random colon biopsy negative.  Transverse colon polyp sessile serrated adenoma.  Duodenal biopsy with normal architecture.  Stomach biopsy mild chronic inactive gastritis with no dysplasia or metaplasia or H. pylori.  There was a lot of stool in the entire colon interfering with visualization.  Internal hemorrhoids.  EGD showed no gross lesions.    -9/27/2022 [facility] medical oncology follow-up: No clear-cut evidence of lymphoma on CTs or EGD and colonoscopy and labs are unremarkable and negative HARRIS.  Her dermatologist told her she had eczema.  Nonetheless she is still periodically so fatigued that she ends up going to the bed for 2 or 3 days and then rebounds and feels normal for a while and then this waxing and waning of performance status and fatigue continues.  We will get a PET through her thighs as that was the original site of her disease.  I will also have her get EBV and CMV and HIV and thyroid functions.    -10/7/2022 whole-body PET is essentially negative.  There is a solitary borderline focus SUV 2.8 near the left lung apex without CT correlate favored to be infectious/inflammatory change.  No hypermetabolism in particular of the lower extremities where her original disease resided.    -10/11/2022 [facility] [place] medical oncology follow-up: States she had COVID September 24 which was before I last saw her but did not know that when last I saw her.  She did not get her EBV, CMV, HIV, thyroid functions but says she is now getting over the COVID as well as some sinusitis and she  thinks that the fatigue.  Her PET was entirely negative..  We will continue to check her whole body PET until April 2023 which is when we will see her back and then we will do that annually only as clinically indicated on annual H&P.  Her only complaint today besides mild fatigue is eczema for which she is taking topical therapy.    Total time of care today inclusive of time spent today prior to patient's arrival reviewing interval images and reports and during visit translating that in her full information to her and interviewing her as to signs or symptoms of her lymphoma history and treatment chronic side effects potentials and other somatic complaints as outlined and after visit setting up follow-up testing as outlined took 30 minutes of patient care time throughout the day today.  Rodríguez Gerardo MD    10/11/2022

## 2022-10-24 ENCOUNTER — OFFICE VISIT (OUTPATIENT)
Dept: FAMILY MEDICINE CLINIC | Facility: CLINIC | Age: 52
End: 2022-10-24

## 2022-10-24 VITALS
WEIGHT: 218.1 LBS | OXYGEN SATURATION: 98 % | RESPIRATION RATE: 15 BRPM | HEART RATE: 102 BPM | TEMPERATURE: 98 F | SYSTOLIC BLOOD PRESSURE: 120 MMHG | DIASTOLIC BLOOD PRESSURE: 80 MMHG | BODY MASS INDEX: 37.24 KG/M2 | HEIGHT: 64 IN

## 2022-10-24 DIAGNOSIS — Z13.1 SCREENING FOR DIABETES MELLITUS: ICD-10-CM

## 2022-10-24 DIAGNOSIS — U09.9 POST-COVID-19 CONDITION: ICD-10-CM

## 2022-10-24 DIAGNOSIS — G47.33 OBSTRUCTIVE SLEEP APNEA: ICD-10-CM

## 2022-10-24 DIAGNOSIS — Z13.220 SCREENING FOR HYPERLIPIDEMIA: ICD-10-CM

## 2022-10-24 DIAGNOSIS — R53.83 OTHER FATIGUE: Primary | ICD-10-CM

## 2022-10-24 DIAGNOSIS — Z11.59 NEED FOR HEPATITIS C SCREENING TEST: ICD-10-CM

## 2022-10-24 PROCEDURE — 99214 OFFICE O/P EST MOD 30 MIN: CPT | Performed by: PHYSICIAN ASSISTANT

## 2022-10-24 NOTE — PROGRESS NOTES
Patient Office Visit      Patient Name: Iliana Gray  : 1970   MRN: [id number]     Chief Complaint:    Chief Complaint   Patient presents with   • Fatigue   • Headache   • Recent COVID       History of Present Illness: Iliana Gray is a 52 y.o. female who is here today planing of persistent fatigue, headache, intermittent sore throat since she had COVID over a month ago.  She is already been treated with an antibiotic and a steroid Dosepak and still does not feel any better.  The provider at urgent care told her she thought she was a COVID long-haul her and recommend she be evaluated by her primary care provider.  Patient is also under the care of of oncology for history of lymphoma.  Patient last saw her oncologist Dr. Gerardo on 2022.  Recently had a negative PET scan but Dr. Gerardo had ordered an EBV, CMV, HIV, thyroid functions but she never got that blood work done.    Subjective      Review of Systems:         Past Medical History:   Past Medical History:   Diagnosis Date   • Anxiety    • Arthritis    • Asthma    • Diffuse large B cell lymphoma (HCC)    • Sleep apnea     cpap       Past Surgical History:   Past Surgical History:   Procedure Laterality Date   • HYSTERECTOMY     • TUMOR EXCISION Right 2021    lymph node removal/biopsy/right inner thigh   • VENOUS ACCESS DEVICE (PORT) INSERTION Right 2021       Family History: History reviewed. No pertinent family history.    Social History:   Social History     Socioeconomic History   • Marital status:    Tobacco Use   • Smoking status: Every Day     Packs/day: 1.00     Years: 41.00     Pack years: 41.00     Types: Cigarettes     Start date:    • Smokeless tobacco: Never   Vaping Use   • Vaping Use: Never used   Substance and Sexual Activity   • Alcohol use: Never   • Drug use: Never   • Sexual activity: Yes     Partners: Male       Allergies:   No Known Allergies    Objective     Physical Exam:  Vital  "Signs:   Vitals:    10/24/22 1537   BP: 120/80   BP Location: Right arm   Patient Position: Sitting   Cuff Size: Large Adult   Pulse: 102   Resp: 15   Temp: 98 °F (36.7 °C)   TempSrc: Temporal   SpO2: 98%   Weight: 98.9 kg (218 lb 1.6 oz)   Height: 162.6 cm (64\")     Body mass index is 37.44 kg/m².          Physical Exam  Constitutional:       Appearance: She is obese.   Cardiovascular:      Rate and Rhythm: Normal rate and regular rhythm.   Pulmonary:      Effort: Pulmonary effort is normal.      Breath sounds: Normal breath sounds.   Musculoskeletal:      Cervical back: Normal range of motion and neck supple.   Lymphadenopathy:      Cervical: No cervical adenopathy.   Neurological:      General: No focal deficit present.   Psychiatric:         Thought Content: Thought content normal.         Judgment: Judgment normal.         Procedures    Assessment / Plan      Assessment/Plan:   Diagnoses and all orders for this visit:    1. Other fatigue (Primary)  Assessment & Plan:  We will go ahead and get labs and have her follow-up in 2 to 3 days.    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    Orders:  -     CBC & Differential; Future  -     Comprehensive Metabolic Panel; Future  -     Vitamin B12; Future  -     Folate; Future  -     TSH; Future  -     T4, Free; Future  -     EBV Antibody Profile; Future  -     CMV IgG IgM; Future  -     HIV-1/O/2 Ag/Ab w Reflex; Future  -     C-reactive protein; Future    3. Screening for diabetes mellitus  -     Hemoglobin A1c; Future    4. Screening for hyperlipidemia  -    "  Lipid Panel; Future    5. Need for hepatitis C screening test  -     Hepatitis C Antibody; Future    6. Obstructive sleep apnea  Assessment & Plan:  Notes compliance with CPAP           Medications:     Current Outpatient Medications:   •  Advair Diskus 250-50 MCG/DOSE DISKUS, 2 puffs 2 (Two) Times a Day., Disp: , Rfl:   •  citalopram (CeleXA) 20 MG tablet, Take 1 tablet by mouth Daily., Disp: , Rfl:   •  diazePAM (VALIUM) 2 MG tablet, TAKE 1 TABLET 4 TIMES A DAY AS NEEDED FOR ANXIETY, Disp: , Rfl:   •  diazePAM (VALIUM) 5 MG tablet, Take 5 mg by mouth 3 (Three) Times a Day As Needed., Disp: , Rfl:   •  estradiol (ESTRACE) 1 MG tablet, Take 1 mg by mouth Daily., Disp: , Rfl:   •  HYDROcodone-acetaminophen (NORCO) 7.5-325 MG per tablet, , Disp: , Rfl:   •  ibuprofen (ADVIL,MOTRIN) 400 MG tablet, , Disp: , Rfl:   •  levocetirizine (XYZAL) 5 MG tablet, Take 5 mg by mouth Daily., Disp: , Rfl:   •  OLANZapine (zyPREXA) 10 MG tablet, Take 1 tablet by mouth Every Night. For nausea, Disp: 30 tablet, Rfl: 0  •  ondansetron (ZOFRAN) 8 MG tablet, , Disp: , Rfl:   •  piroxicam (FELDENE) 10 MG capsule, Take 10 mg by mouth 2 (Two) Times a Day With Meals., Disp: , Rfl:   •  predniSONE (DELTASONE) 50 MG tablet, , Disp: , Rfl:   •  prochlorperazine (COMPAZINE) 10 MG tablet, Take 1 tablet by mouth Every 6 (Six) Hours As Needed for Nausea or Vomiting., Disp: 30 tablet, Rfl: 1  •  Sod Picosulfate-Mag Ox-Cit Acd 10-3.5-12 MG-GM -GM/160ML solution, Take 160 mL by mouth Take As Directed for 2 doses., Disp: 320 mL, Rfl: 0  •  Symbicort 160-4.5 MCG/ACT inhaler, Inhale 2 puffs 2 (Two) Times a Day., Disp: , Rfl:   •  Ventolin  (90 Base) MCG/ACT inhaler, , Disp: , Rfl:         Follow Up:   Return in about 3 days (around 10/27/2022) for 30 minute recheck.    Kiley Blackwell PA-C   [facility]

## 2022-10-25 ENCOUNTER — LAB (OUTPATIENT)
Dept: FAMILY MEDICINE CLINIC | Facility: CLINIC | Age: 52
End: 2022-10-25

## 2022-10-25 DIAGNOSIS — Z13.1 SCREENING FOR DIABETES MELLITUS: ICD-10-CM

## 2022-10-25 DIAGNOSIS — Z13.220 SCREENING FOR HYPERLIPIDEMIA: ICD-10-CM

## 2022-10-25 DIAGNOSIS — R53.83 OTHER FATIGUE: ICD-10-CM

## 2022-10-25 DIAGNOSIS — U09.9 POST-COVID-19 CONDITION: ICD-10-CM

## 2022-10-25 DIAGNOSIS — Z11.59 NEED FOR HEPATITIS C SCREENING TEST: ICD-10-CM

## 2022-10-25 PROCEDURE — 36415 COLL VENOUS BLD VENIPUNCTURE: CPT | Performed by: PHYSICIAN ASSISTANT

## 2022-10-26 LAB
ALBUMIN SERPL-MCNC: 4.2 G/DL (ref 3.8–4.9)
ALBUMIN/GLOB SERPL: 1.6 {RATIO} (ref 1.2–2.2)
ALP SERPL-CCNC: 70 IU/L (ref 44–121)
ALT SERPL-CCNC: 17 IU/L (ref 0–32)
AST SERPL-CCNC: 13 IU/L (ref 0–40)
BASOPHILS # BLD AUTO: 0 X10E3/UL (ref 0–0.2)
BASOPHILS NFR BLD AUTO: 1 %
BILIRUB SERPL-MCNC: 0.3 MG/DL (ref 0–1.2)
BUN SERPL-MCNC: 11 MG/DL (ref 6–24)
BUN/CREAT SERPL: 22 (ref 9–23)
CALCIUM SERPL-MCNC: 8.9 MG/DL (ref 8.7–10.2)
CHLORIDE SERPL-SCNC: 100 MMOL/L (ref 96–106)
CHOLEST SERPL-MCNC: 323 MG/DL (ref 100–199)
CMV IGG SERPL IA-ACNC: 4.6 U/ML (ref 0–0.59)
CMV IGM SERPL IA-ACNC: <30 AU/ML (ref 0–29.9)
CO2 SERPL-SCNC: 23 MMOL/L (ref 20–29)
CREAT SERPL-MCNC: 0.5 MG/DL (ref 0.57–1)
CRP SERPL-MCNC: 18 MG/L (ref 0–10)
EBV NA IGG SER IA-ACNC: 68.8 U/ML (ref 0–17.9)
EBV VCA IGG SER IA-ACNC: 122 U/ML (ref 0–17.9)
EBV VCA IGM SER IA-ACNC: <36 U/ML (ref 0–35.9)
EGFRCR SERPLBLD CKD-EPI 2021: 111 ML/MIN/1.73
EOSINOPHIL # BLD AUTO: 0.2 X10E3/UL (ref 0–0.4)
EOSINOPHIL NFR BLD AUTO: 4 %
ERYTHROCYTE [DISTWIDTH] IN BLOOD BY AUTOMATED COUNT: 14.6 % (ref 11.7–15.4)
FOLATE SERPL-MCNC: 12.3 NG/ML
GLOBULIN SER CALC-MCNC: 2.7 G/DL (ref 1.5–4.5)
GLUCOSE SERPL-MCNC: 130 MG/DL (ref 70–99)
HBA1C MFR BLD: 5.9 % (ref 4.8–5.6)
HCT VFR BLD AUTO: 39.5 % (ref 34–46.6)
HCV AB S/CO SERPL IA: <0.1 S/CO RATIO (ref 0–0.9)
HDLC SERPL-MCNC: 31 MG/DL
HGB BLD-MCNC: 13.5 G/DL (ref 11.1–15.9)
HIV 1+2 AB+HIV1 P24 AG SERPL QL IA: NON REACTIVE
IMM GRANULOCYTES # BLD AUTO: 0.1 X10E3/UL (ref 0–0.1)
IMM GRANULOCYTES NFR BLD AUTO: 1 %
LDLC SERPL CALC-MCNC: ABNORMAL MG/DL (ref 0–99)
LYMPHOCYTES # BLD AUTO: 2 X10E3/UL (ref 0.7–3.1)
LYMPHOCYTES NFR BLD AUTO: 38 %
MCH RBC QN AUTO: 31 PG (ref 26.6–33)
MCHC RBC AUTO-ENTMCNC: 34.2 G/DL (ref 31.5–35.7)
MCV RBC AUTO: 91 FL (ref 79–97)
MONOCYTES # BLD AUTO: 0.4 X10E3/UL (ref 0.1–0.9)
MONOCYTES NFR BLD AUTO: 7 %
NEUTROPHILS # BLD AUTO: 2.7 X10E3/UL (ref 1.4–7)
NEUTROPHILS NFR BLD AUTO: 49 %
PLATELET # BLD AUTO: 339 X10E3/UL (ref 150–450)
POTASSIUM SERPL-SCNC: 4.2 MMOL/L (ref 3.5–5.2)
PROT SERPL-MCNC: 6.9 G/DL (ref 6–8.5)
RBC # BLD AUTO: 4.36 X10E6/UL (ref 3.77–5.28)
SERVICE CMNT-IMP: ABNORMAL
SODIUM SERPL-SCNC: 139 MMOL/L (ref 134–144)
T4 FREE SERPL-MCNC: 1.13 NG/DL (ref 0.82–1.77)
TRIGL SERPL-MCNC: 1010 MG/DL (ref 0–149)
TSH SERPL DL<=0.005 MIU/L-ACNC: 2.22 UIU/ML (ref 0.45–4.5)
VIT B12 SERPL-MCNC: 323 PG/ML (ref 232–1245)
VLDLC SERPL CALC-MCNC: ABNORMAL MG/DL (ref 5–40)
WBC # BLD AUTO: 5.3 X10E3/UL (ref 3.4–10.8)

## 2022-10-27 ENCOUNTER — OFFICE VISIT (OUTPATIENT)
Dept: FAMILY MEDICINE CLINIC | Facility: CLINIC | Age: 52
End: 2022-10-27

## 2022-10-27 VITALS
WEIGHT: 217.2 LBS | BODY MASS INDEX: 37.08 KG/M2 | HEIGHT: 64 IN | DIASTOLIC BLOOD PRESSURE: 88 MMHG | OXYGEN SATURATION: 99 % | HEART RATE: 97 BPM | SYSTOLIC BLOOD PRESSURE: 130 MMHG

## 2022-10-27 DIAGNOSIS — J30.2 SEASONAL ALLERGIC RHINITIS, UNSPECIFIED TRIGGER: ICD-10-CM

## 2022-10-27 DIAGNOSIS — C83.35 DIFFUSE LARGE B-CELL LYMPHOMA OF LYMPH NODES OF INGUINAL REGION: ICD-10-CM

## 2022-10-27 DIAGNOSIS — R53.82 CHRONIC FATIGUE: Primary | ICD-10-CM

## 2022-10-27 DIAGNOSIS — R53.83 OTHER FATIGUE: ICD-10-CM

## 2022-10-27 DIAGNOSIS — Z85.72 HISTORY OF LYMPHOMA: ICD-10-CM

## 2022-10-27 PROCEDURE — 99214 OFFICE O/P EST MOD 30 MIN: CPT | Performed by: FAMILY MEDICINE

## 2022-10-27 RX ORDER — IPRATROPIUM BROMIDE 42 UG/1
SPRAY, METERED NASAL
COMMUNITY
Start: 2022-10-21

## 2022-10-27 RX ORDER — CHLORCYCLIZINE HYDROCHLORIDE AND PSEUDOEPHEDRINE HYDROCHLORIDE 25; 60 MG/1; MG/1
TABLET ORAL
COMMUNITY
Start: 2022-10-21 | End: 2022-10-27

## 2022-10-27 NOTE — PROGRESS NOTES
"Chief Complaint  Follow up (Labs)    Subjective          Iliana Gray presents to [facility]  History of Present Illness  Patient is a 52-year-old female who presents for follow-up examination she saw Kiley Blackwell a while ago and at that time she was complaining of persistent fatigue headache since she had COVID about a month ago she does have a known history of lymphoma and is under the care of Dr. Gerardo.  She last saw her oncologist earlier this month.  She had a negative PET scan and Dr. Gerardo had ordered the EBV CMV HIV thyroid function.  When she saw Kiley she went ahead and ordered some autoimmune blood work as well she is here for blood work follow-up.  She endorses that this chronic fatigue lethargy which is progressively gotten worse she also has developed somewhat of an atypical rash on both extremities.  The headache that she was endorsing has since resolved since she saw Kiley.      Objective   Vital Signs:   /88 (BP Location: Right arm, Patient Position: Sitting, Cuff Size: Adult)   Pulse 97   Ht 162.6 cm (64.02\")   Wt 98.5 kg (217 lb 3.2 oz)   SpO2 99%   BMI 37.26 kg/m²     Body mass index is 37.26 kg/m².    Review of Systems   Constitutional: Positive for fatigue.   HENT: Negative.    Eyes: Negative.    Respiratory: Negative.    Cardiovascular: Negative.    Gastrointestinal: Negative.    Endocrine: Negative.    Genitourinary: Negative.    Musculoskeletal: Positive for arthralgias and back pain.   Skin: Negative.    Allergic/Immunologic: Negative.    Neurological: Negative.    Hematological: Negative.    Psychiatric/Behavioral: Negative.        Past History:  Medical History: has a past medical history of Anxiety, Arthritis, Asthma, Diffuse large B cell lymphoma (HCC), and Sleep apnea.   Surgical History: has a past surgical history that includes Hysterectomy; Tumor excision (Right, 04/2021); and Venous Access Device (Port) (Right, 04/2021). "   Family History: family history is not on file.   Social History: reports that she has been smoking cigarettes. She started smoking about 40 years ago. She has a 41.00 pack-year smoking history. She has never used smokeless tobacco. She reports that she does not drink alcohol and does not use drugs.      Current Outpatient Medications:   •  Advair Diskus 250-50 MCG/DOSE DISKUS, 2 puffs 2 (Two) Times a Day., Disp: , Rfl:   •  citalopram (CeleXA) 20 MG tablet, Take 1 tablet by mouth Daily., Disp: , Rfl:   •  diazePAM (VALIUM) 5 MG tablet, Take 5 mg by mouth 3 (Three) Times a Day As Needed., Disp: , Rfl:   •  estradiol (ESTRACE) 1 MG tablet, Take 1 mg by mouth Daily., Disp: , Rfl:   •  ipratropium (ATROVENT) 0.06 % nasal spray, INSTILL 2 SPRAYS INTRANASALLY 3 TIMES PER DAY FOR 5 DAYS, Disp: , Rfl:   •  levocetirizine (XYZAL) 5 MG tablet, Take 5 mg by mouth Daily., Disp: , Rfl:   •  OLANZapine (zyPREXA) 10 MG tablet, Take 1 tablet by mouth Every Night. For nausea, Disp: 30 tablet, Rfl: 0  •  ondansetron (ZOFRAN) 8 MG tablet, , Disp: , Rfl:   •  Symbicort 160-4.5 MCG/ACT inhaler, Inhale 2 puffs 2 (Two) Times a Day., Disp: , Rfl:   •  Ventolin  (90 Base) MCG/ACT inhaler, , Disp: , Rfl:     Allergies: Patient has no known allergies.    Physical Exam  Constitutional:       Appearance: Normal appearance. She is normal weight.   HENT:      Head: Normocephalic and atraumatic.   Eyes:      Conjunctiva/sclera: Conjunctivae normal.   Cardiovascular:      Rate and Rhythm: Normal rate and regular rhythm.   Pulmonary:      Effort: Pulmonary effort is normal.      Breath sounds: Normal breath sounds.   Musculoskeletal:         General: Normal range of motion.      Cervical back: Normal range of motion and neck supple.   Skin:     General: Skin is warm and dry.   Neurological:      General: No focal deficit present.      Mental Status: She is alert and oriented to person, place, and time. Mental status is at baseline.    Psychiatric:         Mood and Affect: Mood normal.         Behavior: Behavior normal.         Thought Content: Thought content normal.         Judgment: Judgment normal.          Result Review :                   Assessment and Plan    Diagnoses and all orders for this visit:    1. Chronic fatigue (Primary)  -     C-reactive Protein; Future  -     C4 Complement; Future  -     C3 Complement; Future  -     ANCA Panel; Future  -     HARRIS With / DsDNA, RNP, Sjogrens A / B, Simental; Future  -     Cyclic Citrul Peptide Antibody, IgG / IgA; Future  -     Sjogren's Antibody, Anti-SS-A / -SS-B; Future  -     Sedimentation Rate; Future  -     Rheumatoid Factor; Future  -     C-reactive Protein  -     C4 Complement  -     C3 Complement  -     ANCA Panel  -     HARRIS With / DsDNA, RNP, Sjogrens A / B, Smith  -     Cyclic Citrul Peptide Antibody, IgG / IgA  -     Sjogren's Antibody, Anti-SS-A / -SS-B  -     Sedimentation Rate  -     Rheumatoid Factor  Looking through her blood work her CMV and EBV seem to be positive but they are IgG antibodies appearing to be a previous infection she did have mild elevation of her CRP there was some consideration of ruling out something like a temporal arteritis but since she has had primary resolution of her headache I would say this is lower on my index of suspicion.  My bigger suspicion would potentially be something like an autoimmune process that is started after she has had COVID I will go ahead and repeat the inflammatory markers do a full autoimmune panel to further assess any type of abnormalities if patient was full understanding we will follow-up    2. Seasonal allergic rhinitis, unspecified trigger  Mild fluid in both ears could be contributing to some of the dizziness symptoms we will go ahead and recommend continuing the oral antihistamine    3. 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 Other fatigue  -     Christopher-Barr Virus DNA, Quantitative  -     CMV DNA, Quantitative, PCR  -     HIV-1 / O / 2 Ag / Antibody 4th Generation  -     TSH  -     T4, Free    6. History of lymphoma  Chronic seems to be clear and resolved at this time    MEDICATION SIDE EFFECTS, RISKS AND SIDE EFFECTS HAVE BEEN DISCUSSED WITH PATIENT. PATIENT NOTES UNDERSTANDING. IF ANY CONCERN OR QUESTION REGARDING MEDICATION PLEASE CONTACT.         Follow Up   No follow-ups on file.  Patient was given instructions and counseling regarding her condition or for health maintenance advice. Please see specific information pulled into the AVS if appropriate.     Hillary Thomas DO

## 2022-10-28 LAB
ANA SER QL: NEGATIVE
C3 SERPL-MCNC: 227 MG/DL (ref 82–167)
C4 SERPL-MCNC: 31 MG/DL (ref 12–38)
CCP IGA+IGG SERPL IA-ACNC: 4 UNITS (ref 0–19)
CRP SERPL-MCNC: 21 MG/L (ref 0–10)
ENA SS-A AB SER-ACNC: <0.2 AI (ref 0–0.9)
ENA SS-B AB SER-ACNC: <0.2 AI (ref 0–0.9)
ERYTHROCYTE [SEDIMENTATION RATE] IN BLOOD BY WESTERGREN METHOD: 24 MM/HR (ref 0–40)
HIV 1+2 AB+HIV1 P24 AG SERPL QL IA: NON REACTIVE
T4 FREE SERPL-MCNC: 1.07 NG/DL (ref 0.82–1.77)
TSH SERPL DL<=0.005 MIU/L-ACNC: 1.43 UIU/ML (ref 0.45–4.5)

## 2022-10-29 LAB
C-ANCA TITR SER IF: NORMAL TITER
CMV DNA SERPL NAA+PROBE-ACNC: NEGATIVE IU/ML
CMV DNA SERPL NAA+PROBE-LOG IU: NORMAL LOG10 IU/ML
MYELOPEROXIDASE AB SER IA-ACNC: <0.2 UNITS (ref 0–0.9)
P-ANCA ATYPICAL TITR SER IF: NORMAL TITER
P-ANCA TITR SER IF: NORMAL TITER
PROTEINASE3 AB SER IA-ACNC: <0.2 UNITS (ref 0–0.9)

## 2022-10-31 ENCOUNTER — TELEPHONE (OUTPATIENT)
Dept: FAMILY MEDICINE CLINIC | Facility: CLINIC | Age: 52
End: 2022-10-31

## 2022-10-31 DIAGNOSIS — R70.0 ELEVATED SED RATE: Primary | ICD-10-CM

## 2022-10-31 DIAGNOSIS — R77.8 LOW SERUM COMPLEMENT C3: ICD-10-CM

## 2022-10-31 LAB — EBV DNA SERPL NAA+PROBE-ACNC: NEGATIVE IU/ML

## 2022-10-31 NOTE — TELEPHONE ENCOUNTER
Caller: Iliana Gray    Relationship: Self    Best call back number: [phone]    What is the medical concern/diagnosis: ARTHRITIS     What specialty or service is being requested: RHEUMATOLOGY    What is the provider, practice or medical service name:      What is the office location: [address] [place] [address]    What is the office phone number: [phone]    Any additional details: PATIENT USE TO GO TO THE FACILITY AND SHE WOULD LIKE THE REFERRAL THAT WAS RECENTLY SUBMITTED TO BE SENT TO [facility]. PLEASE ADVISE

## 2023-04-05 DIAGNOSIS — C83.35 DIFFUSE LARGE B-CELL LYMPHOMA OF LYMPH NODES OF INGUINAL REGION: Primary | Chronic | ICD-10-CM

## 2023-04-05 NOTE — PROGRESS NOTES
Received message from authorization team that PET scans was denied. Per Anitra, APRN changed to CT C/A/P and notified referral coordinator to schedule. Called patient and notified her of the change, she verbalized understanding.

## 2023-04-11 ENCOUNTER — HOSPITAL ENCOUNTER (OUTPATIENT)
Dept: CT IMAGING | Facility: HOSPITAL | Age: 53
Discharge: HOME | End: 2023-04-11
Admitting: NURSE PRACTITIONER
Payer: COMMERCIAL

## 2023-04-11 DIAGNOSIS — C83.35 DIFFUSE LARGE B-CELL LYMPHOMA OF LYMPH NODES OF INGUINAL REGION: Chronic | ICD-10-CM

## 2023-04-11 PROCEDURE — 25510000001 IOPAMIDOL 61 % SOLUTION: Performed by: NURSE PRACTITIONER

## 2023-04-11 PROCEDURE — 74177 CT ABD & PELVIS W/CONTRAST: CPT

## 2023-04-11 PROCEDURE — 71260 CT THORAX DX C+: CPT

## 2023-04-11 RX ADMIN — IOPAMIDOL 95 ML: 612 INJECTION, SOLUTION INTRAVENOUS at 13:28

## 2023-04-18 ENCOUNTER — OFFICE VISIT (OUTPATIENT)
Dept: ONCOLOGY | Facility: CLINIC | Age: 53
End: 2023-04-18
Payer: COMMERCIAL

## 2023-04-18 VITALS
SYSTOLIC BLOOD PRESSURE: 141 MMHG | RESPIRATION RATE: 18 BRPM | HEART RATE: 94 BPM | BODY MASS INDEX: 35.85 KG/M2 | DIASTOLIC BLOOD PRESSURE: 84 MMHG | HEIGHT: 64 IN | OXYGEN SATURATION: 96 % | TEMPERATURE: 98.9 F | WEIGHT: 210 LBS

## 2023-04-18 DIAGNOSIS — C83.35 DIFFUSE LARGE B-CELL LYMPHOMA OF LYMPH NODES OF INGUINAL REGION: Primary | Chronic | ICD-10-CM

## 2023-04-18 PROCEDURE — 1126F AMNT PAIN NOTED NONE PRSNT: CPT | Performed by: INTERNAL MEDICINE

## 2023-04-18 PROCEDURE — 99214 OFFICE O/P EST MOD 30 MIN: CPT | Performed by: INTERNAL MEDICINE

## 2023-04-18 PROCEDURE — 1160F RVW MEDS BY RX/DR IN RCRD: CPT | Performed by: INTERNAL MEDICINE

## 2023-04-18 PROCEDURE — 1159F MED LIST DOCD IN RCRD: CPT | Performed by: INTERNAL MEDICINE

## 2023-04-18 NOTE — LETTER
April 18, 2023       No Recipients    Patient: Iliana Gray   YOB: 1970   Date of Visit: 4/18/2023       Dear Hillary Thomas, DO    Iliana Gray was in my office today. Below is a copy of my note.    If you have questions, please do not hesitate to call me. I look forward to following Iliana along with you.         Sincerely,        Rodríguez Gerardo MD        CC:   No Recipients    CHIEF COMPLAINT: No new somatic complaints    Problem List:  Oncology/Hematology History Overview Note   1.  Right Medial thigh stage IIa nonbulky less than 7.5 cm diffuse large b cell non-hodgkin lymphoma with component of background follicular lymphoma plan for R-CHOP x3 followed by ISRT.  Had CR after 3 courses of R-CHOP x3 ending 6/8/2021 per PET 6/22/2021.  Fatigue and rash (eczema per dermatology) starting around August 2022 with negative CT chest abdomen pelvis.    Oncology history timeline:  -2/18/2021 hemoglobin 12.7 with normal white count platelet count differential.  CMP unremarkable.  Urinalysis with microscopic hematuria 20-30 red blood cells per milliliter.  AP single view chest x-ray emergency room [place] showed no acute findings.  CT abdomen pelvis with IV contrast emergency room [facility] showed calcified granuloma right lung base, fatty liver infiltration, subcentimeter hypodensity left hepatic lobe too small to categorize, normal size spleen, no adrenal masses, no pancreatic mass, normal kidneys, and no retroperitoneal adenopathy or ascites or pneumoperitoneum.  There was a smooth marginated homogeneous enhancing 4.5 x 5 x 3.3 cm mass in the superficial soft tissues of the anterior upper right thigh.  -2/25/2021 office note from Dr. Gong mentions presentation to emergency room 2/18/2021 complaining of right upper thigh swelling for the preceding week of 2/12/2021.  CT scan abdomen pelvis to include the thighs performed at [facility] emergency room revealing 5 cm smoothly marginated  right upper thigh mass below the groin crease suspicious for pathologic lymph node but could be neoplastic of other nature given no prodromal illness, infections, or inflammation.  Recommendation for biopsy was made.  Per Dr. Gong note, she had been sick for the better part of the winter and had been urgent treatment centers told that she had a viral infection flu and coronavirus negative with fevers and chills that subsequently resolved but for fatigue that did not resolve with hypersomnolence as well.  Says she feels hot all the time but no ronny night sweats.  -3/12/2021 right thigh mass needle core tissue sample extremely small and predominantly crushed limiting adequate evaluation.  Differential diagnosis for this B-cell lymphoma is diffuse large B cell, follicular lymphoma versus other.  Further differentiation could not be performed on this crush small sample.  -3/25/2021 [facility] hematology oncology initial consultation: I, Rodríguez Gerardo, saw for the first time today.  Reviewed the above story with her.  Still fatigued without night sweats or unexplained weight loss.  She has this mass in the soft tissue medial right thigh about 5 cm in size that does seem to move somewhat beneath the skin but also to my exam feels like it is attached to the deeper structures of the thigh as well.  I feel no other adenopathy or hepatosplenomegaly.  I will get a PET scan.  I spoken with Dr. Gong who will look at these images again with the CTs in [place].  If there is clear indication that this is not coming from soft tissue nonnodal structures, then excisional biopsy for adequate tissue diagnosis is important to determine the type of therapy.  This is growing since her biopsy 3/12/2021 but not daily like a very high-grade lymphoma.  To my hand, this does not feel like a typical stas structure and my other concern is, even though this does not venessa with any markers to suggest sarcoma or the like, is to be sure that this  is not something other than lymphoma and I have spoken with Dr. Pino who will do desmin stains and other markers before doing an excisional biopsy next Wednesday with Dr. Gong.  If those additional stains suggest sarcomatoid features that can appear in the medial aspect of the thigh such as this, then she probably needs to have excision by Humberto Vera orthopedic oncologist at .  In the meantime, presuming this to be some form of lymphoma which is the highest likelihood based on the pathology from Dr. Pino where this is CD20 positive, CD10 positive, BCL6 40% positive, MUM1 40% positive, CD23 strong and diffuse positive, and BCL-2 positive.  Ki-67 is 50%.  CD30 and only 5%.  C-Myc is weakly expressed in 10% of cells.  BRIAN by TAYLER is negative.  Cyclin D1 negative.  CD5 indeterminate for B and T-cell coexpression.  LEF 1 presumably staining T cells.  Ultimate decision for treatment depends on the specific pathologic subtype and the staging and I will get her prognostic labs going as well.  Given the relatively initial high-grade features of this that might require Adriamycin I will also proceed with echocardiogram.  -3/25/2021 CBC normal.  CMP unremarkable.  Sedimentation rate normal 27 with C-reactive protein elevated 1.7 upper limit of normal 0.5.  Beta-2 microglobulin normal 1.6.  Uric acid normal 4.3.  LDH normal 162.  Hepatitis B and C serologies negative.  -3/29/2021 echocardiogram ejection fraction 61 to 65%.  -3/31/21 excisional node biopsy shows diffuse large B cell non-Hodgkin lymphoma  -4/2/2021 PET shows postsurgical changes right proximal thigh and superficial inguinal region adjacent to adenopathy with SUV 4.0.  Right internal iliac lymph node mildly enlarged SUV 2.6 additionally noted.  No other adenopathy outside this region.  No mention of any abnormal marrow signal.  -4/5/2021 [facility] follow-up visit: Per Dr. Arun Garcia pathologist, this is a diffuse large B cell  non-Hodgkin lymphoma.  He is not sure whether this is double hit or not yet.  He is not sure if there is a background of follicular lymphoma and hence cannot say whether this is transformed.  Clinically her IPI score is 0 with her age less than 60, normal LDH, ECOG 0, no extranodal involvement on PET, and only stage II at worse per PET with all nodes within the same region.  I will get a bone marrow biopsy.  If this is stage IV, double hit, or has evidence of transition from follicular lymphoma into diffuse large B-cell lymphoma, then I would give R-CHOP x6.  If the marrow is not involved, this is not double hit, and there is no evidence of follicular transformation into diffuse large B-cell lymphoma, then I would treat this as a stage II nonbulky diffuse large B-cell lymphoma and consider R-CHOP x3 followed by involved site radiation therapy.  I reviewed her PET and biopsy reports and echocardiogram which shows good ejection fraction.  She will need to get her [employer] Covid vaccine this week, family doctor established for the possibility of prednisone-induced diabetes, CT-guided bone marrow biopsy, chemo preparation visit, and a port placement with Dr. Gong.  We will present her at multidisciplinary tumor board this Friday.  I went into detail regarding the side effects of R-CHOP but she will have these reviewed at her chemo preparation visit.  Though not bulky, I still would treat her with allopurinol.  First course of therapy will occur in our [place] office and if all goes well then the subsequent Rituxan doses can be faster and performed in [place] office.    -4/14/2021 pathology update Dr. Garcia: Node pathology negative for double hit by FISH    -4/21/2021 bone marrow biopsy negative for lymphomatous involvement with scant stainable iron.      -4/26/2021 [facility] medical oncology follow-up visit: Given lack of marrow involvement, negative for double hit, and possible background follicular  lymphoma , I will treat her as stage IIa nonbulky (original tumor 5 cm on CT, I.  E.  Less than 7.5 cm) diffuse large B-cell lymphoma right proximal thigh SUV 4.0 mass incisionally biopsied (with palpable residua) with mild right internal iliac node enlargement SUV 2.6 as well.  We will treat her with planned R-CHOP x3 followed by involved site radiation therapy.  This was consensus of multidisciplinary tumor board 4/16/2021 as well.  First course [place].  Second course will occur in [place].  With the small component of background follicular lymphoma, there is potential risk of not only relapse of diffuse large B-cell lymphoma but later relapse from follicular lymphoma and we will keep that in mind in terms of following her up past the usual 3 years post definitive therapy presuming we get a complete remission on subsequent imaging and exams.  We will arrange radiation in [place] and I have made referral.  This will not start until after chemotherapy.    -5/13/2021 radiation oncology consultation Dr. Jl Infante for preparation of future involved field radiation therapy    -5/18/2021 [facility] oncology clinic follow-up: Had moderate amount of nausea after her first cycle, will add Zyprexa to Zofran and Compazine that she is already taking. She was also anxious, added Ativan to her care plan premeds. Prescription for Augmentin sent in for sinus infection. Clinically good response with reduction of right medial thigh mass. We will continue treatment in [place] due to mild infusion reaction with first Rituxan but she was able to complete treatment with additional medications. Today is cycle #2 of a planned 3 courses and then will repeat restaging scan to assess for response and proceed to radiation as outlined above.    -6/8/2021 course #3 Rituxan CHOP    -6/22/2021 PET shows resolution of right inguinal and iliac adenopathy.  Negative PET/CT.    -6/28/2021 [facility] medical oncology follow-up visit: I reviewed  images and reports of PET as above.  Status post Rituxan CHOP, PET is essentially negative with complete clinical remission.With the small component of background follicular lymphoma, there is potential risk of not only relapse of diffuse large B-cell lymphoma but later relapse from follicular lymphoma and we will keep that in mind in terms of following her up past the usual 3 years post definitive therapy presuming we get a complete remission on subsequent imaging and exams.  We will arrange radiation in [place] and Dr. Infante saw in mid May.  I called Dr. Infante to get her in to start involved field radiation and we will repeat PET and labs prior to return in 3 months for survivorship visit with my nurse practitioner who will lay out the follow-up according to NCCN guidelines.  Other than for modest anemia hemoglobin in the tens and glucose 150s, no other remarkable findings on CBC and CMP 6/8/2021.    -6/8/2021 completed course #3 of R-CHOP with resolution of right inguinal and iliac adenopathy on 6/22/2021 PET    -8/16/2021 completed 36 Gray in 18 fractions involved field right pelvic/inguinal femoral nodes Dr. Infante    -10/21/2021 [facility] clinic follow-up/survivorship visit: PET scan from 10/14/2021 was negative along with normal CBC and CMP.  She completed radiation in August without complications.  We will get her back to Dr. Gong for port removal.  We will continue surveillance per NCCN guidelines as follows:  -H&P and labs every 3-6 months for 5 years and then annually or as clinically indicated.    -We will do surveillance imaging post completion of treatment with CT chest, abdomen and pelvis with contrast every 6 months for 2 years and then annually or as clinically indicated.    -1/28/2022 CT chest abdomen pelvis with contrast shows no adenopathy chest abdomen pelvis and no acute process.  Hepatic steatosis with sigmoid diverticulosis.  Hemoglobin 13.6 with normal CBC and differential.   CMP entirely normal with creatinine 0.65.    -2/1/2022 [facility] medical oncology follow-up visit: I reviewed images and reports of scans as outlined above as well as labs.  No evidence of recurrence.  As outlined by my nurse practitioner survivorship visit, she will get an H&P every 3 to 6 months out to April 2026 and then annually thereafter and we will do surveillance CT chest abdomen pelvis every 6 months until April 2023 and then annually as clinically indicated.  As I have stated previously, we would want to continue to follow her clinically past the usual 2 years of radiographic follow-up for signs or symptoms of follicular lymphoma as there was a small background follicular lymphomatous component that could recur late.  She will see my nurse practitioner back in 6 months with CTs just prior to return.  No further CBC or CMP in the absence of signs or symptoms given normalization of her counts.    -7/28/2022 CT chest, abdomen and pelvis with no evidence of lymphadenopathy in the chest, abdomen or pelvis, diffuse hepatic steatosis, sigmoid diverticulosis.    - 8/4/2022 [facility] Oncology clinic follow-up: Iliana has been feeling poorly since treatment ended a year ago for her history of lymphoma.  Current CT chest, abdomen and pelvis show no evidence of disease recurrence.  Her symptoms unfortunately have not improved since treatment ended and she is having more bad days than good.  She has a rash on the dorsum of her hands and foot that appear to be possibly eczema versus psoriasis.  We will get her to dermatology for further evaluation, I have asked her to call her insurance company to let us know the name of a provider that she would like to see and then we can put in a referral.  She has ongoing fatigue that has not improved with time, she is unable to do most activities without having to rest for the remainder of the day afterwards.  She also has nausea and loose stool with urgency.  I will get her to  gastroenterology for an EGD and colonoscopy to rule out any GI involvement of her lymphoma as it did have follicular component.  I will also get labs today with CBC, CMP, we will also check HARRIS in light of her rash, will check inflammatory markers with sedimentation rate and CRP and an LDH.  We will see her back in a few months to go over her labs and GI evaluation.  She does have a history of hepatic steatosis shown on all prior CTs therefore would benefit from GI evaluation regarding that also, has had normal LFTs on prior labs.      - 8/4/2022 data: CMP unremarkable.  LDH normal 174.  CRP mildly elevated 1.5 upper limit 0.9.  Sedimentation rate normal 9.  CBC normal with normal differential.  HARRIS negative    -9/19/2022 EGD and colonoscopy Mani Cristofer random colon biopsy negative.  Transverse colon polyp sessile serrated adenoma.  Duodenal biopsy with normal architecture.  Stomach biopsy mild chronic inactive gastritis with no dysplasia or metaplasia or H. pylori.  There was a lot of stool in the entire colon interfering with visualization.  Internal hemorrhoids.  EGD showed no gross lesions.    -9/27/2022 [facility] oncology follow-up: No clear-cut evidence of lymphoma on CTs or EGD and colonoscopy and labs are unremarkable and negative HARRIS.  Her dermatologist told her she had eczema.  Nonetheless she is still periodically so fatigued that she ends up going to the bed for 2 or 3 days and then rebounds and feels normal for a while and then this waxing and waning of performance status and fatigue continues.  We will get a PET through her thighs as that was the original site of her disease.  I will also have her get EBV and CMV and HIV and thyroid functions.    -10/7/2022 whole-body PET is essentially negative.  There is a solitary borderline focus SUV 2.8 near the left lung apex without CT correlate favored to be infectious/inflammatory change.  No hypermetabolism in particular of the lower extremities where  her original disease resided.    -10/11/2022 [facility] medical oncology follow-up: States she had COVID September 24 which was before I last saw her but did not know that when last I saw her.  She did not get her EBV, CMV, HIV, thyroid functions but says she is now getting over the COVID as well as some sinusitis and she thinks that is the fatigue.  Her PET was entirely negative..  We will continue to check her whole-body PET until April 2023 which is when we will see her back and then we will do that annually only as clinically indicated on annual H&P.    -10/27/2022 CBC normal with normal differential and unremarkable CMP, B12, folate, and normal thyroid function. HIV nonreactive.  CMV DNA quantitation negative.  EBV quantitation PCR negative and EBV antibody profile shows negative VCA IgM with increased IgG VCA of 122 and nuclear antigen IgG 68.8 commensurate with prior infection.  C-reactive protein 21.  C4 complement normal.  C3 complement slightly elevated to 27 upper limit 167.  ANCA panel negative.  HARRIS negative.  CCP negative.  Rheumatoid factor not drawn.  Sedimentation rate normal 24.  Anti-SSA and SSB negative.      -12/1/2022 Platelets minimally elevated 384,000 with otherwise unremarkable CBC and CMP.  C-reactive protein elevated 13.1.  Sedimentation rate 30 barely elevated.  Rheumatoid factor negative.  Moderate external hemoglobin and urine.  4-10 red cells per high-power field.    -4/11/2023 CT chest abdomen pelvis Showed no evidence of adenopathy or splenomegaly.  Atherosclerotic changes.  Hepatic steatosis with small liver cyst.    -4/18/2023 [facility] hematology oncology follow-up: No radiographic evidence of recurrence.  Viral studies all negative.  CBC unremarkable.  No significant inflammatory markers and LDH normal. As outlined in her survivorship visit, she has completed routine surveillance CTs per NCCN guidelines and at this point would only image as clinically indicated.  We will continue H&P  every 6 months out till April 2026.  I would probably continue to follow her clinically out past April 2026 for signs or symptoms of late recurrences there was a component of follicular lymphoma within the higher grade lymphoma and late recurrences may occur but at this point no further labs or scans in the absence of symptoms.     Diffuse large B-cell lymphoma of lymph nodes of inguinal region   3/29/2021 -  Other Event    Echocardiogram  · Left ventricular ejection fraction appears to be 61 - 65%. Left ventricular systolic function is normal.  · Normal global longitudinal strain at -23.5%.  · Left ventricular diastolic function was normal.  · No significant structural or functional valvular disease.     4/2/2021 Imaging    PET/CT IMPRESSION:  Postsurgical changes right proximal thigh and superficial  inguinal region adjacent to adenopathy with abnormal hypermetabolism  maximum SUV 4.0. Right internal iliac lymph node is mildly enlarged with  maximum SUV 2.6 additionally noted. No soft tissue mass or adenopathy  outside of this region.     4/21/2021 Biopsy    Marrow biopsy negative     4/26/2021 - 6/28/2021 Chemotherapy    Completed course #3 6/8/2021  OP LYMPHOMA R-CHOP RiTUXimab / Cyclophosphamide / DOXOrubicin / VinCRIStine / PredniSONE     4/26/2021 Cancer Staged    Staging form: Hodgkin And Non-Hodgkin Lymphoma, AJCC 8th Edition  - Clinical: Stage II (Diffuse large B-cell lymphoma) - Signed by Rodríguez Gerardo MD on 4/26/2021 6/22/2021 Imaging     PET shows resolution of right inguinal and iliac adenopathy.  Negative PET/CT.     7/22/2021 - 8/16/2021 Radiation    Radiation OncologyTreatment Course:  Iliana Gray received 36 cGy in 18 fractions to right pelvic and inguinofemoral nodes (involved field) under the care of Dr. Jl Infante.     10/14/2021 Imaging    PET/CT: Stable negative PET/CT with no new hypermetabolic adenopathy to suggest lymphomatous disease progression.     1/28/2022 Imaging    CT  "chest abdomen pelvis with contrast shows no adenopathy chest abdomen pelvis and no acute process.  Hepatic steatosis with sigmoid diverticulosis.  Hemoglobin 13.6 with normal CBC and differential.  CMP entirely normal with creatinine 0.65     7/28/2022 Imaging    CT chest, abdomen and pelvis:  1. No evidence of lymphadenopathy in the chest abdomen or pelvis.  2. Diffuse hepatic steatosis.  3. Sigmoid diverticulosis.  4. Hysterectomy  5. Postoperative changes of prior right inguinal lymph node resection.     4/11/2023 Imaging    CT chest abdomen pelvis Showed no evidence of adenopathy or splenomegaly.  Atherosclerotic changes.  Hepatic steatosis with small liver cyst.         HISTORY OF PRESENT ILLNESS:  The patient is a 52 y.o. female, here for follow up on management of history of lymphoma with no new somatic complaints    Past Medical History:   Diagnosis Date   • Anxiety    • Arthritis    • Asthma    • Diffuse large B cell lymphoma    • Sleep apnea     cpap     Past Surgical History:   Procedure Laterality Date   • HYSTERECTOMY     • TUMOR EXCISION Right 04/2021    lymph node removal/biopsy/right inner thigh   • VENOUS ACCESS DEVICE (PORT) INSERTION Right 04/2021       No Known Allergies    Family History and Social History reviewed and changed as necessary    REVIEW OF SYSTEM:   No new somatic complaints    PHYSICAL EXAM:  No palpable cervical axillary or inguinal adenopathy.    Vitals:    04/18/23 1329   BP: 141/84   Pulse: 94   Resp: 18   Temp: 98.9 °F (37.2 °C)   SpO2: 96%   Weight: 95.3 kg (210 lb)   Height: 162.6 cm (64\")     Vitals:    04/18/23 1329   PainSc: 0-No pain          ECOG score: 0           Vitals reviewed.    ECOG: (0) Fully Active - Able to Carry On All Pre-disease Performance Without Restriction    Lab Results   Component Value Date    HGB 14.0 12/01/2022    HCT 42.7 12/01/2022    MCV 91 12/01/2022     (H) 12/01/2022    WBC 5.53 12/01/2022    NEUTROABS 2.91 12/01/2022    LYMPHSABS 2.02 " 12/01/2022    MONOSABS 0.43 12/01/2022    EOSABS 0.13 12/01/2022    BASOSABS 0.02 12/01/2022       Lab Results   Component Value Date    GLUCOSE 130 (H) 10/25/2022    BUN 11 10/25/2022    CREATININE 0.50 (L) 10/25/2022     10/25/2022    K 4.2 10/25/2022     10/25/2022    CO2 23 10/25/2022    CALCIUM 8.9 10/25/2022    PROTEINTOT 7.0 01/28/2022    ALBUMIN 4.2 10/25/2022    BILITOT 0.3 10/25/2022    ALKPHOS 70 10/25/2022    AST 13 10/25/2022    ALT 17 10/25/2022            ASSESSMENT & PLAN:  1.  Right Medial thigh stage IIa nonbulky less than 7.5 cm diffuse large b cell non-hodgkin lymphoma with component of background follicular lymphoma plan for R-CHOP x3 followed by ISRT.  Had CR after 3 courses of R-CHOP x3 ending 6/8/2021 per PET 6/22/2021.  Fatigue and rash (eczema per dermatology) starting around August 2022 with negative CT chest abdomen pelvis.    Oncology history timeline:  -2/18/2021 hemoglobin 12.7 with normal white count platelet count differential.  CMP unremarkable.  Urinalysis with microscopic hematuria 20-30 red blood cells per milliliter.  AP single view chest x-ray emergency room [place] showed no acute findings.  CT abdomen pelvis with IV contrast emergency room [place] showed calcified granuloma right lung base, fatty liver infiltration, subcentimeter hypodensity left hepatic lobe too small to categorize, normal size spleen, no adrenal masses, no pancreatic mass, normal kidneys, and no retroperitoneal adenopathy or ascites or pneumoperitoneum.  There was a smooth marginated homogeneous enhancing 4.5 x 5 x 3.3 cm mass in the superficial soft tissues of the anterior upper right thigh.  -2/25/2021 office note from Dr. Gong mentions presentation to emergency room 2/18/2021 complaining of right upper thigh swelling for the preceding week of 2/12/2021.  CT scan abdomen pelvis to include the thighs performed at [facility] emergency room revealing 5 cm smoothly marginated right  upper thigh mass below the groin crease suspicious for pathologic lymph node but could be neoplastic of other nature given no prodromal illness, infections, or inflammation.  Recommendation for biopsy was made.  Per Dr. Gong note, she had been sick for the better part of the winter and had been urgent treatment centers told that she had a viral infection flu and coronavirus negative with fevers and chills that subsequently resolved but for fatigue that did not resolve with hypersomnolence as well.  Says she feels hot all the time but no ronny night sweats.  -3/12/2021 right thigh mass needle core tissue sample extremely small and predominantly crushed limiting adequate evaluation.  Differential diagnosis for this B-cell lymphoma is diffuse large B cell, follicular lymphoma versus other.  Further differentiation could not be performed on this crush small sample.  -3/25/2021 [facility], [place] hematology oncology initial consultation: I, Rodríguez Akin, saw for the first time today.  Reviewed the above story with her.  Still fatigued without night sweats or unexplained weight loss.  She has this mass in the soft tissue medial right thigh about 5 cm in size that does seem to move somewhat beneath the skin but also to my exam feels like it is attached to the deeper structures of the thigh as well.  I feel no other adenopathy or hepatosplenomegaly.  I will get a PET scan.  I spoken with Dr. Gong who will look at these images again with the CTs in [place].  If there is clear indication that this is not coming from soft tissue nonnodal structures, then excisional biopsy for adequate tissue diagnosis is important to determine the type of therapy.  This is growing since her biopsy 3/12/2021 but not daily like a very high-grade lymphoma.  To my hand, this does not feel like a typical stas structure and my other concern is, even though this does not venessa with any markers to suggest sarcoma or the like, is to be sure that this is  not something other than lymphoma and I have spoken with Dr. Pino who will do desmin stains and other markers before doing an excisional biopsy next Wednesday with Dr. Gong.  If those additional stains suggest sarcomatoid features that can appear in the medial aspect of the thigh such as this, then she probably needs to have excision by Humberto Vera orthopedic oncologist at .  In the meantime, presuming this to be some form of lymphoma which is the highest likelihood based on the pathology from Dr. Pino where this is CD20 positive, CD10 positive, BCL6 40% positive, MUM1 40% positive, CD23 strong and diffuse positive, and BCL-2 positive.  Ki-67 is 50%.  CD30 and only 5%.  C-Myc is weakly expressed in 10% of cells.  BRIAN by TAYLER is negative.  Cyclin D1 negative.  CD5 indeterminate for B and T-cell coexpression.  LEF 1 presumably staining T cells.  Ultimate decision for treatment depends on the specific pathologic subtype and the staging and I will get her prognostic labs going as well.  Given the relatively initial high-grade features of this that might require Adriamycin I will also proceed with echocardiogram.  -3/25/2021 CBC normal.  CMP unremarkable.  Sedimentation rate normal 27 with C-reactive protein elevated 1.7 upper limit of normal 0.5.  Beta-2 microglobulin normal 1.6.  Uric acid normal 4.3.  LDH normal 162.  Hepatitis B and C serologies negative.  -3/29/2021 echocardiogram ejection fraction 61 to 65%.  -3/31/21 excisional node biopsy shows diffuse large B cell non-Hodgkin lymphoma  -4/2/2021 PET shows postsurgical changes right proximal thigh and superficial inguinal region adjacent to adenopathy with SUV 4.0.  Right internal iliac lymph node mildly enlarged SUV 2.6 additionally noted.  No other adenopathy outside this region.  No mention of any abnormal marrow signal.  -4/5/2021 [facility] medical oncology follow-up visit: Per Dr. Arun Garcia pathologist, this is a diffuse large B cell  non-Hodgkin lymphoma.  He is not sure whether this is double hit or not yet.  He is not sure if there is a background of follicular lymphoma and hence cannot say whether this is transformed.  Clinically her IPI score is 0 with her age less than 60, normal LDH, ECOG 0, no extranodal involvement on PET, and only stage II at worse per PET with all nodes within the same region.  I will get a bone marrow biopsy.  If this is stage IV, double hit, or has evidence of transition from follicular lymphoma into diffuse large B-cell lymphoma, then I would give R-CHOP x6.  If the marrow is not involved, this is not double hit, and there is no evidence of follicular transformation into diffuse large B-cell lymphoma, then I would treat this as a stage II nonbulky diffuse large B-cell lymphoma and consider R-CHOP x3 followed by involved site radiation therapy.  I reviewed her PET and biopsy reports and echocardiogram which shows good ejection fraction.  She will need to get her [employer] Covid vaccine this week, family doctor established for the possibility of prednisone-induced diabetes, CT-guided bone marrow biopsy, chemo preparation visit, and a port placement with Dr. Gong.  We will present her at multidisciplinary tumor board this Friday.  I went into detail regarding the side effects of R-CHOP but she will have these reviewed at her chemo preparation visit.  Though not bulky, I still would treat her with allopurinol.  First course of therapy will occur in our [place] office and if all goes well then the subsequent Rituxan doses can be faster and performed in [place] office.    -4/14/2021 pathology update Dr. Garcia: Node pathology negative for double hit by FISH    -4/21/2021 bone marrow biopsy negative for lymphomatous involvement with scant stainable iron.      -4/26/2021 [facility] medical oncology follow-up visit: Given lack of marrow involvement, negative for double hit, and possible background follicular  lymphoma , I will treat her as stage IIa nonbulky (original tumor 5 cm on CT, I.  E.  Less than 7.5 cm) diffuse large B-cell lymphoma right proximal thigh SUV 4.0 mass incisionally biopsied (with palpable residua) with mild right internal iliac node enlargement SUV 2.6 as well.  We will treat her with planned R-CHOP x3 followed by involved site radiation therapy.  This was consensus of multidisciplinary tumor board 4/16/2021 as well.  First course [place].  Second course will occur in [place].  With the small component of background follicular lymphoma, there is potential risk of not only relapse of diffuse large B-cell lymphoma but later relapse from follicular lymphoma and we will keep that in mind in terms of following her up past the usual 3 years post definitive therapy presuming we get a complete remission on subsequent imaging and exams.  We will arrange radiation in [place] and I have made referral.  This will not start until after chemotherapy.    -5/13/2021 radiation oncology consultation Dr. Jl Infante for preparation of future involved field radiation therapy    -5/18/2021 [facility] oncology clinic follow-up: Had moderate amount of nausea after her first cycle, will add Zyprexa to Zofran and Compazine that she is already taking. She was also anxious, added Ativan to her care plan premeds. Prescription for Augmentin sent in for sinus infection. Clinically good response with reduction of right medial thigh mass. We will continue treatment in [place] due to mild infusion reaction with first Rituxan but she was able to complete treatment with additional medications. Today is cycle #2 of a planned 3 courses and then will repeat restaging scan to assess for response and proceed to radiation as outlined above.    -6/8/2021 course #3 Rituxan CHOP    -6/22/2021 PET shows resolution of right inguinal and iliac adenopathy.  Negative PET/CT.    -6/28/2021 [facility] medical oncology follow-up visit: I reviewed  images and reports of PET as above.  Status post Rituxan CHOP, PET is essentially negative with complete clinical remission.With the small component of background follicular lymphoma, there is potential risk of not only relapse of diffuse large B-cell lymphoma but later relapse from follicular lymphoma and we will keep that in mind in terms of following her up past the usual 3 years post definitive therapy presuming we get a complete remission on subsequent imaging and exams.  We will arrange radiation in [place] and Dr. Infante saw in mid May.  I called Dr. Infante to get her in to start involved field radiation and we will repeat PET and labs prior to return in 3 months for survivorship visit with my nurse practitioner who will lay out the follow-up according to NCCN guidelines.  Other than for modest anemia hemoglobin in the tens and glucose 150s, no other remarkable findings on CBC and CMP 6/8/2021.    -6/8/2021 completed course #3 of R-CHOP with resolution of right inguinal and iliac adenopathy on 6/22/2021 PET    -8/16/2021 completed 36 Gray in 18 fractions involved field right pelvic/inguinal femoral nodes Dr. Infante    -10/21/2021 [facility] follow-up/survivorship visit: PET scan from 10/14/2021 was negative along with normal CBC and CMP.  She completed radiation in August without complications.  We will get her back to Dr. Gong for port removal.  We will continue surveillance per NCCN guidelines as follows:  -H&P and labs every 3-6 months for 5 years and then annually or as clinically indicated.    -We will do surveillance imaging post completion of treatment with CT chest, abdomen and pelvis with contrast every 6 months for 2 years and then annually or as clinically indicated.    -1/28/2022 CT chest abdomen pelvis with contrast shows no adenopathy chest abdomen pelvis and no acute process.  Hepatic steatosis with sigmoid diverticulosis.  Hemoglobin 13.6 with normal CBC and differential.   CMP entirely normal with creatinine 0.65.    -2/1/2022 [facility] medical oncology follow-up visit: I reviewed images and reports of scans as outlined above as well as labs.  No evidence of recurrence.  As outlined by my nurse practitioner survivorship visit, she will get an H&P every 3 to 6 months out to April 2026 and then annually thereafter and we will do surveillance CT chest abdomen pelvis every 6 months until April 2023 and then annually as clinically indicated.  As I have stated previously, we would want to continue to follow her clinically past the usual 2 years of radiographic follow-up for signs or symptoms of follicular lymphoma as there was a small background follicular lymphomatous component that could recur late.  She will see my nurse practitioner back in 6 months with CTs just prior to return.  No further CBC or CMP in the absence of signs or symptoms given normalization of her counts.    -7/28/2022 CT chest, abdomen and pelvis with no evidence of lymphadenopathy in the chest, abdomen or pelvis, diffuse hepatic steatosis, sigmoid diverticulosis.    - 8/4/2022 [facility] Oncology clinic follow-up: Iliana has been feeling poorly since treatment ended a year ago for her history of lymphoma.  Current CT chest, abdomen and pelvis show no evidence of disease recurrence.  Her symptoms unfortunately have not improved since treatment ended and she is having more bad days than good.  She has a rash on the dorsum of her hands and foot that appear to be possibly eczema versus psoriasis.  We will get her to dermatology for further evaluation, I have asked her to call her insurance company to let us know the name of a provider that she would like to see and then we can put in a referral.  She has ongoing fatigue that has not improved with time, she is unable to do most activities without having to rest for the remainder of the day afterwards.  She also has nausea and loose stool with urgency.  I will get her to  gastroenterology for an EGD and colonoscopy to rule out any GI involvement of her lymphoma as it did have follicular component.  I will also get labs today with CBC, CMP, we will also check HARRIS in light of her rash, will check inflammatory markers with sedimentation rate and CRP and an LDH.  We will see her back in a few months to go over her labs and GI evaluation.  She does have a history of hepatic steatosis shown on all prior CTs therefore would benefit from GI evaluation regarding that also, has had normal LFTs on prior labs.      - 8/4/2022 data: CMP unremarkable.  LDH normal 174.  CRP mildly elevated 1.5 upper limit 0.9.  Sedimentation rate normal 9.  CBC normal with normal differential.  HARRIS negative    -9/19/2022 EGD and colonoscopy Mani Cristofer random colon biopsy negative.  Transverse colon polyp sessile serrated adenoma.  Duodenal biopsy with normal architecture.  Stomach biopsy mild chronic inactive gastritis with no dysplasia or metaplasia or H. pylori.  There was a lot of stool in the entire colon interfering with visualization.  Internal hemorrhoids.  EGD showed no gross lesions.    -9/27/2022 [facility] oncology follow-up: No clear-cut evidence of lymphoma on CTs or EGD and colonoscopy and labs are unremarkable and negative HARRIS.  Her dermatologist told her she had eczema.  Nonetheless she is still periodically so fatigued that she ends up going to the bed for 2 or 3 days and then rebounds and feels normal for a while and then this waxing and waning of performance status and fatigue continues.  We will get a PET through her thighs as that was the original site of her disease.  I will also have her get EBV and CMV and HIV and thyroid functions.    -10/7/2022 whole-body PET is essentially negative.  There is a solitary borderline focus SUV 2.8 near the left lung apex without CT correlate favored to be infectious/inflammatory change.  No hypermetabolism in particular of the lower extremities where  her original disease resided.    -10/11/2022 [facility] medical oncology follow-up: States she had COVID September 24 which was before I last saw her but did not know that when last I saw her.  She did not get her EBV, CMV, HIV, thyroid functions but says she is now getting over the COVID as well as some sinusitis and she thinks that is the fatigue.  Her PET was entirely negative..  We will continue to check her whole-body PET until April 2023 which is when we will see her back and then we will do that annually only as clinically indicated on annual H&P.    -10/27/2022 CBC normal with normal differential and unremarkable CMP, B12, folate, and normal thyroid function. HIV nonreactive.  CMV DNA quantitation negative.  EBV quantitation PCR negative and EBV antibody profile shows negative VCA IgM with increased IgG VCA of 122 and nuclear antigen IgG 68.8 commensurate with prior infection.  C-reactive protein 21.  C4 complement normal.  C3 complement slightly elevated to 27 upper limit 167.  ANCA panel negative.  HARRIS negative.  CCP negative.  Rheumatoid factor not drawn.  Sedimentation rate normal 24.  Anti-SSA and SSB negative.      -12/1/2022 Platelets minimally elevated 384,000 with otherwise unremarkable CBC and CMP.  C-reactive protein elevated 13.1.  Sedimentation rate 30 barely elevated.  Rheumatoid factor negative.  Moderate external hemoglobin and urine.  4-10 red cells per high-power field.    -4/11/2023 CT chest abdomen pelvis Showed no evidence of adenopathy or splenomegaly.  Atherosclerotic changes.  Hepatic steatosis with small liver cyst.    -4/18/2023 [facility] hematology oncology follow-up: No radiographic evidence of recurrence.  Viral studies all negative.  CBC unremarkable.  No significant inflammatory markers and LDH normal. As outlined in her survivorship visit, she has completed routine surveillance CTs per NCCN guidelines and at this point would only image as clinically indicated.  We will continue H&P  every 6 months out till April 2026.  I would probably continue to follow her clinically out past April 2026 for signs or symptoms of late recurrences there was a component of follicular lymphoma within the higher grade lymphoma and late recurrences may occur but at this point no further labs or scans in the absence of symptoms.    Total time of care today inclusive of time spent today prior to patient's arrival reviewing interval data as outlined above in interval images and reports thereof and after visit instituting this plan as outlined took 30 minutes of patient care time throughout the day today.  Rodríguez Gerardo MD    04/18/2023

## 2023-04-18 NOTE — PROGRESS NOTES
CHIEF COMPLAINT: No new somatic complaints    Problem List:  Oncology/Hematology History Overview Note   1.  Right Medial thigh stage IIa nonbulky less than 7.5 cm diffuse large b cell non-hodgkin lymphoma with component of background follicular lymphoma plan for R-CHOP x3 followed by ISRT.  Had CR after 3 courses of R-CHOP x3 ending 6/8/2021 per PET 6/22/2021.  Fatigue and rash (eczema per dermatology) starting around August 2022 with negative CT chest abdomen pelvis.    Oncology history timeline:  -2/18/2021 hemoglobin 12.7 with normal white count platelet count differential.  CMP unremarkable.  Urinalysis with microscopic hematuria 20-30 red blood cells per milliliter.  AP single view chest x-ray emergency room [place] showed no acute findings.  CT abdomen pelvis with IV contrast emergency room [place] showed calcified granuloma right lung base, fatty liver infiltration, subcentimeter hypodensity left hepatic lobe too small to categorize, normal size spleen, no adrenal masses, no pancreatic mass, normal kidneys, and no retroperitoneal adenopathy or ascites or pneumoperitoneum.  There was a smooth marginated homogeneous enhancing 4.5 x 5 x 3.3 cm mass in the superficial soft tissues of the anterior upper right thigh.  -2/25/2021 office note from Dr. Gong mentions presentation to emergency room 2/18/2021 complaining of right upper thigh swelling for the preceding week of 2/12/2021.  CT scan abdomen pelvis to include the thighs performed at [facility] emergency room revealing 5 cm smoothly marginated right upper thigh mass below the groin crease suspicious for pathologic lymph node but could be neoplastic of other nature given no prodromal illness, infections, or inflammation.  Recommendation for biopsy was made.  Per Dr. Gong note, she had been sick for the better part of the winter and had been urgent treatment centers told that she had a viral infection flu and coronavirus negative with fevers  and chills that subsequently resolved but for fatigue that did not resolve with hypersomnolence as well.  Says she feels hot all the time but no ronny night sweats.  -3/12/2021 right thigh mass needle core tissue sample extremely small and predominantly crushed limiting adequate evaluation.  Differential diagnosis for this B-cell lymphoma is diffuse large B cell, follicular lymphoma versus other.  Further differentiation could not be performed on this crush small sample.  -3/25/2021 [facility] hematology oncology initial consultation: I, Rodríguez Gerardo, saw for the first time today.  Reviewed the above story with her.  Still fatigued without night sweats or unexplained weight loss.  She has this mass in the soft tissue medial right thigh about 5 cm in size that does seem to move somewhat beneath the skin but also to my exam feels like it is attached to the deeper structures of the thigh as well.  I feel no other adenopathy or hepatosplenomegaly.  I will get a PET scan.  I spoken with Dr. Gong who will look at these images again with the CTs in [place].  If there is clear indication that this is not coming from soft tissue nonnodal structures, then excisional biopsy for adequate tissue diagnosis is important to determine the type of therapy.  This is growing since her biopsy 3/12/2021 but not daily like a very high-grade lymphoma.  To my hand, this does not feel like a typical stas structure and my other concern is, even though this does not venessa with any markers to suggest sarcoma or the like, is to be sure that this is not something other than lymphoma and I have spoken with Dr. Pino who will do desmin stains and other markers before doing an excisional biopsy next Wednesday with Dr. Gong.  If those additional stains suggest sarcomatoid features that can appear in the medial aspect of the thigh such as this, then she probably needs to have excision by Humberto Vera orthopedic oncologist at .  In the  meantime, presuming this to be some form of lymphoma which is the highest likelihood based on the pathology from Dr. Pino where this is CD20 positive, CD10 positive, BCL6 40% positive, MUM1 40% positive, CD23 strong and diffuse positive, and BCL-2 positive.  Ki-67 is 50%.  CD30 and only 5%.  C-Myc is weakly expressed in 10% of cells.  BRIAN by TAYLER is negative.  Cyclin D1 negative.  CD5 indeterminate for B and T-cell coexpression.  LEF 1 presumably staining T cells.  Ultimate decision for treatment depends on the specific pathologic subtype and the staging and I will get her prognostic labs going as well.  Given the relatively initial high-grade features of this that might require Adriamycin I will also proceed with echocardiogram.  -3/25/2021 CBC normal.  CMP unremarkable.  Sedimentation rate normal 27 with C-reactive protein elevated 1.7 upper limit of normal 0.5.  Beta-2 microglobulin normal 1.6.  Uric acid normal 4.3.  LDH normal 162.  Hepatitis B and C serologies negative.  -3/29/2021 echocardiogram ejection fraction 61 to 65%.  -3/31/21 excisional node biopsy shows diffuse large B cell non-Hodgkin lymphoma  -4/2/2021 PET shows postsurgical changes right proximal thigh and superficial inguinal region adjacent to adenopathy with SUV 4.0.  Right internal iliac lymph node mildly enlarged SUV 2.6 additionally noted.  No other adenopathy outside this region.  No mention of any abnormal marrow signal.  -4/5/2021 [facility] medical oncology follow-up visit: Per Dr. Arun Garcia pathologist, this is a diffuse large B cell non-Hodgkin lymphoma.  He is not sure whether this is double hit or not yet.  He is not sure if there is a background of follicular lymphoma and hence cannot say whether this is transformed.  Clinically her IPI score is 0 with her age less than 60, normal LDH, ECOG 0, no extranodal involvement on PET, and only stage II at worse per PET with all nodes within the same region.  I will get a bone marrow  biopsy.  If this is stage IV, double hit, or has evidence of transition from follicular lymphoma into diffuse large B-cell lymphoma, then I would give R-CHOP x6.  If the marrow is not involved, this is not double hit, and there is no evidence of follicular transformation into diffuse large B-cell lymphoma, then I would treat this as a stage II nonbulky diffuse large B-cell lymphoma and consider R-CHOP x3 followed by involved site radiation therapy.  I reviewed her PET and biopsy reports and echocardiogram which shows good ejection fraction.  She will need to get her [employer] Covid vaccine this week, family doctor established for the possibility of prednisone-induced diabetes, CT-guided bone marrow biopsy, chemo preparation visit, and a port placement with Dr. Gong.  We will present her at multidisciplinary tumor board this Friday.  I went into detail regarding the side effects of R-CHOP but she will have these reviewed at her chemo preparation visit.  Though not bulky, I still would treat her with allopurinol.  First course of therapy will occur in our [place] office and if all goes well then the subsequent Rituxan doses can be faster and performed in [place] office.    -4/14/2021 pathology update Dr. Garcia: Node pathology negative for double hit by FISH    -4/21/2021 bone marrow biopsy negative for lymphomatous involvement with scant stainable iron.      -4/26/2021 [facility] medical oncology follow-up visit: Given lack of marrow involvement, negative for double hit, and possible background follicular lymphoma , I will treat her as stage IIa nonbulky (original tumor 5 cm on CT, I.  E.  Less than 7.5 cm) diffuse large B-cell lymphoma right proximal thigh SUV 4.0 mass incisionally biopsied (with palpable residua) with mild right internal iliac node enlargement SUV 2.6 as well.  We will treat her with planned R-CHOP x3 followed by involved site radiation therapy.  This was consensus of multidisciplinary  tumor board 4/16/2021 as well.  First course [place].  Second course will occur in [place].  With the small component of background follicular lymphoma, there is potential risk of not only relapse of diffuse large B-cell lymphoma but later relapse from follicular lymphoma and we will keep that in mind in terms of following her up past the usual 3 years post definitive therapy presuming we get a complete remission on subsequent imaging and exams.  We will arrange radiation in [place] and I have made referral.  This will not start until after chemotherapy.    -5/13/2021 radiation oncology consultation Dr. Jl Infante for preparation of future involved field radiation therapy    -5/18/2021 [facility] oncology clinic follow-up: Had moderate amount of nausea after her first cycle, will add Zyprexa to Zofran and Compazine that she is already taking. She was also anxious, added Ativan to her care plan premeds. Prescription for Augmentin sent in for sinus infection. Clinically good response with reduction of right medial thigh mass. We will continue treatment in [place] due to mild infusion reaction with first Rituxan but she was able to complete treatment with additional medications. Today is cycle #2 of a planned 3 courses and then will repeat restaging scan to assess for response and proceed to radiation as outlined above.    -6/8/2021 course #3 Rituxan CHOP    -6/22/2021 PET shows resolution of right inguinal and iliac adenopathy.  Negative PET/CT.    -6/28/2021 [facility] medical oncology follow-up visit: I reviewed images and reports of PET as above.  Status post Rituxan CHOP, PET is essentially negative with complete clinical remission.With the small component of background follicular lymphoma, there is potential risk of not only relapse of diffuse large B-cell lymphoma but later relapse from follicular lymphoma and we will keep that in mind in terms of following her up past the usual 3 years post definitive  therapy presuming we get a complete remission on subsequent imaging and exams.  We will arrange radiation in [place] and Dr. Infante saw in mid May.  I called Dr. Infante to get her in to start involved field radiation and we will repeat PET and labs prior to return in 3 months for survivorship visit with my nurse practitioner who will lay out the follow-up according to NCCN guidelines.  Other than for modest anemia hemoglobin in the tens and glucose 150s, no other remarkable findings on CBC and CMP 6/8/2021.    -6/8/2021 completed course #3 of R-CHOP with resolution of right inguinal and iliac adenopathy on 6/22/2021 PET    -8/16/2021 completed 36 Gray in 18 fractions involved field right pelvic/inguinal femoral nodes Dr. Infante    -10/21/2021 [facility] Oncology clinic follow-up/survivorship visit: PET scan from 10/14/2021 was negative along with normal CBC and CMP.  She completed radiation in August without complications.  We will get her back to Dr. Gong for port removal.  We will continue surveillance per NCCN guidelines as follows:  -H&P and labs every 3-6 months for 5 years and then annually or as clinically indicated.    -We will do surveillance imaging post completion of treatment with CT chest, abdomen and pelvis with contrast every 6 months for 2 years and then annually or as clinically indicated.    -1/28/2022 CT chest abdomen pelvis with contrast shows no adenopathy chest abdomen pelvis and no acute process.  Hepatic steatosis with sigmoid diverticulosis.  Hemoglobin 13.6 with normal CBC and differential.  CMP entirely normal with creatinine 0.65.    -2/1/2022 [facility] medical oncology follow-up visit: I reviewed images and reports of scans as outlined above as well as labs.  No evidence of recurrence.  As outlined by my nurse practitioner survivorship visit, she will get an H&P every 3 to 6 months out to April 2026 and then annually thereafter and we will do surveillance CT chest abdomen pelvis  every 6 months until April 2023 and then annually as clinically indicated.  As I have stated previously, we would want to continue to follow her clinically past the usual 2 years of radiographic follow-up for signs or symptoms of follicular lymphoma as there was a small background follicular lymphomatous component that could recur late.  She will see my nurse practitioner back in 6 months with CTs just prior to return.  No further CBC or CMP in the absence of signs or symptoms given normalization of her counts.    -7/28/2022 CT chest, abdomen and pelvis with no evidence of lymphadenopathy in the chest, abdomen or pelvis, diffuse hepatic steatosis, sigmoid diverticulosis.    - 8/4/2022 [facility] Oncology clinic follow-up: Iliana has been feeling poorly since treatment ended a year ago for her history of lymphoma.  Current CT chest, abdomen and pelvis show no evidence of disease recurrence.  Her symptoms unfortunately have not improved since treatment ended and she is having more bad days than good.  She has a rash on the dorsum of her hands and foot that appear to be possibly eczema versus psoriasis.  We will get her to dermatology for further evaluation, I have asked her to call her insurance company to let us know the name of a provider that she would like to see and then we can put in a referral.  She has ongoing fatigue that has not improved with time, she is unable to do most activities without having to rest for the remainder of the day afterwards.  She also has nausea and loose stool with urgency.  I will get her to gastroenterology for an EGD and colonoscopy to rule out any GI involvement of her lymphoma as it did have follicular component.  I will also get labs today with CBC, CMP, we will also check HARRIS in light of her rash, will check inflammatory markers with sedimentation rate and CRP and an LDH.  We will see her back in a few months to go over her labs and GI evaluation.  She does have a history of  hepatic steatosis shown on all prior CTs therefore would benefit from GI evaluation regarding that also, has had normal LFTs on prior labs.      - 8/4/2022 data: CMP unremarkable.  LDH normal 174.  CRP mildly elevated 1.5 upper limit 0.9.  Sedimentation rate normal 9.  CBC normal with normal differential.  HARRIS negative    -9/19/2022 EGD and colonoscopy Mani Cleveland random colon biopsy negative.  Transverse colon polyp sessile serrated adenoma.  Duodenal biopsy with normal architecture.  Stomach biopsy mild chronic inactive gastritis with no dysplasia or metaplasia or H. pylori.  There was a lot of stool in the entire colon interfering with visualization.  Internal hemorrhoids.  EGD showed no gross lesions.    -9/27/2022 [facility] medical oncology follow-up: No clear-cut evidence of lymphoma on CTs or EGD and colonoscopy and labs are unremarkable and negative HARRIS.  Her dermatologist told her she had eczema.  Nonetheless she is still periodically so fatigued that she ends up going to the bed for 2 or 3 days and then rebounds and feels normal for a while and then this waxing and waning of performance status and fatigue continues.  We will get a PET through her thighs as that was the original site of her disease.  I will also have her get EBV and CMV and HIV and thyroid functions.    -10/7/2022 whole-body PET is essentially negative.  There is a solitary borderline focus SUV 2.8 near the left lung apex without CT correlate favored to be infectious/inflammatory change.  No hypermetabolism in particular of the lower extremities where her original disease resided.    -10/11/2022 [facility] medical oncology follow-up: States she had COVID September 24 which was before I last saw her but did not know that when last I saw her.  She did not get her EBV, CMV, HIV, thyroid functions but says she is now getting over the COVID as well as some sinusitis and she thinks that is the fatigue.  Her PET was entirely negative..  We will  continue to check her whole-body PET until April 2023 which is when we will see her back and then we will do that annually only as clinically indicated on annual H&P.    -10/27/2022 CBC normal with normal differential and unremarkable CMP, B12, folate, and normal thyroid function. HIV nonreactive.  CMV DNA quantitation negative.  EBV quantitation PCR negative and EBV antibody profile shows negative VCA IgM with increased IgG VCA of 122 and nuclear antigen IgG 68.8 commensurate with prior infection.  C-reactive protein 21.  C4 complement normal.  C3 complement slightly elevated to 27 upper limit 167.  ANCA panel negative.  HARRIS negative.  CCP negative.  Rheumatoid factor not drawn.  Sedimentation rate normal 24.  Anti-SSA and SSB negative.      -12/1/2022 Platelets minimally elevated 384,000 with otherwise unremarkable CBC and CMP.  C-reactive protein elevated 13.1.  Sedimentation rate 30 barely elevated.  Rheumatoid factor negative.  Moderate external hemoglobin and urine.  4-10 red cells per high-power field.    -4/11/2023 CT chest abdomen pelvis Showed no evidence of adenopathy or splenomegaly.  Atherosclerotic changes.  Hepatic steatosis with small liver cyst.    -4/18/2023 [facility] hematology oncology follow-up: No radiographic evidence of recurrence.  Viral studies all negative.  CBC unremarkable.  No significant inflammatory markers and LDH normal. As outlined in her survivorship visit, she has completed routine surveillance CTs per NCCN guidelines and at this point would only image as clinically indicated.  We will continue H&P every 6 months out till April 2026.  I would probably continue to follow her clinically out past April 2026 for signs or symptoms of late recurrences there was a component of follicular lymphoma within the higher grade lymphoma and late recurrences may occur but at this point no further labs or scans in the absence of symptoms.     Diffuse large B-cell lymphoma of lymph nodes of inguinal  region   3/29/2021 -  Other Event    Echocardiogram  · Left ventricular ejection fraction appears to be 61 - 65%. Left ventricular systolic function is normal.  · Normal global longitudinal strain at -23.5%.  · Left ventricular diastolic function was normal.  · No significant structural or functional valvular disease.     4/2/2021 Imaging    PET/CT IMPRESSION:  Postsurgical changes right proximal thigh and superficial  inguinal region adjacent to adenopathy with abnormal hypermetabolism  maximum SUV 4.0. Right internal iliac lymph node is mildly enlarged with  maximum SUV 2.6 additionally noted. No soft tissue mass or adenopathy  outside of this region.     4/21/2021 Biopsy    Marrow biopsy negative     4/26/2021 - 6/28/2021 Chemotherapy    Completed course #3 6/8/2021  OP LYMPHOMA R-CHOP RiTUXimab / Cyclophosphamide / DOXOrubicin / VinCRIStine / PredniSONE     4/26/2021 Cancer Staged    Staging form: Hodgkin And Non-Hodgkin Lymphoma, AJCC 8th Edition  - Clinical: Stage II (Diffuse large B-cell lymphoma) - Signed by Rodríguez Gerardo MD on 4/26/2021 6/22/2021 Imaging     PET shows resolution of right inguinal and iliac adenopathy.  Negative PET/CT.     7/22/2021 - 8/16/2021 Radiation    Radiation OncologyTreatment Course:  Iliana Gray received 36 cGy in 18 fractions to right pelvic and inguinofemoral nodes (involved field) under the care of Dr. Jl Infante.     10/14/2021 Imaging    PET/CT: Stable negative PET/CT with no new hypermetabolic adenopathy to suggest lymphomatous disease progression.     1/28/2022 Imaging    CT chest abdomen pelvis with contrast shows no adenopathy chest abdomen pelvis and no acute process.  Hepatic steatosis with sigmoid diverticulosis.  Hemoglobin 13.6 with normal CBC and differential.  CMP entirely normal with creatinine 0.65     7/28/2022 Imaging    CT chest, abdomen and pelvis:  1. No evidence of lymphadenopathy in the chest abdomen or pelvis.  2. Diffuse hepatic  "steatosis.  3. Sigmoid diverticulosis.  4. Hysterectomy  5. Postoperative changes of prior right inguinal lymph node resection.     4/11/2023 Imaging    CT chest abdomen pelvis Showed no evidence of adenopathy or splenomegaly.  Atherosclerotic changes.  Hepatic steatosis with small liver cyst.         HISTORY OF PRESENT ILLNESS:  The patient is a 52 y.o. female, here for follow up on management of history of lymphoma with no new somatic complaints    Past Medical History:   Diagnosis Date   • Anxiety    • Arthritis    • Asthma    • Diffuse large B cell lymphoma    • Sleep apnea     cpap     Past Surgical History:   Procedure Laterality Date   • HYSTERECTOMY     • TUMOR EXCISION Right 04/2021    lymph node removal/biopsy/right inner thigh   • VENOUS ACCESS DEVICE (PORT) INSERTION Right 04/2021       No Known Allergies    Family History and Social History reviewed and changed as necessary    REVIEW OF SYSTEM:   No new somatic complaints    PHYSICAL EXAM:  No palpable cervical axillary or inguinal adenopathy.    Vitals:    04/18/23 1329   BP: 141/84   Pulse: 94   Resp: 18   Temp: 98.9 °F (37.2 °C)   SpO2: 96%   Weight: 95.3 kg (210 lb)   Height: 162.6 cm (64\")     Vitals:    04/18/23 1329   PainSc: 0-No pain          ECOG score: 0           Vitals reviewed.    ECOG: (0) Fully Active - Able to Carry On All Pre-disease Performance Without Restriction    Lab Results   Component Value Date    HGB 14.0 12/01/2022    HCT 42.7 12/01/2022    MCV 91 12/01/2022     (H) 12/01/2022    WBC 5.53 12/01/2022    NEUTROABS 2.91 12/01/2022    LYMPHSABS 2.02 12/01/2022    MONOSABS 0.43 12/01/2022    EOSABS 0.13 12/01/2022    BASOSABS 0.02 12/01/2022       Lab Results   Component Value Date    GLUCOSE 130 (H) 10/25/2022    BUN 11 10/25/2022    CREATININE 0.50 (L) 10/25/2022     10/25/2022    K 4.2 10/25/2022     10/25/2022    CO2 23 10/25/2022    CALCIUM 8.9 10/25/2022    PROTEINTOT 7.0 01/28/2022    ALBUMIN 4.2 " 10/25/2022    BILITOT 0.3 10/25/2022    ALKPHOS 70 10/25/2022    AST 13 10/25/2022    ALT 17 10/25/2022             ASSESSMENT & PLAN:  1.  Right Medial thigh stage IIa nonbulky less than 7.5 cm diffuse large b cell non-hodgkin lymphoma with component of background follicular lymphoma plan for R-CHOP x3 followed by ISRT.  Had CR after 3 courses of R-CHOP x3 ending 6/8/2021 per PET 6/22/2021.  Fatigue and rash (eczema per dermatology) starting around August 2022 with negative CT chest abdomen pelvis.    Oncology history timeline:  -2/18/2021 hemoglobin 12.7 with normal white count platelet count differential.  CMP unremarkable.  Urinalysis with microscopic hematuria 20-30 red blood cells per milliliter.  AP single view chest x-ray emergency room [place] showed no acute findings.  CT abdomen pelvis with IV contrast emergency room [place] showed calcified granuloma right lung base, fatty liver infiltration, subcentimeter hypodensity left hepatic lobe too small to categorize, normal size spleen, no adrenal masses, no pancreatic mass, normal kidneys, and no retroperitoneal adenopathy or ascites or pneumoperitoneum.  There was a smooth marginated homogeneous enhancing 4.5 x 5 x 3.3 cm mass in the superficial soft tissues of the anterior upper right thigh.  -2/25/2021 office note from Dr. Gong mentions presentation to emergency room 2/18/2021 complaining of right upper thigh swelling for the preceding week of 2/12/2021.  CT scan abdomen pelvis to include the thighs performed at [place] regional emergency room revealing 5 cm smoothly marginated right upper thigh mass below the groin crease suspicious for pathologic lymph node but could be neoplastic of other nature given no prodromal illness, infections, or inflammation.  Recommendation for biopsy was made.  Per Dr. Gong note, she had been sick for the better part of the winter and had been urgent treatment centers told that she had a viral infection flu and  coronavirus negative with fevers and chills that subsequently resolved but for fatigue that did not resolve with hypersomnolence as well.  Says she feels hot all the time but no ronny night sweats.  -3/12/2021 right thigh mass needle core tissue sample extremely small and predominantly crushed limiting adequate evaluation.  Differential diagnosis for this B-cell lymphoma is diffuse large B cell, follicular lymphoma versus other.  Further differentiation could not be performed on this crush small sample.  -3/25/2021 [facility] hematology oncology initial consultation: I, Rodríguez Gerardo, saw for the first time today.  Reviewed the above story with her.  Still fatigued without night sweats or unexplained weight loss.  She has this mass in the soft tissue medial right thigh about 5 cm in size that does seem to move somewhat beneath the skin but also to my exam feels like it is attached to the deeper structures of the thigh as well.  I feel no other adenopathy or hepatosplenomegaly.  I will get a PET scan.  I spoken with Dr. Gong who will look at these images again with the CTs in [place].  If there is clear indication that this is not coming from soft tissue nonnodal structures, then excisional biopsy for adequate tissue diagnosis is important to determine the type of therapy.  This is growing since her biopsy 3/12/2021 but not daily like a very high-grade lymphoma.  To my hand, this does not feel like a typical stas structure and my other concern is, even though this does not venessa with any markers to suggest sarcoma or the like, is to be sure that this is not something other than lymphoma and I have spoken with Dr. Pino who will do desmin stains and other markers before doing an excisional biopsy next Wednesday with Dr. Gong.  If those additional stains suggest sarcomatoid features that can appear in the medial aspect of the thigh such as this, then she probably needs to have excision by Humberto Vera  orthopedic oncologist at .  In the meantime, presuming this to be some form of lymphoma which is the highest likelihood based on the pathology from Dr. Pino where this is CD20 positive, CD10 positive, BCL6 40% positive, MUM1 40% positive, CD23 strong and diffuse positive, and BCL-2 positive.  Ki-67 is 50%.  CD30 and only 5%.  C-Myc is weakly expressed in 10% of cells.  BRIAN by TAYLER is negative.  Cyclin D1 negative.  CD5 indeterminate for B and T-cell coexpression.  LEF 1 presumably staining T cells.  Ultimate decision for treatment depends on the specific pathologic subtype and the staging and I will get her prognostic labs going as well.  Given the relatively initial high-grade features of this that might require Adriamycin I will also proceed with echocardiogram.  -3/25/2021 CBC normal.  CMP unremarkable.  Sedimentation rate normal 27 with C-reactive protein elevated 1.7 upper limit of normal 0.5.  Beta-2 microglobulin normal 1.6.  Uric acid normal 4.3.  LDH normal 162.  Hepatitis B and C serologies negative.  -3/29/2021 echocardiogram ejection fraction 61 to 65%.  -3/31/21 excisional node biopsy shows diffuse large B cell non-Hodgkin lymphoma  -4/2/2021 PET shows postsurgical changes right proximal thigh and superficial inguinal region adjacent to adenopathy with SUV 4.0.  Right internal iliac lymph node mildly enlarged SUV 2.6 additionally noted.  No other adenopathy outside this region.  No mention of any abnormal marrow signal.  -4/5/2021 [facility] medical oncology follow-up visit: Per Dr. Arun Garcia pathologist, this is a diffuse large B cell non-Hodgkin lymphoma.  He is not sure whether this is double hit or not yet.  He is not sure if there is a background of follicular lymphoma and hence cannot say whether this is transformed.  Clinically her IPI score is 0 with her age less than 60, normal LDH, ECOG 0, no extranodal involvement on PET, and only stage II at worse per PET with all nodes within the same  region.  I will get a bone marrow biopsy.  If this is stage IV, double hit, or has evidence of transition from follicular lymphoma into diffuse large B-cell lymphoma, then I would give R-CHOP x6.  If the marrow is not involved, this is not double hit, and there is no evidence of follicular transformation into diffuse large B-cell lymphoma, then I would treat this as a stage II nonbulky diffuse large B-cell lymphoma and consider R-CHOP x3 followed by involved site radiation therapy.  I reviewed her PET and biopsy reports and echocardiogram which shows good ejection fraction.  She will need to get her [employer] Covid vaccine this week, family doctor established for the possibility of prednisone-induced diabetes, CT-guided bone marrow biopsy, chemo preparation visit, and a port placement with Dr. Gong.  We will present her at multidisciplinary tumor board this Friday.  I went into detail regarding the side effects of R-CHOP but she will have these reviewed at her chemo preparation visit.  Though not bulky, I still would treat her with allopurinol.  First course of therapy will occur in our [place] office and if all goes well then the subsequent Rituxan doses can be faster and performed in [place] office.    -4/14/2021 pathology update Dr. Garcia: Node pathology negative for double hit by FISH    -4/21/2021 bone marrow biopsy negative for lymphomatous involvement with scant stainable iron.      -4/26/2021 [facility] medical oncology follow-up visit: Given lack of marrow involvement, negative for double hit, and possible background follicular lymphoma , I will treat her as stage IIa nonbulky (original tumor 5 cm on CT, I.  E.  Less than 7.5 cm) diffuse large B-cell lymphoma right proximal thigh SUV 4.0 mass incisionally biopsied (with palpable residua) with mild right internal iliac node enlargement SUV 2.6 as well.  We will treat her with planned R-CHOP x3 followed by involved site radiation therapy.  This  was consensus of multidisciplinary tumor board 4/16/2021 as well.  First course [place].  Second course will occur in [place].  With the small component of background follicular lymphoma, there is potential risk of not only relapse of diffuse large B-cell lymphoma but later relapse from follicular lymphoma and we will keep that in mind in terms of following her up past the usual 3 years post definitive therapy presuming we get a complete remission on subsequent imaging and exams.  We will arrange radiation in [place] and I have made referral.  This will not start until after chemotherapy.    -5/13/2021 radiation oncology consultation Dr. Jl Infante for preparation of future involved field radiation therapy    -5/18/2021 [facility] oncology clinic follow-up: Had moderate amount of nausea after her first cycle, will add Zyprexa to Zofran and Compazine that she is already taking. She was also anxious, added Ativan to her care plan premeds. Prescription for Augmentin sent in for sinus infection. Clinically good response with reduction of right medial thigh mass. We will continue treatment in [place] due to mild infusion reaction with first Rituxan but she was able to complete treatment with additional medications. Today is cycle #2 of a planned 3 courses and then will repeat restaging scan to assess for response and proceed to radiation as outlined above.    -6/8/2021 course #3 Rituxan CHOP    -6/22/2021 PET shows resolution of right inguinal and iliac adenopathy.  Negative PET/CT.    -6/28/2021 [facility] medical oncology follow-up visit: I reviewed images and reports of PET as above.  Status post Rituxan CHOP, PET is essentially negative with complete clinical remission.With the small component of background follicular lymphoma, there is potential risk of not only relapse of diffuse large B-cell lymphoma but later relapse from follicular lymphoma and we will keep that in mind in terms of following her up past the  usual 3 years post definitive therapy presuming we get a complete remission on subsequent imaging and exams.  We will arrange radiation in [place] and Dr. Infante saw in mid May.  I called Dr. Infante to get her in to start involved field radiation and we will repeat PET and labs prior to return in 3 months for survivorship visit with my nurse practitioner who will lay out the follow-up according to NCCN guidelines.  Other than for modest anemia hemoglobin in the tens and glucose 150s, no other remarkable findings on CBC and CMP 6/8/2021.    -6/8/2021 completed course #3 of R-CHOP with resolution of right inguinal and iliac adenopathy on 6/22/2021 PET    -8/16/2021 completed 36 Gray in 18 fractions involved field right pelvic/inguinal femoral nodes Dr. Infante    -10/21/2021 [facility] Oncology clinic follow-up/survivorship visit: PET scan from 10/14/2021 was negative along with normal CBC and CMP.  She completed radiation in August without complications.  We will get her back to Dr. Gong for port removal.  We will continue surveillance per NCCN guidelines as follows:  -H&P and labs every 3-6 months for 5 years and then annually or as clinically indicated.    -We will do surveillance imaging post completion of treatment with CT chest, abdomen and pelvis with contrast every 6 months for 2 years and then annually or as clinically indicated.    -1/28/2022 CT chest abdomen pelvis with contrast shows no adenopathy chest abdomen pelvis and no acute process.  Hepatic steatosis with sigmoid diverticulosis.  Hemoglobin 13.6 with normal CBC and differential.  CMP entirely normal with creatinine 0.65.    -2/1/2022 [facility] medical oncology follow-up visit: I reviewed images and reports of scans as outlined above as well as labs.  No evidence of recurrence.  As outlined by my nurse practitioner survivorship visit, she will get an H&P every 3 to 6 months out to April 2026 and then annually thereafter and we will do  surveillance CT chest abdomen pelvis every 6 months until April 2023 and then annually as clinically indicated.  As I have stated previously, we would want to continue to follow her clinically past the usual 2 years of radiographic follow-up for signs or symptoms of follicular lymphoma as there was a small background follicular lymphomatous component that could recur late.  She will see my nurse practitioner back in 6 months with CTs just prior to return.  No further CBC or CMP in the absence of signs or symptoms given normalization of her counts.    -7/28/2022 CT chest, abdomen and pelvis with no evidence of lymphadenopathy in the chest, abdomen or pelvis, diffuse hepatic steatosis, sigmoid diverticulosis.    - 8/4/2022 [facility] Oncology clinic follow-up: Iliana has been feeling poorly since treatment ended a year ago for her history of lymphoma.  Current CT chest, abdomen and pelvis show no evidence of disease recurrence.  Her symptoms unfortunately have not improved since treatment ended and she is having more bad days than good.  She has a rash on the dorsum of her hands and foot that appear to be possibly eczema versus psoriasis.  We will get her to dermatology for further evaluation, I have asked her to call her insurance company to let us know the name of a provider that she would like to see and then we can put in a referral.  She has ongoing fatigue that has not improved with time, she is unable to do most activities without having to rest for the remainder of the day afterwards.  She also has nausea and loose stool with urgency.  I will get her to gastroenterology for an EGD and colonoscopy to rule out any GI involvement of her lymphoma as it did have follicular component.  I will also get labs today with CBC, CMP, we will also check HARRIS in light of her rash, will check inflammatory markers with sedimentation rate and CRP and an LDH.  We will see her back in a few months to go over her labs and GI  evaluation.  She does have a history of hepatic steatosis shown on all prior CTs therefore would benefit from GI evaluation regarding that also, has had normal LFTs on prior labs.      - 8/4/2022 data: CMP unremarkable.  LDH normal 174.  CRP mildly elevated 1.5 upper limit 0.9.  Sedimentation rate normal 9.  CBC normal with normal differential.  HARRIS negative    -9/19/2022 EGD and colonoscopy Mani Cleveland random colon biopsy negative.  Transverse colon polyp sessile serrated adenoma.  Duodenal biopsy with normal architecture.  Stomach biopsy mild chronic inactive gastritis with no dysplasia or metaplasia or H. pylori.  There was a lot of stool in the entire colon interfering with visualization.  Internal hemorrhoids.  EGD showed no gross lesions.    -9/27/2022 [facility] medical oncology follow-up: No clear-cut evidence of lymphoma on CTs or EGD and colonoscopy and labs are unremarkable and negative HARRIS.  Her dermatologist told her she had eczema.  Nonetheless she is still periodically so fatigued that she ends up going to the bed for 2 or 3 days and then rebounds and feels normal for a while and then this waxing and waning of performance status and fatigue continues.  We will get a PET through her thighs as that was the original site of her disease.  I will also have her get EBV and CMV and HIV and thyroid functions.    -10/7/2022 whole-body PET is essentially negative.  There is a solitary borderline focus SUV 2.8 near the left lung apex without CT correlate favored to be infectious/inflammatory change.  No hypermetabolism in particular of the lower extremities where her original disease resided.    -10/11/2022 [facility] medical oncology follow-up: States she had COVID September 24 which was before I last saw her but did not know that when last I saw her.  She did not get her EBV, CMV, HIV, thyroid functions but says she is now getting over the COVID as well as some sinusitis and she thinks that is the fatigue.  Her PET  was entirely negative..  We will continue to check her whole-body PET until April 2023 which is when we will see her back and then we will do that annually only as clinically indicated on annual H&P.    -10/27/2022 CBC normal with normal differential and unremarkable CMP, B12, folate, and normal thyroid function. HIV nonreactive.  CMV DNA quantitation negative.  EBV quantitation PCR negative and EBV antibody profile shows negative VCA IgM with increased IgG VCA of 122 and nuclear antigen IgG 68.8 commensurate with prior infection.  C-reactive protein 21.  C4 complement normal.  C3 complement slightly elevated to 27 upper limit 167.  ANCA panel negative.  HARRIS negative.  CCP negative.  Rheumatoid factor not drawn.  Sedimentation rate normal 24.  Anti-SSA and SSB negative.      -12/1/2022 Platelets minimally elevated 384,000 with otherwise unremarkable CBC and CMP.  C-reactive protein elevated 13.1.  Sedimentation rate 30 barely elevated.  Rheumatoid factor negative.  Moderate external hemoglobin and urine.  4-10 red cells per high-power field.    -4/11/2023 CT chest abdomen pelvis Showed no evidence of adenopathy or splenomegaly.  Atherosclerotic changes.  Hepatic steatosis with small liver cyst.    -4/18/2023 [facility] hematology oncology follow-up: No radiographic evidence of recurrence.  Viral studies all negative.  CBC unremarkable.  No significant inflammatory markers and LDH normal. As outlined in her survivorship visit, she has completed routine surveillance CTs per NCCN guidelines and at this point would only image as clinically indicated.  We will continue H&P every 6 months out till April 2026.  I would probably continue to follow her clinically out past April 2026 for signs or symptoms of late recurrences there was a component of follicular lymphoma within the higher grade lymphoma and late recurrences may occur but at this point no further labs or scans in the absence of symptoms.    Total time of care today  inclusive of time spent today prior to patient's arrival reviewing interval data as outlined above in interval images and reports thereof and after visit instituting this plan as outlined took 30 minutes of patient care time throughout the day today.  Rodríguez Gerardo MD    04/18/2023

## 2023-05-18 ENCOUNTER — OFFICE VISIT (OUTPATIENT)
Dept: FAMILY MEDICINE CLINIC | Facility: CLINIC | Age: 53
End: 2023-05-18
Payer: COMMERCIAL

## 2023-05-18 VITALS
OXYGEN SATURATION: 98 % | WEIGHT: 214.2 LBS | HEART RATE: 74 BPM | SYSTOLIC BLOOD PRESSURE: 140 MMHG | BODY MASS INDEX: 36.57 KG/M2 | DIASTOLIC BLOOD PRESSURE: 92 MMHG | HEIGHT: 64 IN

## 2023-05-18 DIAGNOSIS — E55.9 VITAMIN D DEFICIENCY: ICD-10-CM

## 2023-05-18 DIAGNOSIS — R42 DIZZINESS: ICD-10-CM

## 2023-05-18 DIAGNOSIS — K76.0 HEPATIC STEATOSIS: ICD-10-CM

## 2023-05-18 DIAGNOSIS — C83.35 DIFFUSE LARGE B-CELL LYMPHOMA OF LYMPH NODES OF INGUINAL REGION: Chronic | ICD-10-CM

## 2023-05-18 DIAGNOSIS — E78.2 MIXED HYPERLIPIDEMIA: ICD-10-CM

## 2023-05-18 DIAGNOSIS — I70.0 AORTIC ATHEROSCLEROSIS: Primary | ICD-10-CM

## 2023-05-18 DIAGNOSIS — R73.03 PREDIABETES: ICD-10-CM

## 2023-05-18 PROCEDURE — 36415 COLL VENOUS BLD VENIPUNCTURE: CPT | Performed by: FAMILY MEDICINE

## 2023-05-18 PROCEDURE — 99214 OFFICE O/P EST MOD 30 MIN: CPT | Performed by: FAMILY MEDICINE

## 2023-05-18 RX ORDER — VARENICLINE TARTRATE 1 MG/1
1 TABLET, FILM COATED ORAL EVERY 12 HOURS SCHEDULED
COMMUNITY
Start: 2023-03-15 | End: 2023-05-18

## 2023-05-18 RX ORDER — CLOBETASOL PROPIONATE 0.5 MG/G
OINTMENT TOPICAL
COMMUNITY
Start: 2023-04-27

## 2023-05-18 RX ORDER — ERGOCALCIFEROL 1.25 MG/1
CAPSULE ORAL
COMMUNITY
Start: 2023-03-07 | End: 2023-05-18

## 2023-05-18 RX ORDER — ATORVASTATIN CALCIUM 20 MG/1
20 TABLET, FILM COATED ORAL DAILY
Qty: 90 TABLET | Refills: 3 | Status: SHIPPED | OUTPATIENT
Start: 2023-05-18

## 2023-05-18 NOTE — PROGRESS NOTES
"Chief Complaint  Follow-up    Subjective          Iliana Gray presents to [facility] PRIMARY [facility]ARE  History of Present Illness  Patient is a 52-year-old female with past medical history of lymphoma who follows appropriately with Dr. Gerardo she had a recent CT abdomen pelvis where there was some atherosclerotic plaque in her aorta she was told to follow-up with us.  She does not endorse any chest pain or cardiac symptoms.  She was advised she may need to be put on a statin.    She has also been experiencing some dizziness and lightheadedness.  No syncopal episodes.    It is more positional when she turns her head.  Unsure if it is seasonal allergies or fluid.      Objective   Vital Signs:   /92   Pulse 74   Ht 162.6 cm (64.02\")   Wt 97.2 kg (214 lb 3.2 oz)   SpO2 98%   BMI 36.75 kg/m²     Body mass index is 36.75 kg/m².    Review of Systems   Constitutional: Negative.    HENT: Negative.    Eyes: Negative.    Respiratory: Negative.    Cardiovascular: Negative.    Gastrointestinal: Negative.    Endocrine: Negative.    Genitourinary: Negative.    Musculoskeletal: Negative.    Skin: Negative.    Allergic/Immunologic: Negative.    Neurological: Negative.    Hematological: Negative.    Psychiatric/Behavioral: Negative.        Past History:  Medical History: has a past medical history of Anxiety, Arthritis, Asthma, Diffuse large B cell lymphoma, and Sleep apnea.   Surgical History: has a past surgical history that includes Hysterectomy; Tumor excision (Right, 04/2021); and Venous Access Device (Port) (Right, 04/2021).   Family History: family history is not on file.   Social History: reports that she has been smoking cigarettes. She started smoking about 41 years ago. She has a 41.00 pack-year smoking history. She has never used smokeless tobacco. She reports that she does not drink alcohol and does not use drugs.      Current Outpatient Medications:   •  citalopram (CeleXA) 20 MG " tablet, Take 1 tablet by mouth Daily., Disp: , Rfl:   •  clobetasol (TEMOVATE) 0.05 % ointment, PLEASE SEE ATTACHED FOR DETAILED DIRECTIONS, Disp: , Rfl:   •  diazePAM (VALIUM) 5 MG tablet, Take 1 tablet by mouth 3 (Three) Times a Day As Needed., Disp: , Rfl:   •  estradiol (ESTRACE) 1 MG tablet, Take 1 tablet by mouth Daily., Disp: , Rfl:   •  ipratropium (ATROVENT) 0.06 % nasal spray, INSTILL 2 SPRAYS INTRANASALLY 3 TIMES PER DAY FOR 5 DAYS, Disp: , Rfl:   •  Symbicort 160-4.5 MCG/ACT inhaler, Inhale 2 puffs 2 (Two) Times a Day., Disp: , Rfl:   •  Ventolin  (90 Base) MCG/ACT inhaler, , Disp: , Rfl:   •  atorvastatin (Lipitor) 20 MG tablet, Take 1 tablet by mouth Daily., Disp: 90 tablet, Rfl: 3    Allergies: Patient has no known allergies.    Physical Exam  Vitals reviewed: Appropriate sounds on bilateral carotid artery.   Constitutional:       Appearance: Normal appearance. She is normal weight.   HENT:      Head: Normocephalic and atraumatic.   Eyes:      Conjunctiva/sclera: Conjunctivae normal.   Cardiovascular:      Rate and Rhythm: Normal rate and regular rhythm.   Pulmonary:      Effort: Pulmonary effort is normal.      Breath sounds: Normal breath sounds.   Musculoskeletal:         General: Normal range of motion.      Cervical back: Normal range of motion and neck supple.   Skin:     General: Skin is warm and dry.   Neurological:      General: No focal deficit present.      Mental Status: She is alert and oriented to person, place, and time. Mental status is at baseline.   Psychiatric:         Mood and Affect: Mood normal.         Behavior: Behavior normal.         Thought Content: Thought content normal.         Judgment: Judgment normal.          Result Review :                   Assessment and Plan    Diagnoses and all orders for this visit:    1. Aortic atherosclerosis (Primary)  -     Ambulatory Referral to Cardiology  I reviewed patient's CT abdomen pelvis there is trace atherosclerosis of the  aortic arch and mild to moderate atherosclerosis of the descending thoracic aorta.  I discussed with patient this with her history of hyperlipidemia we will go ahead and get her started on statins we will repeat a fasting lipid panel I do think she would benefit from getting established with cardiology as well to get a good baseline we will also go ahead and get a bilateral carotid ultrasound specially with the dizziness to ensure there is no plaque buildup.  Discussion of initiation of low-dose aspirin with cardiology      2. Mixed hyperlipidemia  Patient on low-dose statin advised of risks and side effects we will follow-up on blood work    3. Dizziness  -     Duplex Carotid Ultrasound CAR; Future  -     Ambulatory Referral to Cardiology  As above she does have some mild fluid in the right ear eustachian tube dysfunction could be a contributing factor continue antihistamine use with nasal spray    4. Hepatic steatosis  -     Lipid Panel; Future  -     Hemoglobin A1c; Future  -     Comprehensive Metabolic Panel; Future  Was some evidence of some mild hepatic steatosis dietary modification we will assess her liver enzymes and ensure that the statin initiation will not cause elevation.  Continue to monitor at some point she may benefit from referral to hepatology    5. Prediabetes  -     Lipid Panel; Future  -     Hemoglobin A1c; Future  -     Comprehensive Metabolic Panel; Future  Blood work follow-up    6. Diffuse large B-cell lymphoma of lymph nodes of inguinal region  She follows appropriately with Dr. Gerardo oncology    7. Vitamin D deficiency  We will recheck her vitamin D level    Other orders  -     atorvastatin (Lipitor) 20 MG tablet; Take 1 tablet by mouth Daily.  Dispense: 90 tablet; Refill: 3      MEDICATION SIDE EFFECTS, RISKS AND SIDE EFFECTS HAVE BEEN DISCUSSED WITH PATIENT. PATIENT NOTES UNDERSTANDING. IF ANY CONCERN OR QUESTION REGARDING MEDICATION PLEASE CONTACT.         Follow Up   No follow-ups on  file.  Patient was given instructions and counseling regarding her condition or for health maintenance advice. Please see specific information pulled into the AVS if appropriate.     Hillary Thomas DO

## 2023-05-19 LAB
ALBUMIN SERPL-MCNC: 4.5 G/DL (ref 3.8–4.9)
ALBUMIN/GLOB SERPL: 1.9 {RATIO} (ref 1.2–2.2)
ALP SERPL-CCNC: 70 IU/L (ref 44–121)
ALT SERPL-CCNC: 18 IU/L (ref 0–32)
AST SERPL-CCNC: 15 IU/L (ref 0–40)
BILIRUB SERPL-MCNC: 0.2 MG/DL (ref 0–1.2)
BUN SERPL-MCNC: 8 MG/DL (ref 6–24)
BUN/CREAT SERPL: 15 (ref 9–23)
CALCIUM SERPL-MCNC: 9.1 MG/DL (ref 8.7–10.2)
CHLORIDE SERPL-SCNC: 101 MMOL/L (ref 96–106)
CHOLEST SERPL-MCNC: 320 MG/DL (ref 100–199)
CO2 SERPL-SCNC: 23 MMOL/L (ref 20–29)
CREAT SERPL-MCNC: 0.54 MG/DL (ref 0.57–1)
EGFRCR SERPLBLD CKD-EPI 2021: 109 ML/MIN/1.73
GLOBULIN SER CALC-MCNC: 2.4 G/DL (ref 1.5–4.5)
GLUCOSE SERPL-MCNC: 103 MG/DL (ref 70–99)
HBA1C MFR BLD: 6.1 % (ref 4.8–5.6)
HDLC SERPL-MCNC: 35 MG/DL
LDLC SERPL CALC-MCNC: ABNORMAL MG/DL (ref 0–99)
POTASSIUM SERPL-SCNC: 4.1 MMOL/L (ref 3.5–5.2)
PROT SERPL-MCNC: 6.9 G/DL (ref 6–8.5)
SODIUM SERPL-SCNC: 141 MMOL/L (ref 134–144)
TRIGL SERPL-MCNC: 878 MG/DL (ref 0–149)
VLDLC SERPL CALC-MCNC: ABNORMAL MG/DL (ref 5–40)

## 2023-05-24 ENCOUNTER — OFFICE VISIT (OUTPATIENT)
Dept: CARDIOLOGY | Facility: CLINIC | Age: 53
End: 2023-05-24
Payer: COMMERCIAL

## 2023-05-24 VITALS
BODY MASS INDEX: 36.02 KG/M2 | DIASTOLIC BLOOD PRESSURE: 66 MMHG | HEIGHT: 64 IN | SYSTOLIC BLOOD PRESSURE: 108 MMHG | WEIGHT: 211 LBS | OXYGEN SATURATION: 95 % | HEART RATE: 105 BPM | RESPIRATION RATE: 18 BRPM

## 2023-05-24 DIAGNOSIS — E78.5 HYPERLIPIDEMIA LDL GOAL <70: ICD-10-CM

## 2023-05-24 DIAGNOSIS — Z72.0 TOBACCO USE: ICD-10-CM

## 2023-05-24 DIAGNOSIS — R42 DIZZINESS: Primary | ICD-10-CM

## 2023-05-24 DIAGNOSIS — G47.33 OBSTRUCTIVE SLEEP APNEA: ICD-10-CM

## 2023-05-24 PROBLEM — E78.00 HYPERCHOLESTEROLEMIA: Status: ACTIVE | Noted: 2023-05-24

## 2023-05-24 PROBLEM — E78.00 HYPERCHOLESTEROLEMIA: Status: RESOLVED | Noted: 2023-05-24 | Resolved: 2023-05-24

## 2023-05-24 PROCEDURE — 93000 ELECTROCARDIOGRAM COMPLETE: CPT | Performed by: INTERNAL MEDICINE

## 2023-05-24 PROCEDURE — 99203 OFFICE O/P NEW LOW 30 MIN: CPT | Performed by: INTERNAL MEDICINE

## 2023-05-24 PROCEDURE — 1160F RVW MEDS BY RX/DR IN RCRD: CPT | Performed by: INTERNAL MEDICINE

## 2023-05-24 PROCEDURE — 1159F MED LIST DOCD IN RCRD: CPT | Performed by: INTERNAL MEDICINE

## 2023-05-24 RX ORDER — LAMOTRIGINE 25 MG/1
TABLET ORAL
COMMUNITY
Start: 2023-05-08

## 2023-05-24 RX ORDER — ASPIRIN 81 MG/1
81 TABLET, CHEWABLE ORAL DAILY
COMMUNITY

## 2023-05-24 RX ORDER — VARENICLINE TARTRATE 1 MG/1
1 TABLET, FILM COATED ORAL 2 TIMES DAILY
Qty: 180 TABLET | Refills: 0 | Status: SHIPPED | OUTPATIENT
Start: 2023-05-24

## 2023-05-24 NOTE — PROGRESS NOTES
[facility] [place]  [facility] C[facility]OLOGY  [address] [place] [address]  Dept: [phone]  Dept Fax: [phone]    Iliana Gray  1970    New Patient Office Note    History of Present Illness:  Iliana Gray is a 52 y.o. female who presents to the clinic for Establish Care.  Dizziness - She is 52 years old has had Lymphoma and treated with chemo and also rxt, in 2021, doing fine, has noticed dizziness any time sitting or resting, no relation with position changes , she feels dizzy daily last 5 minutes, has had a Ct chest with no calcifications of the coronary arteries, , her EKg is normal we did check orthostatic and this is normal, her BP is 110.60,  , she is still smoking 1 pack and day and is willing to take Chantix, for it, , her cardiac exam is normal, she has had also a carotid doppler with near normal findings, her dizziness seems no relation to cardiac issues,  Her Lipids are very abnormal  Increase Try and also cholesterol, patient was just recently started on lipitor  By PCP, will suggest to keep taking her med and will need a Lipid profile in 3 months, likely will benefit after of Fish oil Vascepa     The following portions of the patient's history were reviewed and updated as appropriate: allergies, current medications, past family history, past medical history, past social history, past surgical history, and problem list.    Medications:  aspirin  atorvastatin  citalopram  clobetasol  diazePAM  estradiol  ipratropium  lamoTRIgine  Symbicort aerosol  Ventolin HFA aerosol solution    Subjective  No Known Allergies     Past Medical History:   Diagnosis Date   • Anxiety    • Arthritis    • Asthma    • Diffuse large B cell lymphoma    • Sleep apnea     cpap       Past Surgical History:   Procedure Laterality Date   • HYSTERECTOMY     • TUMOR EXCISION Right 04/2021    lymph node removal/biopsy/right inner thigh   • VENOUS ACCESS DEVICE (PORT) INSERTION  "Right 04/2021       History reviewed. No pertinent family history.     Social History     Socioeconomic History   • Marital status:    Tobacco Use   • Smoking status: Every Day     Packs/day: 1.00     Years: 41.00     Pack years: 41.00     Types: Cigarettes     Start date: 1982   • Smokeless tobacco: Never   Vaping Use   • Vaping Use: Never used   Substance and Sexual Activity   • Alcohol use: Never   • Drug use: Never   • Sexual activity: Yes     Partners: Male       Review of Systems   Constitutional: Negative.    HENT: Negative.    Respiratory: Negative.    Cardiovascular: Negative.    Endocrine: Negative.    Genitourinary: Negative.    Musculoskeletal: Negative.    Skin: Negative.    Allergic/Immunologic: Negative.    Neurological: Positive for dizziness and light-headedness.   Hematological: Negative.    Psychiatric/Behavioral: Negative.        Cardiovascular Procedures    ECHO/MUGA:  STRESS TESTS:   CARDIAC CATH:   DEVICES:   HOLTER:   CT/MRI:   VASCULAR:   CARDIOTHORACIC:     Objective  Vitals:    05/24/23 1056   BP: 108/66   BP Location: Right arm   Patient Position: Lying   Cuff Size: Large Adult   Pulse: 105   Resp: 18   SpO2: 95%   Weight: 95.7 kg (211 lb)   Height: 162.6 cm (64\")   PainSc: 0-No pain       Physical Exam  Vitals reviewed.   Constitutional:       Appearance: Healthy appearance. Not in distress.   Neck:      Vascular: No JVR. JVD normal.   Pulmonary:      Effort: Pulmonary effort is normal.      Breath sounds: Normal breath sounds. No wheezing. No rhonchi. No rales.   Chest:      Chest wall: Not tender to palpatation.   Cardiovascular:      PMI at left midclavicular line. Normal rate. Regular rhythm. Normal S1. Normal S2.      Murmurs: There is no murmur.      No gallop. No click. No rub.   Pulses:     Intact distal pulses.   Edema:     Peripheral edema absent.   Abdominal:      General: Bowel sounds are normal.      Palpations: Abdomen is soft.      Tenderness: There is no abdominal " tenderness.   Musculoskeletal: Normal range of motion.         General: No tenderness. Skin:     General: Skin is warm and dry.   Neurological:      General: No focal deficit present.      Mental Status: Alert and oriented to person, place and time.          Diagnostic Data    ECG 12 Lead    Date/Time: 5/24/2023 11:29 AM  Performed by: Ralph Mg MD  Authorized by: Ralph Mg MD   Comparison: compared with previous ECG   Similar to previous ECG  Rhythm: sinus rhythm  Rate: normal  BPM: 91  Conduction: incomplete right bundle branch block  QRS axis: normal    Clinical impression: normal ECG            Assessment and Plan  Diagnoses and all orders for this visit:    Dizziness- Seems no cardiac relation . Will observe    Obstructive sleep apnea- on CPAP    Hyperlipidemia LDL goal <70- Just started on lipitor 20 mg, might need Vascepa after will follow up in 3 months with Lipid     Other orders  -     lamoTRIgine (LaMICtal) 25 MG tablet; TAKE 1 TABLET BY MOUTH DAILY FOR 2 WEEKS THEN TAKE 2 TABLETS FOR 2 WEEKS  -     aspirin 81 MG chewable tablet; Chew 1 tablet Daily.        No follow-ups on file.    Ralph Mg MD  05/24/2023

## 2023-05-31 ENCOUNTER — OFFICE VISIT (OUTPATIENT)
Dept: FAMILY MEDICINE CLINIC | Facility: CLINIC | Age: 53
End: 2023-05-31

## 2023-05-31 VITALS
SYSTOLIC BLOOD PRESSURE: 134 MMHG | RESPIRATION RATE: 15 BRPM | WEIGHT: 216.5 LBS | HEART RATE: 90 BPM | BODY MASS INDEX: 36.07 KG/M2 | DIASTOLIC BLOOD PRESSURE: 82 MMHG | TEMPERATURE: 98 F | OXYGEN SATURATION: 95 % | HEIGHT: 65 IN

## 2023-05-31 DIAGNOSIS — Z23 ENCOUNTER FOR IMMUNIZATION: ICD-10-CM

## 2023-05-31 DIAGNOSIS — R73.03 PREDIABETES: Primary | ICD-10-CM

## 2023-05-31 DIAGNOSIS — E66.01 MORBID (SEVERE) OBESITY DUE TO EXCESS CALORIES: ICD-10-CM

## 2023-05-31 RX ORDER — METFORMIN HYDROCHLORIDE 500 MG/1
500 TABLET, EXTENDED RELEASE ORAL
Qty: 90 TABLET | Refills: 1 | Status: SHIPPED | OUTPATIENT
Start: 2023-05-31

## 2023-05-31 NOTE — PROGRESS NOTES
Patient Office Visit      Patient Name: Iliana Gray  : 1970   MRN: [id number]     Chief Complaint:    Chief Complaint   Patient presents with   • Prediabetes       History of Present Illness: Iliana Gray is a 52 y.o. female who is here today to address prediabetes.  Her cardiologist told her that her triglycerides were very high and he wanted her to see primary care and discuss getting on Ozempic to help her with weight loss.  Her most recent A1c a few days ago was 6.1 and she is not on any type of diabetes medication.  She used to see an endocrinologist in [place] for metformin management but she is pretty sure she did not carry a diagnosis of diabetes.  She is not sure if the metformin she took previously was immediate release or extended release.  She says she just remembers it causing GI upset.    Subjective      Review of Systems:         Past Medical History:   Past Medical History:   Diagnosis Date   • Anxiety    • Arthritis    • Asthma    • Diffuse large B cell lymphoma    • Sleep apnea     cpap       Past Surgical History:   Past Surgical History:   Procedure Laterality Date   • HYSTERECTOMY     • TUMOR EXCISION Right 2021    lymph node removal/biopsy/right inner thigh   • VENOUS ACCESS DEVICE (PORT) INSERTION Right 2021       Family History: History reviewed. No pertinent family history.    Social History:   Social History     Socioeconomic History   • Marital status:    Tobacco Use   • Smoking status: Every Day     Packs/day: 1.00     Years: 41.00     Pack years: 41.00     Types: Cigarettes     Start date:    • Smokeless tobacco: Never   Vaping Use   • Vaping Use: Never used   Substance and Sexual Activity   • Alcohol use: Never   • Drug use: Never   • Sexual activity: Yes     Partners: Male       Allergies:   No Known Allergies    Objective     Physical Exam:  Vital Signs:   Vitals:    23 0901   BP: 134/82   BP Location: Right arm   Patient  "Position: Sitting   Cuff Size: Adult   Pulse: 90   Resp: 15   Temp: 98 °F (36.7 °C)   TempSrc: Temporal   SpO2: 95%   Weight: 98.2 kg (216 lb 8 oz)   Height: 165.1 cm (65\")     Body mass index is 36.03 kg/m².        Physical Exam  Constitutional:       General: She is not in acute distress.     Appearance: Normal appearance. She is obese.   Neurological:      Mental Status: She is alert.   Psychiatric:         Mood and Affect: Mood normal.         Behavior: Behavior normal.         Thought Content: Thought content normal.         Judgment: Judgment normal.         Procedures    Assessment / Plan      Assessment/Plan:   Diagnoses and all orders for this visit:    1. Prediabetes (Primary)  -     metFORMIN ER (GLUCOPHAGE-XR) 500 MG 24 hr tablet; Take 1 tablet by mouth Daily With Breakfast.  Dispense: 90 tablet; Refill: 1    2. Encounter for immunization  -     Tdap Vaccine Greater Than or Equal To 8yo IM    3. Morbid (severe) obesity due to excess calories  Assessment & Plan:  Patient's (Body mass index is 36.03 kg/m².) indicates that they are morbidly/severely obese (BMI > 40 or > 35 with obesity - related health condition) with health conditions that include obstructive sleep apnea, dyslipidemias and Prediabetes . Weight is unchanged. BMI  is above average; BMI management plan is completed. We discussed low calorie, low carb based diet program, portion control, increasing exercise and Starting metformin for prediabetes.  I could not find any record of patient's A1c being 6.5 or higher.  Ozempic is not indicated for prediabetes and insurance will not approve for this diagnosis.  She is agreeable to starting extended release metformin.  We will start at low-dose.  We discussed portion control, diet and exercise and possibly utilizing a structured weight loss program such as Nutrisystem at least temporarily.            Medications:     Current Outpatient Medications:   •  aspirin 81 MG chewable tablet, Chew 1 tablet " Daily., Disp: , Rfl:   •  atorvastatin (Lipitor) 20 MG tablet, Take 1 tablet by mouth Daily., Disp: 90 tablet, Rfl: 3  •  citalopram (CeleXA) 20 MG tablet, Take 2 tablets by mouth Daily., Disp: , Rfl:   •  clobetasol (TEMOVATE) 0.05 % ointment, PLEASE SEE ATTACHED FOR DETAILED DIRECTIONS, Disp: , Rfl:   •  diazePAM (VALIUM) 5 MG tablet, Take 1 tablet by mouth 3 (Three) Times a Day As Needed., Disp: , Rfl:   •  estradiol (ESTRACE) 1 MG tablet, Take 1 tablet by mouth Daily., Disp: , Rfl:   •  ipratropium (ATROVENT) 0.06 % nasal spray, INSTILL 2 SPRAYS INTRANASALLY 3 TIMES PER DAY FOR 5 DAYS, Disp: , Rfl:   •  lamoTRIgine (LaMICtal) 25 MG tablet, TAKE 1 TABLET BY MOUTH DAILY FOR 2 WEEKS THEN TAKE 2 TABLETS FOR 2 WEEKS, Disp: , Rfl:   •  Symbicort 160-4.5 MCG/ACT inhaler, Inhale 2 puffs 2 (Two) Times a Day., Disp: , Rfl:   •  varenicline (Chantix Continuing Month Tylor) 1 MG tablet, Take 1 tablet by mouth 2 (Two) Times a Day., Disp: 180 tablet, Rfl: 0  •  Ventolin  (90 Base) MCG/ACT inhaler, , Disp: , Rfl:   •  metFORMIN ER (GLUCOPHAGE-XR) 500 MG 24 hr tablet, Take 1 tablet by mouth Daily With Breakfast., Disp: 90 tablet, Rfl: 1    I spent 30 minutes caring for Iliana on this date of service. This time includes time spent by me in the following activities:preparing for the visit, reviewing tests, obtaining and/or reviewing a separately obtained history, performing a medically appropriate examination and/or evaluation , counseling and educating the patient/family/caregiver, ordering medications, tests, or procedures and documenting information in the medical record    Follow Up:   No follow-ups on file.    Kiley Blackwell PA-C   [facility]   Answers for HPI/ROS submitted by the patient on 5/30/2023  Please describe your symptoms.: Test results need consult for medication  Have you had these symptoms before?: Yes  How long have you been having these symptoms?: Greater than 2 weeks  What is the  primary reason for your visit?: Other

## 2023-05-31 NOTE — ASSESSMENT & PLAN NOTE
Patient's (Body mass index is 36.03 kg/m².) indicates that they are morbidly/severely obese (BMI > 40 or > 35 with obesity - related health condition) with health conditions that include obstructive sleep apnea, dyslipidemias and Prediabetes . Weight is unchanged. BMI  is above average; BMI management plan is completed. We discussed low calorie, low carb based diet program, portion control, increasing exercise and Starting metformin for prediabetes.  I could not find any record of patient's A1c being 6.5 or higher.  Ozempic is not indicated for prediabetes and insurance will not approve for this diagnosis.  She is agreeable to starting extended release metformin.  We will start at low-dose.  We discussed portion control, diet and exercise and possibly utilizing a structured weight loss program such as Nutrisystem at least temporarily.

## 2023-08-23 ENCOUNTER — OFFICE VISIT (OUTPATIENT)
Dept: FAMILY MEDICINE CLINIC | Facility: CLINIC | Age: 53
End: 2023-08-23
Payer: COMMERCIAL

## 2023-08-23 VITALS
DIASTOLIC BLOOD PRESSURE: 80 MMHG | SYSTOLIC BLOOD PRESSURE: 106 MMHG | TEMPERATURE: 98 F | OXYGEN SATURATION: 98 % | HEIGHT: 63 IN | HEART RATE: 83 BPM | RESPIRATION RATE: 15 BRPM | WEIGHT: 212.9 LBS | BODY MASS INDEX: 37.72 KG/M2

## 2023-08-23 DIAGNOSIS — E55.9 VITAMIN D DEFICIENCY: ICD-10-CM

## 2023-08-23 DIAGNOSIS — R53.83 OTHER FATIGUE: Primary | ICD-10-CM

## 2023-08-23 DIAGNOSIS — F39 MOOD DISORDER: ICD-10-CM

## 2023-08-23 DIAGNOSIS — R73.03 PREDIABETES: ICD-10-CM

## 2023-08-23 DIAGNOSIS — E53.8 VITAMIN B12 DEFICIENCY: ICD-10-CM

## 2023-08-23 PROCEDURE — 99215 OFFICE O/P EST HI 40 MIN: CPT | Performed by: PHYSICIAN ASSISTANT

## 2023-08-23 RX ORDER — METFORMIN HYDROCHLORIDE 500 MG/1
500 TABLET, EXTENDED RELEASE ORAL 2 TIMES DAILY
Qty: 180 TABLET | Refills: 1 | Status: SHIPPED | OUTPATIENT
Start: 2023-08-23

## 2023-08-23 RX ORDER — CHOLECALCIFEROL (VITAMIN D3) 125 MCG
500 CAPSULE ORAL DAILY
Qty: 1 TABLET
Start: 2023-08-23

## 2023-08-23 RX ORDER — CYANOCOBALAMIN 1000 UG/ML
1000 INJECTION, SOLUTION INTRAMUSCULAR; SUBCUTANEOUS ONCE
Status: SHIPPED | OUTPATIENT
Start: 2023-08-23

## 2023-08-23 NOTE — ASSESSMENT & PLAN NOTE
We will check some labs again today.  Keep follow-up with oncology in October.  We will see her back for follow-up in 4 weeks.

## 2023-08-23 NOTE — ASSESSMENT & PLAN NOTE
Fatigue very well could be part of her mood disorder.  If she does have bipolar disorder and is not on a mood stabilizer and antidepressant would expect not be expected to manage her moods and could even make her symptoms worse.  She is agreeable to psychiatry referral to someone local in [place].  I did not make any medication changes today.

## 2023-08-23 NOTE — ASSESSMENT & PLAN NOTE
Tolerating low-dose extended release metformin, will increase dose to twice daily.  She did not tolerate the immediate release due to GI side effects.

## 2023-08-23 NOTE — ASSESSMENT & PLAN NOTE
Is on vitamin D supplement for low vitamin D and would like to have her level checked with her blood work today.

## 2023-08-23 NOTE — ASSESSMENT & PLAN NOTE
Suspect some of her symptoms are due to low vitamin B12.  She has some B12 but does not take consistently.  Metformin can decrease the absorption of B12.  I recommend taking 500 mcg of B12 daily and we will send in a new prescription.  Also gave her B12 1 mg IM today.

## 2023-08-23 NOTE — PROGRESS NOTES
Patient Office Visit      Patient Name: Iliana Gray  : 1970   MRN: [id number]     Chief Complaint:    Chief Complaint   Patient presents with    Prediabetes       History of Present Illness: Iliana Gray is a 53 y.o. female who is here today for follow-up.  She is tolerating low-dose extended release metformin.  Complains of feeling very weak and just sick.  She complains that her scalp burns and says she just feels sick and has to go to bed.  She denies nausea or vomiting, drainage, cough.  She has a history of cancer and has had work-up for recurrence and is due to see oncology again in October.  She also sees psychiatry for mood disorder.  She said they told her she was having mixed episodes but says she has not been dosed with bipolar disorder.  She does not know if there is something physically wrong with her or if this is part of her mood disorder.  She says she does not feel good and needs some help.  She was seeing psychiatry in [place] but now lives in [place].  She is taking citalopram and has a few diazepam that she rarely takes.  She was started on Lamictal but only took that for a few weeks not liking the way it made her feel.  Last time her B12 was checked it was borderline at 323 in 2022.    Subjective      Review of Systems:         Past Medical History:   Past Medical History:   Diagnosis Date    Anxiety     Arthritis     Asthma     Diffuse large B cell lymphoma     Sleep apnea     cpap       Past Surgical History:   Past Surgical History:   Procedure Laterality Date    HYSTERECTOMY      TUMOR EXCISION Right 2021    lymph node removal/biopsy/right inner thigh    VENOUS ACCESS DEVICE (PORT) INSERTION Right 2021       Family History: History reviewed. No pertinent family history.    Social History:   Social History     Socioeconomic History    Marital status:    Tobacco Use    Smoking status: Every Day     Packs/day: 1.00     Years: 41.00      "Pack years: 41.00     Types: Cigarettes     Start date: 1982    Smokeless tobacco: Never   Vaping Use    Vaping Use: Never used   Substance and Sexual Activity    Alcohol use: Never    Drug use: Never    Sexual activity: Yes     Partners: Male       Allergies:   No Known Allergies    Objective     Physical Exam:  Vital Signs:   Vitals:    08/23/23 0828   BP: 106/80   BP Location: Right arm   Patient Position: Sitting   Cuff Size: Adult   Pulse: 83   Resp: 15   Temp: 98 øF (36.7 øC)   TempSrc: Temporal   SpO2: 98%   Weight: 96.6 kg (212 lb 14.4 oz)   Height: 160 cm (63\")     Body mass index is 37.71 kg/mý.           Physical Exam  Constitutional:       Appearance: She is obese.   Neurological:      Mental Status: She is alert and oriented to person, place, and time.   Psychiatric:         Attention and Perception: Attention normal.         Mood and Affect: Mood is depressed.         Speech: Speech normal.         Behavior: Behavior normal.         Thought Content: Thought content normal.         Cognition and Memory: Cognition and memory normal.         Judgment: Judgment normal.       Procedures    Assessment / Plan      Assessment/Plan:   Diagnoses and all orders for this visit:    1. Other fatigue (Primary)  Assessment & Plan:  We will check some labs again today.  Keep follow-up with oncology in October.  We will see her back for follow-up in 4 weeks.    Orders:  -     Comprehensive Metabolic Panel  -     Vitamin B12  -     Folate  -     TSH  -     T4, Free  -     Magnesium  -     CBC Auto Differential    2. Vitamin D deficiency  Assessment & Plan:  Is on vitamin D supplement for low vitamin D and would like to have her level checked with her blood work today.    Orders:  -     Vitamin D,25-Hydroxy    3. Prediabetes  Assessment & Plan:  Tolerating low-dose extended release metformin, will increase dose to twice daily.  She did not tolerate the immediate release due to GI side effects.    Orders:  -     metFORMIN " ER (GLUCOPHAGE-XR) 500 MG 24 hr tablet; Take 1 tablet by mouth 2 (Two) Times a Day.  Dispense: 180 tablet; Refill: 1    4. Mood disorder  Assessment & Plan:  Fatigue very well could be part of her mood disorder.  If she does have bipolar disorder and is not on a mood stabilizer and antidepressant would expect not be expected to manage her moods and could even make her symptoms worse.  She is agreeable to psychiatry referral to someone local in [place].  I did not make any medication changes today.    Orders:  -     Ambulatory Referral to Psychiatry    5. Vitamin B12 deficiency  Assessment & Plan:  Suspect some of her symptoms are due to low vitamin B12.  She has some B12 but does not take consistently.  Metformin can decrease the absorption of B12.  I recommend taking 500 mcg of B12 daily and we will send in a new prescription.  Also gave her B12 1 mg IM today.    Orders:  -     vitamin B-12 (CYANOCOBALAMIN) 500 MCG tablet; Take 1 tablet by mouth Daily. OTC  Dispense: 1 tablet  -     cyanocobalamin injection 1,000 mcg         Medications:     Current Outpatient Medications:     aspirin 81 MG chewable tablet, Chew 1 tablet Daily., Disp: , Rfl:     atorvastatin (Lipitor) 20 MG tablet, Take 1 tablet by mouth Daily., Disp: 90 tablet, Rfl: 3    citalopram (CeleXA) 20 MG tablet, Take 2 tablets by mouth Daily., Disp: , Rfl:     clobetasol (TEMOVATE) 0.05 % ointment, PLEASE SEE ATTACHED FOR DETAILED DIRECTIONS, Disp: , Rfl:     estradiol (ESTRACE) 1 MG tablet, Take 1 tablet by mouth Daily., Disp: , Rfl:     ipratropium (ATROVENT) 0.06 % nasal spray, INSTILL 2 SPRAYS INTRANASALLY 3 TIMES PER DAY FOR 5 DAYS, Disp: , Rfl:     metFORMIN ER (GLUCOPHAGE-XR) 500 MG 24 hr tablet, Take 1 tablet by mouth 2 (Two) Times a Day., Disp: 180 tablet, Rfl: 1    Symbicort 160-4.5 MCG/ACT inhaler, Inhale 2 puffs 2 (Two) Times a Day., Disp: , Rfl:     Ventolin  (90 Base) MCG/ACT inhaler, , Disp: , Rfl:     vitamin B-12 (CYANOCOBALAMIN)  500 MCG tablet, Take 1 tablet by mouth Daily. OTC, Disp: 1 tablet, Rfl:     Current Facility-Administered Medications:     cyanocobalamin injection 1,000 mcg, 1,000 mcg, Intramuscular, Once, Kiley Blackwell PA    I spent 40 minutes caring for Iliana on this date of service. This time includes time spent by me in the following activities:preparing for the visit, reviewing tests, obtaining and/or reviewing a separately obtained history, performing a medically appropriate examination and/or evaluation , counseling and educating the patient/family/caregiver, ordering medications, tests, or procedures, referring and communicating with other health care professionals , and documenting information in the medical record    Follow Up:   Return in about 4 weeks (around 9/20/2023).    Kiley Blackwell PA-C   [facility] Answers submitted by the patient for this visit:  Other (Submitted on 8/22/2023)  Please describe your symptoms.: Medication follow up  Have you had these symptoms before?: No  How long have you been having these symptoms?: Greater than 2 weeks  Please list any medications you are currently taking for this condition.: Estradiol.01, [id number], Atorvastatin 20, Aspirin 81  Please describe any probable cause for these symptoms. : None  Primary Reason for Visit (Submitted on 8/22/2023)  What is the primary reason for your visit?: Other

## 2023-08-24 LAB
25(OH)D3+25(OH)D2 SERPL-MCNC: 37.8 NG/ML (ref 30–100)
ALBUMIN SERPL-MCNC: 4.6 G/DL (ref 3.8–4.9)
ALBUMIN/GLOB SERPL: 2 {RATIO} (ref 1.2–2.2)
ALP SERPL-CCNC: 81 IU/L (ref 44–121)
ALT SERPL-CCNC: 13 IU/L (ref 0–32)
AST SERPL-CCNC: 12 IU/L (ref 0–40)
BASOPHILS # BLD AUTO: 0 X10E3/UL (ref 0–0.2)
BASOPHILS NFR BLD AUTO: 1 %
BILIRUB SERPL-MCNC: 0.3 MG/DL (ref 0–1.2)
BUN SERPL-MCNC: 10 MG/DL (ref 6–24)
BUN/CREAT SERPL: 17 (ref 9–23)
CALCIUM SERPL-MCNC: 9 MG/DL (ref 8.7–10.2)
CHLORIDE SERPL-SCNC: 103 MMOL/L (ref 96–106)
CO2 SERPL-SCNC: 23 MMOL/L (ref 20–29)
CREAT SERPL-MCNC: 0.58 MG/DL (ref 0.57–1)
EGFRCR SERPLBLD CKD-EPI 2021: 107 ML/MIN/1.73
EOSINOPHIL # BLD AUTO: 0.1 X10E3/UL (ref 0–0.4)
EOSINOPHIL NFR BLD AUTO: 2 %
ERYTHROCYTE [DISTWIDTH] IN BLOOD BY AUTOMATED COUNT: 14.1 % (ref 11.7–15.4)
FOLATE SERPL-MCNC: 13.8 NG/ML
GLOBULIN SER CALC-MCNC: 2.3 G/DL (ref 1.5–4.5)
GLUCOSE SERPL-MCNC: 95 MG/DL (ref 70–99)
HCT VFR BLD AUTO: 41 % (ref 34–46.6)
HGB BLD-MCNC: 13.7 G/DL (ref 11.1–15.9)
IMM GRANULOCYTES # BLD AUTO: 0 X10E3/UL (ref 0–0.1)
IMM GRANULOCYTES NFR BLD AUTO: 0 %
LYMPHOCYTES # BLD AUTO: 2.3 X10E3/UL (ref 0.7–3.1)
LYMPHOCYTES NFR BLD AUTO: 40 %
MAGNESIUM SERPL-MCNC: 1.9 MG/DL (ref 1.6–2.3)
MCH RBC QN AUTO: 29.8 PG (ref 26.6–33)
MCHC RBC AUTO-ENTMCNC: 33.4 G/DL (ref 31.5–35.7)
MCV RBC AUTO: 89 FL (ref 79–97)
MONOCYTES # BLD AUTO: 0.3 X10E3/UL (ref 0.1–0.9)
MONOCYTES NFR BLD AUTO: 5 %
NEUTROPHILS # BLD AUTO: 3 X10E3/UL (ref 1.4–7)
NEUTROPHILS NFR BLD AUTO: 52 %
PLATELET # BLD AUTO: 313 X10E3/UL (ref 150–450)
POTASSIUM SERPL-SCNC: 4.1 MMOL/L (ref 3.5–5.2)
PROT SERPL-MCNC: 6.9 G/DL (ref 6–8.5)
RBC # BLD AUTO: 4.59 X10E6/UL (ref 3.77–5.28)
SODIUM SERPL-SCNC: 142 MMOL/L (ref 134–144)
T4 FREE SERPL-MCNC: 0.96 NG/DL (ref 0.82–1.77)
TSH SERPL DL<=0.005 MIU/L-ACNC: 1.14 UIU/ML (ref 0.45–4.5)
VIT B12 SERPL-MCNC: >2000 PG/ML (ref 232–1245)
WBC # BLD AUTO: 5.8 X10E3/UL (ref 3.4–10.8)

## 2023-08-25 ENCOUNTER — CLINICAL SUPPORT (OUTPATIENT)
Dept: CARDIOLOGY | Facility: CLINIC | Age: 53
End: 2023-08-25
Payer: COMMERCIAL

## 2023-08-25 DIAGNOSIS — E78.5 HYPERLIPIDEMIA LDL GOAL <70: Primary | ICD-10-CM

## 2023-08-25 PROCEDURE — 36415 COLL VENOUS BLD VENIPUNCTURE: CPT | Performed by: INTERNAL MEDICINE

## 2023-08-25 NOTE — PROGRESS NOTES
Venipuncture Blood Specimen Collection  Venipuncture performed in clinic by Marcia Sharp MA with good hemostasis. Patient tolerated the procedure well without complications.   08/25/23   Marcia Sharp MA

## 2023-08-28 ENCOUNTER — CLINICAL SUPPORT (OUTPATIENT)
Dept: CARDIOLOGY | Facility: CLINIC | Age: 53
End: 2023-08-28
Payer: COMMERCIAL

## 2023-08-28 ENCOUNTER — OFFICE VISIT (OUTPATIENT)
Dept: CARDIOLOGY | Facility: CLINIC | Age: 53
End: 2023-08-28
Payer: COMMERCIAL

## 2023-08-28 VITALS
OXYGEN SATURATION: 94 % | DIASTOLIC BLOOD PRESSURE: 72 MMHG | BODY MASS INDEX: 37.56 KG/M2 | RESPIRATION RATE: 18 BRPM | WEIGHT: 212 LBS | HEART RATE: 97 BPM | SYSTOLIC BLOOD PRESSURE: 118 MMHG | HEIGHT: 63 IN

## 2023-08-28 DIAGNOSIS — E78.5 HYPERLIPIDEMIA LDL GOAL <70: ICD-10-CM

## 2023-08-28 DIAGNOSIS — G47.33 OBSTRUCTIVE SLEEP APNEA: ICD-10-CM

## 2023-08-28 DIAGNOSIS — Z72.0 TOBACCO USE: ICD-10-CM

## 2023-08-28 DIAGNOSIS — R42 DIZZINESS: Primary | ICD-10-CM

## 2023-08-28 PROCEDURE — 1160F RVW MEDS BY RX/DR IN RCRD: CPT

## 2023-08-28 PROCEDURE — 1159F MED LIST DOCD IN RCRD: CPT

## 2023-08-28 PROCEDURE — 99213 OFFICE O/P EST LOW 20 MIN: CPT

## 2023-08-28 RX ORDER — NICOTINE 21 MG/24HR
1 PATCH, TRANSDERMAL 24 HOURS TRANSDERMAL EVERY 24 HOURS
Qty: 28 PATCH | Refills: 0 | Status: SHIPPED | OUTPATIENT
Start: 2023-08-28 | End: 2023-09-25

## 2023-08-28 RX ORDER — NICOTINE 21 MG/24HR
1 PATCH, TRANSDERMAL 24 HOURS TRANSDERMAL EVERY 24 HOURS
Qty: 14 PATCH | Refills: 0 | Status: SHIPPED | OUTPATIENT
Start: 2023-09-27 | End: 2023-10-11

## 2023-08-28 NOTE — PROGRESS NOTES
[facility] [place]  [facility] CARDIOLOGY  [address] [place] [address]  Dept: [phone]  Dept Fax: [phone]    Iliana Gray  1970    Follow Up Office Visit Note    History of Present Illness:  Iliana Gray is a 53 y.o. female who presents to the clinic for Follow-up. She is doing well today, denies further complaints of dizziness, denies all additional cardiac complaints. She had severely elevated cholesterol last visit, On Lipitor 20, she had lipid done this morning, advised we will make further recommendation when results. Exam today is good, /70 on recheck. We did discuss smoking cessation today, no longer using chantix but states has had luck quitting on patches in the past. Will send today. Advised she cannot smoke while on patches. Follow 6 months or sooner if indicated.     The following portions of the patient's history were reviewed and updated as appropriate: allergies, current medications, past family history, past medical history, past social history, past surgical history, and problem list.    Medications:  aspirin  atorvastatin  citalopram  clobetasol  cyanocobalamin  estradiol  ipratropium  metFORMIN ER  Symbicort aerosol  Ventolin HFA aerosol solution  vitamin B-12    Subjective  No Known Allergies     Past Medical History:   Diagnosis Date    Anxiety     Arthritis     Asthma     Diffuse large B cell lymphoma     Sleep apnea     cpap       Past Surgical History:   Procedure Laterality Date    HYSTERECTOMY      TUMOR EXCISION Right 04/2021    lymph node removal/biopsy/right inner thigh    VENOUS ACCESS DEVICE (PORT) INSERTION Right 04/2021       History reviewed. No pertinent family history.     Social History     Socioeconomic History    Marital status:    Tobacco Use    Smoking status: Every Day     Packs/day: 1.00     Years: 41.00     Pack years: 41.00     Types: Cigarettes     Start date: 1982    Smokeless tobacco: Never  "  Vaping Use    Vaping Use: Never used   Substance and Sexual Activity    Alcohol use: Never    Drug use: Never    Sexual activity: Yes     Partners: Male       Review of Systems   All other systems reviewed and are negative.    Cardiovascular Procedures    ECHO/MUGA:   STRESS TESTS:   CARDIAC CATH:   DEVICES:   HOLTER:   CT/MRI:   VASCULAR:   CARDIOTHORACIC:     Objective  Vitals:    08/28/23 1330   BP: 118/72   BP Location: Left arm   Patient Position: Sitting   Cuff Size: Large Adult   Pulse: 97   Resp: 18   SpO2: 94%   Weight: 96.2 kg (212 lb)   Height: 160 cm (63\")   PainSc: 0-No pain     Body mass index is 37.55 kg/mý.     Physical Exam  Vitals reviewed.   Constitutional:       General: Awake.      Appearance: Normal and healthy appearance. Not in distress.   Neck:      Vascular: No JVR. JVD normal.   Pulmonary:      Effort: Pulmonary effort is normal.      Breath sounds: Normal breath sounds. No wheezing. No rhonchi. No rales.   Chest:      Chest wall: Not tender to palpatation.   Cardiovascular:      PMI at left midclavicular line. Normal rate. Regular rhythm. Normal S1. Normal S2.       Murmurs: There is no murmur.      No gallop.  No click. No rub.   Pulses:     Intact distal pulses.   Edema:     Peripheral edema absent.   Abdominal:      General: Bowel sounds are normal.      Palpations: Abdomen is soft.      Tenderness: There is no abdominal tenderness.   Musculoskeletal: Normal range of motion.         General: No tenderness. Skin:     General: Skin is warm and dry.   Neurological:      General: No focal deficit present.      Mental Status: Alert and oriented to person, place and time.   Psychiatric:         Behavior: Behavior is cooperative.        Diagnostic Data  Procedures    Advance Care Planning          Assessment and Plan  Diagnoses and all orders for this visit:    1. Dizziness (Primary)  Resolved.     2. Hyperlipidemia LDL goal <70  Total cholesterol > 300 LV, on Lipitor 20, Lipid this am, " further recommendation pending result. Continue current management.     3. Obstructive sleep apnea    4. Tobacco use  Still smoking, intolerant to Chantix, will trial patch as she states this has worked in the past. Advised no smoking while on patches.          Return in about 6 months (around 2/28/2024) for Recheck, Dr. Mg.    Andreea Flowers, APRN  08/28/2023    Part of this note may be an electronic transcription/translation of spoken language to printed text using the Dragon Dictation System.

## 2023-08-28 NOTE — PROGRESS NOTES
Venipuncture Blood Specimen Collection  Venipuncture performed in clinic by Marcia Sharp MA with good hemostasis. Patient tolerated the procedure well without complications.   08/28/23   Marcia Sharp MA

## 2023-08-29 ENCOUNTER — TELEPHONE (OUTPATIENT)
Dept: CARDIOLOGY | Facility: CLINIC | Age: 53
End: 2023-08-29
Payer: COMMERCIAL

## 2023-08-29 LAB
CHOLEST SERPL-MCNC: 202 MG/DL (ref 100–199)
HDLC SERPL-MCNC: 34 MG/DL
LDLC SERPL CALC-MCNC: 88 MG/DL (ref 0–99)
TRIGL SERPL-MCNC: 489 MG/DL (ref 0–149)
VLDLC SERPL CALC-MCNC: 80 MG/DL (ref 5–40)

## 2023-08-29 RX ORDER — OMEGA-3-ACID ETHYL ESTERS 1 G/1
2 CAPSULE, LIQUID FILLED ORAL 2 TIMES DAILY
COMMUNITY
End: 2023-08-29 | Stop reason: SDUPTHER

## 2023-08-29 RX ORDER — ICOSAPENT ETHYL 1000 MG/1
2 CAPSULE ORAL 2 TIMES DAILY WITH MEALS
COMMUNITY
End: 2023-08-29 | Stop reason: CLARIF

## 2023-08-29 RX ORDER — OMEGA-3-ACID ETHYL ESTERS 1 G/1
2 CAPSULE, LIQUID FILLED ORAL 2 TIMES DAILY
Qty: 360 CAPSULE | Refills: 1 | Status: SHIPPED | OUTPATIENT
Start: 2023-08-29

## 2023-08-29 NOTE — TELEPHONE ENCOUNTER
Spoke with Ms. Gray and and advised her that the LDL was normal but her triglycerides are still elevated.  Dr. Mg would like you to start Vascepa.  She verbalized understanding.

## 2023-08-29 NOTE — TELEPHONE ENCOUNTER
----- Message from Ralph Mg MD sent at 8/29/2023  8:23 AM EDT -----  LDL is good but Tri elevated, will add vascepa

## 2023-10-24 ENCOUNTER — OFFICE VISIT (OUTPATIENT)
Dept: ONCOLOGY | Facility: CLINIC | Age: 53
End: 2023-10-24
Payer: COMMERCIAL

## 2023-10-24 VITALS
HEIGHT: 63 IN | DIASTOLIC BLOOD PRESSURE: 72 MMHG | OXYGEN SATURATION: 95 % | TEMPERATURE: 98.4 F | BODY MASS INDEX: 37.56 KG/M2 | RESPIRATION RATE: 18 BRPM | WEIGHT: 212 LBS | HEART RATE: 96 BPM | SYSTOLIC BLOOD PRESSURE: 127 MMHG

## 2023-10-24 DIAGNOSIS — C83.35 DIFFUSE LARGE B-CELL LYMPHOMA OF LYMPH NODES OF INGUINAL REGION: Primary | Chronic | ICD-10-CM

## 2023-10-24 PROCEDURE — 99213 OFFICE O/P EST LOW 20 MIN: CPT | Performed by: INTERNAL MEDICINE

## 2023-10-24 PROCEDURE — 1126F AMNT PAIN NOTED NONE PRSNT: CPT | Performed by: INTERNAL MEDICINE

## 2023-10-24 RX ORDER — VARENICLINE TARTRATE 1 MG/1
1 TABLET, FILM COATED ORAL EVERY 12 HOURS SCHEDULED
COMMUNITY
Start: 2023-09-25

## 2023-10-24 RX ORDER — LAMOTRIGINE 25 MG/1
25 TABLET ORAL 2 TIMES DAILY
COMMUNITY
Start: 2023-10-06

## 2023-10-24 NOTE — PROGRESS NOTES
CHIEF COMPLAINT: No new somatic complaints    Problem List:  Oncology/Hematology History Overview Note   1.  Right Medial thigh stage IIa nonbulky less than 7.5 cm diffuse large b cell non-hodgkin lymphoma with component of background follicular lymphoma plan for R-CHOP x3 followed by ISRT.  Had CR after 3 courses of R-CHOP x3 ending 6/8/2021 per PET 6/22/2021.  Fatigue and rash (eczema per dermatology) starting around August 2022 with negative CT chest abdomen pelvis.    Oncology history timeline:  -2/18/2021 hemoglobin 12.7 with normal white count platelet count differential.  CMP unremarkable.  Urinalysis with microscopic hematuria 20-30 red blood cells per milliliter.  AP single view chest x-ray emergency room [facility] showed no acute findings.  CT abdomen pelvis with IV contrast emergency room [facility] showed calcified granuloma right lung base, fatty liver infiltration, subcentimeter hypodensity left hepatic lobe too small to categorize, normal size spleen, no adrenal masses, no pancreatic mass, normal kidneys, and no retroperitoneal adenopathy or ascites or pneumoperitoneum.  There was a smooth marginated homogeneous enhancing 4.5 x 5 x 3.3 cm mass in the superficial soft tissues of the anterior upper right thigh.  -2/25/2021 office note from Dr. Gong mentions presentation to emergency room 2/18/2021 complaining of right upper thigh swelling for the preceding week of 2/12/2021.  CT scan abdomen pelvis to include the thighs performed at [facility] emergency room revealing 5 cm smoothly marginated right upper thigh mass below the groin crease suspicious for pathologic lymph node but could be neoplastic of other nature given no prodromal illness, infections, or inflammation.  Recommendation for biopsy was made.  Per Dr. Gong note, she had been sick for the better part of the winter and had been urgent treatment centers told that she had a viral infection flu and coronavirus negative with fevers  and chills that subsequently resolved but for fatigue that did not resolve with hypersomnolence as well.  Says she feels hot all the time but no ronny night sweats.  -3/12/2021 right thigh mass needle core tissue sample extremely small and predominantly crushed limiting adequate evaluation.  Differential diagnosis for this B-cell lymphoma is diffuse large B cell, follicular lymphoma versus other.  Further differentiation could not be performed on this crush small sample.  -3/25/2021 [facility] hematology oncology initial consultation: I, Rodríguez Gerardo, saw for the first time today.  Reviewed the above story with her.  Still fatigued without night sweats or unexplained weight loss.  She has this mass in the soft tissue medial right thigh about 5 cm in size that does seem to move somewhat beneath the skin but also to my exam feels like it is attached to the deeper structures of the thigh as well.  I feel no other adenopathy or hepatosplenomegaly.  I will get a PET scan.  I spoken with Dr. Gong who will look at these images again with the CTs in [place].  If there is clear indication that this is not coming from soft tissue nonnodal structures, then excisional biopsy for adequate tissue diagnosis is important to determine the type of therapy.  This is growing since her biopsy 3/12/2021 but not daily like a very high-grade lymphoma.  To my hand, this does not feel like a typical stas structure and my other concern is, even though this does not venessa with any markers to suggest sarcoma or the like, is to be sure that this is not something other than lymphoma and I have spoken with Dr. Pino who will do desmin stains and other markers before doing an excisional biopsy next Wednesday with Dr. Gong.  If those additional stains suggest sarcomatoid features that can appear in the medial aspect of the thigh such as this, then she probably needs to have excision by Humberto Vera orthopedic oncologist at .  In the  meantime, presuming this to be some form of lymphoma which is the highest likelihood based on the pathology from Dr. Pino where this is CD20 positive, CD10 positive, BCL6 40% positive, MUM1 40% positive, CD23 strong and diffuse positive, and BCL-2 positive.  Ki-67 is 50%.  CD30 and only 5%.  C-Myc is weakly expressed in 10% of cells.  BRIAN by TAYLER is negative.  Cyclin D1 negative.  CD5 indeterminate for B and T-cell coexpression.  LEF 1 presumably staining T cells.  Ultimate decision for treatment depends on the specific pathologic subtype and the staging and I will get her prognostic labs going as well.  Given the relatively initial high-grade features of this that might require Adriamycin I will also proceed with echocardiogram.  -3/25/2021 CBC normal.  CMP unremarkable.  Sedimentation rate normal 27 with C-reactive protein elevated 1.7 upper limit of normal 0.5.  Beta-2 microglobulin normal 1.6.  Uric acid normal 4.3.  LDH normal 162.  Hepatitis B and C serologies negative.  -3/29/2021 echocardiogram ejection fraction 61 to 65%.  -3/31/21 excisional node biopsy shows diffuse large B cell non-Hodgkin lymphoma  -4/2/2021 PET shows postsurgical changes right proximal thigh and superficial inguinal region adjacent to adenopathy with SUV 4.0.  Right internal iliac lymph node mildly enlarged SUV 2.6 additionally noted.  No other adenopathy outside this region.  No mention of any abnormal marrow signal.  -4/5/2021 [facility] medical oncology follow-up visit: Per Dr. Aurn Garcia pathologist, this is a diffuse large B cell non-Hodgkin lymphoma.  He is not sure whether this is double hit or not yet.  He is not sure if there is a background of follicular lymphoma and hence cannot say whether this is transformed.  Clinically her IPI score is 0 with her age less than 60, normal LDH, ECOG 0, no extranodal involvement on PET, and only stage II at worse per PET with all nodes within the same region.  I will get a bone marrow  biopsy.  If this is stage IV, double hit, or has evidence of transition from follicular lymphoma into diffuse large B-cell lymphoma, then I would give R-CHOP x6.  If the marrow is not involved, this is not double hit, and there is no evidence of follicular transformation into diffuse large B-cell lymphoma, then I would treat this as a stage II nonbulky diffuse large B-cell lymphoma and consider R-CHOP x3 followed by involved site radiation therapy.  I reviewed her PET and biopsy reports and echocardiogram which shows good ejection fraction.  She will need to get her [employer] Covid vaccine this week, family doctor established for the possibility of prednisone-induced diabetes, CT-guided bone marrow biopsy, chemo preparation visit, and a port placement with Dr. Gong.  We will present her at multidisciplinary tumor board this Friday.  I went into detail regarding the side effects of R-CHOP but she will have these reviewed at her chemo preparation visit.  Though not bulky, I still would treat her with allopurinol.  First course of therapy will occur in our [place] office and if all goes well then the subsequent Rituxan doses can be faster and performed in [place] office.    -4/14/2021 pathology update Dr. Garcia: Node pathology negative for double hit by FISH    -4/21/2021 bone marrow biopsy negative for lymphomatous involvement with scant stainable iron.      -4/26/2021 [facility] medical oncology follow-up visit: Given lack of marrow involvement, negative for double hit, and possible background follicular lymphoma , I will treat her as stage IIa nonbulky (original tumor 5 cm on CT, I.  E.  Less than 7.5 cm) diffuse large B-cell lymphoma right proximal thigh SUV 4.0 mass incisionally biopsied (with palpable residua) with mild right internal iliac node enlargement SUV 2.6 as well.  We will treat her with planned R-CHOP x3 followed by involved site radiation therapy.  This was consensus of multidisciplinary  tumor board 4/16/2021 as well.  First course [place].  Second course will occur in [place].  With the small component of background follicular lymphoma, there is potential risk of not only relapse of diffuse large B-cell lymphoma but later relapse from follicular lymphoma and we will keep that in mind in terms of following her up past the usual 3 years post definitive therapy presuming we get a complete remission on subsequent imaging and exams.  We will arrange radiation in [place] and I have made referral.  This will not start until after chemotherapy.    -5/13/2021 radiation oncology consultation Dr. Jl Infante for preparation of future involved field radiation therapy    -5/18/2021 [facility] oncology clinic follow-up: Had moderate amount of nausea after her first cycle, will add Zyprexa to Zofran and Compazine that she is already taking. She was also anxious, added Ativan to her care plan premeds. Prescription for Augmentin sent in for sinus infection. Clinically good response with reduction of right medial thigh mass. We will continue treatment in [place] due to mild infusion reaction with first Rituxan but she was able to complete treatment with additional medications. Today is cycle #2 of a planned 3 courses and then will repeat restaging scan to assess for response and proceed to radiation as outlined above.    -6/8/2021 course #3 Rituxan CHOP    -6/22/2021 PET shows resolution of right inguinal and iliac adenopathy.  Negative PET/CT.    -6/28/2021 [facility] medical oncology follow-up visit: I reviewed images and reports of PET as above.  Status post Rituxan CHOP, PET is essentially negative with complete clinical remission.With the small component of background follicular lymphoma, there is potential risk of not only relapse of diffuse large B-cell lymphoma but later relapse from follicular lymphoma and we will keep that in mind in terms of following her up past the usual 3 years post definitive  therapy presuming we get a complete remission on subsequent imaging and exams.  We will arrange radiation in [place] and Dr. Infante saw in mid May.  I called Dr. Infante to get her in to start involved field radiation and we will repeat PET and labs prior to return in 3 months for survivorship visit with my nurse practitioner who will lay out the follow-up according to NCCN guidelines.  Other than for modest anemia hemoglobin in the tens and glucose 150s, no other remarkable findings on CBC and CMP 6/8/2021.    -6/8/2021 completed course #3 of R-CHOP with resolution of right inguinal and iliac adenopathy on 6/22/2021 PET    -8/16/2021 completed 36 Gray in 18 fractions involved field right pelvic/inguinal femoral nodes Dr. Infante    -10/21/2021 [facility] Oncology clinic follow-up/survivorship visit: PET scan from 10/14/2021 was negative along with normal CBC and CMP.  She completed radiation in August without complications.  We will get her back to Dr. Gong for port removal.  We will continue surveillance per NCCN guidelines as follows:  -H&P and labs every 3-6 months for 5 years and then annually or as clinically indicated.    -We will do surveillance imaging post completion of treatment with CT chest, abdomen and pelvis with contrast every 6 months for 2 years and then annually or as clinically indicated.    -1/28/2022 CT chest abdomen pelvis with contrast shows no adenopathy chest abdomen pelvis and no acute process.  Hepatic steatosis with sigmoid diverticulosis.  Hemoglobin 13.6 with normal CBC and differential.  CMP entirely normal with creatinine 0.65.    -2/1/2022 [facility] medical oncology follow-up visit: I reviewed images and reports of scans as outlined above as well as labs.  No evidence of recurrence.  As outlined by my nurse practitioner survivorship visit, she will get an H&P every 3 to 6 months out to April 2026 and then annually thereafter and we will do surveillance CT chest abdomen pelvis  every 6 months until April 2023 and then annually as clinically indicated.  As I have stated previously, we would want to continue to follow her clinically past the usual 2 years of radiographic follow-up for signs or symptoms of follicular lymphoma as there was a small background follicular lymphomatous component that could recur late.  She will see my nurse practitioner back in 6 months with CTs just prior to return.  No further CBC or CMP in the absence of signs or symptoms given normalization of her counts.    -7/28/2022 CT chest, abdomen and pelvis with no evidence of lymphadenopathy in the chest, abdomen or pelvis, diffuse hepatic steatosis, sigmoid diverticulosis.    - 8/4/2022 [facility] Oncology clinic follow-up: Iliana has been feeling poorly since treatment ended a year ago for her history of lymphoma.  Current CT chest, abdomen and pelvis show no evidence of disease recurrence.  Her symptoms unfortunately have not improved since treatment ended and she is having more bad days than good.  She has a rash on the dorsum of her hands and foot that appear to be possibly eczema versus psoriasis.  We will get her to dermatology for further evaluation, I have asked her to call her insurance company to let us know the name of a provider that she would like to see and then we can put in a referral.  She has ongoing fatigue that has not improved with time, she is unable to do most activities without having to rest for the remainder of the day afterwards.  She also has nausea and loose stool with urgency.  I will get her to gastroenterology for an EGD and colonoscopy to rule out any GI involvement of her lymphoma as it did have follicular component.  I will also get labs today with CBC, CMP, we will also check HARRIS in light of her rash, will check inflammatory markers with sedimentation rate and CRP and an LDH.  We will see her back in a few months to go over her labs and GI evaluation.  She does have a history of  hepatic steatosis shown on all prior CTs therefore would benefit from GI evaluation regarding that also, has had normal LFTs on prior labs.      - 8/4/2022 data: CMP unremarkable.  LDH normal 174.  CRP mildly elevated 1.5 upper limit 0.9.  Sedimentation rate normal 9.  CBC normal with normal differential.  HARRIS negative    -9/19/2022 EGD and colonoscopy Mani [facility] random colon biopsy negative.  Transverse colon polyp sessile serrated adenoma.  Duodenal biopsy with normal architecture.  Stomach biopsy mild chronic inactive gastritis with no dysplasia or metaplasia or H. pylori.  There was a lot of stool in the entire colon interfering with visualization.  Internal hemorrhoids.  EGD showed no gross lesions.    -9/27/2022 [facility] medical oncology follow-up: No clear-cut evidence of lymphoma on CTs or EGD and colonoscopy and labs are unremarkable and negative HARRIS.  Her dermatologist told her she had eczema.  Nonetheless she is still periodically so fatigued that she ends up going to the bed for 2 or 3 days and then rebounds and feels normal for a while and then this waxing and waning of performance status and fatigue continues.  We will get a PET through her thighs as that was the original site of her disease.  I will also have her get EBV and CMV and HIV and thyroid functions.    -10/7/2022 whole-body PET is essentially negative.  There is a solitary borderline focus SUV 2.8 near the left lung apex without CT correlate favored to be infectious/inflammatory change.  No hypermetabolism in particular of the lower extremities where her original disease resided.    -10/11/2022 [facility] medical oncology follow-up: States she had COVID September 24 which was before I last saw her but did not know that when last I saw her.  She did not get her EBV, CMV, HIV, thyroid functions but says she is now getting over the COVID as well as some sinusitis and she thinks that is the fatigue.  Her PET was entirely negative..  We will  continue to check her whole-body PET until April 2023 which is when we will see her back and then we will do that annually only as clinically indicated on annual H&P.    -10/27/2022 CBC normal with normal differential and unremarkable CMP, B12, folate, and normal thyroid function. HIV nonreactive.  CMV DNA quantitation negative.  EBV quantitation PCR negative and EBV antibody profile shows negative VCA IgM with increased IgG VCA of 122 and nuclear antigen IgG 68.8 commensurate with prior infection.  C-reactive protein 21.  C4 complement normal.  C3 complement slightly elevated to 27 upper limit 167.  ANCA panel negative.  HARRIS negative.  CCP negative.  Rheumatoid factor not drawn.  Sedimentation rate normal 24.  Anti-SSA and SSB negative.      -12/1/2022 Platelets minimally elevated 384,000 with otherwise unremarkable CBC and CMP.  C-reactive protein elevated 13.1.  Sedimentation rate 30 barely elevated.  Rheumatoid factor negative.  Moderate external hemoglobin and urine.  4-10 red cells per high-power field.    -4/11/2023 CT chest abdomen pelvis Showed no evidence of adenopathy or splenomegaly.  Atherosclerotic changes.  Hepatic steatosis with small liver cyst.    -4/18/2023 [facility] hematology oncology follow-up: No radiographic evidence of recurrence.  Viral studies all negative.  CBC unremarkable.  No significant inflammatory markers and LDH normal. As outlined in her survivorship visit, she has completed routine surveillance CTs per NCCN guidelines and at this point would only image as clinically indicated.  We will continue H&P every 6 months out till April 2026.  I would probably continue to follow her clinically out past April 2026 for signs or symptoms of late recurrences there was a component of follicular lymphoma within the higher grade lymphoma and late recurrences may occur but at this point no further labs or scans in the absence of symptoms.    -8/23/2023 CBC And CMP ordered by primary care  unremarkable    -10/24/2023 [facility] [place], operated by [facility] hematology oncology follow-up: No new somatic complaints to suggest recurrence.  No palpable cervical or axillary or inguinal adenopathy.  I reviewed labs ordered by primary care in August which were unremarkable.  I will have her see my nurse practitioner every 6 months and a survivorship mode until April 2026 at least.  At that point, given that there was a component of follicular lymphoma within the higher grade lymphoma there is a slightly higher risk of late relapse and I would consider continuing H&P surveillance with oncology every 6 to 12 months after April 2026.  No plans for scans or labs in the absence of symptoms.     Diffuse large B-cell lymphoma of lymph nodes of inguinal region   3/29/2021 -  Other Event    Echocardiogram  Left ventricular ejection fraction appears to be 61 - 65%. Left ventricular systolic function is normal.  Normal global longitudinal strain at -23.5%.  Left ventricular diastolic function was normal.  No significant structural or functional valvular disease.     4/2/2021 Imaging    PET/CT IMPRESSION:  Postsurgical changes right proximal thigh and superficial  inguinal region adjacent to adenopathy with abnormal hypermetabolism  maximum SUV 4.0. Right internal iliac lymph node is mildly enlarged with  maximum SUV 2.6 additionally noted. No soft tissue mass or adenopathy  outside of this region.     4/21/2021 Biopsy    Marrow biopsy negative     4/26/2021 - 6/28/2021 Chemotherapy    Completed course #3 6/8/2021  OP LYMPHOMA R-CHOP RiTUXimab / Cyclophosphamide / DOXOrubicin / VinCRIStine / PredniSONE         4/26/2021 Cancer Staged    Staging form: Hodgkin And Non-Hodgkin Lymphoma, AJCC 8th Edition  - Clinical: Stage II (Diffuse large B-cell lymphoma) - Signed by Rodríguez Gerardo MD on 4/26/2021 6/22/2021 Imaging     PET shows resolution of right inguinal and iliac adenopathy.  Negative PET/CT.     7/22/2021 - 8/16/2021 Radiation    Radiation  "OncologyTreatment Course:  Iliana Gray received 36 cGy in 18 fractions to right pelvic and inguinofemoral nodes (involved field) under the care of Dr. Jl Infante.     10/14/2021 Imaging    PET/CT: Stable negative PET/CT with no new hypermetabolic adenopathy to suggest lymphomatous disease progression.     1/28/2022 Imaging    CT chest abdomen pelvis with contrast shows no adenopathy chest abdomen pelvis and no acute process.  Hepatic steatosis with sigmoid diverticulosis.  Hemoglobin 13.6 with normal CBC and differential.  CMP entirely normal with creatinine 0.65     7/28/2022 Imaging    CT chest, abdomen and pelvis:  1. No evidence of lymphadenopathy in the chest abdomen or pelvis.  2. Diffuse hepatic steatosis.  3. Sigmoid diverticulosis.  4. Hysterectomy  5. Postoperative changes of prior right inguinal lymph node resection.     4/11/2023 Imaging    CT chest abdomen pelvis Showed no evidence of adenopathy or splenomegaly.  Atherosclerotic changes.  Hepatic steatosis with small liver cyst.         HISTORY OF PRESENT ILLNESS:  The patient is a 53 y.o. female, here for follow up on management of history of lymphoma.  No new somatic complaints    Past Medical History:   Diagnosis Date    Anxiety     Arthritis     Asthma     Diffuse large B cell lymphoma     Sleep apnea     cpap     Past Surgical History:   Procedure Laterality Date    HYSTERECTOMY      TUMOR EXCISION Right 04/2021    lymph node removal/biopsy/right inner thigh    VENOUS ACCESS DEVICE (PORT) INSERTION Right 04/2021       No Known Allergies    Family History and Social History reviewed and changed as necessary    REVIEW OF SYSTEM:   No new somatic complaints    PHYSICAL EXAM:  No palpable cervical axillary or inguinal adenopathy.  No organomegaly.  No rashes.  No respiratory distress.    Vitals:    10/24/23 0959   BP: 127/72   Pulse: 96   Resp: 18   Temp: 98.4 °F (36.9 °C)   SpO2: 95%   Weight: 96.2 kg (212 lb)   Height: 160 cm (63\") "     Vitals:    10/24/23 0959   PainSc: 0-No pain          ECOG score: 0           Vitals reviewed.    ECOG: (0) Fully Active - Able to Carry On All Pre-disease Performance Without Restriction    Lab Results   Component Value Date    HGB 13.7 08/23/2023    HCT 41.0 08/23/2023    MCV 89 08/23/2023     08/23/2023    WBC 5.8 08/23/2023    NEUTROABS 3.0 08/23/2023    LYMPHSABS 2.3 08/23/2023    MONOSABS 0.3 08/23/2023    EOSABS 0.1 08/23/2023    BASOSABS 0.0 08/23/2023       Lab Results   Component Value Date    GLUCOSE 95 08/23/2023    BUN 10 08/23/2023    CREATININE 0.58 08/23/2023     08/23/2023    K 4.1 08/23/2023     08/23/2023    CO2 23 08/23/2023    CALCIUM 9.0 08/23/2023    PROTEINTOT 7.0 01/28/2022    ALBUMIN 4.6 08/23/2023    BILITOT 0.3 08/23/2023    ALKPHOS 81 08/23/2023    AST 12 08/23/2023    ALT 13 08/23/2023             ASSESSMENT & PLAN:  1.  Right Medial thigh stage IIa nonbulky less than 7.5 cm diffuse large b cell non-hodgkin lymphoma with component of background follicular lymphoma plan for R-CHOP x3 followed by ISRT.  Had CR after 3 courses of R-CHOP x3 ending 6/8/2021 per PET 6/22/2021.  Fatigue and rash (eczema per dermatology) starting around August 2022 with negative CT chest abdomen pelvis.    Oncology history timeline:  -2/18/2021 hemoglobin 12.7 with normal white count platelet count differential.  CMP unremarkable.  Urinalysis with microscopic hematuria 20-30 red blood cells per milliliter.  AP single view chest x-ray emergency room [place] showed no acute findings.  CT abdomen pelvis with IV contrast emergency room [place] showed calcified granuloma right lung base, fatty liver infiltration, subcentimeter hypodensity left hepatic lobe too small to categorize, normal size spleen, no adrenal masses, no pancreatic mass, normal kidneys, and no retroperitoneal adenopathy or ascites or pneumoperitoneum.  There was a smooth marginated homogeneous enhancing 4.5 x 5 x 3.3 cm  mass in the superficial soft tissues of the anterior upper right thigh.  -2/25/2021 office note from Dr. Gong mentions presentation to emergency room 2/18/2021 complaining of right upper thigh swelling for the preceding week of 2/12/2021.  CT scan abdomen pelvis to include the thighs performed at [facility] emergency room revealing 5 cm smoothly marginated right upper thigh mass below the groin crease suspicious for pathologic lymph node but could be neoplastic of other nature given no prodromal illness, infections, or inflammation.  Recommendation for biopsy was made.  Per Dr. Gong note, she had been sick for the better part of the winter and had been urgent treatment centers told that she had a viral infection flu and coronavirus negative with fevers and chills that subsequently resolved but for fatigue that did not resolve with hypersomnolence as well.  Says she feels hot all the time but no ronny night sweats.  -3/12/2021 right thigh mass needle core tissue sample extremely small and predominantly crushed limiting adequate evaluation.  Differential diagnosis for this B-cell lymphoma is diffuse large B cell, follicular lymphoma versus other.  Further differentiation could not be performed on this crush small sample.  -3/25/2021 [facility] hematology oncology initial consultation: I, Rodríguez Gerardo, saw for the first time today.  Reviewed the above story with her.  Still fatigued without night sweats or unexplained weight loss.  She has this mass in the soft tissue medial right thigh about 5 cm in size that does seem to move somewhat beneath the skin but also to my exam feels like it is attached to the deeper structures of the thigh as well.  I feel no other adenopathy or hepatosplenomegaly.  I will get a PET scan.  I spoken with Dr. Gong who will look at these images again with the CTs in [place].  If there is clear indication that this is not coming from soft tissue nonnodal structures, then  excisional biopsy for adequate tissue diagnosis is important to determine the type of therapy.  This is growing since her biopsy 3/12/2021 but not daily like a very high-grade lymphoma.  To my hand, this does not feel like a typical stas structure and my other concern is, even though this does not venessa with any markers to suggest sarcoma or the like, is to be sure that this is not something other than lymphoma and I have spoken with Dr. Pino who will do desmin stains and other markers before doing an excisional biopsy next Wednesday with Dr. Gong.  If those additional stains suggest sarcomatoid features that can appear in the medial aspect of the thigh such as this, then she probably needs to have excision by Humberto Vera orthopedic oncologist at .  In the meantime, presuming this to be some form of lymphoma which is the highest likelihood based on the pathology from Dr. Pino where this is CD20 positive, CD10 positive, BCL6 40% positive, MUM1 40% positive, CD23 strong and diffuse positive, and BCL-2 positive.  Ki-67 is 50%.  CD30 and only 5%.  C-Myc is weakly expressed in 10% of cells.  BRIAN by TAYLER is negative.  Cyclin D1 negative.  CD5 indeterminate for B and T-cell coexpression.  LEF 1 presumably staining T cells.  Ultimate decision for treatment depends on the specific pathologic subtype and the staging and I will get her prognostic labs going as well.  Given the relatively initial high-grade features of this that might require Adriamycin I will also proceed with echocardiogram.  -3/25/2021 CBC normal.  CMP unremarkable.  Sedimentation rate normal 27 with C-reactive protein elevated 1.7 upper limit of normal 0.5.  Beta-2 microglobulin normal 1.6.  Uric acid normal 4.3.  LDH normal 162.  Hepatitis B and C serologies negative.  -3/29/2021 echocardiogram ejection fraction 61 to 65%.  -3/31/21 excisional node biopsy shows diffuse large B cell non-Hodgkin lymphoma  -4/2/2021 PET shows postsurgical  changes right proximal thigh and superficial inguinal region adjacent to adenopathy with SUV 4.0.  Right internal iliac lymph node mildly enlarged SUV 2.6 additionally noted.  No other adenopathy outside this region.  No mention of any abnormal marrow signal.  -4/5/2021 [facility] medical oncology follow-up visit: Per Dr. Arun Garcia pathologist, this is a diffuse large B cell non-Hodgkin lymphoma.  He is not sure whether this is double hit or not yet.  He is not sure if there is a background of follicular lymphoma and hence cannot say whether this is transformed.  Clinically her IPI score is 0 with her age less than 60, normal LDH, ECOG 0, no extranodal involvement on PET, and only stage II at worse per PET with all nodes within the same region.  I will get a bone marrow biopsy.  If this is stage IV, double hit, or has evidence of transition from follicular lymphoma into diffuse large B-cell lymphoma, then I would give R-CHOP x6.  If the marrow is not involved, this is not double hit, and there is no evidence of follicular transformation into diffuse large B-cell lymphoma, then I would treat this as a stage II nonbulky diffuse large B-cell lymphoma and consider R-CHOP x3 followed by involved site radiation therapy.  I reviewed her PET and biopsy reports and echocardiogram which shows good ejection fraction.  She will need to get her [employer] Covid vaccine this week, family doctor established for the possibility of prednisone-induced diabetes, CT-guided bone marrow biopsy, chemo preparation visit, and a port placement with Dr. Gong.  We will present her at multidisciplinary tumor board this Friday.  I went into detail regarding the side effects of R-CHOP but she will have these reviewed at her chemo preparation visit.  Though not bulky, I still would treat her with allopurinol.  First course of therapy will occur in our [place] office and if all goes well then the subsequent Rituxan doses can be faster  and performed in [place] office.    -4/14/2021 pathology update Dr. Garcia: Node pathology negative for double hit by FISH    -4/21/2021 bone marrow biopsy negative for lymphomatous involvement with scant stainable iron.      -4/26/2021 [facility] medical oncology follow-up visit: Given lack of marrow involvement, negative for double hit, and possible background follicular lymphoma , I will treat her as stage IIa nonbulky (original tumor 5 cm on CT, I.  E.  Less than 7.5 cm) diffuse large B-cell lymphoma right proximal thigh SUV 4.0 mass incisionally biopsied (with palpable residua) with mild right internal iliac node enlargement SUV 2.6 as well.  We will treat her with planned R-CHOP x3 followed by involved site radiation therapy.  This was consensus of multidisciplinary tumor board 4/16/2021 as well.  First course [place].  Second course will occur in [place].  With the small component of background follicular lymphoma, there is potential risk of not only relapse of diffuse large B-cell lymphoma but later relapse from follicular lymphoma and we will keep that in mind in terms of following her up past the usual 3 years post definitive therapy presuming we get a complete remission on subsequent imaging and exams.  We will arrange radiation in [place] and I have made referral.  This will not start until after chemotherapy.    -5/13/2021 radiation oncology consultation Dr. Jl Infante for preparation of future involved field radiation therapy    -5/18/2021 [facility] oncology clinic follow-up: Had moderate amount of nausea after her first cycle, will add Zyprexa to Zofran and Compazine that she is already taking. She was also anxious, added Ativan to her care plan premeds. Prescription for Augmentin sent in for sinus infection. Clinically good response with reduction of right medial thigh mass. We will continue treatment in [place] due to mild infusion reaction with first Rituxan but she was able to complete  treatment with additional medications. Today is cycle #2 of a planned 3 courses and then will repeat restaging scan to assess for response and proceed to radiation as outlined above.    -6/8/2021 course #3 Rituxan CHOP    -6/22/2021 PET shows resolution of right inguinal and iliac adenopathy.  Negative PET/CT.    -6/28/2021 [facility] medical oncology follow-up visit: I reviewed images and reports of PET as above.  Status post Rituxan CHOP, PET is essentially negative with complete clinical remission.With the small component of background follicular lymphoma, there is potential risk of not only relapse of diffuse large B-cell lymphoma but later relapse from follicular lymphoma and we will keep that in mind in terms of following her up past the usual 3 years post definitive therapy presuming we get a complete remission on subsequent imaging and exams.  We will arrange radiation in [place] and Dr. Infante saw in mid May.  I called Dr. Infante to get her in to start involved field radiation and we will repeat PET and labs prior to return in 3 months for survivorship visit with my nurse practitioner who will lay out the follow-up according to NCCN guidelines.  Other than for modest anemia hemoglobin in the tens and glucose 150s, no other remarkable findings on CBC and CMP 6/8/2021.    -6/8/2021 completed course #3 of R-CHOP with resolution of right inguinal and iliac adenopathy on 6/22/2021 PET    -8/16/2021 completed 36 Gray in 18 fractions involved field right pelvic/inguinal femoral nodes Dr. Infante    -10/21/2021 [facility] Oncology clinic follow-up/survivorship visit: PET scan from 10/14/2021 was negative along with normal CBC and CMP.  She completed radiation in August without complications.  We will get her back to Dr. Gong for port removal.  We will continue surveillance per NCCN guidelines as follows:  -H&P and labs every 3-6 months for 5 years and then annually or as clinically indicated.    -We will do  surveillance imaging post completion of treatment with CT chest, abdomen and pelvis with contrast every 6 months for 2 years and then annually or as clinically indicated.    -1/28/2022 CT chest abdomen pelvis with contrast shows no adenopathy chest abdomen pelvis and no acute process.  Hepatic steatosis with sigmoid diverticulosis.  Hemoglobin 13.6 with normal CBC and differential.  CMP entirely normal with creatinine 0.65.    -2/1/2022 [facility] medical oncology follow-up visit: I reviewed images and reports of scans as outlined above as well as labs.  No evidence of recurrence.  As outlined by my nurse practitioner survivorship visit, she will get an H&P every 3 to 6 months out to April 2026 and then annually thereafter and we will do surveillance CT chest abdomen pelvis every 6 months until April 2023 and then annually as clinically indicated.  As I have stated previously, we would want to continue to follow her clinically past the usual 2 years of radiographic follow-up for signs or symptoms of follicular lymphoma as there was a small background follicular lymphomatous component that could recur late.  She will see my nurse practitioner back in 6 months with CTs just prior to return.  No further CBC or CMP in the absence of signs or symptoms given normalization of her counts.    -7/28/2022 CT chest, abdomen and pelvis with no evidence of lymphadenopathy in the chest, abdomen or pelvis, diffuse hepatic steatosis, sigmoid diverticulosis.    - 8/4/2022 [facility] Oncology clinic follow-up: Iliana has been feeling poorly since treatment ended a year ago for her history of lymphoma.  Current CT chest, abdomen and pelvis show no evidence of disease recurrence.  Her symptoms unfortunately have not improved since treatment ended and she is having more bad days than good.  She has a rash on the dorsum of her hands and foot that appear to be possibly eczema versus psoriasis.  We will get her to dermatology for further  evaluation, I have asked her to call her insurance company to let us know the name of a provider that she would like to see and then we can put in a referral.  She has ongoing fatigue that has not improved with time, she is unable to do most activities without having to rest for the remainder of the day afterwards.  She also has nausea and loose stool with urgency.  I will get her to gastroenterology for an EGD and colonoscopy to rule out any GI involvement of her lymphoma as it did have follicular component.  I will also get labs today with CBC, CMP, we will also check HARRIS in light of her rash, will check inflammatory markers with sedimentation rate and CRP and an LDH.  We will see her back in a few months to go over her labs and GI evaluation.  She does have a history of hepatic steatosis shown on all prior CTs therefore would benefit from GI evaluation regarding that also, has had normal LFTs on prior labs.      - 8/4/2022 data: CMP unremarkable.  LDH normal 174.  CRP mildly elevated 1.5 upper limit 0.9.  Sedimentation rate normal 9.  CBC normal with normal differential.  HARRIS negative    -9/19/2022 EGD and colonoscopy Mani Cristofer random colon biopsy negative.  Transverse colon polyp sessile serrated adenoma.  Duodenal biopsy with normal architecture.  Stomach biopsy mild chronic inactive gastritis with no dysplasia or metaplasia or H. pylori.  There was a lot of stool in the entire colon interfering with visualization.  Internal hemorrhoids.  EGD showed no gross lesions.    -9/27/2022 [facility] medical oncology follow-up: No clear-cut evidence of lymphoma on CTs or EGD and colonoscopy and labs are unremarkable and negative HARRIS.  Her dermatologist told her she had eczema.  Nonetheless she is still periodically so fatigued that she ends up going to the bed for 2 or 3 days and then rebounds and feels normal for a while and then this waxing and waning of performance status and fatigue continues.  We will get a PET  through her thighs as that was the original site of her disease.  I will also have her get EBV and CMV and HIV and thyroid functions.    -10/7/2022 whole-body PET is essentially negative.  There is a solitary borderline focus SUV 2.8 near the left lung apex without CT correlate favored to be infectious/inflammatory change.  No hypermetabolism in particular of the lower extremities where her original disease resided.    -10/11/2022 [facility] medical oncology follow-up: States she had COVID September 24 which was before I last saw her but did not know that when last I saw her.  She did not get her EBV, CMV, HIV, thyroid functions but says she is now getting over the COVID as well as some sinusitis and she thinks that is the fatigue.  Her PET was entirely negative..  We will continue to check her whole-body PET until April 2023 which is when we will see her back and then we will do that annually only as clinically indicated on annual H&P.    -10/27/2022 CBC normal with normal differential and unremarkable CMP, B12, folate, and normal thyroid function. HIV nonreactive.  CMV DNA quantitation negative.  EBV quantitation PCR negative and EBV antibody profile shows negative VCA IgM with increased IgG VCA of 122 and nuclear antigen IgG 68.8 commensurate with prior infection.  C-reactive protein 21.  C4 complement normal.  C3 complement slightly elevated to 27 upper limit 167.  ANCA panel negative.  HARRIS negative.  CCP negative.  Rheumatoid factor not drawn.  Sedimentation rate normal 24.  Anti-SSA and SSB negative.      -12/1/2022 Platelets minimally elevated 384,000 with otherwise unremarkable CBC and CMP.  C-reactive protein elevated 13.1.  Sedimentation rate 30 barely elevated.  Rheumatoid factor negative.  Moderate external hemoglobin and urine.  4-10 red cells per high-power field.    -4/11/2023 CT chest abdomen pelvis Showed no evidence of adenopathy or splenomegaly.  Atherosclerotic changes.  Hepatic steatosis with small  liver cyst.    -4/18/2023 [facility] hematology oncology follow-up: No radiographic evidence of recurrence.  Viral studies all negative.  CBC unremarkable.  No significant inflammatory markers and LDH normal. As outlined in her survivorship visit, she has completed routine surveillance CTs per NCCN guidelines and at this point would only image as clinically indicated.  We will continue H&P every 6 months out till April 2026.  I would probably continue to follow her clinically out past April 2026 for signs or symptoms of late recurrences there was a component of follicular lymphoma within the higher grade lymphoma and late recurrences may occur but at this point no further labs or scans in the absence of symptoms.    -8/23/2023 CBC And CMP ordered by primary care unremarkable    -10/24/2023 [facility] hematology oncology follow-up: No new somatic complaints to suggest recurrence.  No palpable cervical or axillary or inguinal adenopathy.  I reviewed labs ordered by primary care in August which were unremarkable.  I will have her see my nurse practitioner every 6 months and a survivorship mode until April 2026 at least.  At that point, given that there was a component of follicular lymphoma within the higher grade lymphoma there is a slightly higher risk of late relapse and I would consider continuing H&P surveillance with oncology every 6 to 12 months after April 2026.  No plans for scans or labs in the absence of symptoms.    Total time of care today 15 minutes  Rodríguez Gerardo MD    10/24/2023

## 2023-10-24 NOTE — LETTER
October 24, 2023       No Recipients    Patient: Iliana Gray   YOB: 1970   Date of Visit: 10/24/2023     Dear JULIO Kenyon:       Thank you for referring Iliana Gray to me for evaluation. Below are the relevant portions of my assessment and plan of care.    If you have questions, please do not hesitate to call me. I look forward to following Iliana along with you.         Sincerely,        Rodríguez Gerardo MD        CC:   No Recipients    Rodríguez Gerardo MD  10/24/23 1020  Sign when Signing Visit  CHIEF COMPLAINT: No new somatic complaints    Problem List:  Oncology/Hematology History Overview Note   1.  Right Medial thigh stage IIa nonbulky less than 7.5 cm diffuse large b cell non-hodgkin lymphoma with component of background follicular lymphoma plan for R-CHOP x3 followed by ISRT.  Had CR after 3 courses of R-CHOP x3 ending 6/8/2021 per PET 6/22/2021.  Fatigue and rash (eczema per dermatology) starting around August 2022 with negative CT chest abdomen pelvis.    Oncology history timeline:  -2/18/2021 hemoglobin 12.7 with normal white count platelet count differential.  CMP unremarkable.  Urinalysis with microscopic hematuria 20-30 red blood cells per milliliter.  AP single view chest x-ray emergency room [place] showed no acute findings.  CT abdomen pelvis with IV contrast emergency room [place] showed calcified granuloma right lung base, fatty liver infiltration, subcentimeter hypodensity left hepatic lobe too small to categorize, normal size spleen, no adrenal masses, no pancreatic mass, normal kidneys, and no retroperitoneal adenopathy or ascites or pneumoperitoneum.  There was a smooth marginated homogeneous enhancing 4.5 x 5 x 3.3 cm mass in the superficial soft tissues of the anterior upper right thigh.  -2/25/2021 office note from Dr. Gong mentions presentation to emergency room 2/18/2021 complaining of right upper thigh swelling for the preceding week of 2/12/2021.  CT  scan abdomen pelvis to include the thighs performed at [facility] emergency room revealing 5 cm smoothly marginated right upper thigh mass below the groin crease suspicious for pathologic lymph node but could be neoplastic of other nature given no prodromal illness, infections, or inflammation.  Recommendation for biopsy was made.  Per Dr. Gong note, she had been sick for the better part of the winter and had been urgent treatment centers told that she had a viral infection flu and coronavirus negative with fevers and chills that subsequently resolved but for fatigue that did not resolve with hypersomnolence as well.  Says she feels hot all the time but no ronny night sweats.  -3/12/2021 right thigh mass needle core tissue sample extremely small and predominantly crushed limiting adequate evaluation.  Differential diagnosis for this B-cell lymphoma is diffuse large B cell, follicular lymphoma versus other.  Further differentiation could not be performed on this crush small sample.  -3/25/2021 [facility] hematology oncology initial consultation: I, Rodríguez Gerardo, saw for the first time today.  Reviewed the above story with her.  Still fatigued without night sweats or unexplained weight loss.  She has this mass in the soft tissue medial right thigh about 5 cm in size that does seem to move somewhat beneath the skin but also to my exam feels like it is attached to the deeper structures of the thigh as well.  I feel no other adenopathy or hepatosplenomegaly.  I will get a PET scan.  I spoken with Dr. Gong who will look at these images again with the CTs in [place].  If there is clear indication that this is not coming from soft tissue nonnodal structures, then excisional biopsy for adequate tissue diagnosis is important to determine the type of therapy.  This is growing since her biopsy 3/12/2021 but not daily like a very high-grade lymphoma.  To my hand, this does not feel like a typical stas structure and  my other concern is, even though this does not venessa with any markers to suggest sarcoma or the like, is to be sure that this is not something other than lymphoma and I have spoken with Dr. Pino who will do desmin stains and other markers before doing an excisional biopsy next Wednesday with Dr. Gong.  If those additional stains suggest sarcomatoid features that can appear in the medial aspect of the thigh such as this, then she probably needs to have excision by Humberto Vera orthopedic oncologist at .  In the meantime, presuming this to be some form of lymphoma which is the highest likelihood based on the pathology from Dr. Pino where this is CD20 positive, CD10 positive, BCL6 40% positive, MUM1 40% positive, CD23 strong and diffuse positive, and BCL-2 positive.  Ki-67 is 50%.  CD30 and only 5%.  C-Myc is weakly expressed in 10% of cells.  BRIAN by TAYLER is negative.  Cyclin D1 negative.  CD5 indeterminate for B and T-cell coexpression.  LEF 1 presumably staining T cells.  Ultimate decision for treatment depends on the specific pathologic subtype and the staging and I will get her prognostic labs going as well.  Given the relatively initial high-grade features of this that might require Adriamycin I will also proceed with echocardiogram.  -3/25/2021 CBC normal.  CMP unremarkable.  Sedimentation rate normal 27 with C-reactive protein elevated 1.7 upper limit of normal 0.5.  Beta-2 microglobulin normal 1.6.  Uric acid normal 4.3.  LDH normal 162.  Hepatitis B and C serologies negative.  -3/29/2021 echocardiogram ejection fraction 61 to 65%.  -3/31/21 excisional node biopsy shows diffuse large B cell non-Hodgkin lymphoma  -4/2/2021 PET shows postsurgical changes right proximal thigh and superficial inguinal region adjacent to adenopathy with SUV 4.0.  Right internal iliac lymph node mildly enlarged SUV 2.6 additionally noted.  No other adenopathy outside this region.  No mention of any abnormal marrow  signal.  -4/5/2021 [facility] medical oncology follow-up visit: Per Dr. Arun Garcia pathologist, this is a diffuse large B cell non-Hodgkin lymphoma.  He is not sure whether this is double hit or not yet.  He is not sure if there is a background of follicular lymphoma and hence cannot say whether this is transformed.  Clinically her IPI score is 0 with her age less than 60, normal LDH, ECOG 0, no extranodal involvement on PET, and only stage II at worse per PET with all nodes within the same region.  I will get a bone marrow biopsy.  If this is stage IV, double hit, or has evidence of transition from follicular lymphoma into diffuse large B-cell lymphoma, then I would give R-CHOP x6.  If the marrow is not involved, this is not double hit, and there is no evidence of follicular transformation into diffuse large B-cell lymphoma, then I would treat this as a stage II nonbulky diffuse large B-cell lymphoma and consider R-CHOP x3 followed by involved site radiation therapy.  I reviewed her PET and biopsy reports and echocardiogram which shows good ejection fraction.  She will need to get her [employer] Covid vaccine this week, family doctor established for the possibility of prednisone-induced diabetes, CT-guided bone marrow biopsy, chemo preparation visit, and a port placement with Dr. Gong.  We will present her at multidisciplinary tumor board this Friday.  I went into detail regarding the side effects of R-CHOP but she will have these reviewed at her chemo preparation visit.  Though not bulky, I still would treat her with allopurinol.  First course of therapy will occur in our [place] office and if all goes well then the subsequent Rituxan doses can be faster and performed in [place] office.    -4/14/2021 pathology update Dr. Garcia: Node pathology negative for double hit by FISH    -4/21/2021 bone marrow biopsy negative for lymphomatous involvement with scant stainable iron.      -4/26/2021 [facility]  medical oncology follow-up visit: Given lack of marrow involvement, negative for double hit, and possible background follicular lymphoma , I will treat her as stage IIa nonbulky (original tumor 5 cm on CT, I.  E.  Less than 7.5 cm) diffuse large B-cell lymphoma right proximal thigh SUV 4.0 mass incisionally biopsied (with palpable residua) with mild right internal iliac node enlargement SUV 2.6 as well.  We will treat her with planned R-CHOP x3 followed by involved site radiation therapy.  This was consensus of multidisciplinary tumor board 4/16/2021 as well.  First course [place].  Second course will occur in [place].  With the small component of background follicular lymphoma, there is potential risk of not only relapse of diffuse large B-cell lymphoma but later relapse from follicular lymphoma and we will keep that in mind in terms of following her up past the usual 3 years post definitive therapy presuming we get a complete remission on subsequent imaging and exams.  We will arrange radiation in [place] and I have made referral.  This will not start until after chemotherapy.    -5/13/2021 radiation oncology consultation Dr. Jl Infante for preparation of future involved field radiation therapy    -5/18/2021 [facility] oncology clinic follow-up: Had moderate amount of nausea after her first cycle, will add Zyprexa to Zofran and Compazine that she is already taking. She was also anxious, added Ativan to her care plan premeds. Prescription for Augmentin sent in for sinus infection. Clinically good response with reduction of right medial thigh mass. We will continue treatment in [place] due to mild infusion reaction with first Rituxan but she was able to complete treatment with additional medications. Today is cycle #2 of a planned 3 courses and then will repeat restaging scan to assess for response and proceed to radiation as outlined above.    -6/8/2021 course #3 Rituxan CHOP    -6/22/2021 PET shows  resolution of right inguinal and iliac adenopathy.  Negative PET/CT.    -6/28/2021 [facility] medical oncology follow-up visit: I reviewed images and reports of PET as above.  Status post Rituxan CHOP, PET is essentially negative with complete clinical remission.With the small component of background follicular lymphoma, there is potential risk of not only relapse of diffuse large B-cell lymphoma but later relapse from follicular lymphoma and we will keep that in mind in terms of following her up past the usual 3 years post definitive therapy presuming we get a complete remission on subsequent imaging and exams.  We will arrange radiation in [place] and Dr. Infante saw in mid May.  I called Dr. Infante to get her in to start involved field radiation and we will repeat PET and labs prior to return in 3 months for survivorship visit with my nurse practitioner who will lay out the follow-up according to NCCN guidelines.  Other than for modest anemia hemoglobin in the tens and glucose 150s, no other remarkable findings on CBC and CMP 6/8/2021.    -6/8/2021 completed course #3 of R-CHOP with resolution of right inguinal and iliac adenopathy on 6/22/2021 PET    -8/16/2021 completed 36 Gray in 18 fractions involved field right pelvic/inguinal femoral nodes Dr. Infante    -10/21/2021 [facility] Oncology clinic follow-up/survivorship visit: PET scan from 10/14/2021 was negative along with normal CBC and CMP.  She completed radiation in August without complications.  We will get her back to Dr. Gong for port removal.  We will continue surveillance per NCCN guidelines as follows:  -H&P and labs every 3-6 months for 5 years and then annually or as clinically indicated.    -We will do surveillance imaging post completion of treatment with CT chest, abdomen and pelvis with contrast every 6 months for 2 years and then annually or as clinically indicated.    -1/28/2022 CT chest abdomen pelvis with contrast shows no adenopathy chest  abdomen pelvis and no acute process.  Hepatic steatosis with sigmoid diverticulosis.  Hemoglobin 13.6 with normal CBC and differential.  CMP entirely normal with creatinine 0.65.    -2/1/2022 [facility] medical oncology follow-up visit: I reviewed images and reports of scans as outlined above as well as labs.  No evidence of recurrence.  As outlined by my nurse practitioner survivorship visit, she will get an H&P every 3 to 6 months out to April 2026 and then annually thereafter and we will do surveillance CT chest abdomen pelvis every 6 months until April 2023 and then annually as clinically indicated.  As I have stated previously, we would want to continue to follow her clinically past the usual 2 years of radiographic follow-up for signs or symptoms of follicular lymphoma as there was a small background follicular lymphomatous component that could recur late.  She will see my nurse practitioner back in 6 months with CTs just prior to return.  No further CBC or CMP in the absence of signs or symptoms given normalization of her counts.    -7/28/2022 CT chest, abdomen and pelvis with no evidence of lymphadenopathy in the chest, abdomen or pelvis, diffuse hepatic steatosis, sigmoid diverticulosis.    - 8/4/2022 [facility] Oncology clinic follow-up: Iliana has been feeling poorly since treatment ended a year ago for her history of lymphoma.  Current CT chest, abdomen and pelvis show no evidence of disease recurrence.  Her symptoms unfortunately have not improved since treatment ended and she is having more bad days than good.  She has a rash on the dorsum of her hands and foot that appear to be possibly eczema versus psoriasis.  We will get her to dermatology for further evaluation, I have asked her to call her insurance company to let us know the name of a provider that she would like to see and then we can put in a referral.  She has ongoing fatigue that has not improved with time, she is unable to do most activities  without having to rest for the remainder of the day afterwards.  She also has nausea and loose stool with urgency.  I will get her to gastroenterology for an EGD and colonoscopy to rule out any GI involvement of her lymphoma as it did have follicular component.  I will also get labs today with CBC, CMP, we will also check HARRIS in light of her rash, will check inflammatory markers with sedimentation rate and CRP and an LDH.  We will see her back in a few months to go over her labs and GI evaluation.  She does have a history of hepatic steatosis shown on all prior CTs therefore would benefit from GI evaluation regarding that also, has had normal LFTs on prior labs.      - 8/4/2022 data: CMP unremarkable.  LDH normal 174.  CRP mildly elevated 1.5 upper limit 0.9.  Sedimentation rate normal 9.  CBC normal with normal differential.  HARRIS negative    -9/19/2022 EGD and colonoscopy [facility] random colon biopsy negative.  Transverse colon polyp sessile serrated adenoma.  Duodenal biopsy with normal architecture.  Stomach biopsy mild chronic inactive gastritis with no dysplasia or metaplasia or H. pylori.  There was a lot of stool in the entire colon interfering with visualization.  Internal hemorrhoids.  EGD showed no gross lesions.    -9/27/2022 [facility] medical oncology follow-up: No clear-cut evidence of lymphoma on CTs or EGD and colonoscopy and labs are unremarkable and negative HARRIS.  Her dermatologist told her she had eczema.  Nonetheless she is still periodically so fatigued that she ends up going to the bed for 2 or 3 days and then rebounds and feels normal for a while and then this waxing and waning of performance status and fatigue continues.  We will get a PET through her thighs as that was the original site of her disease.  I will also have her get EBV and CMV and HIV and thyroid functions.    -10/7/2022 whole-body PET is essentially negative.  There is a solitary borderline focus SUV 2.8 near the left lung apex  without CT correlate favored to be infectious/inflammatory change.  No hypermetabolism in particular of the lower extremities where her original disease resided.    -10/11/2022 [facility] medical oncology follow-up: States she had COVID September 24 which was before I last saw her but did not know that when last I saw her.  She did not get her EBV, CMV, HIV, thyroid functions but says she is now getting over the COVID as well as some sinusitis and she thinks that is the fatigue.  Her PET was entirely negative..  We will continue to check her whole-body PET until April 2023 which is when we will see her back and then we will do that annually only as clinically indicated on annual H&P.    -10/27/2022 CBC normal with normal differential and unremarkable CMP, B12, folate, and normal thyroid function. HIV nonreactive.  CMV DNA quantitation negative.  EBV quantitation PCR negative and EBV antibody profile shows negative VCA IgM with increased IgG VCA of 122 and nuclear antigen IgG 68.8 commensurate with prior infection.  C-reactive protein 21.  C4 complement normal.  C3 complement slightly elevated to 27 upper limit 167.  ANCA panel negative.  HARRIS negative.  CCP negative.  Rheumatoid factor not drawn.  Sedimentation rate normal 24.  Anti-SSA and SSB negative.      -12/1/2022 Platelets minimally elevated 384,000 with otherwise unremarkable CBC and CMP.  C-reactive protein elevated 13.1.  Sedimentation rate 30 barely elevated.  Rheumatoid factor negative.  Moderate external hemoglobin and urine.  4-10 red cells per high-power field.    -4/11/2023 CT chest abdomen pelvis Showed no evidence of adenopathy or splenomegaly.  Atherosclerotic changes.  Hepatic steatosis with small liver cyst.    -4/18/2023 [facility] hematology oncology follow-up: No radiographic evidence of recurrence.  Viral studies all negative.  CBC unremarkable.  No significant inflammatory markers and LDH normal. As outlined in her survivorship visit, she has  completed routine surveillance CTs per NCCN guidelines and at this point would only image as clinically indicated.  We will continue H&P every 6 months out till April 2026.  I would probably continue to follow her clinically out past April 2026 for signs or symptoms of late recurrences there was a component of follicular lymphoma within the higher grade lymphoma and late recurrences may occur but at this point no further labs or scans in the absence of symptoms.    -8/23/2023 CBC And CMP ordered by primary care unremarkable    -10/24/2023 [facility] hematology oncology follow-up: No new somatic complaints to suggest recurrence.  No palpable cervical or axillary or inguinal adenopathy.  I reviewed labs ordered by primary care in August which were unremarkable.  I will have her see my nurse practitioner every 6 months and a survivorship mode until April 2026 at least.  At that point, given that there was a component of follicular lymphoma within the higher grade lymphoma there is a slightly higher risk of late relapse and I would consider continuing H&P surveillance with oncology every 6 to 12 months after April 2026.  No plans for scans or labs in the absence of symptoms.     Diffuse large B-cell lymphoma of lymph nodes of inguinal region   3/29/2021 -  Other Event    Echocardiogram  Left ventricular ejection fraction appears to be 61 - 65%. Left ventricular systolic function is normal.  Normal global longitudinal strain at -23.5%.  Left ventricular diastolic function was normal.  No significant structural or functional valvular disease.     4/2/2021 Imaging    PET/CT IMPRESSION:  Postsurgical changes right proximal thigh and superficial  inguinal region adjacent to adenopathy with abnormal hypermetabolism  maximum SUV 4.0. Right internal iliac lymph node is mildly enlarged with  maximum SUV 2.6 additionally noted. No soft tissue mass or adenopathy  outside of this region.     4/21/2021 Biopsy    Marrow biopsy  negative     4/26/2021 - 6/28/2021 Chemotherapy    Completed course #3 6/8/2021  OP LYMPHOMA R-CHOP RiTUXimab / Cyclophosphamide / DOXOrubicin / VinCRIStine / PredniSONE         4/26/2021 Cancer Staged    Staging form: Hodgkin And Non-Hodgkin Lymphoma, AJCC 8th Edition  - Clinical: Stage II (Diffuse large B-cell lymphoma) - Signed by Rodríguez Gerardo MD on 4/26/2021 6/22/2021 Imaging     PET shows resolution of right inguinal and iliac adenopathy.  Negative PET/CT.     7/22/2021 - 8/16/2021 Radiation    Radiation OncologyTreatment Course:  Iliana Gray received 36 cGy in 18 fractions to right pelvic and inguinofemoral nodes (involved field) under the care of Dr. Jl Infante.     10/14/2021 Imaging    PET/CT: Stable negative PET/CT with no new hypermetabolic adenopathy to suggest lymphomatous disease progression.     1/28/2022 Imaging    CT chest abdomen pelvis with contrast shows no adenopathy chest abdomen pelvis and no acute process.  Hepatic steatosis with sigmoid diverticulosis.  Hemoglobin 13.6 with normal CBC and differential.  CMP entirely normal with creatinine 0.65     7/28/2022 Imaging    CT chest, abdomen and pelvis:  1. No evidence of lymphadenopathy in the chest abdomen or pelvis.  2. Diffuse hepatic steatosis.  3. Sigmoid diverticulosis.  4. Hysterectomy  5. Postoperative changes of prior right inguinal lymph node resection.     4/11/2023 Imaging    CT chest abdomen pelvis Showed no evidence of adenopathy or splenomegaly.  Atherosclerotic changes.  Hepatic steatosis with small liver cyst.         HISTORY OF PRESENT ILLNESS:  The patient is a 53 y.o. female, here for follow up on management of history of lymphoma.  No new somatic complaints    Past Medical History:   Diagnosis Date   • Anxiety    • Arthritis    • Asthma    • Diffuse large B cell lymphoma    • Sleep apnea     cpap     Past Surgical History:   Procedure Laterality Date   • HYSTERECTOMY     • TUMOR EXCISION Right 04/2021     "lymph node removal/biopsy/right inner thigh   • VENOUS ACCESS DEVICE (PORT) INSERTION Right 04/2021       No Known Allergies    Family History and Social History reviewed and changed as necessary    REVIEW OF SYSTEM:   No new somatic complaints    PHYSICAL EXAM:  No palpable cervical axillary or inguinal adenopathy.  No organomegaly.  No rashes.  No respiratory distress.    Vitals:    10/24/23 0959   BP: 127/72   Pulse: 96   Resp: 18   Temp: 98.4 °F (36.9 °C)   SpO2: 95%   Weight: 96.2 kg (212 lb)   Height: 160 cm (63\")     Vitals:    10/24/23 0959   PainSc: 0-No pain          ECOG score: 0           Vitals reviewed.    ECOG: (0) Fully Active - Able to Carry On All Pre-disease Performance Without Restriction    Lab Results   Component Value Date    HGB 13.7 08/23/2023    HCT 41.0 08/23/2023    MCV 89 08/23/2023     08/23/2023    WBC 5.8 08/23/2023    NEUTROABS 3.0 08/23/2023    LYMPHSABS 2.3 08/23/2023    MONOSABS 0.3 08/23/2023    EOSABS 0.1 08/23/2023    BASOSABS 0.0 08/23/2023       Lab Results   Component Value Date    GLUCOSE 95 08/23/2023    BUN 10 08/23/2023    CREATININE 0.58 08/23/2023     08/23/2023    K 4.1 08/23/2023     08/23/2023    CO2 23 08/23/2023    CALCIUM 9.0 08/23/2023    PROTEINTOT 7.0 01/28/2022    ALBUMIN 4.6 08/23/2023    BILITOT 0.3 08/23/2023    ALKPHOS 81 08/23/2023    AST 12 08/23/2023    ALT 13 08/23/2023             ASSESSMENT & PLAN:  1.  Right Medial thigh stage IIa nonbulky less than 7.5 cm diffuse large b cell non-hodgkin lymphoma with component of background follicular lymphoma plan for R-CHOP x3 followed by ISRT.  Had CR after 3 courses of R-CHOP x3 ending 6/8/2021 per PET 6/22/2021.  Fatigue and rash (eczema per dermatology) starting around August 2022 with negative CT chest abdomen pelvis.    Oncology history timeline:  -2/18/2021 hemoglobin 12.7 with normal white count platelet count differential.  CMP unremarkable.  Urinalysis with microscopic hematuria " 20-30 red blood cells per milliliter.  AP single view chest x-ray emergency room [place] showed no acute findings.  CT abdomen pelvis with IV contrast emergency room [place] showed calcified granuloma right lung base, fatty liver infiltration, subcentimeter hypodensity left hepatic lobe too small to categorize, normal size spleen, no adrenal masses, no pancreatic mass, normal kidneys, and no retroperitoneal adenopathy or ascites or pneumoperitoneum.  There was a smooth marginated homogeneous enhancing 4.5 x 5 x 3.3 cm mass in the superficial soft tissues of the anterior upper right thigh.  -2/25/2021 office note from Dr. Gong mentions presentation to emergency room 2/18/2021 complaining of right upper thigh swelling for the preceding week of 2/12/2021.  CT scan abdomen pelvis to include the thighs performed at [facility] emergency room revealing 5 cm smoothly marginated right upper thigh mass below the groin crease suspicious for pathologic lymph node but could be neoplastic of other nature given no prodromal illness, infections, or inflammation.  Recommendation for biopsy was made.  Per Dr. Gong note, she had been sick for the better part of the winter and had been urgent treatment centers told that she had a viral infection flu and coronavirus negative with fevers and chills that subsequently resolved but for fatigue that did not resolve with hypersomnolence as well.  Says she feels hot all the time but no ronny night sweats.  -3/12/2021 right thigh mass needle core tissue sample extremely small and predominantly crushed limiting adequate evaluation.  Differential diagnosis for this B-cell lymphoma is diffuse large B cell, follicular lymphoma versus other.  Further differentiation could not be performed on this crush small sample.  -3/25/2021 [facility] hematology oncology initial consultation: I, Rodríguez Gerardo, saw for the first time today.  Reviewed the above story with her.  Still fatigued without  night sweats or unexplained weight loss.  She has this mass in the soft tissue medial right thigh about 5 cm in size that does seem to move somewhat beneath the skin but also to my exam feels like it is attached to the deeper structures of the thigh as well.  I feel no other adenopathy or hepatosplenomegaly.  I will get a PET scan.  I spoken with Dr. Gong who will look at these images again with the CTs in [place].  If there is clear indication that this is not coming from soft tissue nonnodal structures, then excisional biopsy for adequate tissue diagnosis is important to determine the type of therapy.  This is growing since her biopsy 3/12/2021 but not daily like a very high-grade lymphoma.  To my hand, this does not feel like a typical stas structure and my other concern is, even though this does not venessa with any markers to suggest sarcoma or the like, is to be sure that this is not something other than lymphoma and I have spoken with Dr. Pino who will do desmin stains and other markers before doing an excisional biopsy next Wednesday with Dr. Gong.  If those additional stains suggest sarcomatoid features that can appear in the medial aspect of the thigh such as this, then she probably needs to have excision by Humberto Vera orthopedic oncologist at .  In the meantime, presuming this to be some form of lymphoma which is the highest likelihood based on the pathology from Dr. Pino where this is CD20 positive, CD10 positive, BCL6 40% positive, MUM1 40% positive, CD23 strong and diffuse positive, and BCL-2 positive.  Ki-67 is 50%.  CD30 and only 5%.  C-Myc is weakly expressed in 10% of cells.  BRIAN by TAYLER is negative.  Cyclin D1 negative.  CD5 indeterminate for B and T-cell coexpression.  LEF 1 presumably staining T cells.  Ultimate decision for treatment depends on the specific pathologic subtype and the staging and I will get her prognostic labs going as well.  Given the relatively initial  high-grade features of this that might require Adriamycin I will also proceed with echocardiogram.  -3/25/2021 CBC normal.  CMP unremarkable.  Sedimentation rate normal 27 with C-reactive protein elevated 1.7 upper limit of normal 0.5.  Beta-2 microglobulin normal 1.6.  Uric acid normal 4.3.  LDH normal 162.  Hepatitis B and C serologies negative.  -3/29/2021 echocardiogram ejection fraction 61 to 65%.  -3/31/21 excisional node biopsy shows diffuse large B cell non-Hodgkin lymphoma  -4/2/2021 PET shows postsurgical changes right proximal thigh and superficial inguinal region adjacent to adenopathy with SUV 4.0.  Right internal iliac lymph node mildly enlarged SUV 2.6 additionally noted.  No other adenopathy outside this region.  No mention of any abnormal marrow signal.  -4/5/2021 [facility] medical oncology follow-up visit: Per Dr. Arun Garcia pathologist, this is a diffuse large B cell non-Hodgkin lymphoma.  He is not sure whether this is double hit or not yet.  He is not sure if there is a background of follicular lymphoma and hence cannot say whether this is transformed.  Clinically her IPI score is 0 with her age less than 60, normal LDH, ECOG 0, no extranodal involvement on PET, and only stage II at worse per PET with all nodes within the same region.  I will get a bone marrow biopsy.  If this is stage IV, double hit, or has evidence of transition from follicular lymphoma into diffuse large B-cell lymphoma, then I would give R-CHOP x6.  If the marrow is not involved, this is not double hit, and there is no evidence of follicular transformation into diffuse large B-cell lymphoma, then I would treat this as a stage II nonbulky diffuse large B-cell lymphoma and consider R-CHOP x3 followed by involved site radiation therapy.  I reviewed her PET and biopsy reports and echocardiogram which shows good ejection fraction.  She will need to get her [employer] Covid vaccine this week, family doctor established for  the possibility of prednisone-induced diabetes, CT-guided bone marrow biopsy, chemo preparation visit, and a port placement with Dr. Gong.  We will present her at multidisciplinary tumor board this Friday.  I went into detail regarding the side effects of R-CHOP but she will have these reviewed at her chemo preparation visit.  Though not bulky, I still would treat her with allopurinol.  First course of therapy will occur in our [place] office and if all goes well then the subsequent Rituxan doses can be faster and performed in [place] office.    -4/14/2021 pathology update Dr. Garcia: Node pathology negative for double hit by FISH    -4/21/2021 bone marrow biopsy negative for lymphomatous involvement with scant stainable iron.      -4/26/2021 [facility] oncology follow-up visit: Given lack of marrow involvement, negative for double hit, and possible background follicular lymphoma , I will treat her as stage IIa nonbulky (original tumor 5 cm on CT, I.  E.  Less than 7.5 cm) diffuse large B-cell lymphoma right proximal thigh SUV 4.0 mass incisionally biopsied (with palpable residua) with mild right internal iliac node enlargement SUV 2.6 as well.  We will treat her with planned R-CHOP x3 followed by involved site radiation therapy.  This was consensus of multidisciplinary tumor board 4/16/2021 as well.  First course [place].  Second course will occur in [place].  With the small component of background follicular lymphoma, there is potential risk of not only relapse of diffuse large B-cell lymphoma but later relapse from follicular lymphoma and we will keep that in mind in terms of following her up past the usual 3 years post definitive therapy presuming we get a complete remission on subsequent imaging and exams.  We will arrange radiation in [place] and I have made referral.  This will not start until after chemotherapy.    -5/13/2021 radiation oncology consultation Dr. Jl Infante for preparation  of future involved field radiation therapy    -5/18/2021 [facility] oncology clinic follow-up: Had moderate amount of nausea after her first cycle, will add Zyprexa to Zofran and Compazine that she is already taking. She was also anxious, added Ativan to her care plan premeds. Prescription for Augmentin sent in for sinus infection. Clinically good response with reduction of right medial thigh mass. We will continue treatment in [place] due to mild infusion reaction with first Rituxan but she was able to complete treatment with additional medications. Today is cycle #2 of a planned 3 courses and then will repeat restaging scan to assess for response and proceed to radiation as outlined above.    -6/8/2021 course #3 Rituxan CHOP    -6/22/2021 PET shows resolution of right inguinal and iliac adenopathy.  Negative PET/CT.    -6/28/2021 [facility] medical oncology follow-up visit: I reviewed images and reports of PET as above.  Status post Rituxan CHOP, PET is essentially negative with complete clinical remission.With the small component of background follicular lymphoma, there is potential risk of not only relapse of diffuse large B-cell lymphoma but later relapse from follicular lymphoma and we will keep that in mind in terms of following her up past the usual 3 years post definitive therapy presuming we get a complete remission on subsequent imaging and exams.  We will arrange radiation in [place] and Dr. Infante saw in mid May.  I called Dr. Infante to get her in to start involved field radiation and we will repeat PET and labs prior to return in 3 months for survivorship visit with my nurse practitioner who will lay out the follow-up according to NCCN guidelines.  Other than for modest anemia hemoglobin in the tens and glucose 150s, no other remarkable findings on CBC and CMP 6/8/2021.    -6/8/2021 completed course #3 of R-CHOP with resolution of right inguinal and iliac adenopathy on 6/22/2021 PET    -8/16/2021  completed 36 Gray in 18 fractions involved field right pelvic/inguinal femoral nodes Dr. Infante    -10/21/2021 [facility] Oncology clinic follow-up/survivorship visit: PET scan from 10/14/2021 was negative along with normal CBC and CMP.  She completed radiation in August without complications.  We will get her back to Dr. Gong for port removal.  We will continue surveillance per NCCN guidelines as follows:  -H&P and labs every 3-6 months for 5 years and then annually or as clinically indicated.    -We will do surveillance imaging post completion of treatment with CT chest, abdomen and pelvis with contrast every 6 months for 2 years and then annually or as clinically indicated.    -1/28/2022 CT chest abdomen pelvis with contrast shows no adenopathy chest abdomen pelvis and no acute process.  Hepatic steatosis with sigmoid diverticulosis.  Hemoglobin 13.6 with normal CBC and differential.  CMP entirely normal with creatinine 0.65.    -2/1/2022 [facility] medical oncology follow-up visit: I reviewed images and reports of scans as outlined above as well as labs.  No evidence of recurrence.  As outlined by my nurse practitioner survivorship visit, she will get an H&P every 3 to 6 months out to April 2026 and then annually thereafter and we will do surveillance CT chest abdomen pelvis every 6 months until April 2023 and then annually as clinically indicated.  As I have stated previously, we would want to continue to follow her clinically past the usual 2 years of radiographic follow-up for signs or symptoms of follicular lymphoma as there was a small background follicular lymphomatous component that could recur late.  She will see my nurse practitioner back in 6 months with CTs just prior to return.  No further CBC or CMP in the absence of signs or symptoms given normalization of her counts.    -7/28/2022 CT chest, abdomen and pelvis with no evidence of lymphadenopathy in the chest, abdomen or pelvis, diffuse hepatic  steatosis, sigmoid diverticulosis.    - 8/4/2022 [facility] follow-up: Iliana has been feeling poorly since treatment ended a year ago for her history of lymphoma.  Current CT chest, abdomen and pelvis show no evidence of disease recurrence.  Her symptoms unfortunately have not improved since treatment ended and she is having more bad days than good.  She has a rash on the dorsum of her hands and foot that appear to be possibly eczema versus psoriasis.  We will get her to dermatology for further evaluation, I have asked her to call her insurance company to let us know the name of a provider that she would like to see and then we can put in a referral.  She has ongoing fatigue that has not improved with time, she is unable to do most activities without having to rest for the remainder of the day afterwards.  She also has nausea and loose stool with urgency.  I will get her to gastroenterology for an EGD and colonoscopy to rule out any GI involvement of her lymphoma as it did have follicular component.  I will also get labs today with CBC, CMP, we will also check HARRIS in light of her rash, will check inflammatory markers with sedimentation rate and CRP and an LDH.  We will see her back in a few months to go over her labs and GI evaluation.  She does have a history of hepatic steatosis shown on all prior CTs therefore would benefit from GI evaluation regarding that also, has had normal LFTs on prior labs.      - 8/4/2022 data: CMP unremarkable.  LDH normal 174.  CRP mildly elevated 1.5 upper limit 0.9.  Sedimentation rate normal 9.  CBC normal with normal differential.  HARRIS negative    -9/19/2022 EGD and colonoscopy Mani Cleveland random colon biopsy negative.  Transverse colon polyp sessile serrated adenoma.  Duodenal biopsy with normal architecture.  Stomach biopsy mild chronic inactive gastritis with no dysplasia or metaplasia or H. pylori.  There was a lot of stool in the entire colon interfering with  visualization.  Internal hemorrhoids.  EGD showed no gross lesions.    -9/27/2022 [facility] medical oncology follow-up: No clear-cut evidence of lymphoma on CTs or EGD and colonoscopy and labs are unremarkable and negative HARRIS.  Her dermatologist told her she had eczema.  Nonetheless she is still periodically so fatigued that she ends up going to the bed for 2 or 3 days and then rebounds and feels normal for a while and then this waxing and waning of performance status and fatigue continues.  We will get a PET through her thighs as that was the original site of her disease.  I will also have her get EBV and CMV and HIV and thyroid functions.    -10/7/2022 whole-body PET is essentially negative.  There is a solitary borderline focus SUV 2.8 near the left lung apex without CT correlate favored to be infectious/inflammatory change.  No hypermetabolism in particular of the lower extremities where her original disease resided.    -10/11/2022 [facility] medical oncology follow-up: States she had COVID September 24 which was before I last saw her but did not know that when last I saw her.  She did not get her EBV, CMV, HIV, thyroid functions but says she is now getting over the COVID as well as some sinusitis and she thinks that is the fatigue.  Her PET was entirely negative..  We will continue to check her whole-body PET until April 2023 which is when we will see her back and then we will do that annually only as clinically indicated on annual H&P.    -10/27/2022 CBC normal with normal differential and unremarkable CMP, B12, folate, and normal thyroid function. HIV nonreactive.  CMV DNA quantitation negative.  EBV quantitation PCR negative and EBV antibody profile shows negative VCA IgM with increased IgG VCA of 122 and nuclear antigen IgG 68.8 commensurate with prior infection.  C-reactive protein 21.  C4 complement normal.  C3 complement slightly elevated to 27 upper limit 167.  ANCA panel negative.  HARRIS negative.  CCP  negative.  Rheumatoid factor not drawn.  Sedimentation rate normal 24.  Anti-SSA and SSB negative.      -12/1/2022 Platelets minimally elevated 384,000 with otherwise unremarkable CBC and CMP.  C-reactive protein elevated 13.1.  Sedimentation rate 30 barely elevated.  Rheumatoid factor negative.  Moderate external hemoglobin and urine.  4-10 red cells per high-power field.    -4/11/2023 CT chest abdomen pelvis Showed no evidence of adenopathy or splenomegaly.  Atherosclerotic changes.  Hepatic steatosis with small liver cyst.    -4/18/2023 [facility] hematology oncology follow-up: No radiographic evidence of recurrence.  Viral studies all negative.  CBC unremarkable.  No significant inflammatory markers and LDH normal. As outlined in her survivorship visit, she has completed routine surveillance CTs per NCCN guidelines and at this point would only image as clinically indicated.  We will continue H&P every 6 months out till April 2026.  I would probably continue to follow her clinically out past April 2026 for signs or symptoms of late recurrences there was a component of follicular lymphoma within the higher grade lymphoma and late recurrences may occur but at this point no further labs or scans in the absence of symptoms.    -8/23/2023 CBC And CMP ordered by primary care unremarkable    -10/24/2023 [facility] hematology oncology follow-up: No new somatic complaints to suggest recurrence.  No palpable cervical or axillary or inguinal adenopathy.  I reviewed labs ordered by primary care in August which were unremarkable.  I will have her see my nurse practitioner every 6 months and a survivorship mode until April 2026 at least.  At that point, given that there was a component of follicular lymphoma within the higher grade lymphoma there is a slightly higher risk of late relapse and I would consider continuing H&P surveillance with oncology every 6 to 12 months after April 2026.  No plans for scans or labs in the absence of  symptoms.    Total time of care today 15 minutes  Rodríguez Gerardo MD    10/24/2023

## 2023-11-14 ENCOUNTER — OFFICE VISIT (OUTPATIENT)
Dept: BEHAVIORAL HEALTH | Facility: CLINIC | Age: 53
End: 2023-11-14
Payer: COMMERCIAL

## 2023-11-14 VITALS
SYSTOLIC BLOOD PRESSURE: 124 MMHG | BODY MASS INDEX: 37.21 KG/M2 | HEIGHT: 63 IN | OXYGEN SATURATION: 93 % | DIASTOLIC BLOOD PRESSURE: 82 MMHG | HEART RATE: 80 BPM | WEIGHT: 210 LBS

## 2023-11-14 DIAGNOSIS — F41.1 GENERALIZED ANXIETY DISORDER: Primary | ICD-10-CM

## 2023-11-14 DIAGNOSIS — G47.20 CIRCADIAN RHYTHM SLEEP DISORDER, UNSPECIFIED TYPE: ICD-10-CM

## 2023-11-14 DIAGNOSIS — F43.10 POST TRAUMATIC STRESS DISORDER (PTSD): ICD-10-CM

## 2023-11-14 DIAGNOSIS — F90.2 ATTENTION DEFICIT HYPERACTIVITY DISORDER (ADHD), COMBINED TYPE: ICD-10-CM

## 2023-11-14 DIAGNOSIS — F42.2 MIXED OBSESSIONAL THOUGHTS AND ACTS: ICD-10-CM

## 2023-11-14 DIAGNOSIS — F25.0 SCHIZOAFFECTIVE DISORDER, BIPOLAR TYPE: ICD-10-CM

## 2023-11-14 PROCEDURE — 1159F MED LIST DOCD IN RCRD: CPT | Performed by: NURSE PRACTITIONER

## 2023-11-14 PROCEDURE — 1160F RVW MEDS BY RX/DR IN RCRD: CPT | Performed by: NURSE PRACTITIONER

## 2023-11-14 PROCEDURE — 90792 PSYCH DIAG EVAL W/MED SRVCS: CPT | Performed by: NURSE PRACTITIONER

## 2023-11-14 RX ORDER — DIAZEPAM 5 MG/1
5 TABLET ORAL 2 TIMES DAILY PRN
COMMUNITY

## 2023-11-14 RX ORDER — CITALOPRAM 20 MG/1
20 TABLET ORAL DAILY
Qty: 30 TABLET | Refills: 1 | Status: SHIPPED | OUTPATIENT
Start: 2023-11-14

## 2023-11-14 RX ORDER — PAROXETINE 10 MG/1
10 TABLET, FILM COATED ORAL EVERY MORNING
Qty: 30 TABLET | Refills: 1 | Status: SHIPPED | OUTPATIENT
Start: 2023-11-14

## 2023-11-14 RX ORDER — PAROXETINE 10 MG/1
10 TABLET, FILM COATED ORAL EVERY MORNING
Qty: 30 TABLET | Refills: 1 | Status: SHIPPED | OUTPATIENT
Start: 2023-11-14 | End: 2023-11-14 | Stop reason: SDUPTHER
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
## 2023-11-15 ENCOUNTER — TELEPHONE (OUTPATIENT)
Dept: BEHAVIORAL HEALTH | Facility: CLINIC | Age: 53
End: 2023-11-15
Payer: COMMERCIAL

## 2023-11-15 NOTE — TELEPHONE ENCOUNTER
----- Message from MAN Win sent at 11/14/2023  6:50 PM EST -----  She need to stop in tomorrow and leave a UDS and sign the CSA please.    Thanks!  Miquel

## 2023-11-16 ENCOUNTER — CLINICAL SUPPORT (OUTPATIENT)
Dept: BEHAVIORAL HEALTH | Facility: CLINIC | Age: 53
End: 2023-11-16
Payer: COMMERCIAL

## 2023-11-16 DIAGNOSIS — F41.1 GENERALIZED ANXIETY DISORDER: Primary | ICD-10-CM

## 2023-11-16 DIAGNOSIS — F90.2 ATTENTION DEFICIT HYPERACTIVITY DISORDER (ADHD), COMBINED TYPE: ICD-10-CM

## 2023-11-16 LAB
AMPHET+METHAMPHET UR QL: NEGATIVE
AMPHETAMINE INTERNAL CONTROL: ABNORMAL
AMPHETAMINES UR QL: NEGATIVE
BARBITURATE INTERNAL CONTROL: ABNORMAL
BARBITURATES UR QL SCN: NEGATIVE
BENZODIAZ UR QL SCN: POSITIVE
BENZODIAZEPINE INTERNAL CONTROL: ABNORMAL
BUPRENORPHINE INTERNAL CONTROL: ABNORMAL
BUPRENORPHINE SERPL-MCNC: NEGATIVE NG/ML
CANNABINOIDS SERPL QL: NEGATIVE
COCAINE INTERNAL CONTROL: ABNORMAL
COCAINE UR QL: NEGATIVE
EXPIRATION DATE: ABNORMAL
Lab: ABNORMAL
MDMA (ECSTASY) INTERNAL CONTROL: ABNORMAL
MDMA UR QL SCN: NEGATIVE
METHADONE INTERNAL CONTROL: ABNORMAL
METHADONE UR QL SCN: NEGATIVE
METHAMPHETAMINE INTERNAL CONTROL: ABNORMAL
OPIATES INTERNAL CONTROL: ABNORMAL
OPIATES UR QL: NEGATIVE
OXYCODONE INTERNAL CONTROL: ABNORMAL
OXYCODONE UR QL SCN: NEGATIVE
PCP UR QL SCN: NEGATIVE
PHENCYCLIDINE INTERNAL CONTROL: ABNORMAL
THC INTERNAL CONTROL: ABNORMAL

## 2023-11-23 DIAGNOSIS — R73.03 PREDIABETES: ICD-10-CM

## 2023-11-27 RX ORDER — METFORMIN HYDROCHLORIDE 500 MG/1
500 TABLET, EXTENDED RELEASE ORAL
Qty: 90 TABLET | Refills: 1 | OUTPATIENT
Start: 2023-11-27

## 2023-11-29 DIAGNOSIS — F42.2 MIXED OBSESSIONAL THOUGHTS AND ACTS: ICD-10-CM

## 2023-11-29 DIAGNOSIS — F41.1 GENERALIZED ANXIETY DISORDER: ICD-10-CM

## 2023-11-29 DIAGNOSIS — F43.10 POST TRAUMATIC STRESS DISORDER (PTSD): ICD-10-CM

## 2023-11-29 DIAGNOSIS — F25.0 SCHIZOAFFECTIVE DISORDER, BIPOLAR TYPE: ICD-10-CM

## 2023-11-29 NOTE — TELEPHONE ENCOUNTER
Patient called and requested we resend her medications to [facility] due to [facility] not being able to fill them.

## 2023-11-30 RX ORDER — CITALOPRAM 20 MG/1
20 TABLET ORAL DAILY
Qty: 30 TABLET | Refills: 1 | Status: SHIPPED | OUTPATIENT
Start: 2023-11-30

## 2023-11-30 RX ORDER — PAROXETINE 10 MG/1
10 TABLET, FILM COATED ORAL EVERY MORNING
Qty: 30 TABLET | Refills: 1 | Status: SHIPPED | OUTPATIENT
Start: 2023-11-30

## 2023-12-04 ENCOUNTER — TELEPHONE (OUTPATIENT)
Dept: BEHAVIORAL HEALTH | Facility: CLINIC | Age: 53
End: 2023-12-04
Payer: COMMERCIAL

## 2023-12-04 DIAGNOSIS — F41.1 GENERALIZED ANXIETY DISORDER: Primary | ICD-10-CM

## 2023-12-04 DIAGNOSIS — G47.20 CIRCADIAN RHYTHM SLEEP DISORDER, UNSPECIFIED TYPE: ICD-10-CM

## 2023-12-04 NOTE — TELEPHONE ENCOUNTER
PATIENT CALLED ABOUT HER MEDICATION.     STATES HER DIAZEPAM WAS NEVER CALLED IN.     ALSO, HER INSURANCE DOES NOT COVER THE VRAYLAR, BUT STATES THE MEDICATION IS DOING WELL.

## 2023-12-05 ENCOUNTER — TELEPHONE (OUTPATIENT)
Dept: BEHAVIORAL HEALTH | Facility: CLINIC | Age: 53
End: 2023-12-05
Payer: COMMERCIAL

## 2023-12-06 RX ORDER — DIAZEPAM 5 MG/1
5 TABLET ORAL 2 TIMES DAILY PRN
Qty: 60 TABLET | Refills: 0 | Status: SHIPPED | OUTPATIENT
Start: 2023-12-06

## 2024-01-09 ENCOUNTER — OFFICE VISIT (OUTPATIENT)
Dept: BEHAVIORAL HEALTH | Facility: CLINIC | Age: 54
End: 2024-01-09
Payer: COMMERCIAL

## 2024-01-09 VITALS
SYSTOLIC BLOOD PRESSURE: 130 MMHG | BODY MASS INDEX: 37.74 KG/M2 | OXYGEN SATURATION: 93 % | WEIGHT: 213 LBS | HEART RATE: 88 BPM | HEIGHT: 63 IN | DIASTOLIC BLOOD PRESSURE: 86 MMHG

## 2024-01-09 DIAGNOSIS — G47.20 CIRCADIAN RHYTHM SLEEP DISORDER, UNSPECIFIED TYPE: ICD-10-CM

## 2024-01-09 DIAGNOSIS — F41.1 GENERALIZED ANXIETY DISORDER: Primary | ICD-10-CM

## 2024-01-09 DIAGNOSIS — F25.0 SCHIZOAFFECTIVE DISORDER, BIPOLAR TYPE: ICD-10-CM

## 2024-01-09 DIAGNOSIS — F42.2 MIXED OBSESSIONAL THOUGHTS AND ACTS: ICD-10-CM

## 2024-01-09 DIAGNOSIS — F90.2 ATTENTION DEFICIT HYPERACTIVITY DISORDER (ADHD), COMBINED TYPE: ICD-10-CM

## 2024-01-09 DIAGNOSIS — F43.10 POST TRAUMATIC STRESS DISORDER (PTSD): ICD-10-CM

## 2024-01-09 RX ORDER — PAROXETINE HYDROCHLORIDE 20 MG/1
20 TABLET, FILM COATED ORAL EVERY MORNING
Qty: 20 TABLET | Refills: 1 | Status: SHIPPED | OUTPATIENT
Start: 2024-01-09

## 2024-01-09 RX ORDER — CITALOPRAM 20 MG/1
20 TABLET ORAL DAILY
Qty: 30 TABLET | Refills: 1 | Status: SHIPPED | OUTPATIENT
Start: 2024-01-09

## 2024-01-09 NOTE — PROGRESS NOTES
Follow Up Office Visit      Patient Name: Iliana Gray  : 1970   MRN: [id number]     Referring Provider: Kiley Blackwell PA    Chief Complaint:      ICD-10-CM ICD-9-CM   1. Generalized anxiety disorder  F41.1 300.02   2. Circadian rhythm sleep disorder, unspecified type  G47.20 327.30   3. Schizoaffective disorder, bipolar type  F25.0 295.70   4. Post traumatic stress disorder (PTSD)  F43.10 309.81   5. Mixed obsessional thoughts and acts  F42.2 300.3   6. Attention deficit hyperactivity disorder (ADHD), combined type  F90.2 314.01        History of Present Illness:   Iliana Gray is a 53 y.o. female who is here today for follow up with psychiatric medications.    Subjective      Patient Reports:   Vraylar has changed my life, my anxiety has been almost non existent.  Struggle with sleep some nights, will switch between melatonin and diazepam.  No side effects.  Was hungry with start of vraylar but now is much better. Not an issue  I was able to drive in the dark, in the rain all the way back from [place] and I would have never been able to do before.  My anxiety was so bad and now it is not an issue at all.  I really hate my job.  I haven't really had any crazy intrusive thoughts like I was.  Impulsiveness is an issue. Can't stop buying things to resell on [employer].   Need to pay my credit cards off.  I had a low couple of days so I went back to my higher dose of citalopram    Review of Systems:   Review of Systems   Psychiatric/Behavioral:  Positive for sleep disturbance.        Screening Scores:   PHQ-9 : 7  DEO-7 : 6    RISK ASSESSMENT:  Patient denies any high risk factors today.    Medications:     Current Outpatient Medications:     aspirin 81 MG chewable tablet, Chew 1 tablet Daily., Disp: , Rfl:     atorvastatin (Lipitor) 20 MG tablet, Take 1 tablet by mouth Daily., Disp: 90 tablet, Rfl: 3    Cariprazine HCl (Vraylar) 1.5 MG capsule capsule, Take 1 capsule by mouth Daily., Disp:  "30 capsule, Rfl: 1    citalopram (CeleXA) 20 MG tablet, Take 1 tablet by mouth Daily., Disp: 30 tablet, Rfl: 1    clobetasol (TEMOVATE) 0.05 % ointment, PLEASE SEE ATTACHED FOR DETAILED DIRECTIONS, Disp: , Rfl:     diazePAM (VALIUM) 5 MG tablet, Take 1 tablet by mouth 2 (Two) Times a Day As Needed for Anxiety or Sleep., Disp: 60 tablet, Rfl: 0    estradiol (ESTRACE) 1 MG tablet, Take 1 tablet by mouth Daily., Disp: , Rfl:     ipratropium (ATROVENT) 0.06 % nasal spray, INSTILL 2 SPRAYS INTRANASALLY 3 TIMES PER DAY FOR 5 DAYS, Disp: , Rfl:     metFORMIN ER (GLUCOPHAGE-XR) 500 MG 24 hr tablet, Take 1 tablet by mouth 2 (Two) Times a Day., Disp: 180 tablet, Rfl: 1    omega-3 acid ethyl esters (LOVAZA) 1 g capsule, Take 2 capsules by mouth 2 (Two) Times a Day., Disp: 360 capsule, Rfl: 1    Symbicort 160-4.5 MCG/ACT inhaler, Inhale 2 puffs 2 (Two) Times a Day., Disp: , Rfl:     Ventolin  (90 Base) MCG/ACT inhaler, , Disp: , Rfl:     vitamin B-12 (CYANOCOBALAMIN) 500 MCG tablet, Take 1 tablet by mouth Daily. OTC, Disp: 1 tablet, Rfl:     PARoxetine (PAXIL) 20 MG tablet, Take 1 tablet by mouth Every Morning., Disp: 20 tablet, Rfl: 1    Current Facility-Administered Medications:     cyanocobalamin injection 1,000 mcg, 1,000 mcg, Intramuscular, Once, Kiley Blackwell PA    Medication Considerations:  ANA reviewed and appropriate.      Allergies:   No Known Allergies    Results Reviewed:   screeners     Objective     Physical Exam:  Vital Signs:   Vitals:    01/09/24 1300   BP: 130/86   Pulse: 88   SpO2: 93%   Weight: 96.6 kg (213 lb)   Height: 160 cm (63\")     Body mass index is 37.73 kg/m².     Mental Status Exam:   Hygiene:   good  Cooperation:  Cooperative  Eye Contact:  Good  Psychomotor Behavior:  Appropriate  Affect:  Appropriate  Mood: euthymic  Speech:  Normal  Thought Process:  Goal directed and Linear  Thought Content:  Mood congruent  Suicidal:  None  Homicidal:  None  Hallucinations:  None  Delusion:  " None  Memory:  Intact  Orientation:  Grossly intact  Reliability:  good  Insight:  Fair  Judgement:  Fair  Impulse Control:  Poor  Physical/Medical Issues:   nothing new reported      Assessment / Plan      Visit Diagnosis/Orders Placed This Visit:  Diagnoses and all orders for this visit:    1. Generalized anxiety disorder (Primary)  -     citalopram (CeleXA) 20 MG tablet; Take 1 tablet by mouth Daily.  Dispense: 30 tablet; Refill: 1  -     PARoxetine (PAXIL) 20 MG tablet; Take 1 tablet by mouth Every Morning.  Dispense: 20 tablet; Refill: 1    2. Circadian rhythm sleep disorder, unspecified type    3. Schizoaffective disorder, bipolar type  -     citalopram (CeleXA) 20 MG tablet; Take 1 tablet by mouth Daily.  Dispense: 30 tablet; Refill: 1  -     Cariprazine HCl (Vraylar) 1.5 MG capsule capsule; Take 1 capsule by mouth Daily.  Dispense: 30 capsule; Refill: 1    4. Post traumatic stress disorder (PTSD)  -     citalopram (CeleXA) 20 MG tablet; Take 1 tablet by mouth Daily.  Dispense: 30 tablet; Refill: 1  -     PARoxetine (PAXIL) 20 MG tablet; Take 1 tablet by mouth Every Morning.  Dispense: 20 tablet; Refill: 1    5. Mixed obsessional thoughts and acts  -     PARoxetine (PAXIL) 20 MG tablet; Take 1 tablet by mouth Every Morning.  Dispense: 20 tablet; Refill: 1    6. Attention deficit hyperactivity disorder (ADHD), combined type         Functional Status: Mild impairment     Prognosis: Guarded with Ongoing Treatment    Impression/Formulation:  Patient appeared alert and oriented. Patient is receptive to assistance with maintaining a stable lifestyle.  Patient presents with history of     ICD-10-CM ICD-9-CM   1. Generalized anxiety disorder  F41.1 300.02   2. Circadian rhythm sleep disorder, unspecified type  G47.20 327.30   3. Schizoaffective disorder, bipolar type  F25.0 295.70   4. Post traumatic stress disorder (PTSD)  F43.10 309.81   5. Mixed obsessional thoughts and acts  F42.2 300.3   6. Attention deficit  hyperactivity disorder (ADHD), combined type  F90.2 314.01   .   Advised patient not to increase citalopram on her own, working toward replacing it.     Treatment Plan:   Continue vraylar  Increase paroxetine-plan to replace citalopram  Referred for therapy with Clive Magallanes  Continue diazepam-only using for sleep-no refill today  Decrease citalopram-plan to wean off     Patient will continue supportive psychotherapy efforts and medications as indicated. Clinic will obtain release of information for current treatment team for continuity of care as needed. Patient will contact this office, call 911 or present to the nearest emergency room should suicidal or homicidal ideations occur.  Discussed medication options and treatment plan of prescribed medication(s) as well as the risks, benefits, and potential side effects. Patient ackowledged and verbally consented to continue with current treatment plan and was educated on the importance of compliance with treatment and follow-up appointments.     Follow Up:   Return in about 8 weeks (around 3/5/2024) for Med Check.        MAN Win, JULIAN-BC  [facility]     This is electronically signed by MAN Win, JAMES  [unfilled] 14:13 EST

## 2024-01-15 ENCOUNTER — OFFICE VISIT (OUTPATIENT)
Dept: BEHAVIORAL HEALTH | Facility: CLINIC | Age: 54
End: 2024-01-15
Payer: COMMERCIAL

## 2024-01-15 DIAGNOSIS — F41.1 GENERALIZED ANXIETY DISORDER: ICD-10-CM

## 2024-01-15 DIAGNOSIS — F42.2 MIXED OBSESSIONAL THOUGHTS AND ACTS: Primary | ICD-10-CM

## 2024-01-15 DIAGNOSIS — F25.0 SCHIZOAFFECTIVE DISORDER, BIPOLAR TYPE: ICD-10-CM

## 2024-01-15 DIAGNOSIS — F43.10 POST TRAUMATIC STRESS DISORDER (PTSD): ICD-10-CM

## 2024-01-15 PROCEDURE — 1159F MED LIST DOCD IN RCRD: CPT

## 2024-01-15 PROCEDURE — 1160F RVW MEDS BY RX/DR IN RCRD: CPT

## 2024-01-15 PROCEDURE — 90791 PSYCH DIAGNOSTIC EVALUATION: CPT
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
## 2024-01-26 ENCOUNTER — OFFICE VISIT (OUTPATIENT)
Dept: BEHAVIORAL HEALTH | Facility: CLINIC | Age: 54
End: 2024-01-26
Payer: COMMERCIAL

## 2024-01-26 DIAGNOSIS — F41.1 GENERALIZED ANXIETY DISORDER: ICD-10-CM

## 2024-01-26 DIAGNOSIS — F42.2 MIXED OBSESSIONAL THOUGHTS AND ACTS: ICD-10-CM

## 2024-01-26 DIAGNOSIS — F25.0 SCHIZOAFFECTIVE DISORDER, BIPOLAR TYPE: Primary | ICD-10-CM

## 2024-01-26 NOTE — PROGRESS NOTES
"         Follow Up Adult Note     Date:2024   Patient Name: Iliana Gray  : 1970   MRN: [id number]   Time IN: 9:55 am    Time OUT: 10:50 am     Referring Provider: Kiley Blackwell PA    Chief Complaint:      ICD-10-CM ICD-9-CM   1. Schizoaffective disorder, bipolar type  F25.0 295.70   2. Mixed obsessional thoughts and acts  F42.2 300.3   3. Generalized anxiety disorder  F41.1 300.02        History of Present Illness:   Iliana Gray is a 53 y.o., single, part-time employed  female who is being seen today for scheduled follow up Psychotherapy session. Mood reported as \"I don't know, feeling anxious and manic\" with intermittently anxious and somewhat elevated but overall cooperative affect. Patient presented with intermittent anxiety and fidgety but was overall cooperative and pleasant during session. Patient denied any current SI/HI/AVH. Checked in to discuss history of intrusive thoughts to harm self and patient stated, \"I haven't had any of that recently\". Patient reports she has felt \"manic the past few days and not slept much\" while later reporting that during this time frame she \"went on another impulsive shopping spree and spent $120.00 on rabbit decorations, but I returned most of it to get the money back\". Patient began to identify physical symptoms of manic moods stating, \"my head starts tingling and my heart races\" while processing recent behaviors. Patient has reported a decrease in depressive symptoms but reported ongoing anxiety related to manic and compulsive/intrusive behaviors.       Subjective     PHQ-9 Depression Screening  PHQ-9 Total Score:  Not completed at this time. Will complete at follow up to monitor progress.      DEO-7   Not completed at this time. Will complete at follow up to monitor progress.      Patient's Support Network Includes:  daughter    Functional Status: Moderate impairment     Progress toward goal: Not at goal    Prognosis: Fair with " Ongoing Treatment     Medications:     Current Outpatient Medications:     aspirin 81 MG chewable tablet, Chew 1 tablet Daily., Disp: , Rfl:     atorvastatin (Lipitor) 20 MG tablet, Take 1 tablet by mouth Daily., Disp: 90 tablet, Rfl: 3    Cariprazine HCl (Vraylar) 1.5 MG capsule capsule, Take 1 capsule by mouth Daily., Disp: 30 capsule, Rfl: 1    citalopram (CeleXA) 20 MG tablet, Take 1 tablet by mouth Daily., Disp: 30 tablet, Rfl: 1    clobetasol (TEMOVATE) 0.05 % ointment, PLEASE SEE ATTACHED FOR DETAILED DIRECTIONS, Disp: , Rfl:     diazePAM (VALIUM) 5 MG tablet, Take 1 tablet by mouth 2 (Two) Times a Day As Needed for Anxiety or Sleep., Disp: 60 tablet, Rfl: 0    estradiol (ESTRACE) 1 MG tablet, Take 1 tablet by mouth Daily., Disp: , Rfl:     ipratropium (ATROVENT) 0.06 % nasal spray, INSTILL 2 SPRAYS INTRANASALLY 3 TIMES PER DAY FOR 5 DAYS, Disp: , Rfl:     metFORMIN ER (GLUCOPHAGE-XR) 500 MG 24 hr tablet, Take 1 tablet by mouth 2 (Two) Times a Day., Disp: 180 tablet, Rfl: 1    omega-3 acid ethyl esters (LOVAZA) 1 g capsule, Take 2 capsules by mouth 2 (Two) Times a Day., Disp: 360 capsule, Rfl: 1    PARoxetine (PAXIL) 20 MG tablet, Take 1 tablet by mouth Every Morning., Disp: 20 tablet, Rfl: 1    Symbicort 160-4.5 MCG/ACT inhaler, Inhale 2 puffs 2 (Two) Times a Day., Disp: , Rfl:     Ventolin  (90 Base) MCG/ACT inhaler, , Disp: , Rfl:     vitamin B-12 (CYANOCOBALAMIN) 500 MCG tablet, Take 1 tablet by mouth Daily. OTC, Disp: 1 tablet, Rfl:     Current Facility-Administered Medications:     cyanocobalamin injection 1,000 mcg, 1,000 mcg, Intramuscular, Once, Kiley Blackwell PA    Allergies:   No Known Allergies    Objective     Mental Status Exam:   MENTAL STATUS EXAM   General Appearance:  Cleanly groomed and dressed  Eye Contact:  Good eye contact  Attitude:  Cooperative and polite  Motor Activity:  Other  Other Comment:  Intermittently fidgety but overall appropriate  Muscle Strength:  Normal  Speech:   "Normal rate, tone, volume  Language:  Spontaneous  Mood and affect:  Other  Other Comment:  Mood reported as \"I don't know, feeling anxious and manic\" with intermittently anxious and somewhat elevated but overall cooperative affect.  Hopelessness:  2  Thought Process:  Other  Other Comment:  Somewhat circumstantial but overall goal directed  Associations/ Thought Content:  No delusions  Hallucinations:  None  Suicidal Ideations:  Not present  Homicidal Ideation:  Not present  Sensorium:  Alert and clear  Orientation:  Person, situation, time and place  Immediate Recall, Recent, and Remote Memory:  Intact  Attention Span/ Concentration:  Good  Fund of Knowledge:  Appropriate for age and educational level  Intellectual Functioning:  Average range  Insight:  Limited  Judgement:  Limited  Reliability:  Fair  Impulse Control:  Poor       Assessment / Plan      Visit Diagnosis/Orders Placed This Visit:    ICD-10-CM ICD-9-CM   1. Schizoaffective disorder, bipolar type  F25.0 295.70   2. Mixed obsessional thoughts and acts  F42.2 300.3   3. Generalized anxiety disorder  F41.1 300.02        PLAN:  Safety: No acute safety concerns  Risk Assessment: Risk of self-harm acutely is low. Risk of self-harm chronically is also low, but could be further elevated in the event of treatment noncompliance and/or AODA.    Treatment Plan/Goals: Continue supportive psychotherapy efforts and medications as indicated. Treatment and medication options discussed during today's visit. Patient ackowledged and verbally consented to continue with current treatment plan and was educated on the importance of compliance with treatment and follow-up appointments. Patient seems reasonably able to adhere to treatment plan.      Assisted Patient in processing above session content; acknowledged and normalized patient’s thoughts, feelings, and concerns.  Rationalized patient thought process regarding recent moods and ongoing concerns with impulsive thoughts " "leading to compulsive shopping. Began to process past experiences to better identify triggers to include manic moods and \"feeling bored with a lot of energy\". Provided psychoeducation on coping techniques while collaborating on further behaviors to replace compulsive shopping. Provided patient with mood tracker sheet to complete over next week to process at follow up session.       Allowed Patient to freely discuss issues  without interruption or judgement with unconditional positive regard, active listening skills, and empathy. Therapist provided a safe, confidential environment to facilitate the development of a positive therapeutic relationship and encouraged open, honest communication. Assisted Patient in identifying risk factors which would indicate the need for higher level of care including thoughts to harm self or others and/or self-harming behavior and encouraged Patient to contact this office, call 911, or present to the nearest emergency room should any of these events occur. Discussed crisis intervention services and means to access. Patient adamantly and convincingly denies current suicidal or homicidal ideation or perceptual disturbance. Assisted Patient in processing session content; acknowledged and normalized Patient’s thoughts, feelings, and concerns by utilizing a person-centered approach in efforts to build appropriate rapport and a positive therapeutic relationship with open and honest communication. .     Follow Up:   Return in about 1 week (around 2/2/2024) for Therapy session.    Clive Magallanes LCSW  [facility]   "

## 2024-02-06 ENCOUNTER — OFFICE VISIT (OUTPATIENT)
Dept: BEHAVIORAL HEALTH | Facility: CLINIC | Age: 54
End: 2024-02-06
Payer: COMMERCIAL

## 2024-02-06 DIAGNOSIS — F41.1 GENERALIZED ANXIETY DISORDER: ICD-10-CM

## 2024-02-06 DIAGNOSIS — F42.2 MIXED OBSESSIONAL THOUGHTS AND ACTS: ICD-10-CM

## 2024-02-06 DIAGNOSIS — F25.0 SCHIZOAFFECTIVE DISORDER, BIPOLAR TYPE: Primary | ICD-10-CM

## 2024-02-06 PROCEDURE — 1160F RVW MEDS BY RX/DR IN RCRD: CPT

## 2024-02-06 PROCEDURE — 1159F MED LIST DOCD IN RCRD: CPT

## 2024-02-06 PROCEDURE — 90837 PSYTX W PT 60 MINUTES: CPT

## 2024-02-06 NOTE — PROGRESS NOTES
"        Follow Up Adult Note     Date:2024   Patient Name: Iliana Gray  : 1970   MRN: [id number]   Time IN: 9:58 am    Time OUT: 10:54 am     Referring Provider: Kiley Blackwell PA    Chief Complaint:      ICD-10-CM ICD-9-CM   1. Schizoaffective disorder, bipolar type  F25.0 295.70   2. Mixed obsessional thoughts and acts  F42.2 300.3   3. Generalized anxiety disorder  F41.1 300.02        History of Present Illness:   Iliana Gray is a 53 y.o., , employed (part-time)  female who is being seen today for scheduled follow up Psychotherapy session. Mood reported as \"little agitated\" with congruent but overall cooperative affect. Patient presented as mildly fidgety but remained overall calm, cooperative, and pleasant with provider. Patient reports shift in mood over past week stating, \"I still have some anxiety but I've more more irritable and agitated lately\". Engaged patient in discussing recent stressors with patient identifying sleep and finances reporting that she has large credit card debt from past compulsive shopping and stated, \"I don't really know what I'm going to do, I may just file for bankruptcy\". Further related to financial stressors, patient discussed that she is placing one of her vehicles up for sale and attempting to sale all of her items at a local [employer] mall stating, \"I am just done with the stress and need money so I'm just probably going to put everything on sale for 50% off to get rid of the headache of it all\". In regards to sleep habits, patient reported since her daughters father passed she has been typically sleeping from 11 pm- 3 am. Furthermore, patient reported that she was recently isolating in her bedroom the past week do to reported positive Covid diagnosis but voiced no current symptoms or concerns with health at this time. Patient denied any current SI/HI/AVH.       Subjective     PHQ-9 Depression Screening  PHQ-9 Total Score: Not " completed at this time      DEO-7  Not completed at this time.      Patient's Support Network Includes:  daughter    Functional Status: Mild impairment     Progress toward goal: Not at goal    Prognosis: Good with Ongoing Treatment     Medications:     Current Outpatient Medications:     aspirin 81 MG chewable tablet, Chew 1 tablet Daily., Disp: , Rfl:     atorvastatin (Lipitor) 20 MG tablet, Take 1 tablet by mouth Daily., Disp: 90 tablet, Rfl: 3    Cariprazine HCl (Vraylar) 1.5 MG capsule capsule, Take 1 capsule by mouth Daily., Disp: 30 capsule, Rfl: 1    citalopram (CeleXA) 20 MG tablet, Take 1 tablet by mouth Daily., Disp: 30 tablet, Rfl: 1    clobetasol (TEMOVATE) 0.05 % ointment, PLEASE SEE ATTACHED FOR DETAILED DIRECTIONS, Disp: , Rfl:     diazePAM (VALIUM) 5 MG tablet, Take 1 tablet by mouth 2 (Two) Times a Day As Needed for Anxiety or Sleep., Disp: 60 tablet, Rfl: 0    estradiol (ESTRACE) 1 MG tablet, Take 1 tablet by mouth Daily., Disp: , Rfl:     ipratropium (ATROVENT) 0.06 % nasal spray, INSTILL 2 SPRAYS INTRANASALLY 3 TIMES PER DAY FOR 5 DAYS, Disp: , Rfl:     metFORMIN ER (GLUCOPHAGE-XR) 500 MG 24 hr tablet, Take 1 tablet by mouth 2 (Two) Times a Day., Disp: 180 tablet, Rfl: 1    omega-3 acid ethyl esters (LOVAZA) 1 g capsule, Take 2 capsules by mouth 2 (Two) Times a Day., Disp: 360 capsule, Rfl: 1    PARoxetine (PAXIL) 20 MG tablet, Take 1 tablet by mouth Every Morning., Disp: 20 tablet, Rfl: 1    Symbicort 160-4.5 MCG/ACT inhaler, Inhale 2 puffs 2 (Two) Times a Day., Disp: , Rfl:     Ventolin  (90 Base) MCG/ACT inhaler, , Disp: , Rfl:     vitamin B-12 (CYANOCOBALAMIN) 500 MCG tablet, Take 1 tablet by mouth Daily. OTC, Disp: 1 tablet, Rfl:     Current Facility-Administered Medications:     cyanocobalamin injection 1,000 mcg, 1,000 mcg, Intramuscular, Once, Kiely Blackwell PA    Allergies:   No Known Allergies    Objective     Mental Status Exam:   MENTAL STATUS EXAM   General Appearance:   "Cleanly groomed and dressed and well developed  Eye Contact:  Fair  Attitude:  Other  Other Comment:  Somewhat agitated but overall cooperative and polite with provider  Motor Activity:  Fidgety  Muscle Strength:  Normal  Speech:  Normal rate, tone, volume  Language:  Spontaneous  Mood and affect:  Other  Other Comment:  Mood reported as \"little agitated\" with congruent but overall cooperative affect.  Hopelessness:  Optimistic  Thought Process:  Goal-directed and logical  Associations/ Thought Content:  No delusions  Hallucinations:  None  Suicidal Ideations:  Not present  Homicidal Ideation:  Not present  Sensorium:  Alert and clear  Orientation:  Time, situation, place and person  Immediate Recall, Recent, and Remote Memory:  Intact  Attention Span/ Concentration:  Good  Fund of Knowledge:  Appropriate for age and educational level  Intellectual Functioning:  Average range  Insight:  Fair  Judgement:  Fair  Reliability:  Fair  Impulse Control:  Fair       Assessment / Plan      Visit Diagnosis/Orders Placed This Visit:    ICD-10-CM ICD-9-CM   1. Schizoaffective disorder, bipolar type  F25.0 295.70   2. Mixed obsessional thoughts and acts  F42.2 300.3   3. Generalized anxiety disorder  F41.1 300.02        PLAN:  Safety: No acute safety concerns  Risk Assessment: Risk of self-harm acutely is low. Risk of self-harm chronically is also low, but could be further elevated in the event of treatment noncompliance and/or AODA.    Treatment Plan/Goals: Continue supportive psychotherapy efforts and medications as indicated. Treatment and medication options discussed during today's visit. Patient ackowledged and verbally consented to continue with current treatment plan and was educated on the importance of compliance with treatment and follow-up appointments. Patient seems reasonably able to adhere to treatment plan.      Assisted Patient in processing above session content; acknowledged and normalized patient’s thoughts, " feelings, and concerns.  Rationalized patient thought process regarding increased agitation and stressors related to finances and sleep habits. Provided psychoeducation on healthy sleep hygiene. Continued to collaborate on alternative behaviors that can replace problem behaviors (compulsive shopping) such as deep breathing and redirecting thoughts while beginning to discuss ways to avoid relapse of compulsive shopping behaviors. Patient remained receptive to therapeutic feedback.      Allowed Patient to freely discuss issues  without interruption or judgement with unconditional positive regard, active listening skills, and empathy. Therapist provided a safe, confidential environment to facilitate the development of a positive therapeutic relationship and encouraged open, honest communication. Assisted Patient in identifying risk factors which would indicate the need for higher level of care including thoughts to harm self or others and/or self-harming behavior and encouraged Patient to contact this office, call 911, or present to the nearest emergency room should any of these events occur. Discussed crisis intervention services and means to access. Patient adamantly and convincingly denies current suicidal or homicidal ideation or perceptual disturbance. Assisted Patient in processing session content; acknowledged and normalized Patient’s thoughts, feelings, and concerns by utilizing a person-centered approach in efforts to build appropriate rapport and a positive therapeutic relationship with open and honest communication. .     Follow Up:   Return in about 1 week (around 2/13/2024) for Therapy session.    Clive Magallanes [facility]  [facility]

## 2024-02-13 ENCOUNTER — OFFICE VISIT (OUTPATIENT)
Dept: BEHAVIORAL HEALTH | Facility: CLINIC | Age: 54
End: 2024-02-13
Payer: COMMERCIAL

## 2024-02-13 DIAGNOSIS — F43.10 POST TRAUMATIC STRESS DISORDER (PTSD): ICD-10-CM

## 2024-02-13 DIAGNOSIS — F39 MOOD DISORDER: ICD-10-CM

## 2024-02-13 DIAGNOSIS — F42.2 MIXED OBSESSIONAL THOUGHTS AND ACTS: ICD-10-CM

## 2024-02-13 DIAGNOSIS — F41.1 GENERALIZED ANXIETY DISORDER: Primary | ICD-10-CM

## 2024-02-13 PROCEDURE — 90837 PSYTX W PT 60 MINUTES: CPT

## 2024-02-13 PROCEDURE — 1160F RVW MEDS BY RX/DR IN RCRD: CPT

## 2024-02-13 PROCEDURE — 1159F MED LIST DOCD IN RCRD: CPT

## 2024-02-20 DIAGNOSIS — F41.1 GENERALIZED ANXIETY DISORDER: ICD-10-CM

## 2024-02-20 DIAGNOSIS — F43.10 POST TRAUMATIC STRESS DISORDER (PTSD): ICD-10-CM

## 2024-02-20 DIAGNOSIS — F42.2 MIXED OBSESSIONAL THOUGHTS AND ACTS: ICD-10-CM

## 2024-02-20 RX ORDER — PAROXETINE HYDROCHLORIDE 20 MG/1
20 TABLET, FILM COATED ORAL EVERY MORNING
Qty: 20 TABLET | Refills: 0 | Status: SHIPPED | OUTPATIENT
Start: 2024-02-20

## 2024-02-20 NOTE — TELEPHONE ENCOUNTER
Rx Refill Note    Requested Prescriptions     Pending Prescriptions Disp Refills    PARoxetine (PAXIL) 20 MG tablet [Pharmacy Med Name: PARoxetine HCL 20 MG TABLET] 20 tablet 1     Sig: TAKE 1 TABLET BY MOUTH EVERY MORNING        Last office visit with prescribing clinician: 1/9/2024      Next office visit with prescribing clinician: 3/5/2024   Last labs:   Last refill: 01/09/2024   Pharmacy (be sure to add in Epic). correct

## 2024-02-23 ENCOUNTER — CLINICAL SUPPORT (OUTPATIENT)
Dept: CARDIOLOGY | Facility: CLINIC | Age: 54
End: 2024-02-23
Payer: COMMERCIAL

## 2024-02-23 DIAGNOSIS — C83.35 DIFFUSE LARGE B-CELL LYMPHOMA OF LYMPH NODES OF INGUINAL REGION: Primary | Chronic | ICD-10-CM

## 2024-02-23 DIAGNOSIS — Z85.72 HISTORY OF DIFFUSE LARGE B-CELL LYMPHOMA: ICD-10-CM

## 2024-02-23 DIAGNOSIS — E53.8 VITAMIN B12 DEFICIENCY: ICD-10-CM

## 2024-02-23 DIAGNOSIS — R73.03 PREDIABETES: ICD-10-CM

## 2024-02-23 DIAGNOSIS — E78.5 HYPERLIPIDEMIA LDL GOAL <70: ICD-10-CM

## 2024-02-23 DIAGNOSIS — E55.9 VITAMIN D DEFICIENCY: ICD-10-CM

## 2024-02-23 DIAGNOSIS — E66.01 MORBID (SEVERE) OBESITY DUE TO EXCESS CALORIES: ICD-10-CM

## 2024-02-24 LAB
25(OH)D3+25(OH)D2 SERPL-MCNC: 30.4 NG/ML (ref 30–100)
ALBUMIN SERPL-MCNC: 4.3 G/DL (ref 3.8–4.9)
ALBUMIN/GLOB SERPL: 1.5 {RATIO} (ref 1.2–2.2)
ALP SERPL-CCNC: 89 IU/L (ref 44–121)
ALT SERPL-CCNC: 12 IU/L (ref 0–32)
AST SERPL-CCNC: 12 IU/L (ref 0–40)
BASOPHILS # BLD AUTO: 0 X10E3/UL (ref 0–0.2)
BASOPHILS NFR BLD AUTO: 1 %
BILIRUB SERPL-MCNC: 0.3 MG/DL (ref 0–1.2)
BUN SERPL-MCNC: 10 MG/DL (ref 6–24)
BUN/CREAT SERPL: 17 (ref 9–23)
CALCIUM SERPL-MCNC: 9.4 MG/DL (ref 8.7–10.2)
CHLORIDE SERPL-SCNC: 101 MMOL/L (ref 96–106)
CHOLEST SERPL-MCNC: 222 MG/DL (ref 100–199)
CO2 SERPL-SCNC: 24 MMOL/L (ref 20–29)
CREAT SERPL-MCNC: 0.59 MG/DL (ref 0.57–1)
EGFRCR SERPLBLD CKD-EPI 2021: 106 ML/MIN/1.73
EOSINOPHIL # BLD AUTO: 0.2 X10E3/UL (ref 0–0.4)
EOSINOPHIL NFR BLD AUTO: 3 %
ERYTHROCYTE [DISTWIDTH] IN BLOOD BY AUTOMATED COUNT: 14.3 % (ref 11.7–15.4)
GLOBULIN SER CALC-MCNC: 2.9 G/DL (ref 1.5–4.5)
GLUCOSE SERPL-MCNC: 102 MG/DL (ref 70–99)
HCT VFR BLD AUTO: 41.5 % (ref 34–46.6)
HDLC SERPL-MCNC: 43 MG/DL
HGB BLD-MCNC: 14.4 G/DL (ref 11.1–15.9)
IMM GRANULOCYTES # BLD AUTO: 0 X10E3/UL (ref 0–0.1)
IMM GRANULOCYTES NFR BLD AUTO: 0 %
LDLC SERPL CALC-MCNC: 97 MG/DL (ref 0–99)
LYMPHOCYTES # BLD AUTO: 2.5 X10E3/UL (ref 0.7–3.1)
LYMPHOCYTES NFR BLD AUTO: 43 %
MAGNESIUM SERPL-MCNC: 2 MG/DL (ref 1.6–2.3)
MCH RBC QN AUTO: 30.5 PG (ref 26.6–33)
MCHC RBC AUTO-ENTMCNC: 34.7 G/DL (ref 31.5–35.7)
MCV RBC AUTO: 88 FL (ref 79–97)
MONOCYTES # BLD AUTO: 0.4 X10E3/UL (ref 0.1–0.9)
MONOCYTES NFR BLD AUTO: 7 %
NEUTROPHILS # BLD AUTO: 2.7 X10E3/UL (ref 1.4–7)
NEUTROPHILS NFR BLD AUTO: 46 %
PLATELET # BLD AUTO: 315 X10E3/UL (ref 150–450)
POTASSIUM SERPL-SCNC: 4.3 MMOL/L (ref 3.5–5.2)
PROT SERPL-MCNC: 7.2 G/DL (ref 6–8.5)
RBC # BLD AUTO: 4.72 X10E6/UL (ref 3.77–5.28)
SODIUM SERPL-SCNC: 141 MMOL/L (ref 134–144)
T4 FREE SERPL-MCNC: 0.86 NG/DL (ref 0.82–1.77)
TRIGL SERPL-MCNC: 492 MG/DL (ref 0–149)
TSH SERPL DL<=0.005 MIU/L-ACNC: 1.4 UIU/ML (ref 0.45–4.5)
VIT B12 SERPL-MCNC: 410 PG/ML (ref 232–1245)
VLDLC SERPL CALC-MCNC: 82 MG/DL (ref 5–40)
WBC # BLD AUTO: 5.8 X10E3/UL (ref 3.4–10.8)

## 2024-02-26 ENCOUNTER — OFFICE VISIT (OUTPATIENT)
Dept: CARDIOLOGY | Facility: CLINIC | Age: 54
End: 2024-02-26
Payer: COMMERCIAL

## 2024-02-26 VITALS
HEIGHT: 63 IN | OXYGEN SATURATION: 93 % | HEART RATE: 93 BPM | WEIGHT: 213 LBS | DIASTOLIC BLOOD PRESSURE: 78 MMHG | RESPIRATION RATE: 16 BRPM | BODY MASS INDEX: 37.74 KG/M2 | SYSTOLIC BLOOD PRESSURE: 120 MMHG

## 2024-02-26 DIAGNOSIS — E78.5 HYPERLIPIDEMIA LDL GOAL <70: Primary | ICD-10-CM

## 2024-02-26 DIAGNOSIS — E66.01 MORBID (SEVERE) OBESITY DUE TO EXCESS CALORIES: ICD-10-CM

## 2024-02-26 DIAGNOSIS — Z72.0 TOBACCO USE: ICD-10-CM

## 2024-02-26 PROCEDURE — 1160F RVW MEDS BY RX/DR IN RCRD: CPT | Performed by: INTERNAL MEDICINE

## 2024-02-26 PROCEDURE — 99213 OFFICE O/P EST LOW 20 MIN: CPT | Performed by: INTERNAL MEDICINE

## 2024-02-26 PROCEDURE — 1159F MED LIST DOCD IN RCRD: CPT | Performed by: INTERNAL MEDICINE

## 2024-02-26 RX ORDER — ICOSAPENT ETHYL 1000 MG/1
2 CAPSULE ORAL 2 TIMES DAILY WITH MEALS
Qty: 360 CAPSULE | Refills: 2 | Status: SHIPPED | OUTPATIENT
Start: 2024-02-26

## 2024-02-26 RX ORDER — ROSUVASTATIN CALCIUM 40 MG/1
40 TABLET, COATED ORAL DAILY
Qty: 90 TABLET | Refills: 3 | Status: SHIPPED | OUTPATIENT
Start: 2024-02-26

## 2024-02-26 NOTE — PROGRESS NOTES
[facility]  [facility]  [address] [place] [address]  Dept: [phone]  Dept Fax: [phone]    Iliana Gray  1970    Follow Up Office Visit Note    History of Present Illness:  Iliana Gray is a 53 y.o. female who presents to the clinic for Follow-up.Hyperlipidemia - She denies any complaints, she is on Lipitor 20 mg, lipids seems no change despite Lipitor 20 mg, Try over 400, will use Crestor and will check lipoprotein LP a    The following portions of the patient's history were reviewed and updated as appropriate: allergies, current medications, past family history, past medical history, past social history, past surgical history, and problem list.    Medications:  aspirin  atorvastatin  Cariprazine HCl capsule  citalopram  clobetasol  cyanocobalamin  diazePAM  estradiol  icosapent ethyl capsule  ipratropium  metFORMIN ER  PARoxetine  rosuvastatin  Symbicort aerosol  Ventolin HFA aerosol solution  vitamin B-12    Subjective  No Known Allergies     Past Medical History:   Diagnosis Date    Anxiety     Arthritis     Asthma     Diffuse large B cell lymphoma     Sleep apnea     cpap       Past Surgical History:   Procedure Laterality Date    HYSTERECTOMY      TUMOR EXCISION Right 04/2021    lymph node removal/biopsy/right inner thigh    VENOUS ACCESS DEVICE (PORT) INSERTION Right 04/2021       History reviewed. No pertinent family history.     Social History     Socioeconomic History    Marital status:    Tobacco Use    Smoking status: Every Day     Packs/day: 1.00     Years: 41.00     Additional pack years: 0.00     Total pack years: 41.00     Types: Cigarettes     Start date: 1982    Smokeless tobacco: Never   Vaping Use    Vaping Use: Never used   Substance and Sexual Activity    Alcohol use: Never    Drug use: Never    Sexual activity: Yes     Partners: Male       Review of Systems   Constitutional: Negative.    HENT: Negative.    "  Respiratory: Negative.     Cardiovascular: Negative.    Endocrine: Negative.    Genitourinary: Negative.    Musculoskeletal: Negative.    Skin: Negative.    Allergic/Immunologic: Negative.    Neurological: Negative.    Hematological: Negative.    Psychiatric/Behavioral: Negative.       Cardiovascular Procedures    ECHO/MUGA:  STRESS TESTS:   CARDIAC CATH:   DEVICES:   HOLTER:   CT/MRI:   VASCULAR:   CARDIOTHORACIC:     Objective  Vitals:    02/26/24 1132   BP: 120/78   BP Location: Right arm   Patient Position: Lying   Cuff Size: Large Adult   Pulse: 93   Resp: 16   SpO2: 93%   Weight: 96.6 kg (213 lb)   Height: 160 cm (63\")   PainSc: 0-No pain     Body mass index is 37.73 kg/m².     Physical Exam  Vitals reviewed.   Constitutional:       Appearance: Healthy appearance. Not in distress.   Neck:      Vascular: No JVR. JVD normal.   Pulmonary:      Effort: Pulmonary effort is normal.      Breath sounds: Normal breath sounds. No wheezing. No rhonchi. No rales.   Chest:      Chest wall: Not tender to palpatation.   Cardiovascular:      PMI at left midclavicular line. Normal rate. Regular rhythm. Normal S1. Normal S2.       Murmurs: There is no murmur.      No gallop.  No click. No rub.   Pulses:     Intact distal pulses.   Edema:     Peripheral edema absent.   Abdominal:      General: Bowel sounds are normal.      Palpations: Abdomen is soft.      Tenderness: There is no abdominal tenderness.   Musculoskeletal: Normal range of motion.         General: No tenderness. Skin:     General: Skin is warm and dry.   Neurological:      General: No focal deficit present.      Mental Status: Alert and oriented to person, place and time.        Diagnostic Data  Procedures    Assessment and Plan  Diagnoses and all orders for this visit:    Hyperlipidemia LDL goal <70- No changes with lipitor, will use crestor 40 mg and will check Lpa and also Apo B    Morbid (severe) obesity due to excess calories    Tobacco use- advised to quit "     Other orders  -     icosapent ethyl (Vascepa) 1 g capsule capsule; Take 2 g by mouth 2 (Two) Times a Day With Meals.  -     rosuvastatin (CRESTOR) 40 MG tablet; Take 1 tablet by mouth Daily.         Return in about 6 months (around 8/26/2024) for Recheck with Dr. Mg.    Ralph Mg MD  02/26/2024

## 2024-02-27 ENCOUNTER — OFFICE VISIT (OUTPATIENT)
Dept: BEHAVIORAL HEALTH | Facility: CLINIC | Age: 54
End: 2024-02-27
Payer: COMMERCIAL

## 2024-02-27 DIAGNOSIS — F42.2 MIXED OBSESSIONAL THOUGHTS AND ACTS: ICD-10-CM

## 2024-02-27 DIAGNOSIS — F41.1 GENERALIZED ANXIETY DISORDER: Primary | ICD-10-CM

## 2024-02-27 DIAGNOSIS — F43.10 POST TRAUMATIC STRESS DISORDER (PTSD): ICD-10-CM

## 2024-02-27 DIAGNOSIS — F39 MOOD DISORDER: ICD-10-CM

## 2024-02-27 NOTE — PROGRESS NOTES
"        Follow Up Adult Note     Date:2024   Patient Name: Iliana Gray  : 1970   MRN: [id number]   Time IN: 10:04 am    Time OUT: 11:02 am     Referring Provider: Kiley Blackwell PA    Chief Complaint:      ICD-10-CM ICD-9-CM   1. Generalized anxiety disorder  F41.1 300.02   2. Post traumatic stress disorder (PTSD)  F43.10 309.81   3. Mood disorder  F39 296.90   4. Mixed obsessional thoughts and acts  F42.2 300.3        History of Present Illness:   Iliana Gray is a 53 y.o., single, employed (part-time)  female who is being seen today for scheduled Psychotherapy session. Mood reported as \"I'm feeling anxious and down\" with somewhat dysphoric but overall cooperative affect. Patient presented with intermittent sadness and anxiety but remained overall calm, cooperative, engaged, and pleasant with provider. Patient denied any current SI/HI/AVH. Patient reports recent anxiety symptoms along with mood shifts and stated, \"I don't know if I'm sad but definitely feel blue and blah\". Patient voiced increased irritability and lack of enjoyment in activities recently and stated, \"I can't seem to find anything I really enjoy and just get bored more easily\" which exacerbates her ongoing anxiety. Patient discussed emotions and feelings related to losing her son and sister and stated, \"I do blame myself and think I could have done more\". Patient reported stress and anxiety related to medical concerns as she has a history of cancer and recently completed a blood panel that triggered those thoughts. Discussed medications with patient stating, \"Everything seems to be good but I don't like the Paxil\".       Subjective     PHQ-9 Depression Screening  PHQ-9 Total Score:  Not completed at this time     DEO-7   Not completed at this time.     Patient's Support Network Includes:  daughter, extended family, and \"few friends\"    Functional Status: Moderate impairment     Progress toward goal: Not at " goal    Prognosis: Good with Ongoing Treatment     Medications:     Current Outpatient Medications:     aspirin 81 MG chewable tablet, Chew 1 tablet Daily., Disp: , Rfl:     atorvastatin (Lipitor) 20 MG tablet, Take 1 tablet by mouth Daily., Disp: 90 tablet, Rfl: 3    Cariprazine HCl (Vraylar) 1.5 MG capsule capsule, Take 1 capsule by mouth Daily., Disp: 30 capsule, Rfl: 1    citalopram (CeleXA) 20 MG tablet, Take 1 tablet by mouth Daily., Disp: 30 tablet, Rfl: 1    clobetasol (TEMOVATE) 0.05 % ointment, PLEASE SEE ATTACHED FOR DETAILED DIRECTIONS, Disp: , Rfl:     diazePAM (VALIUM) 5 MG tablet, Take 1 tablet by mouth 2 (Two) Times a Day As Needed for Anxiety or Sleep., Disp: 60 tablet, Rfl: 0    estradiol (ESTRACE) 1 MG tablet, Take 1 tablet by mouth Daily., Disp: , Rfl:     icosapent ethyl (Vascepa) 1 g capsule capsule, Take 2 g by mouth 2 (Two) Times a Day With Meals., Disp: 360 capsule, Rfl: 2    ipratropium (ATROVENT) 0.06 % nasal spray, INSTILL 2 SPRAYS INTRANASALLY 3 TIMES PER DAY FOR 5 DAYS, Disp: , Rfl:     metFORMIN ER (GLUCOPHAGE-XR) 500 MG 24 hr tablet, Take 1 tablet by mouth 2 (Two) Times a Day., Disp: 180 tablet, Rfl: 1    PARoxetine (PAXIL) 20 MG tablet, TAKE 1 TABLET BY MOUTH EVERY MORNING, Disp: 20 tablet, Rfl: 0    rosuvastatin (CRESTOR) 40 MG tablet, Take 1 tablet by mouth Daily., Disp: 90 tablet, Rfl: 3    Symbicort 160-4.5 MCG/ACT inhaler, Inhale 2 puffs 2 (Two) Times a Day., Disp: , Rfl:     Ventolin  (90 Base) MCG/ACT inhaler, , Disp: , Rfl:     vitamin B-12 (CYANOCOBALAMIN) 500 MCG tablet, Take 1 tablet by mouth Daily. OTC, Disp: 1 tablet, Rfl:     Current Facility-Administered Medications:     cyanocobalamin injection 1,000 mcg, 1,000 mcg, Intramuscular, Once, Kiley Blackwell PA    Allergies:   No Known Allergies    Objective     Mental Status Exam:   MENTAL STATUS EXAM   General Appearance:  Cleanly groomed and dressed  Eye Contact:  Good eye contact  Attitude:  Other  Other  "Comment:  Patient presented with intermittent sadness and anxiety but remained overall calm, cooperative, engaged, and pleasant with provider.  Motor Activity:  Normal gait, posture  Muscle Strength:  Normal  Speech:  Normal rate, tone, volume  Language:  Spontaneous  Mood and affect:  Other  Other Comment:  Mood reported as \"I'm feeling anxious and down\" with somewhat dysphoric but overall cooperative affect.  Hopelessness:  Denies  Thought Process:  Logical and goal-directed  Associations/ Thought Content:  No delusions  Hallucinations:  None  Suicidal Ideations:  Not present  Homicidal Ideation:  Not present  Sensorium:  Alert and clear  Orientation:  Situation, time, place and person  Immediate Recall, Recent, and Remote Memory:  Intact  Attention Span/ Concentration:  Good  Fund of Knowledge:  Appropriate for age and educational level  Intellectual Functioning:  Average range  Insight:  Fair  Judgement:  Fair  Reliability:  Fair  Impulse Control:  Fair       Assessment / Plan      Visit Diagnosis/Orders Placed This Visit:    ICD-10-CM ICD-9-CM   1. Generalized anxiety disorder  F41.1 300.02   2. Post traumatic stress disorder (PTSD)  F43.10 309.81   3. Mood disorder  F39 296.90   4. Mixed obsessional thoughts and acts  F42.2 300.3        PLAN:  Safety: No acute safety concerns  Risk Assessment: Risk of self-harm acutely is low. Risk of self-harm chronically is also low, but could be further elevated in the event of treatment noncompliance and/or AODA.    Treatment Plan/Goals: Continue supportive psychotherapy efforts and medications as indicated. Treatment and medication options discussed during today's visit. Patient ackowledged and verbally consented to continue with current treatment plan and was educated on the importance of compliance with treatment and follow-up appointments. Patient seems reasonably able to adhere to treatment plan.      Assisted Patient in processing above session content; acknowledged " and normalized patient’s thoughts, feelings, and concerns.  Rationalized patient thought process regarding recent emotions and feelings of anxiety and mood change. Engaged patient in discussing current medications while providing psychoeducation on specific medication of concern. Utilized Socratic Questioning techniques to challenge patient to process past trauma/grief, and loss while aiding the patient in identifying her feelings related to loss. Assisted patient in identifying interpersonal conflicts that may be contributing to negate mood states. Patient was receptive to therapeutic feedback.       Allowed Patient to freely discuss issues  without interruption or judgement with unconditional positive regard, active listening skills, and empathy. Therapist provided a safe, confidential environment to facilitate the development of a positive therapeutic relationship and encouraged open, honest communication. Assisted Patient in identifying risk factors which would indicate the need for higher level of care including thoughts to harm self or others and/or self-harming behavior and encouraged Patient to contact this office, call 911, or present to the nearest emergency room should any of these events occur. Discussed crisis intervention services and means to access. Patient adamantly and convincingly denies current suicidal or homicidal ideation or perceptual disturbance. Assisted Patient in processing session content; acknowledged and normalized Patient’s thoughts, feelings, and concerns by utilizing a person-centered approach in efforts to build appropriate rapport and a positive therapeutic relationship with open and honest communication. .     Follow Up:   Return in about 2 weeks (around 3/12/2024) for Therapy session.    Clive Magallanes [facility]  [facility]

## 2024-02-28 LAB
APO B SERPL-MCNC: 142 MG/DL
LPA SERPL-SCNC: 8.5 NMOL/L

## 2024-03-01 ENCOUNTER — TELEPHONE (OUTPATIENT)
Dept: CARDIOLOGY | Facility: CLINIC | Age: 54
End: 2024-03-01
Payer: COMMERCIAL

## 2024-03-01 NOTE — TELEPHONE ENCOUNTER
----- Message from Ralph Mg MD sent at 2/29/2024 12:13 PM EST -----  APO B is high, Lpa is good,  will need to see if she respond to Crestor

## 2024-03-01 NOTE — TELEPHONE ENCOUNTER
Left a message for Ms. Gray that her ABO a was normal and the b was elevated so possible increased risk of future cardiovascular events.  He started you on crestor and wants to see how that works for you.  Please call Monday if any questions or concerns.

## 2024-03-05 ENCOUNTER — OFFICE VISIT (OUTPATIENT)
Dept: BEHAVIORAL HEALTH | Facility: CLINIC | Age: 54
End: 2024-03-05
Payer: COMMERCIAL

## 2024-03-05 VITALS
HEART RATE: 88 BPM | DIASTOLIC BLOOD PRESSURE: 70 MMHG | SYSTOLIC BLOOD PRESSURE: 132 MMHG | HEIGHT: 63 IN | WEIGHT: 216 LBS | OXYGEN SATURATION: 94 % | BODY MASS INDEX: 38.27 KG/M2

## 2024-03-05 DIAGNOSIS — F43.10 POST TRAUMATIC STRESS DISORDER (PTSD): ICD-10-CM

## 2024-03-05 DIAGNOSIS — G47.20 CIRCADIAN RHYTHM SLEEP DISORDER, UNSPECIFIED TYPE: ICD-10-CM

## 2024-03-05 DIAGNOSIS — F25.0 SCHIZOAFFECTIVE DISORDER, BIPOLAR TYPE: Primary | ICD-10-CM

## 2024-03-05 DIAGNOSIS — F41.1 GENERALIZED ANXIETY DISORDER: ICD-10-CM

## 2024-03-05 RX ORDER — DIAZEPAM 5 MG/1
5 TABLET ORAL 2 TIMES DAILY PRN
Qty: 60 TABLET | Refills: 0 | Status: SHIPPED | OUTPATIENT
Start: 2024-03-05

## 2024-03-05 RX ORDER — CITALOPRAM 20 MG/1
20 TABLET ORAL DAILY
Qty: 30 TABLET | Refills: 1 | Status: SHIPPED | OUTPATIENT
Start: 2024-03-05

## 2024-03-05 NOTE — PROGRESS NOTES
Follow Up Office Visit      Patient Name: Iliana Gray  : 1970   MRN: [id number]     Referring Provider: Kiley Blackwell PA    Chief Complaint:      ICD-10-CM ICD-9-CM   1. Schizoaffective disorder, bipolar type  F25.0 295.70   2. Generalized anxiety disorder  F41.1 300.02   3. Post traumatic stress disorder (PTSD)  F43.10 309.81   4. Circadian rhythm sleep disorder, unspecified type  G47.20 327.30        History of Present Illness:   Iliana Gray is a 53 y.o. female who is here today for follow up with psychiatric medications.    Subjective      Patient Reports:   Feeling depressed and non-motivated for the last 3 weeks.  I felt the best when we added vraylar, now with increasing paxil I feel awful.   I am still taking it though.    Review of Systems:   Review of Systems   Psychiatric/Behavioral:  Positive for sleep disturbance, depressed mood and stress. The patient is nervous/anxious.        Screening Scores:   PHQ-9 : 13  DEO-7 : 13    RISK ASSESSMENT:  Patient denies any high risk factors today.    Medications:     Current Outpatient Medications:     aspirin 81 MG chewable tablet, Chew 1 tablet Daily., Disp: , Rfl:     atorvastatin (Lipitor) 20 MG tablet, Take 1 tablet by mouth Daily., Disp: 90 tablet, Rfl: 3    citalopram (CeleXA) 20 MG tablet, Take 1 tablet by mouth Daily., Disp: 30 tablet, Rfl: 1    clobetasol (TEMOVATE) 0.05 % ointment, PLEASE SEE ATTACHED FOR DETAILED DIRECTIONS, Disp: , Rfl:     diazePAM (VALIUM) 5 MG tablet, Take 1 tablet by mouth 2 (Two) Times a Day As Needed for Anxiety or Sleep., Disp: 60 tablet, Rfl: 0    estradiol (ESTRACE) 1 MG tablet, Take 1 tablet by mouth Daily., Disp: , Rfl:     icosapent ethyl (Vascepa) 1 g capsule capsule, Take 2 g by mouth 2 (Two) Times a Day With Meals., Disp: 360 capsule, Rfl: 2    ipratropium (ATROVENT) 0.06 % nasal spray, INSTILL 2 SPRAYS INTRANASALLY 3 TIMES PER DAY FOR 5 DAYS, Disp: , Rfl:     metFORMIN ER  "(GLUCOPHAGE-XR) 500 MG 24 hr tablet, Take 1 tablet by mouth 2 (Two) Times a Day., Disp: 180 tablet, Rfl: 1    rosuvastatin (CRESTOR) 40 MG tablet, Take 1 tablet by mouth Daily., Disp: 90 tablet, Rfl: 3    Symbicort 160-4.5 MCG/ACT inhaler, Inhale 2 puffs 2 (Two) Times a Day., Disp: , Rfl:     Ventolin  (90 Base) MCG/ACT inhaler, , Disp: , Rfl:     vitamin B-12 (CYANOCOBALAMIN) 500 MCG tablet, Take 1 tablet by mouth Daily. OTC, Disp: 1 tablet, Rfl:     Cariprazine HCl (Vraylar) 3 MG capsule capsule, Take 1 capsule by mouth Daily., Disp: 30 capsule, Rfl: 1    Current Facility-Administered Medications:     cyanocobalamin injection 1,000 mcg, 1,000 mcg, Intramuscular, Once, Kiley Blackwell PA    Medication Considerations:  ANA reviewed and appropriate.      Allergies:   No Known Allergies    Results Reviewed:   screeners     Objective     Physical Exam:  Vital Signs:   Vitals:    03/05/24 1343   BP: 132/70   Pulse: 88   SpO2: 94%   Weight: 98 kg (216 lb)   Height: 160 cm (63\")     Body mass index is 38.26 kg/m².     Mental Status Exam:   Hygiene:   good  Cooperation:  Cooperative  Eye Contact:  Good  Psychomotor Behavior:  Appropriate  Affect:  Appropriate  Mood: anxious  Speech:  Normal  Thought Process:  Goal directed and Linear  Thought Content:  Mood congruent  Suicidal:  None  Homicidal:  None  Hallucinations:  None  Delusion:  None  Memory:  Intact  Orientation:  Grossly intact  Reliability:  good  Insight:  Fair  Judgement:  Fair  Impulse Control:  Poor  Physical/Medical Issues:   nothing reported today      Assessment / Plan      Visit Diagnosis/Orders Placed This Visit:  Diagnoses and all orders for this visit:    1. Schizoaffective disorder, bipolar type (Primary)  -     Cariprazine HCl (Vraylar) 3 MG capsule capsule; Take 1 capsule by mouth Daily.  Dispense: 30 capsule; Refill: 1  -     citalopram (CeleXA) 20 MG tablet; Take 1 tablet by mouth Daily.  Dispense: 30 tablet; Refill: 1    2. Generalized " anxiety disorder  -     citalopram (CeleXA) 20 MG tablet; Take 1 tablet by mouth Daily.  Dispense: 30 tablet; Refill: 1  -     diazePAM (VALIUM) 5 MG tablet; Take 1 tablet by mouth 2 (Two) Times a Day As Needed for Anxiety or Sleep.  Dispense: 60 tablet; Refill: 0    3. Post traumatic stress disorder (PTSD)  -     citalopram (CeleXA) 20 MG tablet; Take 1 tablet by mouth Daily.  Dispense: 30 tablet; Refill: 1    4. Circadian rhythm sleep disorder, unspecified type  -     diazePAM (VALIUM) 5 MG tablet; Take 1 tablet by mouth 2 (Two) Times a Day As Needed for Anxiety or Sleep.  Dispense: 60 tablet; Refill: 0         Functional Status: Moderate impairment     Prognosis: Good with Ongoing Treatment     Impression/Formulation:  Patient appeared alert and oriented. Patient is receptive to assistance with maintaining a stable lifestyle.  Patient presents with history of     ICD-10-CM ICD-9-CM   1. Schizoaffective disorder, bipolar type  F25.0 295.70   2. Generalized anxiety disorder  F41.1 300.02   3. Post traumatic stress disorder (PTSD)  F43.10 309.81   4. Circadian rhythm sleep disorder, unspecified type  G47.20 327.30   .  Therapy is going well.  Patient feeling increased anxiety and depression with increase of paroxetine and weaning off celexa.    Treatment Plan:   Continue therapy with Clive Magallanes  Continue celexa for now  Wean off paxil with home supply-schedule provided to patient  Increase vraylar-working towards monotherapy  Continue valium-plan to wean down/off once stable.  Will consider genesight testing at follow up.    Patient will continue supportive psychotherapy efforts and medications as indicated. Clinic will obtain release of information for current treatment team for continuity of care as needed. Patient will contact this office, call 911 or present to the nearest emergency room should suicidal or homicidal ideations occur.  Discussed medication options and treatment plan of prescribed medication(s) as  well as the risks, benefits, and potential side effects. Patient ackowledged and verbally consented to continue with current treatment plan and was educated on the importance of compliance with treatment and follow-up appointments.     Follow Up:   Return in about 2 months (around 5/5/2024) for Med Check.        MAN Win, PMHNP-BC  [facility]     This is electronically signed by MAN Win, JULIAN-BC  [unfilled] 17:56 EST

## 2024-03-12 ENCOUNTER — OFFICE VISIT (OUTPATIENT)
Dept: BEHAVIORAL HEALTH | Facility: CLINIC | Age: 54
End: 2024-03-12
Payer: COMMERCIAL

## 2024-03-12 DIAGNOSIS — F41.1 GENERALIZED ANXIETY DISORDER: ICD-10-CM

## 2024-03-12 DIAGNOSIS — F25.0 SCHIZOAFFECTIVE DISORDER, BIPOLAR TYPE: Primary | ICD-10-CM

## 2024-03-12 DIAGNOSIS — F42.2 MIXED OBSESSIONAL THOUGHTS AND ACTS: ICD-10-CM

## 2024-03-12 DIAGNOSIS — F90.2 ATTENTION DEFICIT HYPERACTIVITY DISORDER (ADHD), COMBINED TYPE: ICD-10-CM

## 2024-03-12 NOTE — PROGRESS NOTES
"     Follow Up Adult Note     Date:2024   Patient Name: Iliana Gray  : 1970   MRN: [id number]   Time IN: 10:00 am    Time OUT: 10:58 am     Referring Provider: Kiley Blackwell PA    Chief Complaint:      ICD-10-CM ICD-9-CM   1. Schizoaffective disorder, bipolar type  F25.0 295.70   2. Attention deficit hyperactivity disorder (ADHD), combined type  F90.2 314.01   3. Generalized anxiety disorder  F41.1 300.02   4. Mixed obsessional thoughts and acts  F42.2 300.3        History of Present Illness:   Iliana Gray is a 53 y.o., , employed (part-time)  female who is being seen today for scheduled Psychotherapy session. Mood reported as \"much better\" with somewhat elevated but overall improved and cooperative affect. Patient presented with elevated and more energetic mood and remained overall calm, cooperative, engaged, and pleasant with provider. Patient denied any current SI/HI/AVH. Patient recently had medication changes and stated, \"I feel much better\" but did present with more energetic and elevated mood (history of manic and will continue to monitor mood). Patient discussed still feeling \"bored\" most days and later stated, \"some days I feel like doing something and others I just don't really care to\". Patient discussed her family and how she is looking forward to going on spring break with her daughter and granddaughter. Discussed other family and past events while continuing to address presenting mood, anxiety, ADHD, and OCD like behaviors (which seem to show improvement). 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       Subjective     PHQ-9 Depression Screening  PHQ-9 Total Score:  Not completed at this time     DEO-7   Not completed at this time    Patient's Support Network Includes:  children, extended family, and friends    Functional Status: " Mild impairment     Progress toward goal: Not at goal    Prognosis: Good with Ongoing Treatment     Medications:     Current Outpatient Medications:     aspirin 81 MG chewable tablet, Chew 1 tablet Daily., Disp: , Rfl:     atorvastatin (Lipitor) 20 MG tablet, Take 1 tablet by mouth Daily., Disp: 90 tablet, Rfl: 3    Cariprazine HCl (Vraylar) 3 MG capsule capsule, Take 1 capsule by mouth Daily., Disp: 30 capsule, Rfl: 1    citalopram (CeleXA) 20 MG tablet, Take 1 tablet by mouth Daily., Disp: 30 tablet, Rfl: 1    clobetasol (TEMOVATE) 0.05 % ointment, PLEASE SEE ATTACHED FOR DETAILED DIRECTIONS, Disp: , Rfl:     diazePAM (VALIUM) 5 MG tablet, Take 1 tablet by mouth 2 (Two) Times a Day As Needed for Anxiety or Sleep., Disp: 60 tablet, Rfl: 0    estradiol (ESTRACE) 1 MG tablet, Take 1 tablet by mouth Daily., Disp: , Rfl:     icosapent ethyl (Vascepa) 1 g capsule capsule, Take 2 g by mouth 2 (Two) Times a Day With Meals., Disp: 360 capsule, Rfl: 2    ipratropium (ATROVENT) 0.06 % nasal spray, INSTILL 2 SPRAYS INTRANASALLY 3 TIMES PER DAY FOR 5 DAYS, Disp: , Rfl:     metFORMIN ER (GLUCOPHAGE-XR) 500 MG 24 hr tablet, Take 1 tablet by mouth 2 (Two) Times a Day., Disp: 180 tablet, Rfl: 1    rosuvastatin (CRESTOR) 40 MG tablet, Take 1 tablet by mouth Daily., Disp: 90 tablet, Rfl: 3    Symbicort 160-4.5 MCG/ACT inhaler, Inhale 2 puffs 2 (Two) Times a Day., Disp: , Rfl:     Ventolin  (90 Base) MCG/ACT inhaler, , Disp: , Rfl:     vitamin B-12 (CYANOCOBALAMIN) 500 MCG tablet, Take 1 tablet by mouth Daily. OTC, Disp: 1 tablet, Rfl:     Current Facility-Administered Medications:     cyanocobalamin injection 1,000 mcg, 1,000 mcg, Intramuscular, Once, Kiley Blackwell PA    Allergies:   No Known Allergies    Objective     Mental Status Exam:   MENTAL STATUS EXAM   General Appearance:  Cleanly groomed and dressed and well developed  Eye Contact:  Good eye contact  Attitude:  Cooperative and polite  Motor Activity:  Normal  "gait, posture  Muscle Strength:  Normal  Speech:  Normal rate, tone, volume  Language:  Spontaneous  Mood and affect:  Other  Other Comment:  Mood reported as \"much better\" with somewhat elevated but overall improved and cooperative affect.  Hopelessness:  Optimistic  Loneliness: 4  Thought Process:  Goal-directed and linear  Associations/ Thought Content:  No delusions  Hallucinations:  None  Suicidal Ideations:  Not present  Homicidal Ideation:  Not present  Sensorium:  Alert and clear  Orientation:  Situation, time, person and place  Immediate Recall, Recent, and Remote Memory:  Intact  Attention Span/ Concentration:  Good  Fund of Knowledge:  Appropriate for age and educational level  Intellectual Functioning:  Average range  Insight:  Fair  Judgement:  Fair  Reliability:  Fair  Impulse Control:  Fair     Assessment / Plan      Visit Diagnosis/Orders Placed This Visit:    ICD-10-CM ICD-9-CM   1. Schizoaffective disorder, bipolar type  F25.0 295.70   2. Attention deficit hyperactivity disorder (ADHD), combined type  F90.2 314.01   3. Generalized anxiety disorder  F41.1 300.02   4. Mixed obsessional thoughts and acts  F42.2 300.3        PLAN:  Safety: No acute safety concerns  Risk Assessment: Risk of self-harm acutely is low. Risk of self-harm chronically is also low, but could be further elevated in the event of treatment noncompliance and/or AODA.    Treatment Plan/Goals: Continue supportive psychotherapy efforts and medications as indicated. Treatment and medication options discussed during today's visit. Patient ackowledged and verbally consented to continue with current treatment plan and was educated on the importance of compliance with treatment and follow-up appointments. Patient seems reasonably able to adhere to treatment plan.      Assisted Patient in processing above session content; acknowledged and normalized patient’s thoughts, feelings, and concerns.  Rationalized patient thought process regarding " recently reported improved mood and intermittent feelings of boredom and wanting to be alone. Engaged patient in identifying recent successes and ongoing strengths while discussing future goals. Continued to process past events and current symptom burdens while providing psychoeducation on medications and encouraged patient to continue monitoring mood. Patient remained engaged and receptive to therapeutic feedback.       Allowed Patient to freely discuss issues  without interruption or judgement with unconditional positive regard, active listening skills, and empathy. Therapist provided a safe, confidential environment to facilitate the development of a positive therapeutic relationship and encouraged open, honest communication. Assisted Patient in identifying risk factors which would indicate the need for higher level of care including thoughts to harm self or others and/or self-harming behavior and encouraged Patient to contact this office, call 911, or present to the nearest emergency room should any of these events occur. Discussed crisis intervention services and means to access. Patient adamantly and convincingly denies current suicidal or homicidal ideation or perceptual disturbance. Assisted Patient in processing session content; acknowledged and normalized Patient’s thoughts, feelings, and concerns by utilizing a person-centered approach in efforts to build appropriate rapport and a positive therapeutic relationship with open and honest communication. .     Follow Up:   Return in about 3 weeks (around 4/2/2024) for Therapy session.    Clive Magallanes, [facility]OR[facility]INO  [facility]

## 2024-04-23 ENCOUNTER — OFFICE VISIT (OUTPATIENT)
Dept: BEHAVIORAL HEALTH | Facility: CLINIC | Age: 54
End: 2024-04-23
Payer: COMMERCIAL

## 2024-04-23 DIAGNOSIS — F90.2 ATTENTION DEFICIT HYPERACTIVITY DISORDER (ADHD), COMBINED TYPE: ICD-10-CM

## 2024-04-23 DIAGNOSIS — F41.1 GENERALIZED ANXIETY DISORDER: ICD-10-CM

## 2024-04-23 DIAGNOSIS — F43.10 POST TRAUMATIC STRESS DISORDER (PTSD): ICD-10-CM

## 2024-04-23 DIAGNOSIS — F25.0 SCHIZOAFFECTIVE DISORDER, BIPOLAR TYPE: Primary | ICD-10-CM

## 2024-04-23 PROCEDURE — 90837 PSYTX W PT 60 MINUTES: CPT

## 2024-04-23 PROCEDURE — 1159F MED LIST DOCD IN RCRD: CPT

## 2024-04-23 PROCEDURE — 1160F RVW MEDS BY RX/DR IN RCRD: CPT

## 2024-04-23 NOTE — PROGRESS NOTES
"     Follow Up Adult Note     Date:2024   Patient Name: Iliana Gray  : 1970   MRN: [id number]   Time IN: 1:45 pm    Time OUT: 2:42 pm     Referring Provider: Kiley Blackwell PA    Chief Complaint:      ICD-10-CM ICD-9-CM   1. Schizoaffective disorder, bipolar type  F25.0 295.70   2. Post traumatic stress disorder (PTSD)  F43.10 309.81   3. Attention deficit hyperactivity disorder (ADHD), combined type  F90.2 314.01   4. Generalized anxiety disorder  F41.1 300.02        History of Present Illness:   Iliana Gray is a 53 y.o., , employed (part-time)  female who is being seen today for scheduled Psychotherapy session. Mood reported as \"kind of down\" with mostly congruent but overall cooperative affect. Patient presented as somewhat sad and more down than previous session but remained overall calm, cooperative, engaged, and pleasant with provider. Patient denied any current SI/HI/AVH. Patient reports recent decrease in overall mood report sadness, low motivation, loss of interest and/or pleasure in previously enjoyable activities, fatigue, with ongoing depressive symptoms and stated, \"I have felt more down and not interested in doing much and I kind of miss the manic side of me because I feel like I got more done\". Patient reports frequently waking up in the middle of the night with racing thoughts stating, \"I just start thinking about things from the past and that just puts me in an even worse mood\". Furthermore, patient reported increased physical pain which is only exacerbating ongoing symptom burdens. Patient reported \"some anxiety\" but voiced an overall improvement with psychotherapy and medications. Patient reported she would like to work on decreasing her nicotine intake and lose weight.       Subjective     PHQ-9 Depression Screening  PHQ-9 Total Score:  Not completed at this time     DEO-7   Not completed at this time    Patient's Support Network Includes:  " children and few close friends    Functional Status: Moderate impairment     Progress toward goal: Not at goal    Prognosis: Good with Ongoing Treatment     Medications:     Current Outpatient Medications:     aspirin 81 MG chewable tablet, Chew 1 tablet Daily., Disp: , Rfl:     atorvastatin (Lipitor) 20 MG tablet, Take 1 tablet by mouth Daily., Disp: 90 tablet, Rfl: 3    Cariprazine HCl (Vraylar) 3 MG capsule capsule, Take 1 capsule by mouth Daily., Disp: 30 capsule, Rfl: 1    citalopram (CeleXA) 20 MG tablet, Take 1 tablet by mouth Daily., Disp: 30 tablet, Rfl: 1    clobetasol (TEMOVATE) 0.05 % ointment, PLEASE SEE ATTACHED FOR DETAILED DIRECTIONS, Disp: , Rfl:     diazePAM (VALIUM) 5 MG tablet, Take 1 tablet by mouth 2 (Two) Times a Day As Needed for Anxiety or Sleep., Disp: 60 tablet, Rfl: 0    estradiol (ESTRACE) 1 MG tablet, Take 1 tablet by mouth Daily., Disp: , Rfl:     icosapent ethyl (Vascepa) 1 g capsule capsule, Take 2 g by mouth 2 (Two) Times a Day With Meals., Disp: 360 capsule, Rfl: 2    ipratropium (ATROVENT) 0.06 % nasal spray, INSTILL 2 SPRAYS INTRANASALLY 3 TIMES PER DAY FOR 5 DAYS, Disp: , Rfl:     metFORMIN ER (GLUCOPHAGE-XR) 500 MG 24 hr tablet, Take 1 tablet by mouth 2 (Two) Times a Day., Disp: 180 tablet, Rfl: 1    rosuvastatin (CRESTOR) 40 MG tablet, Take 1 tablet by mouth Daily., Disp: 90 tablet, Rfl: 3    Symbicort 160-4.5 MCG/ACT inhaler, Inhale 2 puffs 2 (Two) Times a Day., Disp: , Rfl:     Ventolin  (90 Base) MCG/ACT inhaler, , Disp: , Rfl:     vitamin B-12 (CYANOCOBALAMIN) 500 MCG tablet, Take 1 tablet by mouth Daily. OTC, Disp: 1 tablet, Rfl:     Current Facility-Administered Medications:     cyanocobalamin injection 1,000 mcg, 1,000 mcg, Intramuscular, Once, Kiley Blackwell PA    Allergies:   No Known Allergies    Objective     Mental Status Exam:   MENTAL STATUS EXAM   General Appearance:  Cleanly groomed and dressed  Eye Contact:  Good eye contact  Attitude:  Cooperative  "and polite  Motor Activity:  Normal gait, posture  Muscle Strength:  Normal  Speech:  Normal rate, tone, volume  Language:  Spontaneous  Mood and affect:  Other  Other Comment:  Mood reported as \"kind of down\" with mostly congruent but overall cooperative affect.  Hopelessness:  Optimistic  Loneliness: 2  Thought Process:  Goal-directed and linear  Associations/ Thought Content:  No delusions  Hallucinations:  None  Suicidal Ideations:  Not present  Homicidal Ideation:  Not present  Sensorium:  Alert and clear  Orientation:  Person, situation, time and place  Immediate Recall, Recent, and Remote Memory:  Intact  Attention Span/ Concentration:  Good  Fund of Knowledge:  Appropriate for age and educational level  Intellectual Functioning:  Average range  Insight:  Fair  Judgement:  Fair  Reliability:  Fair  Impulse Control:  Fair       Assessment / Plan      Visit Diagnosis/Orders Placed This Visit:    ICD-10-CM ICD-9-CM   1. Schizoaffective disorder, bipolar type  F25.0 295.70   2. Post traumatic stress disorder (PTSD)  F43.10 309.81   3. Attention deficit hyperactivity disorder (ADHD), combined type  F90.2 314.01   4. Generalized anxiety disorder  F41.1 300.02        PLAN:  Safety: No acute safety concerns  Risk Assessment: Risk of self-harm acutely is low. Risk of self-harm chronically is also low, but could be further elevated in the event of treatment noncompliance and/or AODA.    Treatment Plan/Goals: Continue supportive psychotherapy efforts and medications as indicated. Treatment and medication options discussed during today's visit. Patient ackowledged and verbally consented to continue with current treatment plan and was educated on the importance of compliance with treatment and follow-up appointments. Patient seems reasonably able to adhere to treatment plan.      Assisted Patient in processing above session content; acknowledged and normalized patient’s thoughts, feelings, and concerns.  Rationalized " patient thought process regarding recent mood decreasing and loss of overall motivation in her daily life. Engaged patient in updating this provider on recent events to gather further details regarding recent stressors. Engaged and assisted the patient in discussing the connection between her thoughts, emotions, and behaviors and how mood regulation can impact daily life and relationships. Discussed the impact of negative habits and physical health on overall mental health while collaborating with the patient to identify healthy activities to participate in to help uplift her mood. Patient remained engaged and receptive to therapeutic feedback.      Allowed Patient to freely discuss issues  without interruption or judgement with unconditional positive regard, active listening skills, and empathy. Therapist provided a safe, confidential environment to facilitate the development of a positive therapeutic relationship and encouraged open, honest communication. Assisted Patient in identifying risk factors which would indicate the need for higher level of care including thoughts to harm self or others and/or self-harming behavior and encouraged Patient to contact this office, call 911, or present to the nearest emergency room should any of these events occur. Discussed crisis intervention services and means to access. Patient adamantly and convincingly denies current suicidal or homicidal ideation or perceptual disturbance. Assisted Patient in processing session content; acknowledged and normalized Patient’s thoughts, feelings, and concerns by utilizing a person-centered approach in efforts to build appropriate rapport and a positive therapeutic relationship with open and honest communication. .     Follow Up:   Return in about 1 week (around 4/30/2024) for Therapy session.    Clive Magallanes \[facility] [place] [facility]\""FLORENTINO  [facility]

## 2024-04-24 ENCOUNTER — OFFICE VISIT (OUTPATIENT)
Dept: ONCOLOGY | Facility: CLINIC | Age: 54
End: 2024-04-24
Payer: COMMERCIAL

## 2024-04-24 VITALS
HEART RATE: 97 BPM | WEIGHT: 222 LBS | HEIGHT: 63 IN | RESPIRATION RATE: 18 BRPM | OXYGEN SATURATION: 95 % | SYSTOLIC BLOOD PRESSURE: 148 MMHG | BODY MASS INDEX: 39.34 KG/M2 | DIASTOLIC BLOOD PRESSURE: 88 MMHG | TEMPERATURE: 98.7 F

## 2024-04-24 DIAGNOSIS — Z85.72 HISTORY OF DIFFUSE LARGE B-CELL LYMPHOMA: Primary | ICD-10-CM

## 2024-04-24 PROBLEM — C83.35 DIFFUSE LARGE B-CELL LYMPHOMA OF LYMPH NODES OF INGUINAL REGION: Chronic | Status: RESOLVED | Noted: 2021-03-25 | Resolved: 2024-04-24

## 2024-04-24 NOTE — PROGRESS NOTES
CHIEF COMPLAINT: History of non-Hodgkin's lymphoma    Problem List:  Oncology/Hematology History Overview Note   1.  Right Medial thigh stage IIa nonbulky less than 7.5 cm diffuse large b cell non-hodgkin lymphoma with component of background follicular lymphoma plan for R-CHOP x3 followed by ISRT.  Had CR after 3 courses of R-CHOP x3 ending 6/8/2021 per PET 6/22/2021.  Fatigue and rash (eczema per dermatology) starting around August 2022 with negative CT chest abdomen pelvis.    Oncology history timeline:  -2/18/2021 hemoglobin 12.7 with normal white count platelet count differential.  CMP unremarkable.  Urinalysis with microscopic hematuria 20-30 red blood cells per milliliter.  AP single view chest x-ray emergency room [place] showed no acute findings.  CT abdomen pelvis with IV contrast emergency room [place] showed calcified granuloma right lung base, fatty liver infiltration, subcentimeter hypodensity left hepatic lobe too small to categorize, normal size spleen, no adrenal masses, no pancreatic mass, normal kidneys, and no retroperitoneal adenopathy or ascites or pneumoperitoneum.  There was a smooth marginated homogeneous enhancing 4.5 x 5 x 3.3 cm mass in the superficial soft tissues of the anterior upper right thigh.  -2/25/2021 office note from Dr. Gong mentions presentation to emergency room 2/18/2021 complaining of right upper thigh swelling for the preceding week of 2/12/2021.  CT scan abdomen pelvis to include the thighs performed at [place] [facility] revealing 5 cm smoothly marginated right upper thigh mass below the groin crease suspicious for pathologic lymph node but could be neoplastic of other nature given no prodromal illness, infections, or inflammation.  Recommendation for biopsy was made.  Per Dr. Gong note, she had been sick for the better part of the winter and had been urgent treatment centers told that she had a viral infection flu and coronavirus negative  with fevers and chills that subsequently resolved but for fatigue that did not resolve with hypersomnolence as well.  Says she feels hot all the time but no ronny night sweats.  -3/12/2021 right thigh mass needle core tissue sample extremely small and predominantly crushed limiting adequate evaluation.  Differential diagnosis for this B-cell lymphoma is diffuse large B cell, follicular lymphoma versus other.  Further differentiation could not be performed on this crush small sample.  -3/25/2021 [facility] hematology oncology initial consultation: I, Rodríguez Gerardo, saw for the first time today.  Reviewed the above story with her.  Still fatigued without night sweats or unexplained weight loss.  She has this mass in the soft tissue medial right thigh about 5 cm in size that does seem to move somewhat beneath the skin but also to my exam feels like it is attached to the deeper structures of the thigh as well.  I feel no other adenopathy or hepatosplenomegaly.  I will get a PET scan.  I spoken with Dr. Gong who will look at these images again with the CTs in [place].  If there is clear indication that this is not coming from soft tissue nonnodal structures, then excisional biopsy for adequate tissue diagnosis is important to determine the type of therapy.  This is growing since her biopsy 3/12/2021 but not daily like a very high-grade lymphoma.  To my hand, this does not feel like a typical stas structure and my other concern is, even though this does not venessa with any markers to suggest sarcoma or the like, is to be sure that this is not something other than lymphoma and I have spoken with Dr. Pino who will do desmin stains and other markers before doing an excisional biopsy next Wednesday with Dr. Gong.  If those additional stains suggest sarcomatoid features that can appear in the medial aspect of the thigh such as this, then she probably needs to have excision by Humberto Vera orthopedic oncologist at .   In the meantime, presuming this to be some form of lymphoma which is the highest likelihood based on the pathology from Dr. Pino where this is CD20 positive, CD10 positive, BCL6 40% positive, MUM1 40% positive, CD23 strong and diffuse positive, and BCL-2 positive.  Ki-67 is 50%.  CD30 and only 5%.  C-Myc is weakly expressed in 10% of cells.  BRIAN by TAYLER is negative.  Cyclin D1 negative.  CD5 indeterminate for B and T-cell coexpression.  LEF 1 presumably staining T cells.  Ultimate decision for treatment depends on the specific pathologic subtype and the staging and I will get her prognostic labs going as well.  Given the relatively initial high-grade features of this that might require Adriamycin I will also proceed with echocardiogram.  -3/25/2021 CBC normal.  CMP unremarkable.  Sedimentation rate normal 27 with C-reactive protein elevated 1.7 upper limit of normal 0.5.  Beta-2 microglobulin normal 1.6.  Uric acid normal 4.3.  LDH normal 162.  Hepatitis B and C serologies negative.  -3/29/2021 echocardiogram ejection fraction 61 to 65%.  -3/31/21 excisional node biopsy shows diffuse large B cell non-Hodgkin lymphoma  -4/2/2021 PET shows postsurgical changes right proximal thigh and superficial inguinal region adjacent to adenopathy with SUV 4.0.  Right internal iliac lymph node mildly enlarged SUV 2.6 additionally noted.  No other adenopathy outside this region.  No mention of any abnormal marrow signal.  -4/5/2021 [facility] medical oncology follow-up visit: Per Dr. Arun Garcia pathologist, this is a diffuse large B cell non-Hodgkin lymphoma.  He is not sure whether this is double hit or not yet.  He is not sure if there is a background of follicular lymphoma and hence cannot say whether this is transformed.  Clinically her IPI score is 0 with her age less than 60, normal LDH, ECOG 0, no extranodal involvement on PET, and only stage II at worse per PET with all nodes within the same region.  I will get a bone  marrow biopsy.  If this is stage IV, double hit, or has evidence of transition from follicular lymphoma into diffuse large B-cell lymphoma, then I would give R-CHOP x6.  If the marrow is not involved, this is not double hit, and there is no evidence of follicular transformation into diffuse large B-cell lymphoma, then I would treat this as a stage II nonbulky diffuse large B-cell lymphoma and consider R-CHOP x3 followed by involved site radiation therapy.  I reviewed her PET and biopsy reports and echocardiogram which shows good ejection fraction.  She will need to get her [employer] Covid vaccine this week, family doctor established for the possibility of prednisone-induced diabetes, CT-guided bone marrow biopsy, chemo preparation visit, and a port placement with Dr. Gong.  We will present her at multidisciplinary tumor board this Friday.  I went into detail regarding the side effects of R-CHOP but she will have these reviewed at her chemo preparation visit.  Though not bulky, I still would treat her with allopurinol.  First course of therapy will occur in our [place] office and if all goes well then the subsequent Rituxan doses can be faster and performed in [place] office.    -4/14/2021 pathology update Dr. Garcia: Node pathology negative for double hit by FISH    -4/21/2021 bone marrow biopsy negative for lymphomatous involvement with scant stainable iron.      -4/26/2021 [facility] [place] [facility] oncology follow-up visit: Given lack of marrow involvement, negative for double hit, and possible background follicular lymphoma , I will treat her as stage IIa nonbulky (original tumor 5 cm on CT, I.  E.  Less than 7.5 cm) diffuse large B-cell lymphoma right proximal thigh SUV 4.0 mass incisionally biopsied (with palpable residua) with mild right internal iliac node enlargement SUV 2.6 as well.  We will treat her with planned R-CHOP x3 followed by involved site radiation therapy.  This was consensus of  multidisciplinary tumor board 4/16/2021 as well.  First course [place].  Second course will occur in [place].  With the small component of background follicular lymphoma, there is potential risk of not only relapse of diffuse large B-cell lymphoma but later relapse from follicular lymphoma and we will keep that in mind in terms of following her up past the usual 3 years post definitive therapy presuming we get a complete remission on subsequent imaging and exams.  We will arrange radiation in [place] and I have made referral.  This will not start until after chemotherapy.    -5/13/2021 radiation oncology consultation Dr. Jl Infante for preparation of future involved field radiation therapy    -5/18/2021 [facility] oncology clinic follow-up: Had moderate amount of nausea after her first cycle, will add Zyprexa to Zofran and Compazine that she is already taking. She was also anxious, added Ativan to her care plan premeds. Prescription for Augmentin sent in for sinus infection. Clinically good response with reduction of right medial thigh mass. We will continue treatment in [place] due to mild infusion reaction with first Rituxan but she was able to complete treatment with additional medications. Today is cycle #2 of a planned 3 courses and then will repeat restaging scan to assess for response and proceed to radiation as outlined above.    -6/8/2021 course #3 Rituxan CHOP    -6/22/2021 PET shows resolution of right inguinal and iliac adenopathy.  Negative PET/CT.    -6/28/2021 [facility] medical oncology follow-up visit: I reviewed images and reports of PET as above.  Status post Rituxan CHOP, PET is essentially negative with complete clinical remission.With the small component of background follicular lymphoma, there is potential risk of not only relapse of diffuse large B-cell lymphoma but later relapse from follicular lymphoma and we will keep that in mind in terms of following her up past the usual 3 years  post definitive therapy presuming we get a complete remission on subsequent imaging and exams.  We will arrange radiation in [place] and Dr. Infante saw in mid May.  I called Dr. Infante to get her in to start involved field radiation and we will repeat PET and labs prior to return in 3 months for survivorship visit with my nurse practitioner who will lay out the follow-up according to NCCN guidelines.  Other than for modest anemia hemoglobin in the tens and glucose 150s, no other remarkable findings on CBC and CMP 6/8/2021.    -6/8/2021 completed course #3 of R-CHOP with resolution of right inguinal and iliac adenopathy on 6/22/2021 PET    -8/16/2021 completed 36 Gray in 18 fractions involved field right pelvic/inguinal femoral nodes Dr. Infante    -10/21/2021 [facility] follow-up/survivorship visit: PET scan from 10/14/2021 was negative along with normal CBC and CMP.  She completed radiation in August without complications.  We will get her back to Dr. Gong for port removal.  We will continue surveillance per NCCN guidelines as follows:  -H&P and labs every 3-6 months for 5 years and then annually or as clinically indicated.    -We will do surveillance imaging post completion of treatment with CT chest, abdomen and pelvis with contrast every 6 months for 2 years and then annually or as clinically indicated.    -1/28/2022 CT chest abdomen pelvis with contrast shows no adenopathy chest abdomen pelvis and no acute process.  Hepatic steatosis with sigmoid diverticulosis.  Hemoglobin 13.6 with normal CBC and differential.  CMP entirely normal with creatinine 0.65.    -2/1/2022 [facility] medical oncology follow-up visit: I reviewed images and reports of scans as outlined above as well as labs.  No evidence of recurrence.  As outlined by my nurse practitioner survivorship visit, she will get an H&P every 3 to 6 months out to April 2026 and then annually thereafter and we will do surveillance CT chest  abdomen pelvis every 6 months until April 2023 and then annually as clinically indicated.  As I have stated previously, we would want to continue to follow her clinically past the usual 2 years of radiographic follow-up for signs or symptoms of follicular lymphoma as there was a small background follicular lymphomatous component that could recur late.  She will see my nurse practitioner back in 6 months with CTs just prior to return.  No further CBC or CMP in the absence of signs or symptoms given normalization of her counts.    -7/28/2022 CT chest, abdomen and pelvis with no evidence of lymphadenopathy in the chest, abdomen or pelvis, diffuse hepatic steatosis, sigmoid diverticulosis.    - 8/4/2022 [facility] Oncology clinic follow-up: Iliana has been feeling poorly since treatment ended a year ago for her history of lymphoma.  Current CT chest, abdomen and pelvis show no evidence of disease recurrence.  Her symptoms unfortunately have not improved since treatment ended and she is having more bad days than good.  She has a rash on the dorsum of her hands and foot that appear to be possibly eczema versus psoriasis.  We will get her to dermatology for further evaluation, I have asked her to call her insurance company to let us know the name of a provider that she would like to see and then we can put in a referral.  She has ongoing fatigue that has not improved with time, she is unable to do most activities without having to rest for the remainder of the day afterwards.  She also has nausea and loose stool with urgency.  I will get her to gastroenterology for an EGD and colonoscopy to rule out any GI involvement of her lymphoma as it did have follicular component.  I will also get labs today with CBC, CMP, we will also check HARRIS in light of her rash, will check inflammatory markers with sedimentation rate and CRP and an LDH.  We will see her back in a few months to go over her labs and GI evaluation.  She does have a  history of hepatic steatosis shown on all prior CTs therefore would benefit from GI evaluation regarding that also, has had normal LFTs on prior labs.      - 8/4/2022 data: CMP unremarkable.  LDH normal 174.  CRP mildly elevated 1.5 upper limit 0.9.  Sedimentation rate normal 9.  CBC normal with normal differential.  HARRIS negative    -9/19/2022 EGD and colonoscopy Mani Cleveland random colon biopsy negative.  Transverse colon polyp sessile serrated adenoma.  Duodenal biopsy with normal architecture.  Stomach biopsy mild chronic inactive gastritis with no dysplasia or metaplasia or H. pylori.  There was a lot of stool in the entire colon interfering with visualization.  Internal hemorrhoids.  EGD showed no gross lesions.    -9/27/2022 [facility] medical oncology follow-up: No clear-cut evidence of lymphoma on CTs or EGD and colonoscopy and labs are unremarkable and negative HARRIS.  Her dermatologist told her she had eczema.  Nonetheless she is still periodically so fatigued that she ends up going to the bed for 2 or 3 days and then rebounds and feels normal for a while and then this waxing and waning of performance status and fatigue continues.  We will get a PET through her thighs as that was the original site of her disease.  I will also have her get EBV and CMV and HIV and thyroid functions.    -10/7/2022 whole-body PET is essentially negative.  There is a solitary borderline focus SUV 2.8 near the left lung apex without CT correlate favored to be infectious/inflammatory change.  No hypermetabolism in particular of the lower extremities where her original disease resided.    -10/11/2022 [facility] medical oncology follow-up: States she had COVID September 24 which was before I last saw her but did not know that when last I saw her.  She did not get her EBV, CMV, HIV, thyroid functions but says she is now getting over the COVID as well as some sinusitis and she thinks that is the fatigue.  Her PET was entirely negative..  We  will continue to check her whole-body PET until April 2023 which is when we will see her back and then we will do that annually only as clinically indicated on annual H&P.    -10/27/2022 CBC normal with normal differential and unremarkable CMP, B12, folate, and normal thyroid function. HIV nonreactive.  CMV DNA quantitation negative.  EBV quantitation PCR negative and EBV antibody profile shows negative VCA IgM with increased IgG VCA of 122 and nuclear antigen IgG 68.8 commensurate with prior infection.  C-reactive protein 21.  C4 complement normal.  C3 complement slightly elevated to 27 upper limit 167.  ANCA panel negative.  HARRIS negative.  CCP negative.  Rheumatoid factor not drawn.  Sedimentation rate normal 24.  Anti-SSA and SSB negative.      -12/1/2022 Platelets minimally elevated 384,000 with otherwise unremarkable CBC and CMP.  C-reactive protein elevated 13.1.  Sedimentation rate 30 barely elevated.  Rheumatoid factor negative.  Moderate external hemoglobin and urine.  4-10 red cells per high-power field.    -4/11/2023 CT chest abdomen pelvis Showed no evidence of adenopathy or splenomegaly.  Atherosclerotic changes.  Hepatic steatosis with small liver cyst.    -4/18/2023 [facility] hematology oncology follow-up: No radiographic evidence of recurrence.  Viral studies all negative.  CBC unremarkable.  No significant inflammatory markers and LDH normal. As outlined in her survivorship visit, she has completed routine surveillance CTs per NCCN guidelines and at this point would only image as clinically indicated.  We will continue H&P every 6 months out till April 2026.  I would probably continue to follow her clinically out past April 2026 for signs or symptoms of late recurrences there was a component of follicular lymphoma within the higher grade lymphoma and late recurrences may occur but at this point no further labs or scans in the absence of symptoms.    -8/23/2023 CBC And CMP ordered by primary care  unremarkable    -10/24/2023 [facility] hematology oncology follow-up: No new somatic complaints to suggest recurrence.  No palpable cervical or axillary or inguinal adenopathy.  I reviewed labs ordered by primary care in August which were unremarkable.  I will have her see my nurse practitioner every 6 months and a survivorship mode until April 2026 at least.  At that point, given that there was a component of follicular lymphoma within the higher grade lymphoma there is a slightly higher risk of late relapse and I would consider continuing H&P surveillance with oncology every 6 to 12 months after April 2026.  No plans for scans or labs in the absence of symptoms.     Diffuse large B-cell lymphoma of lymph nodes of inguinal region   3/29/2021 -  Other Event    Echocardiogram  Left ventricular ejection fraction appears to be 61 - 65%. Left ventricular systolic function is normal.  Normal global longitudinal strain at -23.5%.  Left ventricular diastolic function was normal.  No significant structural or functional valvular disease.     4/2/2021 Imaging    PET/CT IMPRESSION:  Postsurgical changes right proximal thigh and superficial  inguinal region adjacent to adenopathy with abnormal hypermetabolism  maximum SUV 4.0. Right internal iliac lymph node is mildly enlarged with  maximum SUV 2.6 additionally noted. No soft tissue mass or adenopathy  outside of this region.     4/21/2021 Biopsy    Marrow biopsy negative     4/26/2021 - 6/28/2021 Chemotherapy    Completed course #3 6/8/2021  OP LYMPHOMA R-CHOP RiTUXimab / Cyclophosphamide / DOXOrubicin / VinCRIStine / PredniSONE     4/26/2021 Cancer Staged    Staging form: Hodgkin And Non-Hodgkin Lymphoma, AJCC 8th Edition  - Clinical: Stage II (Diffuse large B-cell lymphoma) - Signed by Rodríguez Gerardo MD on 4/26/2021 6/22/2021 Imaging     PET shows resolution of right inguinal and iliac adenopathy.  Negative PET/CT.     7/22/2021 - 8/16/2021 Radiation    Radiation  OncologyTreatment Course:  Iliana Gray received 36 cGy in 18 fractions to right pelvic and inguinofemoral nodes (involved field) under the care of Dr. Jl Infante.     10/14/2021 Imaging    PET/CT: Stable negative PET/CT with no new hypermetabolic adenopathy to suggest lymphomatous disease progression.     1/28/2022 Imaging    CT chest abdomen pelvis with contrast shows no adenopathy chest abdomen pelvis and no acute process.  Hepatic steatosis with sigmoid diverticulosis.  Hemoglobin 13.6 with normal CBC and differential.  CMP entirely normal with creatinine 0.65     7/28/2022 Imaging    CT chest, abdomen and pelvis:  1. No evidence of lymphadenopathy in the chest abdomen or pelvis.  2. Diffuse hepatic steatosis.  3. Sigmoid diverticulosis.  4. Hysterectomy  5. Postoperative changes of prior right inguinal lymph node resection.     4/11/2023 Imaging    CT chest abdomen pelvis Showed no evidence of adenopathy or splenomegaly.  Atherosclerotic changes.  Hepatic steatosis with small liver cyst.         HISTORY OF PRESENT ILLNESS:  The patient is a 53 y.o. female, here for follow up on management of history of non-Hodgkin's lymphoma.  She will overall has been doing well since we saw her last with no new concerns.  She has mild chronic fatigue but not worsening or changing with time.  She has not noted any abnormal swelling or lymphadenopathy.  She has no new pain.  No illnesses or hospitalizations since we saw her last.  Her appetite has been normal, weight is stable.  She denies any fevers, chills or bit drenching night sweats.    Past Medical History:   Diagnosis Date    Anxiety     Arthritis     Asthma     Diffuse large B cell lymphoma     Sleep apnea     cpap     Past Surgical History:   Procedure Laterality Date    HYSTERECTOMY      TUMOR EXCISION Right 04/2021    lymph node removal/biopsy/right inner thigh    VENOUS ACCESS DEVICE (PORT) INSERTION Right 04/2021       No Known Allergies    Family  "History and Social History reviewed and changed as necessary    REVIEW OF SYSTEM:   No new somatic concerns    PHYSICAL EXAM:  Well-developed, well-nourished appearing female in no distress  Respirations regular and unlabored, lungs clear to auscultation bilaterally  No cervical, supraclavicular, axillary or inguinal nodes palpable on exam  Abdomen is soft, nondistended, nontender    Vitals:    04/24/24 0957   BP: 148/88   Pulse: 97   Resp: 18   Temp: 98.7 °F (37.1 °C)   SpO2: 95%   Weight: 101 kg (222 lb)   Height: 160 cm (63\")     Vitals:    04/24/24 0957   PainSc: 0-No pain          ECOG score: 0           Vitals reviewed.  Labs available in epic reviewed at time of visit from most recent results 2/23/2024 with normal CBC and CMP    ECOG: (0) Fully Active - Able to Carry On All Pre-disease Performance Without Restriction    Lab Results   Component Value Date    HGB 14.4 02/23/2024    HCT 41.5 02/23/2024    MCV 88 02/23/2024     02/23/2024    WBC 5.8 02/23/2024    NEUTROABS 2.7 02/23/2024    LYMPHSABS 2.5 02/23/2024    MONOSABS 0.4 02/23/2024    EOSABS 0.2 02/23/2024    BASOSABS 0.0 02/23/2024       Lab Results   Component Value Date    GLUCOSE 102 (H) 02/23/2024    BUN 10 02/23/2024    CREATININE 0.59 02/23/2024     02/23/2024    K 4.3 02/23/2024     02/23/2024    CO2 24 02/23/2024    CALCIUM 9.4 02/23/2024    PROTEINTOT 7.0 01/28/2022    ALBUMIN 4.3 02/23/2024    BILITOT 0.3 02/23/2024    ALKPHOS 89 02/23/2024    AST 12 02/23/2024    ALT 12 02/23/2024             ASSESSMENT & PLAN:    1.  History of diffuse large B cell non-Hodgkin's lymphoma with component of background follicular lymphoma of the right medial thigh as outlined above in the oncology history: Iliana is doing well, she has no evidence of recurrent lymphoma on clinical exam and no new worrisome symptoms.  I have reviewed CBC and CMP available in epic from her most recent visit 2/23/2024 which were normal.  We will continue to see " her every 6 months for surveillance until April 2026 and then will go to annual visits.  No plans for routine scans or labs in the absence of new symptoms.  She does continue to smoke, I discussed with her the importance of tobacco cessation, at this time she is not ready to quit.    Return to clinic in 6 months for follow-up    I spent 20 minutes caring for Iliana on this date of service. This time includes time spent by me in the following activities: preparing for the visit, reviewing tests, performing a medically appropriate examination and/or evaluation, counseling and educating the patient/family/caregiver, and documenting information in the medical record.     Anitra Shelley, APRN    04/24/2024

## 2024-04-30 ENCOUNTER — TELEPHONE (OUTPATIENT)
Dept: BEHAVIORAL HEALTH | Facility: CLINIC | Age: 54
End: 2024-04-30
Payer: COMMERCIAL

## 2024-04-30 ENCOUNTER — OFFICE VISIT (OUTPATIENT)
Dept: BEHAVIORAL HEALTH | Facility: CLINIC | Age: 54
End: 2024-04-30
Payer: COMMERCIAL

## 2024-04-30 DIAGNOSIS — F43.10 POST TRAUMATIC STRESS DISORDER (PTSD): ICD-10-CM

## 2024-04-30 DIAGNOSIS — F39 MOOD DISORDER: ICD-10-CM

## 2024-04-30 DIAGNOSIS — F33.1 MAJOR DEPRESSIVE DISORDER, RECURRENT EPISODE, MODERATE: Primary | ICD-10-CM

## 2024-04-30 DIAGNOSIS — F41.1 GENERALIZED ANXIETY DISORDER: ICD-10-CM

## 2024-04-30 PROCEDURE — 1159F MED LIST DOCD IN RCRD: CPT

## 2024-04-30 PROCEDURE — 90837 PSYTX W PT 60 MINUTES: CPT

## 2024-04-30 PROCEDURE — 1160F RVW MEDS BY RX/DR IN RCRD: CPT

## 2024-04-30 NOTE — TELEPHONE ENCOUNTER
Incoming Refill Request      Medication requested (name and dose):     CITALOPRAM (CELEXA) 20 MG TABLET     Pharmacy where request should be sent:     [facility]     Additional details provided by patient:     PATIENT THOUGHT SHE WAS ON 40 MG NOT 20 MG     Best call back number: [phone]    Does the patient have less than a 3 day supply:  [] Yes  [] No    Radha Mitchell Rep  04/30/24, 14:41 EDT        {Tip  DIRECTIONS Send the encounter HIGH priority, If patient has less than a 3 day supply. If the patient will run out of medication over the weekend add that information to the additional details line. Send this encounter to the clinical pool:[id number]

## 2024-04-30 NOTE — PROGRESS NOTES
"     Follow Up Adult Note     Date:2024   Patient Name: Iliana Gray  : 1970   MRN: [id number]   Time IN: 1:45 pm    Time OUT: 2:42 pm     Referring Provider: Kiley Blackwell PA    Chief Complaint:      ICD-10-CM ICD-9-CM   1. Major depressive disorder, recurrent episode, moderate  F33.1 296.32   2. Mood disorder  F39 296.90   3. Generalized anxiety disorder  F41.1 300.02   4. Post traumatic stress disorder (PTSD)  F43.10 309.81        History of Present Illness:   Iliana Gray is a 53 y.o., single, employed (part-time)  female who is being seen today for scheduled Psychotherapy session. Mood reported as \"not been good and irritable\" with somewhat congruent but overall cooperative affect. Patient initially presented with some agitation (not directed at provider) that improved as session progressed, showed some sadness, and remained overall calm, cooperative, engaged, and pleasant with provider. Patient denied any current SI/HI/AVH. Patient reports increased stress with reported loss of interest and/or pleasure in activities, increased sleep, increased irritability and agitation, loss of energy/fatigue with ongoing depressive and anxiety symptoms along with reported poor overall mood. Patient expressed sadness when talking about past trauma and grief, mostly related to the loss of her son.     \"I feel like my mood has tanked and I'm more depressed\"  \"I just dread doing anything and it is worse when I know there is something I have to do\"  \"I am sleeping a lot more and not getting as much as I want done which only frustrates me even more\"  \"It seems like everything is making me irritable and angry\"  \"I never really processed the grief, and with my son he passed on a Friday and I was back at work on the following Monday because that's just how I dealt with things\"  \"When I lost my son I can honestly say I know what someone feels like when they say they lost their mind, but like " "I said, I tried to stay busy to avoid thinking about it\"  \"I am the only one in the family that talks to my step-dad and I just found out he has been sleeping at a rest stop and gave him some money\"  \"I do have a hard time saying no\"        Subjective     PHQ-9 Depression Screening  PHQ-9 Total Score:  Not completed at this time     DEO-7   Not completed at this time    Patient's Support Network Includes:  daughter, extended family, and friends    Functional Status: Moderate impairment     Progress toward goal: Not at goal    Prognosis:  Patient has shown good progress in past and will continue to benefit from treatment    Medications:     Current Outpatient Medications:     aspirin 81 MG chewable tablet, Chew 1 tablet Daily., Disp: , Rfl:     atorvastatin (Lipitor) 20 MG tablet, Take 1 tablet by mouth Daily., Disp: 90 tablet, Rfl: 3    Cariprazine HCl (Vraylar) 3 MG capsule capsule, Take 1 capsule by mouth Daily., Disp: 30 capsule, Rfl: 1    citalopram (CeleXA) 20 MG tablet, Take 1 tablet by mouth Daily., Disp: 30 tablet, Rfl: 1    clobetasol (TEMOVATE) 0.05 % ointment, PLEASE SEE ATTACHED FOR DETAILED DIRECTIONS, Disp: , Rfl:     diazePAM (VALIUM) 5 MG tablet, Take 1 tablet by mouth 2 (Two) Times a Day As Needed for Anxiety or Sleep., Disp: 60 tablet, Rfl: 0    estradiol (ESTRACE) 1 MG tablet, Take 1 tablet by mouth Daily., Disp: , Rfl:     icosapent ethyl (Vascepa) 1 g capsule capsule, Take 2 g by mouth 2 (Two) Times a Day With Meals., Disp: 360 capsule, Rfl: 2    ipratropium (ATROVENT) 0.06 % nasal spray, INSTILL 2 SPRAYS INTRANASALLY 3 TIMES PER DAY FOR 5 DAYS, Disp: , Rfl:     metFORMIN ER (GLUCOPHAGE-XR) 500 MG 24 hr tablet, Take 1 tablet by mouth 2 (Two) Times a Day., Disp: 180 tablet, Rfl: 1    rosuvastatin (CRESTOR) 40 MG tablet, Take 1 tablet by mouth Daily., Disp: 90 tablet, Rfl: 3    Symbicort 160-4.5 MCG/ACT inhaler, Inhale 2 puffs 2 (Two) Times a Day., Disp: , Rfl:     Ventolin  (90 Base) " "MCG/ACT inhaler, , Disp: , Rfl:     vitamin B-12 (CYANOCOBALAMIN) 500 MCG tablet, Take 1 tablet by mouth Daily. OTC, Disp: 1 tablet, Rfl:     Current Facility-Administered Medications:     cyanocobalamin injection 1,000 mcg, 1,000 mcg, Intramuscular, Once, Kiley Blackwell PA    Allergies:   No Known Allergies    Objective     Mental Status Exam:   MENTAL STATUS EXAM   General Appearance:  Cleanly groomed and dressed  Eye Contact:  Good eye contact  Attitude:  Cooperative and polite  Motor Activity:  Other  Other Comment:  Foot tapping intermittently but overall appropriate  Muscle Strength:  Normal  Speech:  Normal rate, tone, volume  Language:  Spontaneous  Mood and affect:  Other  Other Comment:  Mood reported as \"not been good and irritable\" with somewhat congruent but overall cooperative affect.  Hopelessness:  Optimistic  Loneliness: 3  Thought Process:  Goal-directed and linear  Associations/ Thought Content:  No delusions  Hallucinations:  None  Suicidal Ideations:  Not present  Homicidal Ideation:  Not present  Sensorium:  Alert and clear  Orientation:  Person, situation, time and place  Immediate Recall, Recent, and Remote Memory:  Intact  Attention Span/ Concentration:  Good  Fund of Knowledge:  Appropriate for age and educational level  Intellectual Functioning:  Average range  Insight:  Fair  Judgement:  Fair  Reliability:  Good  Impulse Control:  Fair       Assessment / Plan      Visit Diagnosis/Orders Placed This Visit:    ICD-10-CM ICD-9-CM   1. Major depressive disorder, recurrent episode, moderate  F33.1 296.32   2. Mood disorder  F39 296.90   3. Generalized anxiety disorder  F41.1 300.02   4. Post traumatic stress disorder (PTSD)  F43.10 309.81        PLAN:  Safety: No acute safety concerns  Risk Assessment: Risk of self-harm acutely is low. Risk of self-harm chronically is also low, but could be further elevated in the event of treatment noncompliance and/or AODA.    Treatment Plan/Goals: " Continue supportive psychotherapy efforts and medications as indicated. Treatment and medication options discussed during today's visit. Patient ackowledged and verbally consented to continue with current treatment plan and was educated on the importance of compliance with treatment and follow-up appointments. Patient seems reasonably able to adhere to treatment plan.      Assisted Patient in processing above session content; acknowledged and normalized patient’s thoughts, feelings, and concerns.  Rationalized patient thought process regarding recent mood to include increase depressive symptoms and history of avoiding dealing with grief and trauma. Engaged patient in exploring triggers and gaining better insight into her own patterns of mood regulation and how negative moods can be modified by developing and practicing problem solving skills to cope with challenging situations and emotions. Engaged patient in discussing and learning more about the impact of unexpressed emotions, feelings, and needs on mental and physical health. Processed and discussed that grief is a normal and natural response to loss and that everyone experiences it differently. Patient remained motivated and receptive to therapeutic feedback.        Allowed Patient to freely discuss issues  without interruption or judgement with unconditional positive regard, active listening skills, and empathy. Therapist provided a safe, confidential environment to facilitate the development of a positive therapeutic relationship and encouraged open, honest communication. Assisted Patient in identifying risk factors which would indicate the need for higher level of care including thoughts to harm self or others and/or self-harming behavior and encouraged Patient to contact this office, call 911, or present to the nearest emergency room should any of these events occur. Discussed crisis intervention services and means to access. Patient adamantly and  convincingly denies current suicidal or homicidal ideation or perceptual disturbance. Assisted Patient in processing session content; acknowledged and normalized Patient’s thoughts, feelings, and concerns by utilizing a person-centered approach in efforts to build appropriate rapport and a positive therapeutic relationship with open and honest communication. .     Follow Up:   Return in about 1 week (around 5/7/2024) for Therapy session.    Clive Magallanes, [facility]  [facility]

## 2024-05-01 DIAGNOSIS — F25.0 SCHIZOAFFECTIVE DISORDER, BIPOLAR TYPE: ICD-10-CM

## 2024-05-01 RX ORDER — CARIPRAZINE 3 MG/1
3 CAPSULE, GELATIN COATED ORAL DAILY
Qty: 30 CAPSULE | Refills: 0 | Status: SHIPPED | OUTPATIENT
Start: 2024-05-01

## 2024-05-09 ENCOUNTER — OFFICE VISIT (OUTPATIENT)
Dept: BEHAVIORAL HEALTH | Facility: CLINIC | Age: 54
End: 2024-05-09
Payer: COMMERCIAL

## 2024-05-09 VITALS
WEIGHT: 221 LBS | BODY MASS INDEX: 39.16 KG/M2 | HEART RATE: 90 BPM | OXYGEN SATURATION: 91 % | HEIGHT: 63 IN | DIASTOLIC BLOOD PRESSURE: 80 MMHG | SYSTOLIC BLOOD PRESSURE: 124 MMHG

## 2024-05-09 DIAGNOSIS — G47.20 CIRCADIAN RHYTHM SLEEP DISORDER, UNSPECIFIED TYPE: ICD-10-CM

## 2024-05-09 DIAGNOSIS — F33.1 MAJOR DEPRESSIVE DISORDER, RECURRENT EPISODE, MODERATE: Primary | ICD-10-CM

## 2024-05-09 DIAGNOSIS — F25.0 SCHIZOAFFECTIVE DISORDER, BIPOLAR TYPE: ICD-10-CM

## 2024-05-09 DIAGNOSIS — F41.1 GENERALIZED ANXIETY DISORDER: ICD-10-CM

## 2024-05-09 RX ORDER — FLUOXETINE HYDROCHLORIDE 20 MG/1
20 CAPSULE ORAL DAILY
Qty: 30 CAPSULE | Refills: 2 | Status: SHIPPED | OUTPATIENT
Start: 2024-05-09 | End: 2025-05-09

## 2024-05-15 ENCOUNTER — TELEPHONE (OUTPATIENT)
Dept: BEHAVIORAL HEALTH | Facility: CLINIC | Age: 54
End: 2024-05-15
Payer: COMMERCIAL

## 2024-05-15 ENCOUNTER — OFFICE VISIT (OUTPATIENT)
Dept: BEHAVIORAL HEALTH | Facility: CLINIC | Age: 54
End: 2024-05-15
Payer: COMMERCIAL

## 2024-05-15 DIAGNOSIS — F25.0 SCHIZOAFFECTIVE DISORDER, BIPOLAR TYPE: ICD-10-CM

## 2024-05-15 DIAGNOSIS — F33.1 MAJOR DEPRESSIVE DISORDER, RECURRENT EPISODE, MODERATE: Primary | ICD-10-CM

## 2024-05-15 DIAGNOSIS — F41.1 GENERALIZED ANXIETY DISORDER: ICD-10-CM

## 2024-05-15 NOTE — PROGRESS NOTES
"     Follow Up Adult Note     Date:05/15/2024   Patient Name: Iliana Gray  : 1970   MRN: [id number]   Time IN: 9:05 am     Time OUT: 9:50 am     Referring Provider: Kiley Blackwell PA    Chief Complaint:      ICD-10-CM ICD-9-CM   1. Major depressive disorder, recurrent episode, moderate  F33.1 296.32   2. Schizoaffective disorder, bipolar type  F25.0 295.70   3. Generalized anxiety disorder  F41.1 300.02        History of Present Illness:   Iliana Gray is a 53 y.o., single, employed (part time)  female who is being seen today for scheduled Psychotherapy session. Mood reported as \"good\" with mostly irritable and somewhat tense affect. Patient appeared to present as tense with some irritability but was overall calm, cooperative, and pleasant with provider. Patient showed decrease in overall engagement during this session. Patient denied any current SI/HI/AVH. Patient reports increased fatigue and increased sleep throughout the days with reported loss of interest in activities, increased agitation and irritable moods, and feeling down and anxious. Patient reports feeling more withdrawn.     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      Subjective     PHQ-9 Depression Screening  PHQ-9 Total Score:  Not completed at this time     DEO-7   Not completed at this time    Patient's Support Network Includes:  children and extended family    Functional Status: Moderate impairment     Progress toward goal: Not at goal    Prognosis: Fair with Ongoing Treatment     Medications:     Current Outpatient Medications: "     aspirin 81 MG chewable tablet, Chew 1 tablet Daily., Disp: , Rfl:     atorvastatin (Lipitor) 20 MG tablet, Take 1 tablet by mouth Daily., Disp: 90 tablet, Rfl: 3    Cariprazine HCl (Vraylar) 3 MG capsule capsule, Take 1 capsule by mouth Daily., Disp: 30 capsule, Rfl: 2    clobetasol (TEMOVATE) 0.05 % ointment, PLEASE SEE ATTACHED FOR DETAILED DIRECTIONS, Disp: , Rfl:     diazePAM (VALIUM) 5 MG tablet, Take 1 tablet by mouth 2 (Two) Times a Day As Needed for Anxiety or Sleep., Disp: 60 tablet, Rfl: 0    estradiol (ESTRACE) 1 MG tablet, Take 1 tablet by mouth Daily., Disp: , Rfl:     FLUoxetine (PROzac) 20 MG capsule, Take 1 capsule by mouth Daily., Disp: 30 capsule, Rfl: 2    icosapent ethyl (Vascepa) 1 g capsule capsule, Take 2 g by mouth 2 (Two) Times a Day With Meals., Disp: 360 capsule, Rfl: 2    ipratropium (ATROVENT) 0.06 % nasal spray, INSTILL 2 SPRAYS INTRANASALLY 3 TIMES PER DAY FOR 5 DAYS, Disp: , Rfl:     metFORMIN ER (GLUCOPHAGE-XR) 500 MG 24 hr tablet, Take 1 tablet by mouth 2 (Two) Times a Day., Disp: 180 tablet, Rfl: 1    rosuvastatin (CRESTOR) 40 MG tablet, Take 1 tablet by mouth Daily., Disp: 90 tablet, Rfl: 3    Symbicort 160-4.5 MCG/ACT inhaler, Inhale 2 puffs 2 (Two) Times a Day., Disp: , Rfl:     Ventolin  (90 Base) MCG/ACT inhaler, , Disp: , Rfl:     vitamin B-12 (CYANOCOBALAMIN) 500 MCG tablet, Take 1 tablet by mouth Daily. OTC, Disp: 1 tablet, Rfl:     Current Facility-Administered Medications:     cyanocobalamin injection 1,000 mcg, 1,000 mcg, Intramuscular, Once, Kiley Blackwell PA    Allergies:   No Known Allergies    Objective     Mental Status Exam:   MENTAL STATUS EXAM   General Appearance:  Cleanly groomed and dressed  Eye Contact:  Fair  Attitude:  Other  Other Comment:  More guarded and evasive than usual  Motor Activity:  Fidgety  Muscle Strength:  Normal  Speech:  Normal rate, tone, volume  Language:  Spontaneous  Mood and affect:  Other  Other Comment:  Mood reported as  "\"good\" with mostly irritable and somewhat tense affect  Hopelessness:  Optimistic  Loneliness: 4  Thought Process:  Linear  Associations/ Thought Content:  No delusions  Hallucinations:  None  Suicidal Ideations:  Not present  Homicidal Ideation:  Not present  Sensorium:  Alert and clear  Orientation:  Person, place, time and situation  Immediate Recall, Recent, and Remote Memory:  Intact  Attention Span/ Concentration:  Good  Fund of Knowledge:  Appropriate for age and educational level  Intellectual Functioning:  Average range  Insight:  Limited  Judgement:  Fair  Reliability:  Fair  Impulse Control:  Fair       Assessment / Plan      Visit Diagnosis/Orders Placed This Visit:    ICD-10-CM ICD-9-CM   1. Major depressive disorder, recurrent episode, moderate  F33.1 296.32   2. Schizoaffective disorder, bipolar type  F25.0 295.70   3. Generalized anxiety disorder  F41.1 300.02        PLAN:  Safety: No acute safety concerns  Risk Assessment: Risk of self-harm acutely is low. Risk of self-harm chronically is also low, but could be further elevated in the event of treatment noncompliance and/or AODA.    Treatment Plan/Goals: Continue supportive psychotherapy efforts and medications as indicated. Treatment and medication options discussed during today's visit. Patient ackowledged and verbally consented to continue with current treatment plan and was educated on the importance of compliance with treatment and follow-up appointments. Patient seems reasonably able to adhere to treatment plan.      Assisted Patient in processing above session content; acknowledged and normalized patient’s thoughts, feelings, and concerns.  Rationalized patient thought process regarding recent moods and lack of motivation and feeling withdrawn. Utilized CBT techniques to assist patient with identifying negative thoughts and beliefs that contribute to negative mood states while developing alternative and more balanced thoughts and beliefs. " Continued to discuss self care and the importance of engaging with peers to assist with elevating overall mood. Patient mostly receptive to therapeutic feedback.     Allowed Patient to freely discuss issues  without interruption or judgement with unconditional positive regard, active listening skills, and empathy. Therapist provided a safe, confidential environment to facilitate the development of a positive therapeutic relationship and encouraged open, honest communication. Assisted Patient in identifying risk factors which would indicate the need for higher level of care including thoughts to harm self or others and/or self-harming behavior and encouraged Patient to contact this office, call 911, or present to the nearest emergency room should any of these events occur. Discussed crisis intervention services and means to access. Patient adamantly and convincingly denies current suicidal or homicidal ideation or perceptual disturbance. Assisted Patient in processing session content; acknowledged and normalized Patient’s thoughts, feelings, and concerns by utilizing a person-centered approach in efforts to build appropriate rapport and a positive therapeutic relationship with open and honest communication. .     Follow Up:   Return in about 2 weeks (around 5/29/2024) for Therapy session.    Clive [facility]allanes [facility]  [facility]    Stroke severity too mild (non-disabling)

## 2024-05-15 NOTE — TELEPHONE ENCOUNTER
[facility]. THEY HAVE RECEIVED THE SAMPLES BUT NO ORDER

## 2024-05-16 DIAGNOSIS — R73.03 PREDIABETES: ICD-10-CM

## 2024-05-16 RX ORDER — METFORMIN HYDROCHLORIDE 500 MG/1
500 TABLET, EXTENDED RELEASE ORAL 2 TIMES DAILY
Qty: 180 TABLET | Refills: 0 | OUTPATIENT
Start: 2024-05-16

## 2024-05-16 NOTE — TELEPHONE ENCOUNTER
Rx Refill Note    Requested Prescriptions     Pending Prescriptions Disp Refills    metFORMIN ER (GLUCOPHAGE-XR) 500 MG 24 hr tablet [Pharmacy Med Name: METFORMIN HCL  MG TABLET] 180 tablet 0     Sig: TAKE 1 TABLET BY MOUTH TWICE A DAY        Last office visit with prescribing clinician: 8/23/2023      Next office visit with prescribing clinician: Visit date not found   Last labs:   Last refill: 08/23/2023   Pharmacy (be sure to add in Epic). correct

## 2024-06-13 ENCOUNTER — TELEPHONE (OUTPATIENT)
Dept: BEHAVIORAL HEALTH | Facility: CLINIC | Age: 54
End: 2024-06-13
Payer: COMMERCIAL

## 2024-07-02 ENCOUNTER — TELEPHONE (OUTPATIENT)
Dept: FAMILY MEDICINE CLINIC | Facility: CLINIC | Age: 54
End: 2024-07-02

## 2024-07-02 NOTE — TELEPHONE ENCOUNTER
Caller: Iliana Gray    Relationship to patient: Self    Best call back number:  [phone]     Chief complaint: SHORTNESS OF BREATH WHILE SITTING/LAYING/ALL OF THE TIME.    Patient directed to call 911 or go to their nearest emergency room.     Patient verbalized understanding: [x] Yes  [] No  If no, why?    Additional notes: PATIENT STATED SHE IS GOING TO CLOSEST ED NOW.

## 2024-08-06 DIAGNOSIS — F41.1 GENERALIZED ANXIETY DISORDER: ICD-10-CM

## 2024-08-06 DIAGNOSIS — F33.1 MAJOR DEPRESSIVE DISORDER, RECURRENT EPISODE, MODERATE: ICD-10-CM

## 2024-08-06 RX ORDER — FLUOXETINE HYDROCHLORIDE 20 MG/1
20 CAPSULE ORAL DAILY
Qty: 30 CAPSULE | Refills: 2 | Status: SHIPPED | OUTPATIENT
Start: 2024-08-06 | End: 2025-08-06

## 2024-08-06 NOTE — TELEPHONE ENCOUNTER
Incoming Refill Request      Medication requested (name and dose):     FLUOXETINE (PROZAC) 30 MG CAPSULE     Pharmacy where request should be sent:     [facility]      Additional details provided by patient:       Best call back number: [phone]    Does the patient have less than a 3 day supply:  [] Yes  [] No    Janice Radha Dick Rep  08/06/24, 09:13 EDT        {Tip  DIRECTIONS Send the encounter HIGH priority, If patient has less than a 3 day supply. If the patient will run out of medication over the weekend add that information to the additional details line. Send this encounter to the clinical pool:[id number]

## 2024-08-06 NOTE — TELEPHONE ENCOUNTER
Rx Refill Note    Requested Prescriptions     Pending Prescriptions Disp Refills    FLUoxetine (PROzac) 20 MG capsule 30 capsule 2     Sig: Take 1 capsule by mouth Daily.        Last office visit with prescribing clinician: 5/9/2024      Next office visit with prescribing clinician: 10/30/2024   Last labs:   Last refill: request was for 30mg, but I only saw 20mg in her med list    Pharmacy (be sure to add in Epic). correct

## 2024-08-07 DIAGNOSIS — F41.1 GENERALIZED ANXIETY DISORDER: ICD-10-CM

## 2024-08-07 DIAGNOSIS — F33.1 MAJOR DEPRESSIVE DISORDER, RECURRENT EPISODE, MODERATE: ICD-10-CM

## 2024-08-07 RX ORDER — FLUOXETINE HYDROCHLORIDE 20 MG/1
20 CAPSULE ORAL DAILY
Qty: 30 CAPSULE | Refills: 2 | OUTPATIENT
Start: 2024-08-07 | End: 2025-08-07

## 2024-08-27 ENCOUNTER — OFFICE VISIT (OUTPATIENT)
Dept: CARDIOLOGY | Facility: CLINIC | Age: 54
End: 2024-08-27
Payer: COMMERCIAL

## 2024-08-27 VITALS
HEIGHT: 63 IN | BODY MASS INDEX: 39.69 KG/M2 | SYSTOLIC BLOOD PRESSURE: 126 MMHG | WEIGHT: 224 LBS | DIASTOLIC BLOOD PRESSURE: 88 MMHG | HEART RATE: 80 BPM | RESPIRATION RATE: 18 BRPM | OXYGEN SATURATION: 99 %

## 2024-08-27 DIAGNOSIS — E66.01 SEVERE OBESITY (BMI 35.0-39.9) WITH COMORBIDITY: ICD-10-CM

## 2024-08-27 DIAGNOSIS — E78.5 HYPERLIPIDEMIA LDL GOAL <70: Primary | ICD-10-CM

## 2024-08-27 DIAGNOSIS — G47.33 OBSTRUCTIVE SLEEP APNEA: ICD-10-CM

## 2024-08-27 DIAGNOSIS — R73.03 PREDIABETES: ICD-10-CM

## 2024-08-27 PROBLEM — Z72.0 TOBACCO USE: Status: RESOLVED | Noted: 2023-05-24 | Resolved: 2024-08-27

## 2024-08-27 RX ORDER — ROSUVASTATIN CALCIUM 40 MG/1
40 TABLET, COATED ORAL DAILY
Qty: 90 TABLET | Refills: 3 | Status: SHIPPED | OUTPATIENT
Start: 2024-08-27

## 2024-08-27 RX ORDER — ICOSAPENT ETHYL 1 G/1
2 CAPSULE ORAL 2 TIMES DAILY WITH MEALS
Qty: 360 CAPSULE | Refills: 3 | Status: SHIPPED | OUTPATIENT
Start: 2024-08-27

## 2024-08-27 NOTE — PROGRESS NOTES
[facility]  [facility]  [address] [place] [address]  Dept: [phone]  Dept Fax: [phone]    Iliana Gray  1970    Follow Up Office Visit Note    History of Present Illness:  Iliana Gray is a 54 y.o. female who presents to the clinic for Follow-up. Hyperlipidemia - She has quit smoking denies any complaints on Crestor 20 mg and also Vascepa. We need a Lipid profile  BP is 120.80    The following portions of the patient's history were reviewed and updated as appropriate: allergies, current medications, past family history, past medical history, past social history, past surgical history, and problem list.    Medications:  aspirin  Cariprazine HCl capsule  clobetasol  cyanocobalamin  diazePAM  estradiol  FLUoxetine  icosapent ethyl capsule  ipratropium  metFORMIN ER  rosuvastatin  Symbicort aerosol  Ventolin HFA aerosol solution  vitamin B-12    Subjective  No Known Allergies     Past Medical History:   Diagnosis Date   • Anxiety    • Arthritis    • Asthma    • Diffuse large B cell lymphoma    • Sleep apnea     cpap       Past Surgical History:   Procedure Laterality Date   • HYSTERECTOMY     • TUMOR EXCISION Right 2021    lymph node removal/biopsy/right inner thigh   • VENOUS ACCESS DEVICE (PORT) INSERTION Right 2021       History reviewed. No pertinent family history.     Social History     Socioeconomic History   • Marital status:    Tobacco Use   • Smoking status: Former     Current packs/day: 0.00     Average packs/day: 1 pack/day for 42.0 years (42.0 ttl pk-yrs)     Types: Cigarettes     Start date:      Quit date:      Years since quittin.6   • Smokeless tobacco: Never   Vaping Use   • Vaping status: Never Used   Substance and Sexual Activity   • Alcohol use: Never   • Drug use: Never   • Sexual activity: Yes     Partners: Male       Review of Systems   Constitutional: Negative.    HENT: Negative.    "  Respiratory: Negative.     Cardiovascular: Negative.    Endocrine: Negative.    Genitourinary: Negative.    Musculoskeletal: Negative.    Skin: Negative.    Allergic/Immunologic: Negative.    Neurological: Negative.    Hematological: Negative.    Psychiatric/Behavioral: Negative.       Cardiovascular Procedures    ECHO/MUGA:  STRESS TESTS:   CARDIAC CATH:   DEVICES:   HOLTER:   CT/MRI:   VASCULAR:   CARDIOTHORACIC:     Objective  Vitals:    08/27/24 1128   BP: 126/88   BP Location: Right arm   Patient Position: Lying   Cuff Size: Adult   Pulse: 80   Resp: 18   SpO2: 99%   Weight: 102 kg (224 lb)   Height: 160 cm (63\")   PainSc: 0-No pain     Body mass index is 39.68 kg/m².     Physical Exam  Vitals reviewed.   Constitutional:       Appearance: Healthy appearance. Not in distress.   Neck:      Vascular: No JVR. JVD normal.   Pulmonary:      Effort: Pulmonary effort is normal.      Breath sounds: Normal breath sounds. No wheezing. No rhonchi. No rales.   Chest:      Chest wall: Not tender to palpatation.   Cardiovascular:      PMI at left midclavicular line. Normal rate. Regular rhythm. Normal S1. Normal S2.       Murmurs: There is no murmur.      No gallop.  No click. No rub.   Pulses:     Intact distal pulses.   Edema:     Peripheral edema absent.   Abdominal:      General: Bowel sounds are normal.      Palpations: Abdomen is soft.      Tenderness: There is no abdominal tenderness.   Musculoskeletal: Normal range of motion.         General: No tenderness. Skin:     General: Skin is warm and dry.   Neurological:      General: No focal deficit present.      Mental Status: Alert and oriented to person, place and time.        Diagnostic Data    ECG 12 Lead    Date/Time: 8/27/2024 11:51 AM  Performed by: Ralph Mg MD    Authorized by: Ralph Mg MD  Comparison: compared with previous ECG from 5/24/2023  Similar to previous ECG  Rhythm: sinus rhythm  Rate: normal  BPM: 90  QRS axis: " normal    Clinical impression: normal ECG      Assessment and Plan  Diagnoses and all orders for this visit:    Hyperlipidemia LDL goal <70-On crestor 20 mg and vascepa, we need a Lipid, tolerates well the meds    Prediabetes- per PCP,     Obstructive sleep apnea- on CPAP    Severe obesity (BMI 35.0-39.9) with comorbidity- Advised to lose weight    Other orders  -     rosuvastatin (CRESTOR) 40 MG tablet; Take 1 tablet by mouth Daily.  -     icosapent ethyl (Vascepa) 1 g capsule capsule; Take 2 g by mouth 2 (Two) Times a Day With Meals.         Return in about 1 year (around 8/27/2025) for Recheck with Dr. Mg.    Ralph Mg MD  08/27/2024

## 2024-08-30 DIAGNOSIS — F25.0 SCHIZOAFFECTIVE DISORDER, BIPOLAR TYPE: ICD-10-CM

## 2024-08-30 RX ORDER — CARIPRAZINE 3 MG/1
3 CAPSULE, GELATIN COATED ORAL DAILY
Qty: 30 CAPSULE | Refills: 1 | Status: SHIPPED | OUTPATIENT
Start: 2024-08-30

## 2024-09-03 ENCOUNTER — CLINICAL SUPPORT (OUTPATIENT)
Dept: CARDIOLOGY | Facility: CLINIC | Age: 54
End: 2024-09-03
Payer: COMMERCIAL

## 2024-09-03 DIAGNOSIS — E78.5 HYPERLIPIDEMIA LDL GOAL <70: Primary | ICD-10-CM

## 2024-09-03 PROCEDURE — 36415 COLL VENOUS BLD VENIPUNCTURE: CPT | Performed by: INTERNAL MEDICINE

## 2024-09-03 NOTE — PROGRESS NOTES
Venipuncture Blood Specimen Collection  Venipuncture performed in clinic by Penny Abreu RN with good hemostasis. Patient tolerated the procedure well without complications.   09/03/24   Penny Abreu RN

## 2024-09-04 LAB
ALBUMIN SERPL-MCNC: 4.2 G/DL (ref 3.8–4.9)
ALP SERPL-CCNC: 78 IU/L (ref 44–121)
ALT SERPL-CCNC: 23 IU/L (ref 0–32)
AST SERPL-CCNC: 25 IU/L (ref 0–40)
BASOPHILS # BLD AUTO: 0 X10E3/UL (ref 0–0.2)
BASOPHILS NFR BLD AUTO: 1 %
BILIRUB SERPL-MCNC: 0.4 MG/DL (ref 0–1.2)
BUN SERPL-MCNC: 9 MG/DL (ref 6–24)
BUN/CREAT SERPL: 15 (ref 9–23)
CALCIUM SERPL-MCNC: 8.9 MG/DL (ref 8.7–10.2)
CHLORIDE SERPL-SCNC: 100 MMOL/L (ref 96–106)
CHOLEST SERPL-MCNC: 193 MG/DL (ref 100–199)
CO2 SERPL-SCNC: 24 MMOL/L (ref 20–29)
CREAT SERPL-MCNC: 0.59 MG/DL (ref 0.57–1)
EGFRCR SERPLBLD CKD-EPI 2021: 106 ML/MIN/1.73
EOSINOPHIL # BLD AUTO: 0.2 X10E3/UL (ref 0–0.4)
EOSINOPHIL NFR BLD AUTO: 3 %
ERYTHROCYTE [DISTWIDTH] IN BLOOD BY AUTOMATED COUNT: 13.7 % (ref 11.7–15.4)
GLOBULIN SER CALC-MCNC: 2.4 G/DL (ref 1.5–4.5)
GLUCOSE SERPL-MCNC: 132 MG/DL (ref 70–99)
HCT VFR BLD AUTO: 40.6 % (ref 34–46.6)
HDLC SERPL-MCNC: 38 MG/DL
HGB BLD-MCNC: 13.2 G/DL (ref 11.1–15.9)
IMM GRANULOCYTES # BLD AUTO: 0 X10E3/UL (ref 0–0.1)
IMM GRANULOCYTES NFR BLD AUTO: 0 %
LDLC SERPL CALC-MCNC: 85 MG/DL (ref 0–99)
LYMPHOCYTES # BLD AUTO: 2.3 X10E3/UL (ref 0.7–3.1)
LYMPHOCYTES NFR BLD AUTO: 42 %
MCH RBC QN AUTO: 29.8 PG (ref 26.6–33)
MCHC RBC AUTO-ENTMCNC: 32.5 G/DL (ref 31.5–35.7)
MCV RBC AUTO: 92 FL (ref 79–97)
MONOCYTES # BLD AUTO: 0.4 X10E3/UL (ref 0.1–0.9)
MONOCYTES NFR BLD AUTO: 8 %
NEUTROPHILS # BLD AUTO: 2.5 X10E3/UL (ref 1.4–7)
NEUTROPHILS NFR BLD AUTO: 46 %
PLATELET # BLD AUTO: 291 X10E3/UL (ref 150–450)
POTASSIUM SERPL-SCNC: 4.1 MMOL/L (ref 3.5–5.2)
PROT SERPL-MCNC: 6.6 G/DL (ref 6–8.5)
RBC # BLD AUTO: 4.43 X10E6/UL (ref 3.77–5.28)
SODIUM SERPL-SCNC: 140 MMOL/L (ref 134–144)
TRIGL SERPL-MCNC: 429 MG/DL (ref 0–149)
VLDLC SERPL CALC-MCNC: 70 MG/DL (ref 5–40)
WBC # BLD AUTO: 5.5 X10E3/UL (ref 3.4–10.8)

## 2024-09-05 ENCOUNTER — TELEPHONE (OUTPATIENT)
Dept: CARDIOLOGY | Facility: CLINIC | Age: 54
End: 2024-09-05
Payer: COMMERCIAL

## 2024-09-05 LAB
HBA1C MFR BLD: 6.5 % (ref 4.8–5.6)
WRITTEN AUTHORIZATION: NORMAL

## 2024-09-05 RX ORDER — FENOFIBRATE 145 MG/1
145 TABLET, COATED ORAL DAILY
Qty: 90 TABLET | Refills: 3 | Status: SHIPPED | OUTPATIENT
Start: 2024-09-05

## 2024-09-05 RX ORDER — FENOFIBRATE 145 MG/1
145 TABLET, COATED ORAL DAILY
COMMUNITY
End: 2024-09-05 | Stop reason: SDUPTHER

## 2024-09-05 NOTE — TELEPHONE ENCOUNTER
----- Message from Ralph Mg sent at 9/4/2024  8:37 AM EDT -----  Sugar high 132, please add A!C, lipids cholesterol good but Tri still high , is she taking Vascepa 2 pills twice a day plus crestor 40 mg, if yes, we need to add Tricor,

## 2024-09-05 NOTE — TELEPHONE ENCOUNTER
Spoke with Ms. Gray and went over the lab results and Dr Mg's recommendations:    Sugar high 132, please add A!C, lipids cholesterol good but Tri still high , is she taking Vascepa 2 pills twice a day plus crestor 40 mg, if yes, we need to add Tricor,      The A1C is 6.5 so you are diabetic and need to discuss this with your primary doctor.  She is agreeable to starting Tricor and sent in a prescription.

## 2024-10-29 DIAGNOSIS — F25.0 SCHIZOAFFECTIVE DISORDER, BIPOLAR TYPE: ICD-10-CM

## 2024-10-29 RX ORDER — CARIPRAZINE 3 MG/1
3 CAPSULE, GELATIN COATED ORAL DAILY
Qty: 30 CAPSULE | Refills: 1 | OUTPATIENT
Start: 2024-10-29

## 2024-10-30 ENCOUNTER — OFFICE VISIT (OUTPATIENT)
Dept: ONCOLOGY | Facility: CLINIC | Age: 54
End: 2024-10-30
Payer: COMMERCIAL

## 2024-10-30 ENCOUNTER — OFFICE VISIT (OUTPATIENT)
Dept: BEHAVIORAL HEALTH | Facility: CLINIC | Age: 54
End: 2024-10-30
Payer: COMMERCIAL

## 2024-10-30 VITALS
HEIGHT: 63 IN | DIASTOLIC BLOOD PRESSURE: 90 MMHG | OXYGEN SATURATION: 94 % | WEIGHT: 233 LBS | BODY MASS INDEX: 41.29 KG/M2 | SYSTOLIC BLOOD PRESSURE: 146 MMHG | HEART RATE: 97 BPM

## 2024-10-30 VITALS
DIASTOLIC BLOOD PRESSURE: 84 MMHG | HEIGHT: 63 IN | HEART RATE: 97 BPM | BODY MASS INDEX: 41.64 KG/M2 | SYSTOLIC BLOOD PRESSURE: 147 MMHG | TEMPERATURE: 97.8 F | WEIGHT: 235 LBS | RESPIRATION RATE: 18 BRPM | OXYGEN SATURATION: 97 %

## 2024-10-30 DIAGNOSIS — F33.1 MAJOR DEPRESSIVE DISORDER, RECURRENT EPISODE, MODERATE: Primary | ICD-10-CM

## 2024-10-30 DIAGNOSIS — R91.1 PULMONARY NODULE LESS THAN 1 CM IN DIAMETER WITH MODERATE TO HIGH RISK FOR MALIGNANT NEOPLASM: ICD-10-CM

## 2024-10-30 DIAGNOSIS — R06.02 SHORTNESS OF BREATH: ICD-10-CM

## 2024-10-30 DIAGNOSIS — Z91.89 PULMONARY NODULE LESS THAN 1 CM IN DIAMETER WITH MODERATE TO HIGH RISK FOR MALIGNANT NEOPLASM: ICD-10-CM

## 2024-10-30 DIAGNOSIS — Z85.72 HISTORY OF DIFFUSE LARGE B-CELL LYMPHOMA: Primary | ICD-10-CM

## 2024-10-30 DIAGNOSIS — F41.1 GENERALIZED ANXIETY DISORDER: ICD-10-CM

## 2024-10-30 DIAGNOSIS — G47.20 CIRCADIAN RHYTHM SLEEP DISORDER, UNSPECIFIED TYPE: ICD-10-CM

## 2024-10-30 DIAGNOSIS — F25.0 SCHIZOAFFECTIVE DISORDER, BIPOLAR TYPE: ICD-10-CM

## 2024-10-30 DIAGNOSIS — Z87.891 HISTORY OF TOBACCO ABUSE: ICD-10-CM

## 2024-10-30 RX ORDER — BUPROPION HYDROCHLORIDE 150 MG/1
150 TABLET ORAL DAILY
Qty: 30 TABLET | Refills: 3 | Status: SHIPPED | OUTPATIENT
Start: 2024-10-30

## 2024-10-30 RX ORDER — TRAZODONE HYDROCHLORIDE 50 MG/1
50 TABLET, FILM COATED ORAL NIGHTLY
Qty: 30 TABLET | Refills: 3 | Status: SHIPPED | OUTPATIENT
Start: 2024-10-30

## 2024-10-30 NOTE — PROGRESS NOTES
Follow Up Office Visit      Patient Name: Iliana Gary  : 1970   MRN: [id number]     Referring Provider: Kiley Blackwell PA    Chief Complaint:      ICD-10-CM ICD-9-CM   1. Major depressive disorder, recurrent episode, moderate  F33.1 296.32   2. Circadian rhythm sleep disorder, unspecified type  G47.20 327.30   3. Generalized anxiety disorder  F41.1 300.02   4. Schizoaffective disorder, bipolar type  F25.0 295.70        History of Present Illness:   Iliana Gray is a 54 y.o. female who is here today for follow up with psychiatric medications.    Subjective      Patient Reports:   I could care less about things.  Things I enjoyed I don't really care much about.  Dad  since I was here last.    Review of Systems:   Review of Systems   Psychiatric/Behavioral:  Positive for sleep disturbance, depressed mood and stress. The patient is nervous/anxious.        Screening Scores:   PHQ-9 : 11  DEO-7 : 6    RISK ASSESSMENT:  Patient denies any high risk factors today.    Medications:     Current Outpatient Medications:     aspirin 81 MG chewable tablet, Chew 1 tablet Daily., Disp: , Rfl:     Cariprazine HCl (Vraylar) 3 MG capsule capsule, Take 1 capsule by mouth Daily., Disp: 30 capsule, Rfl: 3    clobetasol (TEMOVATE) 0.05 % ointment, PLEASE SEE ATTACHED FOR DETAILED DIRECTIONS, Disp: , Rfl:     estradiol (ESTRACE) 1 MG tablet, Take 1 tablet by mouth Daily., Disp: , Rfl:     fenofibrate (TRICOR) 145 MG tablet, Take 1 tablet by mouth Daily., Disp: 90 tablet, Rfl: 3    icosapent ethyl (Vascepa) 1 g capsule capsule, Take 2 g by mouth 2 (Two) Times a Day With Meals., Disp: 360 capsule, Rfl: 3    ipratropium (ATROVENT) 0.06 % nasal spray, INSTILL 2 SPRAYS INTRANASALLY 3 TIMES PER DAY FOR 5 DAYS, Disp: , Rfl:     rosuvastatin (CRESTOR) 40 MG tablet, Take 1 tablet by mouth Daily., Disp: 90 tablet, Rfl: 3    Symbicort 160-4.5 MCG/ACT inhaler, Inhale 2 puffs 2 (Two) Times a Day., Disp: , Rfl:      "Ventolin  (90 Base) MCG/ACT inhaler, , Disp: , Rfl:     buPROPion XL (WELLBUTRIN XL) 150 MG 24 hr tablet, Take 1 tablet by mouth Daily., Disp: 30 tablet, Rfl: 3    traZODone (DESYREL) 50 MG tablet, Take 1 tablet by mouth Every Night., Disp: 30 tablet, Rfl: 3  No current facility-administered medications for this visit.    Medication Considerations:  ANA reviewed and appropriate.      Allergies:   No Known Allergies    Results Reviewed:   screeners     Objective     Physical Exam:  Vital Signs:   Vitals:    10/30/24 0902   BP: 146/90   Pulse: 97   SpO2: 94%   Weight: 106 kg (233 lb)   Height: 160 cm (63\")     Body mass index is 41.27 kg/m².     Mental Status Exam:   Hygiene:   good  Cooperation:  Cooperative  Eye Contact:  Good  Psychomotor Behavior:  Appropriate  Affect:  Blunted  Mood: depressed  Speech:   slow and low  Thought Process:  Linear  Thought Content:  Mood congruent  Suicidal:  None  Homicidal:  None  Hallucinations:  None  Delusion:  None  Memory:  Intact  Orientation:  Grossly intact  Reliability:  good  Insight:  Fair  Judgement:  Fair  Impulse Control:  Fair  Physical/Medical Issues:   fatigue      Assessment / Plan      Visit Diagnosis/Orders Placed This Visit:  Diagnoses and all orders for this visit:    1. Major depressive disorder, recurrent episode, moderate (Primary)  -     buPROPion XL (WELLBUTRIN XL) 150 MG 24 hr tablet; Take 1 tablet by mouth Daily.  Dispense: 30 tablet; Refill: 3  -     traZODone (DESYREL) 50 MG tablet; Take 1 tablet by mouth Every Night.  Dispense: 30 tablet; Refill: 3    2. Circadian rhythm sleep disorder, unspecified type  -     traZODone (DESYREL) 50 MG tablet; Take 1 tablet by mouth Every Night.  Dispense: 30 tablet; Refill: 3    3. Generalized anxiety disorder  -     traZODone (DESYREL) 50 MG tablet; Take 1 tablet by mouth Every Night.  Dispense: 30 tablet; Refill: 3    4. Schizoaffective disorder, bipolar type  -     Cariprazine HCl (Vraylar) 3 MG capsule " capsule; Take 1 capsule by mouth Daily.  Dispense: 30 capsule; Refill: 3         Functional Status: Moderate impairment     Prognosis: Good with Ongoing Treatment     Impression/Formulation:  Patient appeared alert and oriented. Patient is receptive to assistance with maintaining a stable lifestyle.  Patient presents with history of     ICD-10-CM ICD-9-CM   1. Major depressive disorder, recurrent episode, moderate  F33.1 296.32   2. Circadian rhythm sleep disorder, unspecified type  G47.20 327.30   3. Generalized anxiety disorder  F41.1 300.02   4. Schizoaffective disorder, bipolar type  F25.0 295.70   .Patient screened positive for depression based on a PHQ-9 score of 11 on 10/30/2024. Follow-up recommendations include: Prescribed antidepressant medication treatment.  Fluoxetine causing apathy, patient also quit smoking 3 months ago, discussed nicotine as a stimulant and patient has been sleeping more/fatigued/low energy most of the day.  Patient has gained 10 pounds. Not in a healthy sleeping pattern currently.  Encouraged returning to therapy with Clive, dad passed away over the summer as well. Vraylar continues to be effective for bipolar.    Treatment Plan:   Continue vraylar  Start wellbutrin xl for depression/motivation  Start trazodone for sleep/anxiety  D/C valium, fluoxetine    Patient will continue supportive psychotherapy efforts and medications as indicated. Clinic will obtain release of information for current treatment team for continuity of care as needed. Patient will contact this office, call 911 or present to the nearest emergency room should suicidal or homicidal ideations occur.  Discussed medication options and treatment plan of prescribed medication(s) as well as the risks, benefits, and potential side effects. Patient ackowledged and verbally consented to continue with current treatment plan and was educated on the importance of compliance with treatment and follow-up appointments.     Follow  Up:   Return in about 3 months (around 1/30/2025) for Med Check.        MAN Win, PMHNP-BC  [facility]     This is electronically signed by MAN Win, JULIAN-BC  [unfilled] 10:03 EDT

## 2024-10-31 ENCOUNTER — OFFICE VISIT (OUTPATIENT)
Dept: BEHAVIORAL HEALTH | Facility: CLINIC | Age: 54
End: 2024-10-31
Payer: COMMERCIAL

## 2024-10-31 DIAGNOSIS — F33.1 MAJOR DEPRESSIVE DISORDER, RECURRENT EPISODE, MODERATE: Primary | ICD-10-CM

## 2024-10-31 DIAGNOSIS — F41.1 GENERALIZED ANXIETY DISORDER: ICD-10-CM

## 2024-10-31 DIAGNOSIS — F43.10 POST TRAUMATIC STRESS DISORDER (PTSD): ICD-10-CM

## 2024-10-31 DIAGNOSIS — F25.0 SCHIZOAFFECTIVE DISORDER, BIPOLAR TYPE: ICD-10-CM

## 2024-10-31 NOTE — PROGRESS NOTES
"     Follow Up Adult Note     Date:10/31/2024   Patient Name: Iliana Gray  : 1970   MRN: [id number]   Time IN: 10:53 am    Time OUT: 11:47 am     Referring Provider: Kiley Blackwell PA    Chief Complaint:      ICD-10-CM ICD-9-CM   1. Major depressive disorder, recurrent episode, moderate  F33.1 296.32   2. Generalized anxiety disorder  F41.1 300.02   3. Post traumatic stress disorder (PTSD)  F43.10 309.81   4. Schizoaffective disorder, bipolar type  F25.0 295.70        History of Present Illness:   Iliana Gray is a 54 y.o., , employed female who is being seen today for scheduled Psychotherapy session. Mood reported as \"alright\" with somewhat anxious and guarded but overall cooperative affect. Patient presented with some anxiety intermittently that improved as session progressed and remained calm, cooperative, somewhat engaged, and pleasant with provider. Patient denied any current SI/HI/AVH.    \"I know I haven't been here since the summer-just a lot has happened and I put a lot of things off\"  \"My dad passed from cancer in -he found out a month before he passed\"  \"My job is stressing me out-working 5-6 days a week\"  \"My anxiety has picked up\"  \"My motivation has decreased and I am sleeping too much\"  \"I have been having dreams about my sister-nothing crazy, just dreaming about her more\"  \"I did stop smoking because it was affecting my breathing and they found some spots on my lung that I am getting checked out next week-kind of nervous about it with the history of cancer in my family-mom  from lung cancer\"      Subjective     PHQ-9 Depression Screening  PHQ-9 Total Score:  Not completed at this time     DEO-7   Not completed at this time    Patient's Support Network Includes:  daughter and granddaughter, extended family and siblings    Functional Status: Moderate impairment     Progress toward goal: Not at goal    Prognosis: Fair with Ongoing Treatment     Medications: " "    Current Outpatient Medications:     aspirin 81 MG chewable tablet, Chew 1 tablet Daily., Disp: , Rfl:     buPROPion XL (WELLBUTRIN XL) 150 MG 24 hr tablet, Take 1 tablet by mouth Daily., Disp: 30 tablet, Rfl: 3    Cariprazine HCl (Vraylar) 3 MG capsule capsule, Take 1 capsule by mouth Daily., Disp: 30 capsule, Rfl: 3    clobetasol (TEMOVATE) 0.05 % ointment, PLEASE SEE ATTACHED FOR DETAILED DIRECTIONS, Disp: , Rfl:     estradiol (ESTRACE) 1 MG tablet, Take 1 tablet by mouth Daily., Disp: , Rfl:     fenofibrate (TRICOR) 145 MG tablet, Take 1 tablet by mouth Daily., Disp: 90 tablet, Rfl: 3    icosapent ethyl (Vascepa) 1 g capsule capsule, Take 2 g by mouth 2 (Two) Times a Day With Meals., Disp: 360 capsule, Rfl: 3    ipratropium (ATROVENT) 0.06 % nasal spray, INSTILL 2 SPRAYS INTRANASALLY 3 TIMES PER DAY FOR 5 DAYS, Disp: , Rfl:     rosuvastatin (CRESTOR) 40 MG tablet, Take 1 tablet by mouth Daily., Disp: 90 tablet, Rfl: 3    Symbicort 160-4.5 MCG/ACT inhaler, Inhale 2 puffs 2 (Two) Times a Day., Disp: , Rfl:     traZODone (DESYREL) 50 MG tablet, Take 1 tablet by mouth Every Night., Disp: 30 tablet, Rfl: 3    Ventolin  (90 Base) MCG/ACT inhaler, , Disp: , Rfl:     Allergies:   No Known Allergies    Objective     Mental Status Exam:   MENTAL STATUS EXAM   General Appearance:  Cleanly groomed and dressed and well developed  Eye Contact:  Good eye contact  Attitude:  Other  Other Comment:  Evasive at times but overall cooperative and polite  Motor Activity:  Normal gait, posture  Muscle Strength:  Normal  Speech:  Normal rate, tone, volume  Language:  Spontaneous  Mood and affect:  Other  Other Comment:  Mood reported as \"alright\" with somewhat anxious and guarded but overall cooperative affect.  Hopelessness:  Optimistic  Loneliness: 3  Thought Process:  Linear  Associations/ Thought Content:  No delusions  Hallucinations:  None  Suicidal Ideations:  Not present  Homicidal Ideation:  Not present  Sensorium:  " Alert and clear  Orientation:  Person, place, time and situation  Immediate Recall, Recent, and Remote Memory:  Intact  Attention Span/ Concentration:  Good  Fund of Knowledge:  Appropriate for age and educational level  Intellectual Functioning:  Average range  Insight:  Fair  Judgement:  Fair  Reliability:  Good  Impulse Control:  Fair       Assessment / Plan      Visit Diagnosis/Orders Placed This Visit:    ICD-10-CM ICD-9-CM   1. Major depressive disorder, recurrent episode, moderate  F33.1 296.32   2. Generalized anxiety disorder  F41.1 300.02   3. Post traumatic stress disorder (PTSD)  F43.10 309.81   4. Schizoaffective disorder, bipolar type  F25.0 295.70        PLAN:  Safety: No acute safety concerns  Risk Assessment: Risk of self-harm acutely is low. Risk of self-harm chronically is also low, but could be further elevated in the event of treatment noncompliance and/or AODA.    Treatment Plan/Goals: Continue supportive psychotherapy efforts and medications as indicated. Treatment and medication options discussed during today's visit. Patient ackowledged and verbally consented to continue with current treatment plan and was educated on the importance of compliance with treatment and follow-up appointments. Patient seems reasonably able to adhere to treatment plan.      Assisted Patient in processing above session content; acknowledged and normalized patient’s thoughts, feelings, and concerns. Assisted patient in processing recent stressors/emotions/feelings and utilized CBT techniques to assist patient with challenging negative/irrational thoughts and beliefs and replacing them with rational ones while also utilizing Socratic Questioning techniques and open ended questions to encourage reflection. Engaged patient in identifying recent sources and triggers of stress while identifying potential obstacles to stress management and developed coping strategies to manage stressors. Discussed the importance of peer  to peer engagement and how this can affect overall mood. Patient was receptive to therapeutic feedback.       Allowed Patient to freely discuss issues  without interruption or judgement with unconditional positive regard, active listening skills, and empathy. Therapist provided a safe, confidential environment to facilitate the development of a positive therapeutic relationship and encouraged open, honest communication. Assisted Patient in identifying risk factors which would indicate the need for higher level of care including thoughts to harm self or others and/or self-harming behavior and encouraged Patient to contact this office, call 911, or present to the nearest emergency room should any of these events occur. Discussed crisis intervention services and means to access. Patient adamantly and convincingly denies current suicidal or homicidal ideation or perceptual disturbance. Assisted Patient in processing session content; acknowledged and normalized Patient’s thoughts, feelings, and concerns by utilizing a person-centered approach in efforts to build appropriate rapport and a positive therapeutic relationship with open and honest communication. .     Follow Up:   Return in about 1 week (around 11/7/2024) for Therapy session.    Clive Magallanes [facility]  [facility]

## 2024-11-04 DIAGNOSIS — F33.1 MAJOR DEPRESSIVE DISORDER, RECURRENT EPISODE, MODERATE: ICD-10-CM

## 2024-11-04 DIAGNOSIS — F41.1 GENERALIZED ANXIETY DISORDER: ICD-10-CM

## 2024-11-04 NOTE — TELEPHONE ENCOUNTER
Rx Refill Note    Requested Prescriptions     Pending Prescriptions Disp Refills    FLUoxetine (PROzac) 20 MG capsule [Pharmacy Med Name: FLUoxetine HCL 20 MG CAPSULE] 90 capsule 0     Sig: TAKE 1 CAPSULE BY MOUTH DAILY        Last office visit with prescribing clinician: 10/30/2024      Next office visit with prescribing clinician: 2/6/2025   Last labs:   Last refill: n/a   Pharmacy (be sure to add in Epic). correct

## 2024-11-05 ENCOUNTER — HOSPITAL ENCOUNTER (OUTPATIENT)
Dept: CT IMAGING | Facility: HOSPITAL | Age: 54
Discharge: HOME | End: 2024-11-05
Admitting: NURSE PRACTITIONER
Payer: COMMERCIAL

## 2024-11-05 DIAGNOSIS — Z85.72 HISTORY OF DIFFUSE LARGE B-CELL LYMPHOMA: ICD-10-CM

## 2024-11-05 DIAGNOSIS — Z87.891 HISTORY OF TOBACCO ABUSE: ICD-10-CM

## 2024-11-05 DIAGNOSIS — R91.1 PULMONARY NODULE LESS THAN 1 CM IN DIAMETER WITH MODERATE TO HIGH RISK FOR MALIGNANT NEOPLASM: ICD-10-CM

## 2024-11-05 DIAGNOSIS — R06.02 SHORTNESS OF BREATH: ICD-10-CM

## 2024-11-05 DIAGNOSIS — Z91.89 PULMONARY NODULE LESS THAN 1 CM IN DIAMETER WITH MODERATE TO HIGH RISK FOR MALIGNANT NEOPLASM: ICD-10-CM

## 2024-11-05 PROCEDURE — 71260 CT THORAX DX C+: CPT

## 2024-11-05 PROCEDURE — 25510000001 IOPAMIDOL 61 % SOLUTION: Performed by: NURSE PRACTITIONER

## 2024-11-05 RX ORDER — IOPAMIDOL 612 MG/ML
100 INJECTION, SOLUTION INTRAVASCULAR
Status: COMPLETED | OUTPATIENT
Start: 2024-11-05 | End: 2024-11-05

## 2024-11-05 RX ADMIN — IOPAMIDOL 90 ML: 612 INJECTION, SOLUTION INTRAVENOUS at 13:27

## 2024-11-07 ENCOUNTER — TELEMEDICINE (OUTPATIENT)
Dept: ONCOLOGY | Facility: CLINIC | Age: 54
End: 2024-11-07
Payer: COMMERCIAL

## 2024-11-07 DIAGNOSIS — Z85.72 HISTORY OF DIFFUSE LARGE B-CELL LYMPHOMA: Primary | ICD-10-CM

## 2024-11-07 DIAGNOSIS — R91.1 LUNG NODULE SEEN ON IMAGING STUDY: ICD-10-CM

## 2024-11-07 NOTE — PROGRESS NOTES
CHIEF COMPLAINT: Follow-up on abnormal CT    Problem List:  Oncology/Hematology History Overview Note   1.  Right Medial thigh stage IIa nonbulky less than 7.5 cm diffuse large b cell non-hodgkin lymphoma with component of background follicular lymphoma plan for R-CHOP x3 followed by ISRT.  Had CR after 3 courses of R-CHOP x3 ending 6/8/2021 per PET 6/22/2021.    -Completed radiation 8/16/2021 to the right pelvic and inguinofemoral nodes  -Fatigue and rash (eczema per dermatology) starting around August 2022 with negative CT chest abdomen pelvis.  2.  Incidental finding on CT chest 7/2/2024 of 0.4 cm left upper lobe solid pulmonary nodule  3.  Hepatic steatosis  4.  History of tobacco abuse, greater than 40-year.  Stop smoking July 2024  5.  Sleep apnea    Oncology history timeline:  -2/18/2021 hemoglobin 12.7 with normal white count platelet count differential.  CMP unremarkable.  Urinalysis with microscopic hematuria 20-30 red blood cells per milliliter.  AP single view chest x-ray emergency room [place] showed no acute findings.  CT abdomen pelvis with IV contrast emergency room [place] showed calcified granuloma right lung base, fatty liver infiltration, subcentimeter hypodensity left hepatic lobe too small to categorize, normal size spleen, no adrenal masses, no pancreatic mass, normal kidneys, and no retroperitoneal adenopathy or ascites or pneumoperitoneum.  There was a smooth marginated homogeneous enhancing 4.5 x 5 x 3.3 cm mass in the superficial soft tissues of the anterior upper right thigh.  -2/25/2021 office note from Dr. Gong mentions presentation to emergency room 2/18/2021 complaining of right upper thigh swelling for the preceding week of 2/12/2021.  CT scan abdomen pelvis to include the thighs performed at [facility] emergency room revealing 5 cm smoothly marginated right upper thigh mass below the groin crease suspicious for pathologic lymph node but could be neoplastic of other  nature given no prodromal illness, infections, or inflammation.  Recommendation for biopsy was made.  Per Dr. Gong note, she had been sick for the better part of the winter and had been urgent treatment centers told that she had a viral infection flu and coronavirus negative with fevers and chills that subsequently resolved but for fatigue that did not resolve with hypersomnolence as well.  Says she feels hot all the time but no ronny night sweats.  -3/12/2021 right thigh mass needle core tissue sample extremely small and predominantly crushed limiting adequate evaluation.  Differential diagnosis for this B-cell lymphoma is diffuse large B cell, follicular lymphoma versus other.  Further differentiation could not be performed on this crush small sample.  -3/25/2021 [facility] hematology oncology initial consultation: I, Rodríguez Gerardo, saw for the first time today.  Reviewed the above story with her.  Still fatigued without night sweats or unexplained weight loss.  She has this mass in the soft tissue medial right thigh about 5 cm in size that does seem to move somewhat beneath the skin but also to my exam feels like it is attached to the deeper structures of the thigh as well.  I feel no other adenopathy or hepatosplenomegaly.  I will get a PET scan.  I spoken with Dr. Gong who will look at these images again with the CTs in [place].  If there is clear indication that this is not coming from soft tissue nonnodal structures, then excisional biopsy for adequate tissue diagnosis is important to determine the type of therapy.  This is growing since her biopsy 3/12/2021 but not daily like a very high-grade lymphoma.  To my hand, this does not feel like a typical stas structure and my other concern is, even though this does not venessa with any markers to suggest sarcoma or the like, is to be sure that this is not something other than lymphoma and I have spoken with Dr. Pino who will do desmin stains and other markers  before doing an excisional biopsy next Wednesday with Dr. Gong.  If those additional stains suggest sarcomatoid features that can appear in the medial aspect of the thigh such as this, then she probably needs to have excision by Humberto Vera orthopedic oncologist at .  In the meantime, presuming this to be some form of lymphoma which is the highest likelihood based on the pathology from Dr. Pino where this is CD20 positive, CD10 positive, BCL6 40% positive, MUM1 40% positive, CD23 strong and diffuse positive, and BCL-2 positive.  Ki-67 is 50%.  CD30 and only 5%.  C-Myc is weakly expressed in 10% of cells.  BRIAN by TAYLER is negative.  Cyclin D1 negative.  CD5 indeterminate for B and T-cell coexpression.  LEF 1 presumably staining T cells.  Ultimate decision for treatment depends on the specific pathologic subtype and the staging and I will get her prognostic labs going as well.  Given the relatively initial high-grade features of this that might require Adriamycin I will also proceed with echocardiogram.  -3/25/2021 CBC normal.  CMP unremarkable.  Sedimentation rate normal 27 with C-reactive protein elevated 1.7 upper limit of normal 0.5.  Beta-2 microglobulin normal 1.6.  Uric acid normal 4.3.  LDH normal 162.  Hepatitis B and C serologies negative.  -3/29/2021 echocardiogram ejection fraction 61 to 65%.  -3/31/21 excisional node biopsy shows diffuse large B cell non-Hodgkin lymphoma  -4/2/2021 PET shows postsurgical changes right proximal thigh and superficial inguinal region adjacent to adenopathy with SUV 4.0.  Right internal iliac lymph node mildly enlarged SUV 2.6 additionally noted.  No other adenopathy outside this region.  No mention of any abnormal marrow signal.  -4/5/2021 [facility] medical oncology follow-up visit: Per Dr. Arun Garcia pathologist, this is a diffuse large B cell non-Hodgkin lymphoma.  He is not sure whether this is double hit or not yet.  He is not sure if there is a background of  follicular lymphoma and hence cannot say whether this is transformed.  Clinically her IPI score is 0 with her age less than 60, normal LDH, ECOG 0, no extranodal involvement on PET, and only stage II at worse per PET with all nodes within the same region.  I will get a bone marrow biopsy.  If this is stage IV, double hit, or has evidence of transition from follicular lymphoma into diffuse large B-cell lymphoma, then I would give R-CHOP x6.  If the marrow is not involved, this is not double hit, and there is no evidence of follicular transformation into diffuse large B-cell lymphoma, then I would treat this as a stage II nonbulky diffuse large B-cell lymphoma and consider R-CHOP x3 followed by involved site radiation therapy.  I reviewed her PET and biopsy reports and echocardiogram which shows good ejection fraction.  She will need to get her [employer] Covid vaccine this week, family doctor established for the possibility of prednisone-induced diabetes, CT-guided bone marrow biopsy, chemo preparation visit, and a port placement with Dr. Gong.  We will present her at multidisciplinary tumor board this Friday.  I went into detail regarding the side effects of R-CHOP but she will have these reviewed at her chemo preparation visit.  Though not bulky, I still would treat her with allopurinol.  First course of therapy will occur in our [place] office and if all goes well then the subsequent Rituxan doses can be faster and performed in [place] office.    -4/14/2021 pathology update Dr. Garcia: Node pathology negative for double hit by FISH    -4/21/2021 bone marrow biopsy negative for lymphomatous involvement with scant stainable iron.      -4/26/2021 [facility] medical oncology follow-up visit: Given lack of marrow involvement, negative for double hit, and possible background follicular lymphoma , I will treat her as stage IIa nonbulky (original tumor 5 cm on CT, I.  E.  Less than 7.5 cm) diffuse large B-cell  lymphoma right proximal thigh SUV 4.0 mass incisionally biopsied (with palpable residua) with mild right internal iliac node enlargement SUV 2.6 as well.  We will treat her with planned R-CHOP x3 followed by involved site radiation therapy.  This was consensus of multidisciplinary tumor board 4/16/2021 as well.  First course [place].  Second course will occur in [place].  With the small component of background follicular lymphoma, there is potential risk of not only relapse of diffuse large B-cell lymphoma but later relapse from follicular lymphoma and we will keep that in mind in terms of following her up past the usual 3 years post definitive therapy presuming we get a complete remission on subsequent imaging and exams.  We will arrange radiation in [place] and I have made referral.  This will not start until after chemotherapy.    -5/13/2021 radiation oncology consultation Dr. Jl Infante for preparation of future involved field radiation therapy    -5/18/2021 [facility], [place], operated by [facility] oncology clinic follow-up: Had moderate amount of nausea after her first cycle, will add Zyprexa to Zofran and Compazine that she is already taking. She was also anxious, added Ativan to her care plan premeds. Prescription for Augmentin sent in for sinus infection. Clinically good response with reduction of right medial thigh mass. We will continue treatment in [place] due to mild infusion reaction with first Rituxan but she was able to complete treatment with additional medications. Today is cycle #2 of a planned 3 courses and then will repeat restaging scan to assess for response and proceed to radiation as outlined above.    -6/8/2021 course #3 Rituxan CHOP    -6/22/2021 PET shows resolution of right inguinal and iliac adenopathy.  Negative PET/CT.    -6/28/2021 [facility], [place], operated by [facility] medical oncology follow-up visit: I reviewed images and reports of PET as above.  Status post Rituxan CHOP, PET is essentially negative with complete clinical  remission.With the small component of background follicular lymphoma, there is potential risk of not only relapse of diffuse large B-cell lymphoma but later relapse from follicular lymphoma and we will keep that in mind in terms of following her up past the usual 3 years post definitive therapy presuming we get a complete remission on subsequent imaging and exams.  We will arrange radiation in [place] and Dr. Infante saw in mid May.  I called Dr. Infante to get her in to start involved field radiation and we will repeat PET and labs prior to return in 3 months for survivorship visit with my nurse practitioner who will lay out the follow-up according to NCCN guidelines.  Other than for modest anemia hemoglobin in the tens and glucose 150s, no other remarkable findings on CBC and CMP 6/8/2021.    -6/8/2021 completed course #3 of R-CHOP with resolution of right inguinal and iliac adenopathy on 6/22/2021 PET    -8/16/2021 completed 36 Gray in 18 fractions involved field right pelvic/inguinal femoral nodes Dr. Infante    -10/21/2021 [facility] follow-up/survivorship visit: PET scan from 10/14/2021 was negative along with normal CBC and CMP.  She completed radiation in August without complications.  We will get her back to Dr. Gong for port removal.  We will continue surveillance per NCCN guidelines as follows:  -H&P and labs every 3-6 months for 5 years and then annually or as clinically indicated.    -We will do surveillance imaging post completion of treatment with CT chest, abdomen and pelvis with contrast every 6 months for 2 years and then annually or as clinically indicated.    -1/28/2022 CT chest abdomen pelvis with contrast shows no adenopathy chest abdomen pelvis and no acute process.  Hepatic steatosis with sigmoid diverticulosis.  Hemoglobin 13.6 with normal CBC and differential.  CMP entirely normal with creatinine 0.65.    -2/1/2022 [facility] follow-up visit: I reviewed  images and reports of scans as outlined above as well as labs.  No evidence of recurrence.  As outlined by my nurse practitioner survivorship visit, she will get an H&P every 3 to 6 months out to April 2026 and then annually thereafter and we will do surveillance CT chest abdomen pelvis every 6 months until April 2023 and then annually as clinically indicated.  As I have stated previously, we would want to continue to follow her clinically past the usual 2 years of radiographic follow-up for signs or symptoms of follicular lymphoma as there was a small background follicular lymphomatous component that could recur late.  She will see my nurse practitioner back in 6 months with CTs just prior to return.  No further CBC or CMP in the absence of signs or symptoms given normalization of her counts.    -7/28/2022 CT chest, abdomen and pelvis with no evidence of lymphadenopathy in the chest, abdomen or pelvis, diffuse hepatic steatosis, sigmoid diverticulosis.    - 8/4/2022 [facility], [place], operated by [facility] Oncology clinic follow-up: Iliana has been feeling poorly since treatment ended a year ago for her history of lymphoma.  Current CT chest, abdomen and pelvis show no evidence of disease recurrence.  Her symptoms unfortunately have not improved since treatment ended and she is having more bad days than good.  She has a rash on the dorsum of her hands and foot that appear to be possibly eczema versus psoriasis.  We will get her to dermatology for further evaluation, I have asked her to call her insurance company to let us know the name of a provider that she would like to see and then we can put in a referral.  She has ongoing fatigue that has not improved with time, she is unable to do most activities without having to rest for the remainder of the day afterwards.  She also has nausea and loose stool with urgency.  I will get her to gastroenterology for an EGD and colonoscopy to rule out any GI involvement of her lymphoma as it did have  follicular component.  I will also get labs today with CBC, CMP, we will also check HARRIS in light of her rash, will check inflammatory markers with sedimentation rate and CRP and an LDH.  We will see her back in a few months to go over her labs and GI evaluation.  She does have a history of hepatic steatosis shown on all prior CTs therefore would benefit from GI evaluation regarding that also, has had normal LFTs on prior labs.      - 8/4/2022 data: CMP unremarkable.  LDH normal 174.  CRP mildly elevated 1.5 upper limit 0.9.  Sedimentation rate normal 9.  CBC normal with normal differential.  HARRIS negative    -9/19/2022 EGD and colonoscopy Mani Cleveland random colon biopsy negative.  Transverse colon polyp sessile serrated adenoma.  Duodenal biopsy with normal architecture.  Stomach biopsy mild chronic inactive gastritis with no dysplasia or metaplasia or H. pylori.  There was a lot of stool in the entire colon interfering with visualization.  Internal hemorrhoids.  EGD showed no gross lesions.    -9/27/2022 [facility] follow-up: No clear-cut evidence of lymphoma on CTs or EGD and colonoscopy and labs are unremarkable and negative HARRIS.  Her dermatologist told her she had eczema.  Nonetheless she is still periodically so fatigued that she ends up going to the bed for 2 or 3 days and then rebounds and feels normal for a while and then this waxing and waning of performance status and fatigue continues.  We will get a PET through her thighs as that was the original site of her disease.  I will also have her get EBV and CMV and HIV and thyroid functions.    -10/7/2022 whole-body PET is essentially negative.  There is a solitary borderline focus SUV 2.8 near the left lung apex without CT correlate favored to be infectious/inflammatory change.  No hypermetabolism in particular of the lower extremities where her original disease resided.    -10/11/2022 [facility] follow-up: States she had COVID  September 24 which was before I last saw her but did not know that when last I saw her.  She did not get her EBV, CMV, HIV, thyroid functions but says she is now getting over the COVID as well as some sinusitis and she thinks that is the fatigue.  Her PET was entirely negative..  We will continue to check her whole-body PET until April 2023 which is when we will see her back and then we will do that annually only as clinically indicated on annual H&P.    -10/27/2022 CBC normal with normal differential and unremarkable CMP, B12, folate, and normal thyroid function. HIV nonreactive.  CMV DNA quantitation negative.  EBV quantitation PCR negative and EBV antibody profile shows negative VCA IgM with increased IgG VCA of 122 and nuclear antigen IgG 68.8 commensurate with prior infection.  C-reactive protein 21.  C4 complement normal.  C3 complement slightly elevated to 27 upper limit 167.  ANCA panel negative.  HARRIS negative.  CCP negative.  Rheumatoid factor not drawn.  Sedimentation rate normal 24.  Anti-SSA and SSB negative.      -12/1/2022 Platelets minimally elevated 384,000 with otherwise unremarkable CBC and CMP.  C-reactive protein elevated 13.1.  Sedimentation rate 30 barely elevated.  Rheumatoid factor negative.  Moderate external hemoglobin and urine.  4-10 red cells per high-power field.    -4/11/2023 CT chest abdomen pelvis Showed no evidence of adenopathy or splenomegaly.  Atherosclerotic changes.  Hepatic steatosis with small liver cyst.    -4/18/2023 [facility] hematology oncology follow-up: No radiographic evidence of recurrence.  Viral studies all negative.  CBC unremarkable.  No significant inflammatory markers and LDH normal. As outlined in her survivorship visit, she has completed routine surveillance CTs per NCCN guidelines and at this point would only image as clinically indicated.  We will continue H&P every 6 months out till April 2026.  I would probably continue to follow her clinically out past  April 2026 for signs or symptoms of late recurrences there was a component of follicular lymphoma within the higher grade lymphoma and late recurrences may occur but at this point no further labs or scans in the absence of symptoms.    -8/23/2023 CBC And CMP ordered by primary care unremarkable    -10/24/2023 [facility] hematology oncology follow-up: No new somatic complaints to suggest recurrence.  No palpable cervical or axillary or inguinal adenopathy.  I reviewed labs ordered by primary care in August which were unremarkable.  I will have her see my nurse practitioner every 6 months and a survivorship mode until April 2026 at least.  At that point, given that there was a component of follicular lymphoma within the higher grade lymphoma there is a slightly higher risk of late relapse and I would consider continuing H&P surveillance with oncology every 6 to 12 months after April 2026.  No plans for scans or labs in the absence of symptoms.    -7/2/2024 patient presented to [facility] with shortness of breath and history of recent pneumonia, CT chest showed no acute thoracic process, incidental finding of 0.4 cm left upper lobe solid pulmonary nodule which is indeterminate.  Hepatic steatosis.    -10/30/2024 [facility] oncology clinic follow-up: Iliana overall is doing well, she has no lymphadenopathy or symptoms concerning for recurrent lymphoma.  Her CBC and CMP from 9/3/2024 were unremarkable.  I did review CT chest without contrast that was performed on 7/2/2024 ER visit to [facility] that showed 0.4 cm left upper lobe solid pulmonary nodule, I reviewed previous CT scans performed at [facility] for the past few years and could find no mention of left upper lobe nodule.  I will repeat her CT chest now with contrast for follow-up.  If the nodule is stable then I will get her to our lung nodule clinic for further recommendations.  At a minimum she would be  eligible for annual low-dose CT chest for screening in light of her history of tobacco abuse.  I did congratulate her on her smoking cessation, she stopped smoking about 3 months ago.  I will see her back in 1 to 2 weeks to go over her CT chest and further plan of care.    -11/5/2024 CT chest with contrast shows no acute or suspicious findings.  Tiny questionable subsolid micronodule in the left upper lobe seen on prior outside CT 7/2/2024 is no longer present.  Sequelae of healed granulomatous disease within the chest, no lymphadenopathy.     Diffuse large B-cell lymphoma of lymph nodes of inguinal region (Resolved)   3/29/2021 -  Other Event    Echocardiogram  Left ventricular ejection fraction appears to be 61 - 65%. Left ventricular systolic function is normal.  Normal global longitudinal strain at -23.5%.  Left ventricular diastolic function was normal.  No significant structural or functional valvular disease.     4/2/2021 Imaging    PET/CT IMPRESSION:  Postsurgical changes right proximal thigh and superficial  inguinal region adjacent to adenopathy with abnormal hypermetabolism  maximum SUV 4.0. Right internal iliac lymph node is mildly enlarged with  maximum SUV 2.6 additionally noted. No soft tissue mass or adenopathy  outside of this region.     4/21/2021 Biopsy    Marrow biopsy negative     4/26/2021 - 6/28/2021 Chemotherapy    Completed course #3 6/8/2021  OP LYMPHOMA R-CHOP RiTUXimab / Cyclophosphamide / DOXOrubicin / VinCRIStine / PredniSONE     4/26/2021 Cancer Staged    Staging form: Hodgkin And Non-Hodgkin Lymphoma, AJCC 8th Edition  - Clinical: Stage II (Diffuse large B-cell lymphoma) - Signed by Rodríguez Gerardo MD on 4/26/2021 6/22/2021 Imaging     PET shows resolution of right inguinal and iliac adenopathy.  Negative PET/CT.     7/22/2021 - 8/16/2021 Radiation    Radiation OncologyTreatment Course:  Iliana Gray received 36 cGy in 18 fractions to right pelvic and inguinofemoral nodes  (involved field) under the care of Dr. Jl Infante.     10/14/2021 Imaging    PET/CT: Stable negative PET/CT with no new hypermetabolic adenopathy to suggest lymphomatous disease progression.     1/28/2022 Imaging    CT chest abdomen pelvis with contrast shows no adenopathy chest abdomen pelvis and no acute process.  Hepatic steatosis with sigmoid diverticulosis.  Hemoglobin 13.6 with normal CBC and differential.  CMP entirely normal with creatinine 0.65     7/28/2022 Imaging    CT chest, abdomen and pelvis:  1. No evidence of lymphadenopathy in the chest abdomen or pelvis.  2. Diffuse hepatic steatosis.  3. Sigmoid diverticulosis.  4. Hysterectomy  5. Postoperative changes of prior right inguinal lymph node resection.     4/11/2023 Imaging    CT chest abdomen pelvis Showed no evidence of adenopathy or splenomegaly.  Atherosclerotic changes.  Hepatic steatosis with small liver cyst.         HISTORY OF PRESENT ILLNESS:  The patient is a 54 y.o. female, here for follow up on management of ***    Past Medical History:   Diagnosis Date    Anxiety     Arthritis     Asthma     Diffuse large B cell lymphoma     Sleep apnea     cpap     Past Surgical History:   Procedure Laterality Date    HYSTERECTOMY      TUMOR EXCISION Right 04/2021    lymph node removal/biopsy/right inner thigh    VENOUS ACCESS DEVICE (PORT) INSERTION Right 04/2021       No Known Allergies    Family History and Social History reviewed and changed as necessary    REVIEW OF SYSTEM:   ***    PHYSICAL EXAM:  ***    There were no vitals filed for this visit.  There were no vitals filed for this visit.                   Vitals reviewed.    ECOG: {findings; ecog performance status:25476}    Lab Results   Component Value Date    HGB 13.2 09/03/2024    HCT 40.6 09/03/2024    MCV 92 09/03/2024     09/03/2024    WBC 5.5 09/03/2024    NEUTROABS 2.5 09/03/2024    LYMPHSABS 2.3 09/03/2024    MONOSABS 0.4 09/03/2024    EOSABS 0.2 09/03/2024    BASOSABS 0.0  09/03/2024       Lab Results   Component Value Date    GLUCOSE 132 (H) 09/03/2024    BUN 9 09/03/2024    CREATININE 0.59 09/03/2024     09/03/2024    K 4.1 09/03/2024     09/03/2024    CO2 24 09/03/2024    CALCIUM 8.9 09/03/2024    PROTEINTOT 7.0 01/28/2022    ALBUMIN 4.2 09/03/2024    BILITOT 0.4 09/03/2024    ALKPHOS 78 09/03/2024    AST 25 09/03/2024    ALT 23 09/03/2024             ASSESSMENT & PLAN:    Anitra Shelley, APRN    11/07/2024

## 2024-11-07 NOTE — PROGRESS NOTES
Mode of Visit: Video  Location of patient: -HOME-  Location of provider: +[facility]+  You have chosen to receive care through a telehealth visit.  The patient has signed the video visit consent form.  The visit included audio and video interaction. No technical issues occurred during this visit.       CHIEF COMPLAINT: Follow-up on abnormal CT with lung nodule    Problem List:  Oncology/Hematology History Overview Note   1.  Right Medial thigh stage IIa nonbulky less than 7.5 cm diffuse large b cell non-hodgkin lymphoma with component of background follicular lymphoma plan for R-CHOP x3 followed by ISRT.  Had CR after 3 courses of R-CHOP x3 ending 6/8/2021 per PET 6/22/2021.    -Completed radiation 8/16/2021 to the right pelvic and inguinofemoral nodes  -Fatigue and rash (eczema per dermatology) starting around August 2022 with negative CT chest abdomen pelvis.  2.  Incidental finding on CT chest 7/2/2024 of 0.4 cm left upper lobe solid pulmonary nodule  3.  Hepatic steatosis  4.  History of tobacco abuse, greater than 40-year.  Stop smoking July 2024  5.  Sleep apnea    Oncology history timeline:  -2/18/2021 hemoglobin 12.7 with normal white count platelet count differential.  CMP unremarkable.  Urinalysis with microscopic hematuria 20-30 red blood cells per milliliter.  AP single view chest x-ray emergency room [facility] showed no acute findings.  CT abdomen pelvis with IV contrast emergency room [facility] showed calcified granuloma right lung base, fatty liver infiltration, subcentimeter hypodensity left hepatic lobe too small to categorize, normal size spleen, no adrenal masses, no pancreatic mass, normal kidneys, and no retroperitoneal adenopathy or ascites or pneumoperitoneum.  There was a smooth marginated homogeneous enhancing 4.5 x 5 x 3.3 cm mass in the superficial soft tissues of the anterior upper right thigh.  -2/25/2021 office note from Dr. Gong mentions presentation to emergency room 2/18/2021  complaining of right upper thigh swelling for the preceding week of 2/12/2021.  CT scan abdomen pelvis to include the thighs performed at [facility] revealing 5 cm smoothly marginated right upper thigh mass below the groin crease suspicious for pathologic lymph node but could be neoplastic of other nature given no prodromal illness, infections, or inflammation.  Recommendation for biopsy was made.  Per Dr. Gong note, she had been sick for the better part of the winter and had been urgent treatment centers told that she had a viral infection flu and coronavirus negative with fevers and chills that subsequently resolved but for fatigue that did not resolve with hypersomnolence as well.  Says she feels hot all the time but no ronny night sweats.  -3/12/2021 right thigh mass needle core tissue sample extremely small and predominantly crushed limiting adequate evaluation.  Differential diagnosis for this B-cell lymphoma is diffuse large B cell, follicular lymphoma versus other.  Further differentiation could not be performed on this crush small sample.  -3/25/2021 [facility] hematology oncology initial consultation: I, Rodríguez Gerardo, saw for the first time today.  Reviewed the above story with her.  Still fatigued without night sweats or unexplained weight loss.  She has this mass in the soft tissue medial right thigh about 5 cm in size that does seem to move somewhat beneath the skin but also to my exam feels like it is attached to the deeper structures of the thigh as well.  I feel no other adenopathy or hepatosplenomegaly.  I will get a PET scan.  I spoken with Dr. Gong who will look at these images again with the CTs in [place].  If there is clear indication that this is not coming from soft tissue nonnodal structures, then excisional biopsy for adequate tissue diagnosis is important to determine the type of therapy.  This is growing since her biopsy 3/12/2021 but not daily like a very  high-grade lymphoma.  To my hand, this does not feel like a typical stas structure and my other concern is, even though this does not venessa with any markers to suggest sarcoma or the like, is to be sure that this is not something other than lymphoma and I have spoken with Dr. Pino who will do desmin stains and other markers before doing an excisional biopsy next Wednesday with Dr. Gong.  If those additional stains suggest sarcomatoid features that can appear in the medial aspect of the thigh such as this, then she probably needs to have excision by Humberto Vera orthopedic oncologist at .  In the meantime, presuming this to be some form of lymphoma which is the highest likelihood based on the pathology from Dr. Pino where this is CD20 positive, CD10 positive, BCL6 40% positive, MUM1 40% positive, CD23 strong and diffuse positive, and BCL-2 positive.  Ki-67 is 50%.  CD30 and only 5%.  C-Myc is weakly expressed in 10% of cells.  BRIAN by TAYLER is negative.  Cyclin D1 negative.  CD5 indeterminate for B and T-cell coexpression.  LEF 1 presumably staining T cells.  Ultimate decision for treatment depends on the specific pathologic subtype and the staging and I will get her prognostic labs going as well.  Given the relatively initial high-grade features of this that might require Adriamycin I will also proceed with echocardiogram.  -3/25/2021 CBC normal.  CMP unremarkable.  Sedimentation rate normal 27 with C-reactive protein elevated 1.7 upper limit of normal 0.5.  Beta-2 microglobulin normal 1.6.  Uric acid normal 4.3.  LDH normal 162.  Hepatitis B and C serologies negative.  -3/29/2021 echocardiogram ejection fraction 61 to 65%.  -3/31/21 excisional node biopsy shows diffuse large B cell non-Hodgkin lymphoma  -4/2/2021 PET shows postsurgical changes right proximal thigh and superficial inguinal region adjacent to adenopathy with SUV 4.0.  Right internal iliac lymph node mildly enlarged SUV 2.6 additionally  noted.  No other adenopathy outside this region.  No mention of any abnormal marrow signal.  -4/5/2021 [facility] medical oncology follow-up visit: Per Dr. Arun Garcia pathologist, this is a diffuse large B cell non-Hodgkin lymphoma.  He is not sure whether this is double hit or not yet.  He is not sure if there is a background of follicular lymphoma and hence cannot say whether this is transformed.  Clinically her IPI score is 0 with her age less than 60, normal LDH, ECOG 0, no extranodal involvement on PET, and only stage II at worse per PET with all nodes within the same region.  I will get a bone marrow biopsy.  If this is stage IV, double hit, or has evidence of transition from follicular lymphoma into diffuse large B-cell lymphoma, then I would give R-CHOP x6.  If the marrow is not involved, this is not double hit, and there is no evidence of follicular transformation into diffuse large B-cell lymphoma, then I would treat this as a stage II nonbulky diffuse large B-cell lymphoma and consider R-CHOP x3 followed by involved site radiation therapy.  I reviewed her PET and biopsy reports and echocardiogram which shows good ejection fraction.  She will need to get her [employer] Covid vaccine this week, family doctor established for the possibility of prednisone-induced diabetes, CT-guided bone marrow biopsy, chemo preparation visit, and a port placement with Dr. Gong.  We will present her at multidisciplinary tumor board this Friday.  I went into detail regarding the side effects of R-CHOP but she will have these reviewed at her chemo preparation visit.  Though not bulky, I still would treat her with allopurinol.  First course of therapy will occur in our [place] office and if all goes well then the subsequent Rituxan doses can be faster and performed in [place] office.    -4/14/2021 pathology update Dr. Garcia: Node pathology negative for double hit by FISH    -4/21/2021 bone marrow biopsy negative  for lymphomatous involvement with scant stainable iron.      -4/26/2021 [facility] medical oncology follow-up visit: Given lack of marrow involvement, negative for double hit, and possible background follicular lymphoma , I will treat her as stage IIa nonbulky (original tumor 5 cm on CT, I.  E.  Less than 7.5 cm) diffuse large B-cell lymphoma right proximal thigh SUV 4.0 mass incisionally biopsied (with palpable residua) with mild right internal iliac node enlargement SUV 2.6 as well.  We will treat her with planned R-CHOP x3 followed by involved site radiation therapy.  This was consensus of multidisciplinary tumor board 4/16/2021 as well.  First course [place].  Second course will occur in [place].  With the small component of background follicular lymphoma, there is potential risk of not only relapse of diffuse large B-cell lymphoma but later relapse from follicular lymphoma and we will keep that in mind in terms of following her up past the usual 3 years post definitive therapy presuming we get a complete remission on subsequent imaging and exams.  We will arrange radiation in [place] and I have made referral.  This will not start until after chemotherapy.    -5/13/2021 radiation oncology consultation Dr. Jl Infante for preparation of future involved field radiation therapy    -5/18/2021 [facility] oncology clinic follow-up: Had moderate amount of nausea after her first cycle, will add Zyprexa to Zofran and Compazine that she is already taking. She was also anxious, added Ativan to her care plan premeds. Prescription for Augmentin sent in for sinus infection. Clinically good response with reduction of right medial thigh mass. We will continue treatment in [place] due to mild infusion reaction with first Rituxan but she was able to complete treatment with additional medications. Today is cycle #2 of a planned 3 courses and then will repeat restaging scan to assess for response and proceed to radiation as  outlined above.    -6/8/2021 course #3 Rituxan CHOP    -6/22/2021 PET shows resolution of right inguinal and iliac adenopathy.  Negative PET/CT.    -6/28/2021 [facility] medical oncology follow-up visit: I reviewed images and reports of PET as above.  Status post Rituxan CHOP, PET is essentially negative with complete clinical remission.With the small component of background follicular lymphoma, there is potential risk of not only relapse of diffuse large B-cell lymphoma but later relapse from follicular lymphoma and we will keep that in mind in terms of following her up past the usual 3 years post definitive therapy presuming we get a complete remission on subsequent imaging and exams.  We will arrange radiation in [place] and Dr. Infante saw in mid May.  I called Dr. Infante to get her in to start involved field radiation and we will repeat PET and labs prior to return in 3 months for survivorship visit with my nurse practitioner who will lay out the follow-up according to NCCN guidelines.  Other than for modest anemia hemoglobin in the tens and glucose 150s, no other remarkable findings on CBC and CMP 6/8/2021.    -6/8/2021 completed course #3 of R-CHOP with resolution of right inguinal and iliac adenopathy on 6/22/2021 PET    -8/16/2021 completed 36 Gray in 18 fractions involved field right pelvic/inguinal femoral nodes Dr. Infante    -10/21/2021 [facility] Oncology clinic follow-up/survivorship visit: PET scan from 10/14/2021 was negative along with normal CBC and CMP.  She completed radiation in August without complications.  We will get her back to Dr. Gong for port removal.  We will continue surveillance per NCCN guidelines as follows:  -H&P and labs every 3-6 months for 5 years and then annually or as clinically indicated.    -We will do surveillance imaging post completion of treatment with CT chest, abdomen and pelvis with contrast every 6 months for 2 years and then annually or as clinically  indicated.    -1/28/2022 CT chest abdomen pelvis with contrast shows no adenopathy chest abdomen pelvis and no acute process.  Hepatic steatosis with sigmoid diverticulosis.  Hemoglobin 13.6 with normal CBC and differential.  CMP entirely normal with creatinine 0.65.    -2/1/2022 [facility] medical oncology follow-up visit: I reviewed images and reports of scans as outlined above as well as labs.  No evidence of recurrence.  As outlined by my nurse practitioner survivorship visit, she will get an H&P every 3 to 6 months out to April 2026 and then annually thereafter and we will do surveillance CT chest abdomen pelvis every 6 months until April 2023 and then annually as clinically indicated.  As I have stated previously, we would want to continue to follow her clinically past the usual 2 years of radiographic follow-up for signs or symptoms of follicular lymphoma as there was a small background follicular lymphomatous component that could recur late.  She will see my nurse practitioner back in 6 months with CTs just prior to return.  No further CBC or CMP in the absence of signs or symptoms given normalization of her counts.    -7/28/2022 CT chest, abdomen and pelvis with no evidence of lymphadenopathy in the chest, abdomen or pelvis, diffuse hepatic steatosis, sigmoid diverticulosis.    - 8/4/2022 [facility] clinic follow-up: Iliana has been feeling poorly since treatment ended a year ago for her history of lymphoma.  Current CT chest, abdomen and pelvis show no evidence of disease recurrence.  Her symptoms unfortunately have not improved since treatment ended and she is having more bad days than good.  She has a rash on the dorsum of her hands and foot that appear to be possibly eczema versus psoriasis.  We will get her to dermatology for further evaluation, I have asked her to call her insurance company to let us know the name of a provider that she would like to see and then we can put in a referral.  She has  ongoing fatigue that has not improved with time, she is unable to do most activities without having to rest for the remainder of the day afterwards.  She also has nausea and loose stool with urgency.  I will get her to gastroenterology for an EGD and colonoscopy to rule out any GI involvement of her lymphoma as it did have follicular component.  I will also get labs today with CBC, CMP, we will also check HARRIS in light of her rash, will check inflammatory markers with sedimentation rate and CRP and an LDH.  We will see her back in a few months to go over her labs and GI evaluation.  She does have a history of hepatic steatosis shown on all prior CTs therefore would benefit from GI evaluation regarding that also, has had normal LFTs on prior labs.      - 8/4/2022 data: CMP unremarkable.  LDH normal 174.  CRP mildly elevated 1.5 upper limit 0.9.  Sedimentation rate normal 9.  CBC normal with normal differential.  HARRIS negative    -9/19/2022 EGD and colonoscopy [facility] random colon biopsy negative.  Transverse colon polyp sessile serrated adenoma.  Duodenal biopsy with normal architecture.  Stomach biopsy mild chronic inactive gastritis with no dysplasia or metaplasia or H. pylori.  There was a lot of stool in the entire colon interfering with visualization.  Internal hemorrhoids.  EGD showed no gross lesions.    -9/27/2022 [facility] [place], operated by [facility] medical oncology follow-up: No clear-cut evidence of lymphoma on CTs or EGD and colonoscopy and labs are unremarkable and negative HARRIS.  Her dermatologist told her she had eczema.  Nonetheless she is still periodically so fatigued that she ends up going to the bed for 2 or 3 days and then rebounds and feels normal for a while and then this waxing and waning of performance status and fatigue continues.  We will get a PET through her thighs as that was the original site of her disease.  I will also have her get EBV and CMV and HIV and thyroid functions.    -10/7/2022 whole-body PET is  essentially negative.  There is a solitary borderline focus SUV 2.8 near the left lung apex without CT correlate favored to be infectious/inflammatory change.  No hypermetabolism in particular of the lower extremities where her original disease resided.    -10/11/2022 [facility] medical oncology follow-up: States she had COVID September 24 which was before I last saw her but did not know that when last I saw her.  She did not get her EBV, CMV, HIV, thyroid functions but says she is now getting over the COVID as well as some sinusitis and she thinks that is the fatigue.  Her PET was entirely negative..  We will continue to check her whole-body PET until April 2023 which is when we will see her back and then we will do that annually only as clinically indicated on annual H&P.    -10/27/2022 CBC normal with normal differential and unremarkable CMP, B12, folate, and normal thyroid function. HIV nonreactive.  CMV DNA quantitation negative.  EBV quantitation PCR negative and EBV antibody profile shows negative VCA IgM with increased IgG VCA of 122 and nuclear antigen IgG 68.8 commensurate with prior infection.  C-reactive protein 21.  C4 complement normal.  C3 complement slightly elevated to 27 upper limit 167.  ANCA panel negative.  HARRIS negative.  CCP negative.  Rheumatoid factor not drawn.  Sedimentation rate normal 24.  Anti-SSA and SSB negative.      -12/1/2022 Platelets minimally elevated 384,000 with otherwise unremarkable CBC and CMP.  C-reactive protein elevated 13.1.  Sedimentation rate 30 barely elevated.  Rheumatoid factor negative.  Moderate external hemoglobin and urine.  4-10 red cells per high-power field.    -4/11/2023 CT chest abdomen pelvis Showed no evidence of adenopathy or splenomegaly.  Atherosclerotic changes.  Hepatic steatosis with small liver cyst.    -4/18/2023 [facility] hematology oncology follow-up: No radiographic evidence of recurrence.  Viral studies all negative.  CBC unremarkable.  No  significant inflammatory markers and LDH normal. As outlined in her survivorship visit, she has completed routine surveillance CTs per NCCN guidelines and at this point would only image as clinically indicated.  We will continue H&P every 6 months out till April 2026.  I would probably continue to follow her clinically out past April 2026 for signs or symptoms of late recurrences there was a component of follicular lymphoma within the higher grade lymphoma and late recurrences may occur but at this point no further labs or scans in the absence of symptoms.    -8/23/2023 CBC And CMP ordered by primary care unremarkable    -10/24/2023 [facility] hematology oncology follow-up: No new somatic complaints to suggest recurrence.  No palpable cervical or axillary or inguinal adenopathy.  I reviewed labs ordered by primary care in August which were unremarkable.  I will have her see my nurse practitioner every 6 months and a survivorship mode until April 2026 at least.  At that point, given that there was a component of follicular lymphoma within the higher grade lymphoma there is a slightly higher risk of late relapse and I would consider continuing H&P surveillance with oncology every 6 to 12 months after April 2026.  No plans for scans or labs in the absence of symptoms.    -7/2/2024 patient presented to [facility] with shortness of breath and history of recent pneumonia, CT chest showed no acute thoracic process, incidental finding of 0.4 cm left upper lobe solid pulmonary nodule which is indeterminate.  Hepatic steatosis.    -10/30/2024 [facility] oncology clinic follow-up: Iliana overall is doing well, she has no lymphadenopathy or symptoms concerning for recurrent lymphoma.  Her CBC and CMP from 9/3/2024 were unremarkable.  I did review CT chest without contrast that was performed on 7/2/2024 ER visit to [facility] that showed 0.4 cm left upper lobe solid pulmonary nodule, I  reviewed previous CT scans performed at [facility] for the past few years and could find no mention of left upper lobe nodule.  I will repeat her CT chest now with contrast for follow-up.  If the nodule is stable then I will get her to our lung nodule clinic for further recommendations.  At a minimum she would be eligible for annual low-dose CT chest for screening in light of her history of tobacco abuse.  I did congratulate her on her smoking cessation, she stopped smoking about 3 months ago.  I will see her back in 1 to 2 weeks to go over her CT chest and further plan of care.    -11/5/2024 CT chest with contrast shows no acute or suspicious findings.  Tiny questionable subsolid micronodule in the left upper lobe seen on prior outside CT 7/2/2024 is no longer present.  Sequelae of healed granulomatous disease within the chest, no lymphadenopathy.     Diffuse large B-cell lymphoma of lymph nodes of inguinal region (Resolved)   3/29/2021 -  Other Event    Echocardiogram  Left ventricular ejection fraction appears to be 61 - 65%. Left ventricular systolic function is normal.  Normal global longitudinal strain at -23.5%.  Left ventricular diastolic function was normal.  No significant structural or functional valvular disease.     4/2/2021 Imaging    PET/CT IMPRESSION:  Postsurgical changes right proximal thigh and superficial  inguinal region adjacent to adenopathy with abnormal hypermetabolism  maximum SUV 4.0. Right internal iliac lymph node is mildly enlarged with  maximum SUV 2.6 additionally noted. No soft tissue mass or adenopathy  outside of this region.     4/21/2021 Biopsy    Marrow biopsy negative     4/26/2021 - 6/28/2021 Chemotherapy    Completed course #3 6/8/2021  OP LYMPHOMA R-CHOP RiTUXimab / Cyclophosphamide / DOXOrubicin / VinCRIStine / PredniSONE     4/26/2021 Cancer Staged    Staging form: Hodgkin And Non-Hodgkin Lymphoma, AJCC 8th Edition  - Clinical: Stage II (Diffuse large B-cell  lymphoma) - Signed by Rodríguez Gerardo MD on 4/26/2021 6/22/2021 Imaging     PET shows resolution of right inguinal and iliac adenopathy.  Negative PET/CT.     7/22/2021 - 8/16/2021 Radiation    Radiation OncologyTreatment Course:  Iliana Gray received 36 cGy in 18 fractions to right pelvic and inguinofemoral nodes (involved field) under the care of Dr. Jl Infante.     10/14/2021 Imaging    PET/CT: Stable negative PET/CT with no new hypermetabolic adenopathy to suggest lymphomatous disease progression.     1/28/2022 Imaging    CT chest abdomen pelvis with contrast shows no adenopathy chest abdomen pelvis and no acute process.  Hepatic steatosis with sigmoid diverticulosis.  Hemoglobin 13.6 with normal CBC and differential.  CMP entirely normal with creatinine 0.65     7/28/2022 Imaging    CT chest, abdomen and pelvis:  1. No evidence of lymphadenopathy in the chest abdomen or pelvis.  2. Diffuse hepatic steatosis.  3. Sigmoid diverticulosis.  4. Hysterectomy  5. Postoperative changes of prior right inguinal lymph node resection.     4/11/2023 Imaging    CT chest abdomen pelvis Showed no evidence of adenopathy or splenomegaly.  Atherosclerotic changes.  Hepatic steatosis with small liver cyst.         HISTORY OF PRESENT ILLNESS:  The patient is a 54 y.o. female, here for follow up on management of follow-up on CT chest done at outside facility in July that showed small 0.4 cm left upper lobe lung nodule.  I had ordered a repeat chest CT a few weeks ago to follow-up on previous abnormal CT.  She will is doing well, she is here today to go over those results via video visit.  She has no new concerns.    Past Medical History:   Diagnosis Date    Anxiety     Arthritis     Asthma     Diffuse large B cell lymphoma     Sleep apnea     cpap     Past Surgical History:   Procedure Laterality Date    HYSTERECTOMY      TUMOR EXCISION Right 04/2021    lymph node removal/biopsy/right inner thigh    VENOUS ACCESS  DEVICE (PORT) INSERTION Right 04/2021       No Known Allergies    Family History and Social History reviewed and changed as necessary    REVIEW OF SYSTEM:   No new somatic concerns    PHYSICAL EXAM:  On video well-developed, well-nourished healthy appearing female in no distress    There were no vitals filed for this visit.  There were no vitals filed for this visit.                   Vitals reviewed.  Results from CT chest 11/5/2024 reviewed at time of visit    ECOG: (0) Fully Active - Able to Carry On All Pre-disease Performance Without Restriction    Lab Results   Component Value Date    HGB 13.2 09/03/2024    HCT 40.6 09/03/2024    MCV 92 09/03/2024     09/03/2024    WBC 5.5 09/03/2024    NEUTROABS 2.5 09/03/2024    LYMPHSABS 2.3 09/03/2024    MONOSABS 0.4 09/03/2024    EOSABS 0.2 09/03/2024    BASOSABS 0.0 09/03/2024       Lab Results   Component Value Date    GLUCOSE 132 (H) 09/03/2024    BUN 9 09/03/2024    CREATININE 0.59 09/03/2024     09/03/2024    K 4.1 09/03/2024     09/03/2024    CO2 24 09/03/2024    CALCIUM 8.9 09/03/2024    PROTEINTOT 7.0 01/28/2022    ALBUMIN 4.2 09/03/2024    BILITOT 0.4 09/03/2024    ALKPHOS 78 09/03/2024    AST 25 09/03/2024    ALT 23 09/03/2024     CT Chest With Contrast Diagnostic    Result Date: 11/7/2024  Impression: No acute or suspicious findings. A tiny questionable subsolid micronodule in the left upper lobe seen on prior outside CT from 7/2/2024 is no longer present on today's exam. Sequelae of healed granulomatous disease within the chest. No lymphadenopathy. Electronically Signed: Clayton Santos MD  11/7/2024 10:14 AM EST  Workstation ID: [id number]           ASSESSMENT & PLAN:    1.  Incidental finding on CT chest 7/2/2024 of 0.4 cm left upper lobe solid pulmonary nodule  2.  Right Medial thigh stage IIa nonbulky less than 7.5 cm diffuse large b cell non-hodgkin lymphoma with component of background follicular lymphoma plan for R-CHOP x3 followed by  ISRT.  Had CR after 3 courses of R-CHOP x3 ending 6/8/2021 per PET 6/22/2021.    -Completed radiation 8/16/2021 to the right pelvic and inguinofemoral nodes  -Fatigue and rash (eczema per dermatology) starting around August 2022 with negative CT chest abdomen pelvis.    Discussion: On repeat CT chest there are no acute findings, the tiny questionable micronodule seen previously in July is no longer present.  Iliana was happy to hear this news.  She remains tobacco free.  We discussed that she would need to continue annual low-dose chest CT screening in light of her history of tobacco abuse.  I will plan on seeing her back in 6 months for follow-up for surveillance in light of her history of lymphoma.    I spent 20 minutes caring for Iliana on this date of service. This time includes time spent by me in the following activities: preparing for the visit, reviewing tests, performing a medically appropriate examination and/or evaluation, documenting information in the medical record, and independently interpreting results and communicating that information with the patient/family/caregiver.     Anitra Shelley, APRN    11/07/2024

## 2024-11-12 ENCOUNTER — OFFICE VISIT (OUTPATIENT)
Dept: BEHAVIORAL HEALTH | Facility: CLINIC | Age: 54
End: 2024-11-12
Payer: COMMERCIAL

## 2024-11-12 DIAGNOSIS — F90.2 ATTENTION DEFICIT HYPERACTIVITY DISORDER (ADHD), COMBINED TYPE: ICD-10-CM

## 2024-11-12 DIAGNOSIS — F33.1 MAJOR DEPRESSIVE DISORDER, RECURRENT EPISODE, MODERATE: Primary | ICD-10-CM

## 2024-11-12 DIAGNOSIS — F41.1 GENERALIZED ANXIETY DISORDER: ICD-10-CM

## 2024-11-12 DIAGNOSIS — F25.0 SCHIZOAFFECTIVE DISORDER, BIPOLAR TYPE: ICD-10-CM

## 2024-11-12 NOTE — PROGRESS NOTES
"     Follow Up Adult Note     Date:2024   Patient Name: Iliana Gray  : 1970   MRN: [id number]   Time IN: 9:58 am    Time OUT: 10:55 am     Referring Provider: Kiley Blackwell PA    Chief Complaint:      ICD-10-CM ICD-9-CM   1. Major depressive disorder, recurrent episode, moderate  F33.1 296.32   2. Generalized anxiety disorder  F41.1 300.02   3. Schizoaffective disorder, bipolar type  F25.0 295.70   4. Attention deficit hyperactivity disorder (ADHD), combined type  F90.2 314.01        History of Present Illness:   Iliana Gray is a 54 y.o., single, employed (part-time)  female who is being seen today for scheduled Psychotherapy session. Mood reported as \"feeling better\" with intermittently anxious but overall cooperative affect. Patient presented with some jittery and anxious behaviors that improved with session and was overall calm, cooperative, mostly engaged, and pleasant with provider. Patient denied any current SI/HI/AVH.    \"Feeling better with the new meds\"  \"Feeling more normal and sleeping a lot better\"  \"Work still sucks and they keep cutting hours\"  \"I want to find something that brings me fabian\"  \"Planning on having the holidays with family this year\"      Subjective     PHQ-9 Depression Screening  PHQ-9 Total Score:  Not completed at this time     DEO-7   Not completed at this time    Patient's Support Network Includes:  children, extended family, and friends    Functional Status: Mild impairment     Progress toward goal: Not at goal    Prognosis: Fair with Ongoing Treatment     Medications:     Current Outpatient Medications:     aspirin 81 MG chewable tablet, Chew 1 tablet Daily., Disp: , Rfl:     buPROPion XL (WELLBUTRIN XL) 150 MG 24 hr tablet, Take 1 tablet by mouth Daily., Disp: 30 tablet, Rfl: 3    Cariprazine HCl (Vraylar) 3 MG capsule capsule, Take 1 capsule by mouth Daily., Disp: 30 capsule, Rfl: 3    clobetasol (TEMOVATE) 0.05 % ointment, PLEASE SEE " "ATTACHED FOR DETAILED DIRECTIONS, Disp: , Rfl:     estradiol (ESTRACE) 1 MG tablet, Take 1 tablet by mouth Daily., Disp: , Rfl:     fenofibrate (TRICOR) 145 MG tablet, Take 1 tablet by mouth Daily., Disp: 90 tablet, Rfl: 3    icosapent ethyl (Vascepa) 1 g capsule capsule, Take 2 g by mouth 2 (Two) Times a Day With Meals., Disp: 360 capsule, Rfl: 3    ipratropium (ATROVENT) 0.06 % nasal spray, INSTILL 2 SPRAYS INTRANASALLY 3 TIMES PER DAY FOR 5 DAYS, Disp: , Rfl:     rosuvastatin (CRESTOR) 40 MG tablet, Take 1 tablet by mouth Daily., Disp: 90 tablet, Rfl: 3    Symbicort 160-4.5 MCG/ACT inhaler, Inhale 2 puffs 2 (Two) Times a Day., Disp: , Rfl:     traZODone (DESYREL) 50 MG tablet, Take 1 tablet by mouth Every Night., Disp: 30 tablet, Rfl: 3    Ventolin  (90 Base) MCG/ACT inhaler, , Disp: , Rfl:     Allergies:   No Known Allergies    Objective     Mental Status Exam:   MENTAL STATUS EXAM   General Appearance:  Cleanly groomed and dressed and well developed  Eye Contact:  Good eye contact  Attitude:  Cooperative and polite  Motor Activity:  Foot tapping and fidgety  Muscle Strength:  Normal  Speech:  Normal rate, tone, volume  Language:  Spontaneous  Mood and affect:  Other  Other Comment:  Mood reported as \"feeling better\" with intermittently anxious but overall cooperative affect.  Hopelessness:  Optimistic  Loneliness: 2  Thought Process:  Goal-directed and linear  Associations/ Thought Content:  No delusions  Hallucinations:  None  Suicidal Ideations:  Not present  Homicidal Ideation:  Not present  Sensorium:  Alert and clear  Orientation:  Person, place, time and situation  Immediate Recall, Recent, and Remote Memory:  Intact  Attention Span/ Concentration:  Good  Fund of Knowledge:  Appropriate for age and educational level  Intellectual Functioning:  Average range  Insight:  Fair  Judgement:  Fair  Reliability:  Good  Impulse Control:  Fair       Assessment / Plan      Visit Diagnosis/Orders Placed This " Visit:    ICD-10-CM ICD-9-CM   1. Major depressive disorder, recurrent episode, moderate  F33.1 296.32   2. Generalized anxiety disorder  F41.1 300.02   3. Schizoaffective disorder, bipolar type  F25.0 295.70   4. Attention deficit hyperactivity disorder (ADHD), combined type  F90.2 314.01        PLAN:  Safety: No acute safety concerns  Risk Assessment: Risk of self-harm acutely is low. Risk of self-harm chronically is also low, but could be further elevated in the event of treatment noncompliance and/or AODA.    Treatment Plan/Goals: Continue supportive psychotherapy efforts and medications as indicated. Treatment and medication options discussed during today's visit. Patient ackowledged and verbally consented to continue with current treatment plan and was educated on the importance of compliance with treatment and follow-up appointments. Patient seems reasonably able to adhere to treatment plan.      Assisted Patient in processing above session content; acknowledged and normalized patient’s thoughts, feelings, and concerns. Assisted patient in processing recent stressors/emotions/feelings and utilized Socratic Questioning techniques and open ended questions to encourage reflection. Engaged patient in reflecting on recent changes while listing out current strengths and concerns to address. Worked on identifying what would bring fabian to patient. Patient was receptive to therapeutic feedback.      Allowed Patient to freely discuss issues  without interruption or judgement with unconditional positive regard, active listening skills, and empathy. Therapist provided a safe, confidential environment to facilitate the development of a positive therapeutic relationship and encouraged open, honest communication. Assisted Patient in identifying risk factors which would indicate the need for higher level of care including thoughts to harm self or others and/or self-harming behavior and encouraged Patient to contact this  office, call 911, or present to the nearest emergency room should any of these events occur. Discussed crisis intervention services and means to access. Patient adamantly and convincingly denies current suicidal or homicidal ideation or perceptual disturbance. Assisted Patient in processing session content; acknowledged and normalized Patient’s thoughts, feelings, and concerns by utilizing a person-centered approach in efforts to build appropriate rapport and a positive therapeutic relationship with open and honest communication. .     Follow Up:   Return in about 1 week (around 11/19/2024) for Therapy session.    Clive Magallanes, SONIA  [facility]

## 2024-11-18 ENCOUNTER — TELEPHONE (OUTPATIENT)
Dept: BEHAVIORAL HEALTH | Facility: CLINIC | Age: 54
End: 2024-11-18
Payer: COMMERCIAL

## 2024-11-18 NOTE — TELEPHONE ENCOUNTER
Incoming Refill Request      Medication requested (name and dose):     DIAZEPAM (VALIUM) 5 MG TABLET     Pharmacy where request should be sent:     [facility]     Additional details provided by patient:       Best call back number: [phone]    Does the patient have less than a 3 day supply:  [x] Yes  [] No    Janice Radha Dick Rep  11/18/24, 13:29 EST        {Tip  DIRECTIONS Send the encounter HIGH priority, If patient has less than a 3 day supply. If the patient will run out of medication over the weekend add that information to the additional details line. Send this encounter to the clinical pool:[id number]

## 2024-11-26 ENCOUNTER — OFFICE VISIT (OUTPATIENT)
Dept: BEHAVIORAL HEALTH | Facility: CLINIC | Age: 54
End: 2024-11-26
Payer: COMMERCIAL

## 2024-11-26 DIAGNOSIS — F39 MOOD DISORDER: ICD-10-CM

## 2024-11-26 DIAGNOSIS — F33.1 MAJOR DEPRESSIVE DISORDER, RECURRENT EPISODE, MODERATE: ICD-10-CM

## 2024-11-26 DIAGNOSIS — F41.1 GENERALIZED ANXIETY DISORDER: Primary | ICD-10-CM

## 2024-11-26 NOTE — PROGRESS NOTES
"     Follow Up Adult Note     Date:2024   Patient Name: Iliana Gray  : 1970   MRN: [id number]   Time IN: 10:40 am      Time OUT: 11:35 am     Referring Provider: Kiley Blackwell PA    Chief Complaint:      ICD-10-CM ICD-9-CM   1. Generalized anxiety disorder  F41.1 300.02   2. Major depressive disorder, recurrent episode, moderate  F33.1 296.32   3. Mood disorder  F39 296.90        History of Present Illness:   Iliana Gray is a 54 y.o., single, employed (part-time)  female who is being seen today for scheduled Psychotherapy session. Mood reported as \"I don't know-okay I guess\" with somewhat anxious but overall cooperative affect. Patient presented with some mild anxiety but remained overall calm, cooperative, engaged, and pleasant with provider. Patient denied any current SI/HI/AVH.     \"I've been crying more this last week and don't know why-just yesterday watching a show and I just started crying for no reason\"  \"I'm not feeling as down as it may seem, but the anxiety is there a bit and I have been worrying about my weight and health\"  \"I just don't ever really have much energy and get short of breath and that gets me anxious\"  \"Family is good and planning to cook dinner for the holiday\"      Subjective     PHQ-9 Depression Screening  PHQ-9 Total Score:  Not completed at this time     DEO-7   Not completed at this time    Patient's Support Network Includes:  daughter and granddaughter, friends, family    Functional Status: Moderate impairment     Progress toward goal: Not at goal    Prognosis: Fair with Ongoing Treatment     Medications:     Current Outpatient Medications:     aspirin 81 MG chewable tablet, Chew 1 tablet Daily., Disp: , Rfl:     buPROPion XL (WELLBUTRIN XL) 150 MG 24 hr tablet, Take 1 tablet by mouth Daily., Disp: 30 tablet, Rfl: 3    Cariprazine HCl (Vraylar) 3 MG capsule capsule, Take 1 capsule by mouth Daily., Disp: 30 capsule, Rfl: 3    clobetasol " "(TEMOVATE) 0.05 % ointment, PLEASE SEE ATTACHED FOR DETAILED DIRECTIONS, Disp: , Rfl:     estradiol (ESTRACE) 1 MG tablet, Take 1 tablet by mouth Daily., Disp: , Rfl:     fenofibrate (TRICOR) 145 MG tablet, Take 1 tablet by mouth Daily., Disp: 90 tablet, Rfl: 3    icosapent ethyl (Vascepa) 1 g capsule capsule, Take 2 g by mouth 2 (Two) Times a Day With Meals., Disp: 360 capsule, Rfl: 3    ipratropium (ATROVENT) 0.06 % nasal spray, INSTILL 2 SPRAYS INTRANASALLY 3 TIMES PER DAY FOR 5 DAYS, Disp: , Rfl:     rosuvastatin (CRESTOR) 40 MG tablet, Take 1 tablet by mouth Daily., Disp: 90 tablet, Rfl: 3    Symbicort 160-4.5 MCG/ACT inhaler, Inhale 2 puffs 2 (Two) Times a Day., Disp: , Rfl:     traZODone (DESYREL) 50 MG tablet, Take 1 tablet by mouth Every Night., Disp: 30 tablet, Rfl: 3    Ventolin  (90 Base) MCG/ACT inhaler, , Disp: , Rfl:     Allergies:   No Known Allergies    Objective     Mental Status Exam:   MENTAL STATUS EXAM   General Appearance:  Cleanly groomed and dressed  Eye Contact:  Good eye contact  Attitude:  Cooperative and polite  Motor Activity:  Normal gait, posture  Muscle Strength:  Normal  Speech:  Normal rate, tone, volume  Language:  Spontaneous  Mood and affect:  Other  Other Comment:  Mood reported as \"I don't know-okay I guess\" with somewhat anxious but overall cooperative affect.  Hopelessness:  Optimistic  Loneliness: 2  Thought Process:  Linear  Associations/ Thought Content:  No delusions  Hallucinations:  None  Suicidal Ideations:  Not present  Homicidal Ideation:  Not present  Sensorium:  Alert and clear  Orientation:  Person, place, time and situation  Immediate Recall, Recent, and Remote Memory:  Intact  Attention Span/ Concentration:  Good  Fund of Knowledge:  Appropriate for age and educational level  Intellectual Functioning:  Average range  Insight:  Fair  Judgement:  Fair  Reliability:  Good  Impulse Control:  Fair       Assessment / Plan      Visit Diagnosis/Orders Placed " This Visit:    ICD-10-CM ICD-9-CM   1. Generalized anxiety disorder  F41.1 300.02   2. Major depressive disorder, recurrent episode, moderate  F33.1 296.32   3. Mood disorder  F39 296.90        PLAN:  Safety: No acute safety concerns  Risk Assessment: Risk of self-harm acutely is low. Risk of self-harm chronically is also low, but could be further elevated in the event of treatment noncompliance and/or AODA.    Treatment Plan/Goals: Continue supportive psychotherapy efforts and medications as indicated. Treatment and medication options discussed during today's visit. Patient ackowledged and verbally consented to continue with current treatment plan and was educated on the importance of compliance with treatment and follow-up appointments. Patient seems reasonably able to adhere to treatment plan.      Assisted Patient in processing above session content; acknowledged and normalized patient’s thoughts, feelings, and concerns. Assisted patient in processing recent stressors/emotions/feelings and utilized CBT techniques to assist patient with challenging negative/irrational thoughts and beliefs and replacing them with rational ones while also utilizing Socratic Questioning techniques and open ended questions to encourage reflection. Practiced problem solving skills, mindfulness, and relaxation techniques to reduce stress and better challenge negative situations and emotions. Discussed healthy eating habits and diet along with the benefits of regular exercise on mental health. Patient was receptive to therapeutic feedback.       Allowed Patient to freely discuss issues  without interruption or judgement with unconditional positive regard, active listening skills, and empathy. Therapist provided a safe, confidential environment to facilitate the development of a positive therapeutic relationship and encouraged open, honest communication. Assisted Patient in identifying risk factors which would indicate the need for  higher level of care including thoughts to harm self or others and/or self-harming behavior and encouraged Patient to contact this office, call 911, or present to the nearest emergency room should any of these events occur. Discussed crisis intervention services and means to access. Patient adamantly and convincingly denies current suicidal or homicidal ideation or perceptual disturbance. Assisted Patient in processing session content; acknowledged and normalized Patient’s thoughts, feelings, and concerns by utilizing a person-centered approach in efforts to build appropriate rapport and a positive therapeutic relationship with open and honest communication. .     Follow Up:   Return in about 2 weeks (around 12/10/2024) for Therapy session.    Clive Magallanes [facility]  [facility]

## 2024-11-27 ENCOUNTER — TELEPHONE (OUTPATIENT)
Dept: BEHAVIORAL HEALTH | Facility: CLINIC | Age: 54
End: 2024-11-27
Payer: COMMERCIAL

## 2024-11-27 NOTE — TELEPHONE ENCOUNTER
PATIENT REPORTS SHE HAS BEEN LOSING HER HAIR, THE DERMATOLOGIST SAYS IT IS A COMBINATION OF WELLBUTRIN AND TRAZODONE.   PATIENT WANTS TO KNOW IF SHE CAN STOP TAKING BOTH MEDICATIONS OR DOES SHE NEED TO WEAN OFF OF THEM.       PLEASE ADVISE

## 2024-12-02 NOTE — TELEPHONE ENCOUNTER
Would you find out if this is this something that just started within this last month with the start of the medications?

## 2024-12-10 ENCOUNTER — OFFICE VISIT (OUTPATIENT)
Dept: FAMILY MEDICINE CLINIC | Facility: CLINIC | Age: 54
End: 2024-12-10
Payer: COMMERCIAL

## 2024-12-10 ENCOUNTER — OFFICE VISIT (OUTPATIENT)
Dept: BEHAVIORAL HEALTH | Facility: CLINIC | Age: 54
End: 2024-12-10
Payer: COMMERCIAL

## 2024-12-10 VITALS
DIASTOLIC BLOOD PRESSURE: 86 MMHG | HEIGHT: 63 IN | HEART RATE: 105 BPM | SYSTOLIC BLOOD PRESSURE: 154 MMHG | WEIGHT: 243 LBS | OXYGEN SATURATION: 93 % | BODY MASS INDEX: 43.05 KG/M2

## 2024-12-10 DIAGNOSIS — E53.8 VITAMIN B12 DEFICIENCY: ICD-10-CM

## 2024-12-10 DIAGNOSIS — F39 MOOD DISORDER: ICD-10-CM

## 2024-12-10 DIAGNOSIS — E55.9 VITAMIN D DEFICIENCY: ICD-10-CM

## 2024-12-10 DIAGNOSIS — E11.9 TYPE 2 DIABETES MELLITUS WITHOUT COMPLICATION, WITHOUT LONG-TERM CURRENT USE OF INSULIN: Primary | ICD-10-CM

## 2024-12-10 DIAGNOSIS — F33.1 MAJOR DEPRESSIVE DISORDER, RECURRENT EPISODE, MODERATE: Primary | ICD-10-CM

## 2024-12-10 DIAGNOSIS — Z00.00 GENERAL MEDICAL EXAM: ICD-10-CM

## 2024-12-10 DIAGNOSIS — F41.1 GENERALIZED ANXIETY DISORDER: ICD-10-CM

## 2024-12-10 DIAGNOSIS — E78.5 HYPERLIPIDEMIA LDL GOAL <70: ICD-10-CM

## 2024-12-10 DIAGNOSIS — F43.10 POST TRAUMATIC STRESS DISORDER (PTSD): ICD-10-CM

## 2024-12-10 DIAGNOSIS — Z23 ENCOUNTER FOR IMMUNIZATION: ICD-10-CM

## 2024-12-10 LAB
EXPIRATION DATE: ABNORMAL
HBA1C MFR BLD: 6.9 % (ref 4.5–5.7)
Lab: ABNORMAL

## 2024-12-10 PROCEDURE — 90472 IMMUNIZATION ADMIN EACH ADD: CPT | Performed by: PHYSICIAN ASSISTANT

## 2024-12-10 PROCEDURE — 90656 IIV3 VACC NO PRSV 0.5 ML IM: CPT | Performed by: PHYSICIAN ASSISTANT

## 2024-12-10 PROCEDURE — 90677 PCV20 VACCINE IM: CPT | Performed by: PHYSICIAN ASSISTANT

## 2024-12-10 PROCEDURE — 83036 HEMOGLOBIN GLYCOSYLATED A1C: CPT | Performed by: PHYSICIAN ASSISTANT

## 2024-12-10 PROCEDURE — 3044F HG A1C LEVEL LT 7.0%: CPT | Performed by: PHYSICIAN ASSISTANT

## 2024-12-10 PROCEDURE — 1126F AMNT PAIN NOTED NONE PRSNT: CPT | Performed by: PHYSICIAN ASSISTANT

## 2024-12-10 PROCEDURE — 90471 IMMUNIZATION ADMIN: CPT | Performed by: PHYSICIAN ASSISTANT

## 2024-12-10 PROCEDURE — 99214 OFFICE O/P EST MOD 30 MIN: CPT | Performed by: PHYSICIAN ASSISTANT

## 2024-12-10 RX ORDER — ALBUTEROL SULFATE 0.83 MG/ML
2.5 SOLUTION RESPIRATORY (INHALATION)
COMMUNITY
Start: 2024-09-24

## 2024-12-10 RX ORDER — METFORMIN HYDROCHLORIDE 500 MG/1
1000 TABLET, EXTENDED RELEASE ORAL 2 TIMES DAILY
Qty: 180 TABLET | Refills: 0 | Status: SHIPPED | OUTPATIENT
Start: 2024-12-10

## 2024-12-10 NOTE — ASSESSMENT & PLAN NOTE
Point-of-care A 1C today 6.9 so getting worse.  Start metformin.  If not tolerating then we will consider an injectable GLP-1 medication.  Follow-up in 1 month.

## 2024-12-10 NOTE — PROGRESS NOTES
"     Follow Up Adult Note     Date:12/10/2024   Patient Name: Iliana Gray  : 1970   MRN: [id number]   Time IN: 10:52 am    Time OUT: 11:52 am     Referring Provider: Kiley Blackwell PA    Chief Complaint:      ICD-10-CM ICD-9-CM   1. Major depressive disorder, recurrent episode, moderate  F33.1 296.32   2. Post traumatic stress disorder (PTSD)  F43.10 309.81   3. Generalized anxiety disorder  F41.1 300.02   4. Mood disorder  F39 296.90        History of Present Illness:   Iliana Gray is a 54 y.o., single, employed (part-time)  female who is being seen today for scheduled Psychotherapy session. Mood reported as \"I'm here-alright I guess\" with intermittently tearful, somewhat anxious affect. Patient presented with some anxiety and intermittently tearful and sad when discussing loss of loved ones over her lifetime but remained overall calm, cooperative, more engaged, and pleasant with provider. Patient denied any current SI/HI/AVH. Patient reports increased sadness and \"crying more\" with ongoing depressive symptoms related increased racing thoughts of losing loved ones with a focus on \"missing my sister and feeling lonely\", all of which exacerbates ongoing symptoms burdens.     \"I have been crying more, like every day, and I hate it because I've never been like this\"  \"I have been thinking about my sister more lately and just the future in general-I have never really been alone and don't want to\"  \"I've been more agitated and mood lately too\"  \"I feel more lonely this holiday\"  \"Having healthy issues and more shortness of breath causing my anxiety to rise\"      Subjective     PHQ-9 Depression Screening  PHQ-9 Total Score:  Not completed at this time     DEO-7   Not completed at this time    Patient's Support Network Includes:  children, extended family, and grandchildren, friends    Functional Status: Moderate impairment     Progress toward goal: Not at goal    Prognosis: Fair with " "Ongoing Treatment     Medications:     Current Outpatient Medications:     albuterol (PROVENTIL) (2.5 MG/3ML) 0.083% nebulizer solution, Take 2.5 mg by nebulization., Disp: , Rfl:     aspirin 81 MG chewable tablet, Chew 1 tablet Daily., Disp: , Rfl:     Cariprazine HCl (Vraylar) 3 MG capsule capsule, Take 1 capsule by mouth Daily., Disp: 30 capsule, Rfl: 3    clobetasol (TEMOVATE) 0.05 % ointment, PLEASE SEE ATTACHED FOR DETAILED DIRECTIONS, Disp: , Rfl:     estradiol (ESTRACE) 1 MG tablet, Take 1 tablet by mouth Daily., Disp: , Rfl:     fenofibrate (TRICOR) 145 MG tablet, Take 1 tablet by mouth Daily., Disp: 90 tablet, Rfl: 3    icosapent ethyl (Vascepa) 1 g capsule capsule, Take 2 g by mouth 2 (Two) Times a Day With Meals. (Patient not taking: Reported on 12/10/2024), Disp: 360 capsule, Rfl: 3    ipratropium (ATROVENT) 0.06 % nasal spray, INSTILL 2 SPRAYS INTRANASALLY 3 TIMES PER DAY FOR 5 DAYS, Disp: , Rfl:     metFORMIN ER (GLUCOPHAGE-XR) 500 MG 24 hr tablet, Take 2 tablets by mouth 2 (Two) Times a Day., Disp: 180 tablet, Rfl: 0    rosuvastatin (CRESTOR) 40 MG tablet, Take 1 tablet by mouth Daily. (Patient not taking: Reported on 12/10/2024), Disp: 90 tablet, Rfl: 3    Symbicort 160-4.5 MCG/ACT inhaler, Inhale 2 puffs 2 (Two) Times a Day., Disp: , Rfl:     Ventolin  (90 Base) MCG/ACT inhaler, , Disp: , Rfl:     Allergies:   No Known Allergies    Objective     Mental Status Exam:   MENTAL STATUS EXAM   General Appearance:  Cleanly groomed and dressed  Eye Contact:  Good eye contact  Attitude:  Other  Other Comment:  Guarded and evasive at times but overall cooperative and polite  Motor Activity:  Other  Other Comment:  Intermittently fidgety and foot tapping  Muscle Strength:  Normal  Speech:  Normal rate, tone, volume  Language:  Spontaneous  Mood and affect:  Other  Other Comment:  Mood reported as \"I'm here-alright I guess\" with intermittently tearful, somewhat anxious affect.  Hopelessness:  " 2  Loneliness: 4  Thought Process:  Linear  Associations/ Thought Content:  No delusions  Hallucinations:  None  Suicidal Ideations:  Not present  Homicidal Ideation:  Not present  Sensorium:  Alert and clear  Orientation:  Person, place, time and situation  Immediate Recall, Recent, and Remote Memory:  Intact  Attention Span/ Concentration:  Good  Fund of Knowledge:  Appropriate for age and educational level  Intellectual Functioning:  Average range  Insight:  Fair  Judgement:  Fair  Reliability:  Good  Impulse Control:  Fair       Assessment / Plan      Visit Diagnosis/Orders Placed This Visit:    ICD-10-CM ICD-9-CM   1. Major depressive disorder, recurrent episode, moderate  F33.1 296.32   2. Post traumatic stress disorder (PTSD)  F43.10 309.81   3. Generalized anxiety disorder  F41.1 300.02   4. Mood disorder  F39 296.90        PLAN:  Safety: No acute safety concerns  Risk Assessment: Risk of self-harm acutely is low. Risk of self-harm chronically is also low, but could be further elevated in the event of treatment noncompliance and/or AODA.    Treatment Plan/Goals: Continue supportive psychotherapy efforts and medications as indicated. Treatment and medication options discussed during today's visit. Patient ackowledged and verbally consented to continue with current treatment plan and was educated on the importance of compliance with treatment and follow-up appointments. Patient seems reasonably able to adhere to treatment plan.      Assisted Patient in processing above session content; acknowledged and normalized patient’s thoughts, feelings, and concerns. Assisted patient in processing recent stressors/emotions/feelings and utilized CBT techniques to assist patient with challenging negative/irrational thoughts and beliefs and replacing them with rational ones while also utilizing Socratic Questioning techniques and open ended questions to encourage reflection. Engaged patient in identifying any physical  health concerns that may be contributing to depression and other mental health distress while developing strategies to address such concerns. Provided psychoeducation on the impact of unexpressed emotions, feelings, and needs on mental and physical health while practicing techniques for expressing emotions in a safe and healthy way. Patient was mostly receptive to therapeutic feedback.      Allowed Patient to freely discuss issues  without interruption or judgement with unconditional positive regard, active listening skills, and empathy. Therapist provided a safe, confidential environment to facilitate the development of a positive therapeutic relationship and encouraged open, honest communication. Assisted Patient in identifying risk factors which would indicate the need for higher level of care including thoughts to harm self or others and/or self-harming behavior and encouraged Patient to contact this office, call 911, or present to the nearest emergency room should any of these events occur. Discussed crisis intervention services and means to access. Patient adamantly and convincingly denies current suicidal or homicidal ideation or perceptual disturbance. Assisted Patient in processing session content; acknowledged and normalized Patient’s thoughts, feelings, and concerns by utilizing a person-centered approach in efforts to build appropriate rapport and a positive therapeutic relationship with open and honest communication. .     Follow Up:   Return in about 2 weeks (around 12/24/2024) for Therapy session.    Clive Magallanes, [facility]  [facility]

## 2024-12-10 NOTE — PROGRESS NOTES
"      Patient Office Visit      Patient Name: Iliana Gray  : 1970   MRN: [id number]     Chief Complaint:    Chief Complaint   Patient presents with    Diabetes       History of Present Illness: Iliana Gray is a 54 y.o. female who is here today to discuss A1C.  Cardiology wanted her to see primary care.  A1c about 4 months ago was 6.5.  Patient complains about weight gain.    Subjective      Review of Systems:         Past Medical History:   Past Medical History:   Diagnosis Date    Anxiety     Arthritis     Asthma     Diffuse large B cell lymphoma     Sleep apnea     cpap       Past Surgical History:   Past Surgical History:   Procedure Laterality Date    HYSTERECTOMY      TUMOR EXCISION Right 2021    lymph node removal/biopsy/right inner thigh    VENOUS ACCESS DEVICE (PORT) INSERTION Right 2021       Family History: History reviewed. No pertinent family history.    Social History:   Social History     Socioeconomic History    Marital status:    Tobacco Use    Smoking status: Former     Current packs/day: 0.00     Average packs/day: 1 pack/day for 42.0 years (42.0 ttl pk-yrs)     Types: Cigarettes     Start date:      Quit date:      Years since quittin.9     Passive exposure: Past    Smokeless tobacco: Never   Vaping Use    Vaping status: Never Used   Substance and Sexual Activity    Alcohol use: Not Currently    Drug use: Never    Sexual activity: Yes     Partners: Male       Allergies:   No Known Allergies    Objective     Physical Exam:  Vital Signs:   Vitals:    12/10/24 0808   BP: 154/86   BP Location: Left arm   Patient Position: Sitting   Cuff Size: Adult   Pulse: 105   SpO2: 93%   Weight: 110 kg (243 lb)   Height: 160 cm (63\")   PainSc: 0-No pain     Body mass index is 43.05 kg/m².           Physical Exam  Constitutional:       General: She is not in acute distress.     Appearance: Normal appearance. She is obese.   Neurological:      Mental Status: She " is alert.   Psychiatric:         Mood and Affect: Mood normal.         Behavior: Behavior normal.         Thought Content: Thought content normal.         Judgment: Judgment normal.         Procedures    Assessment / Plan      Assessment/Plan:   Diagnoses and all orders for this visit:    1. Type 2 diabetes mellitus without complication, without long-term current use of insulin (Primary)  Assessment & Plan:  Point-of-care A 1C today 6.9 so getting worse.  Start metformin.  If not tolerating then we will consider an injectable GLP-1 medication.  Follow-up in 1 month.    Orders:  -     POC Glycosylated Hemoglobin (Hb A1C)  -     metFORMIN ER (GLUCOPHAGE-XR) 500 MG 24 hr tablet; Take 2 tablets by mouth 2 (Two) Times a Day.  Dispense: 180 tablet; Refill: 0  -     Hemoglobin A1c; Future    2. General medical exam  -     Comprehensive Metabolic Panel; Future  -     Vitamin B12; Future  -     Folate; Future  -     Lipid Panel; Future  -     Hemoglobin A1c; Future  -     CK; Future  -     TSH; Future  -     T4, Free; Future  -     CBC Auto Differential; Future    3. Encounter for immunization  -     Fluzone >6mos  -     Pneumococcal Conjugate Vaccine 20-Valent All    4. Vitamin D deficiency  -     Vitamin D,25-Hydroxy; Future    5. Vitamin B12 deficiency  -     Vitamin B12; Future  -     Folate; Future    6. Hyperlipidemia LDL goal <70  -     Lipid Panel; Future  -     TSH; Future  -     T4, Free; Future           Medications:     Current Outpatient Medications:     albuterol (PROVENTIL) (2.5 MG/3ML) 0.083% nebulizer solution, Take 2.5 mg by nebulization., Disp: , Rfl:     aspirin 81 MG chewable tablet, Chew 1 tablet Daily., Disp: , Rfl:     Cariprazine HCl (Vraylar) 3 MG capsule capsule, Take 1 capsule by mouth Daily., Disp: 30 capsule, Rfl: 3    clobetasol (TEMOVATE) 0.05 % ointment, PLEASE SEE ATTACHED FOR DETAILED DIRECTIONS, Disp: , Rfl:     estradiol (ESTRACE) 1 MG tablet, Take 1 tablet by mouth Daily., Disp: , Rfl:      fenofibrate (TRICOR) 145 MG tablet, Take 1 tablet by mouth Daily., Disp: 90 tablet, Rfl: 3    ipratropium (ATROVENT) 0.06 % nasal spray, INSTILL 2 SPRAYS INTRANASALLY 3 TIMES PER DAY FOR 5 DAYS, Disp: , Rfl:     Symbicort 160-4.5 MCG/ACT inhaler, Inhale 2 puffs 2 (Two) Times a Day., Disp: , Rfl:     Ventolin  (90 Base) MCG/ACT inhaler, , Disp: , Rfl:     icosapent ethyl (Vascepa) 1 g capsule capsule, Take 2 g by mouth 2 (Two) Times a Day With Meals. (Patient not taking: Reported on 12/10/2024), Disp: 360 capsule, Rfl: 3    metFORMIN ER (GLUCOPHAGE-XR) 500 MG 24 hr tablet, Take 2 tablets by mouth 2 (Two) Times a Day., Disp: 180 tablet, Rfl: 0    rosuvastatin (CRESTOR) 40 MG tablet, Take 1 tablet by mouth Daily. (Patient not taking: Reported on 12/10/2024), Disp: 90 tablet, Rfl: 3        Follow Up:   Return in about 6 weeks (around 1/21/2025) for Annual physical with labs one week prior.    Kiley Blackwell PA-C   [facility]     NOTE TO PATIENT: The 21st Century Cures Act makes medical notes like these available to patients in the interest of transparency. However, be advised this is a medical document. It is intended as peer to peer communication. It is written in medical language and may contain abbreviations or verbiage that are unfamiliar. It may appear blunt or direct. Medical documents are intended to carry relevant information, facts as evident, and the clinical opinion of the practitioner.

## 2025-01-14 ENCOUNTER — LAB (OUTPATIENT)
Dept: FAMILY MEDICINE CLINIC | Facility: CLINIC | Age: 55
End: 2025-01-14
Payer: COMMERCIAL

## 2025-01-14 ENCOUNTER — OFFICE VISIT (OUTPATIENT)
Dept: BEHAVIORAL HEALTH | Facility: CLINIC | Age: 55
End: 2025-01-14
Payer: COMMERCIAL

## 2025-01-14 DIAGNOSIS — E78.5 HYPERLIPIDEMIA LDL GOAL <70: ICD-10-CM

## 2025-01-14 DIAGNOSIS — Z00.00 GENERAL MEDICAL EXAM: ICD-10-CM

## 2025-01-14 DIAGNOSIS — E53.8 VITAMIN B12 DEFICIENCY: ICD-10-CM

## 2025-01-14 DIAGNOSIS — F33.1 MAJOR DEPRESSIVE DISORDER, RECURRENT EPISODE, MODERATE: ICD-10-CM

## 2025-01-14 DIAGNOSIS — F43.10 POST TRAUMATIC STRESS DISORDER (PTSD): ICD-10-CM

## 2025-01-14 DIAGNOSIS — F39 MOOD DISORDER: Primary | ICD-10-CM

## 2025-01-14 DIAGNOSIS — E11.9 TYPE 2 DIABETES MELLITUS WITHOUT COMPLICATION, WITHOUT LONG-TERM CURRENT USE OF INSULIN: ICD-10-CM

## 2025-01-14 DIAGNOSIS — F41.1 GENERALIZED ANXIETY DISORDER: ICD-10-CM

## 2025-01-14 DIAGNOSIS — E55.9 VITAMIN D DEFICIENCY: ICD-10-CM

## 2025-01-14 NOTE — PROGRESS NOTES
"     Follow Up Adult Note     Date:2025   Patient Name: Iliana Gray  : 1970   MRN: [id number]   Time IN: 10:50 am    Time OUT: 11:45 am     Referring Provider: Kiley Blackwell PA    Chief Complaint:      ICD-10-CM ICD-9-CM   1. Mood disorder  F39 296.90   2. Generalized anxiety disorder  F41.1 300.02   3. Major depressive disorder, recurrent episode, moderate  F33.1 296.32   4. Post traumatic stress disorder (PTSD)  F43.10 309.81        History of Present Illness:   Iliana Gray is a 54 y.o., single, employed part-time  female who is being seen today for scheduled Psychotherapy session. Mood reported as \"alright\" with somewhat anxious affect. Patient presented with some anxiety and somewhat minimized recent events/stressors but was overall calm, cooperative, and pleasant with provider. Patient denied any current SI/HI/AVH.     \"I haven't been up to too much lately-my daughter is in [place] so really just piddling around the house\"  \"I'm only working about 10 hours a week\"  \"I have been off all my medications for a few weeks now, so no mental health meds and I guess I'm doing alright\"  \"I have been stressing about my weight and health and plan on starting Ozempic here soon\"  \"Haven't really been manic or anything, so I guess I'm doing alright\"      Subjective     PHQ-9 Depression Screening  PHQ-9 Total Score:  Not completed at this time     DEO-7   Not completed at this time    Patient's Support Network Includes:  children, extended family, and friends    Functional Status: Moderate impairment     Progress toward goal: Not at goal    Prognosis: Fair with Ongoing Treatment     Medications:     Current Outpatient Medications:     albuterol (PROVENTIL) (2.5 MG/3ML) 0.083% nebulizer solution, Take 2.5 mg by nebulization., Disp: , Rfl:     aspirin 81 MG chewable tablet, Chew 1 tablet Daily., Disp: , Rfl:     Cariprazine HCl (Vraylar) 3 MG capsule capsule, Take 1 capsule by mouth " "Daily., Disp: 30 capsule, Rfl: 3    clobetasol (TEMOVATE) 0.05 % ointment, PLEASE SEE ATTACHED FOR DETAILED DIRECTIONS, Disp: , Rfl:     estradiol (ESTRACE) 1 MG tablet, Take 1 tablet by mouth Daily., Disp: , Rfl:     fenofibrate (TRICOR) 145 MG tablet, Take 1 tablet by mouth Daily., Disp: 90 tablet, Rfl: 3    icosapent ethyl (Vascepa) 1 g capsule capsule, Take 2 g by mouth 2 (Two) Times a Day With Meals. (Patient not taking: Reported on 12/10/2024), Disp: 360 capsule, Rfl: 3    ipratropium (ATROVENT) 0.06 % nasal spray, INSTILL 2 SPRAYS INTRANASALLY 3 TIMES PER DAY FOR 5 DAYS, Disp: , Rfl:     metFORMIN ER (GLUCOPHAGE-XR) 500 MG 24 hr tablet, Take 2 tablets by mouth 2 (Two) Times a Day., Disp: 180 tablet, Rfl: 0    rosuvastatin (CRESTOR) 40 MG tablet, Take 1 tablet by mouth Daily. (Patient not taking: Reported on 12/10/2024), Disp: 90 tablet, Rfl: 3    Symbicort 160-4.5 MCG/ACT inhaler, Inhale 2 puffs 2 (Two) Times a Day., Disp: , Rfl:     Ventolin  (90 Base) MCG/ACT inhaler, , Disp: , Rfl:     Allergies:   No Known Allergies    Objective     Mental Status Exam:   MENTAL STATUS EXAM   General Appearance:  Cleanly groomed and dressed  Eye Contact:  Fair  Attitude:  Other  Other Comment:  Mildly guarded at times but overall cooperative and polite  Motor Activity:  Fidgety and foot tapping  Muscle Strength:  Normal  Speech:  Normal rate, tone, volume  Language:  Spontaneous  Mood and affect:  Other  Other Comment:  Mood reported as \"alright\" with somewhat anxious affect.  Hopelessness:  Optimistic  Loneliness: 2  Thought Process:  Linear  Associations/ Thought Content:  No delusions  Hallucinations:  None  Suicidal Ideations:  Not present  Homicidal Ideation:  Not present  Sensorium:  Alert and clear  Orientation:  Person, place, time and situation  Immediate Recall, Recent, and Remote Memory:  Intact  Attention Span/ Concentration:  Good  Fund of Knowledge:  Appropriate for age and educational " level  Intellectual Functioning:  Average range  Insight:  Fair  Judgement:  Limited  Reliability:  Fair  Impulse Control:  Fair     Assessment / Plan      Visit Diagnosis/Orders Placed This Visit:    ICD-10-CM ICD-9-CM   1. Mood disorder  F39 296.90   2. Generalized anxiety disorder  F41.1 300.02   3. Major depressive disorder, recurrent episode, moderate  F33.1 296.32   4. Post traumatic stress disorder (PTSD)  F43.10 309.81        PLAN:  Safety: No acute safety concerns  Risk Assessment: Risk of self-harm acutely is low. Risk of self-harm chronically is also low, but could be further elevated in the event of treatment noncompliance and/or AODA.    Treatment Plan/Goals: Continue supportive psychotherapy efforts and medications as indicated. Treatment and medication options discussed during today's visit. Patient ackowledged and verbally consented to continue with current treatment plan and was educated on the importance of compliance with treatment and follow-up appointments. Patient seems reasonably able to adhere to treatment plan.      Assisted Patient in processing above session content; acknowledged and normalized patient’s thoughts, feelings, and concerns. Assisted patient in processing recent stressors/emotions/feelings and utilized Socratic Questioning techniques and open ended questions to encourage reflection. Discussed medication management and safety planned for any acute mental health distress if patient wishes not to continue with medications to include discussing local resources and acute hospitals along with informing supports to help identify if patient is becoming manic. Continued to explore hobbies and activities to address free time.       Allowed Patient to freely discuss issues  without interruption or judgement with unconditional positive regard, active listening skills, and empathy. Therapist provided a safe, confidential environment to facilitate the development of a positive therapeutic  relationship and encouraged open, honest communication. Assisted Patient in identifying risk factors which would indicate the need for higher level of care including thoughts to harm self or others and/or self-harming behavior and encouraged Patient to contact this office, call 911, or present to the nearest emergency room should any of these events occur. Discussed crisis intervention services and means to access. Patient adamantly and convincingly denies current suicidal or homicidal ideation or perceptual disturbance. Assisted Patient in processing session content; acknowledged and normalized Patient’s thoughts, feelings, and concerns by utilizing a person-centered approach in efforts to build appropriate rapport and a positive therapeutic relationship with open and honest communication. .     Follow Up:   Return in about 2 weeks (around 1/28/2025) for Therapy session.    Clive Magallanes LCSW  [facility]

## 2025-01-15 LAB
25(OH)D3+25(OH)D2 SERPL-MCNC: 27.7 NG/ML (ref 30–100)
ALBUMIN SERPL-MCNC: 4.2 G/DL (ref 3.8–4.9)
ALP SERPL-CCNC: 73 IU/L (ref 44–121)
ALT SERPL-CCNC: 24 IU/L (ref 0–32)
AST SERPL-CCNC: 25 IU/L (ref 0–40)
BASOPHILS # BLD AUTO: 0 X10E3/UL (ref 0–0.2)
BASOPHILS NFR BLD AUTO: 1 %
BILIRUB SERPL-MCNC: 0.2 MG/DL (ref 0–1.2)
BUN SERPL-MCNC: 12 MG/DL (ref 6–24)
BUN/CREAT SERPL: 19 (ref 9–23)
CALCIUM SERPL-MCNC: 9.1 MG/DL (ref 8.7–10.2)
CHLORIDE SERPL-SCNC: 102 MMOL/L (ref 96–106)
CHOLEST SERPL-MCNC: 207 MG/DL (ref 100–199)
CK SERPL-CCNC: 98 U/L (ref 32–182)
CO2 SERPL-SCNC: 27 MMOL/L (ref 20–29)
CREAT SERPL-MCNC: 0.62 MG/DL (ref 0.57–1)
EGFRCR SERPLBLD CKD-EPI 2021: 104 ML/MIN/1.73
EOSINOPHIL # BLD AUTO: 0.2 X10E3/UL (ref 0–0.4)
EOSINOPHIL NFR BLD AUTO: 3 %
ERYTHROCYTE [DISTWIDTH] IN BLOOD BY AUTOMATED COUNT: 13.5 % (ref 11.7–15.4)
FOLATE SERPL-MCNC: 13.2 NG/ML
GLOBULIN SER CALC-MCNC: 2.6 G/DL (ref 1.5–4.5)
GLUCOSE SERPL-MCNC: 139 MG/DL (ref 70–99)
HBA1C MFR BLD: 7 % (ref 4.8–5.6)
HCT VFR BLD AUTO: 36.4 % (ref 34–46.6)
HDLC SERPL-MCNC: 42 MG/DL
HGB BLD-MCNC: 12 G/DL (ref 11.1–15.9)
IMM GRANULOCYTES # BLD AUTO: 0.1 X10E3/UL (ref 0–0.1)
IMM GRANULOCYTES NFR BLD AUTO: 1 %
LDLC SERPL CALC-MCNC: 94 MG/DL (ref 0–99)
LYMPHOCYTES # BLD AUTO: 2.5 X10E3/UL (ref 0.7–3.1)
LYMPHOCYTES NFR BLD AUTO: 36 %
MCH RBC QN AUTO: 29.2 PG (ref 26.6–33)
MCHC RBC AUTO-ENTMCNC: 33 G/DL (ref 31.5–35.7)
MCV RBC AUTO: 89 FL (ref 79–97)
MONOCYTES # BLD AUTO: 0.5 X10E3/UL (ref 0.1–0.9)
MONOCYTES NFR BLD AUTO: 7 %
NEUTROPHILS # BLD AUTO: 3.6 X10E3/UL (ref 1.4–7)
NEUTROPHILS NFR BLD AUTO: 52 %
PLATELET # BLD AUTO: 292 X10E3/UL (ref 150–450)
POTASSIUM SERPL-SCNC: 4.7 MMOL/L (ref 3.5–5.2)
PROT SERPL-MCNC: 6.8 G/DL (ref 6–8.5)
RBC # BLD AUTO: 4.11 X10E6/UL (ref 3.77–5.28)
SODIUM SERPL-SCNC: 142 MMOL/L (ref 134–144)
T4 FREE SERPL-MCNC: 0.81 NG/DL (ref 0.82–1.77)
TRIGL SERPL-MCNC: 427 MG/DL (ref 0–149)
TSH SERPL DL<=0.005 MIU/L-ACNC: 2.68 UIU/ML (ref 0.45–4.5)
VIT B12 SERPL-MCNC: 539 PG/ML (ref 232–1245)
VLDLC SERPL CALC-MCNC: 71 MG/DL (ref 5–40)
WBC # BLD AUTO: 6.9 X10E3/UL (ref 3.4–10.8)

## 2025-01-16 NOTE — TREATMENT PLAN
Multi-Disciplinary Problems (from Behavioral Health Treatment Plan)      Active Problems       Problem: Anxiety  Start Date: 01/14/25      Problem Details: The patient self-scales this problem as a 6 with 10 being the worst.          Goal Priority Start Date Expected End Date End Date    Patient will develop and implement behavioral and cognitive strategies to reduce anxiety and irrational fears. -- 01/16/25 07/17/25 --    Goal Details: Progress toward goal:  Not appropriate to rate progress toward goal since this is the initial treatment plan.          Goal Intervention Frequency Start Date End Date    Help patient explore past emotional issues in relation to present anxiety. Each Visit 01/16/25 --    Intervention Details: Duration of treatment until patient is able to challenge negative thought patterns and identify and replace unhelpful or distorted thoughts with more realistic and positive perspectives.          Goal Intervention Frequency Start Date End Date    Help patient develop an awareness of their cognitive and physical responses to anxiety. Q3 Months 01/16/25 --    Intervention Details: Duration of treatment until patient is able to develop, and identify, an understanding of the physical, emotional, and cognitive symptoms of anxiety along with identifying triggers for anxiety and learning strategies for managing triggers and anxiety symptoms.                  Problem: Depression  Start Date: 01/14/25      Problem Details: The patient self-scales this problem as a 5 with 10 being the worst.          Goal Priority Start Date Expected End Date End Date    Patient will demonstrate the ability to initiate new constructive life skills outside of sessions on a consistent basis. -- 01/16/25 07/17/25 --    Goal Details: Progress toward goal:  Not appropriate to rate progress toward goal since this is the initial treatment plan.          Goal Intervention Frequency Start Date End Date    Assist patient in setting  attainable activities of daily living goals. Each Visit 01/16/25 --    Intervention Details: Client will identify and engage in enjoyable activities that promote relaxation and positive emotions to include hobbies and/or socializing        Goal Intervention Frequency Start Date End Date    Provide education about depression Q3 Months 01/16/25 --    Intervention Details: Duration of treatment until patient is able to identify physical, cognitive, and psychological symptoms and triggers of depression.          Goal Intervention Frequency Start Date End Date    Assist patient in developing healthy coping strategies. Q3 Months 01/16/25 --    Intervention Details: Duration of treatment until patient is able to identify and effectively practice stress management skills such as deep breathing, visualization, mindfulness, to manage negative emotions and triggers related to depression.                  Problem: Grief and Loss  Start Date: 01/14/25      Problem Details: The patient self-scales this problem as a 6 with 10 being the worst.          Goal Priority Start Date Expected End Date End Date    Patient will process feelings and identify and implement specific ways to facilitate the grieving process. -- 01/16/25 07/17/25 --    Goal Details: Progress toward goal:  Not appropriate to rate progress toward goal since this is the initial treatment plan.          Goal Intervention Frequency Start Date End Date    Assist patient in identifying and processing feelings about losses. Q3 Months 01/16/25 --    Intervention Details: Duration of treatment until patient is able to process the loss of family members to include her son and sister in a healthy and adaptive manner and effectively identify and acknowledge her feelings related to the loss.          Goal Intervention Frequency Start Date End Date    Identify ways to grieve effectively and utilize healthy support systems Weekly 01/16/25 --    Intervention Details: Duration of  treatment until patient is able to practice techniques for expressing their emotions in a safe and healthy way to include journaling or talking with trusted support systems.                  Problem: Mood Instability  Start Date: 01/14/25      Problem Details: The patient self-scales this problem as a 5 with 10 being the worst.          Goal Priority Start Date Expected End Date End Date    Patient will achieve mood stability as evidenced by controlled behavior and a more deliberate thought process -- 01/16/25 07/17/25 --    Goal Details: Progress toward goal:  Not appropriate to rate progress toward goal since this is the initial treatment plan.          Goal Intervention Frequency Start Date End Date    Provide structure and focus to patient's thoughts and actions by establishing plans and routine. Q3 Months 01/16/25 --    Intervention Details: Duration of treatment until patient develops an understanding of how mood regulation can impact daily life and relationships, is able to identify the connection between thoughts, emotions, and behaviors; and be able to utilize problem solving skills to cope with challenging situations and emotions.                          Reviewed By       Clive Magallanes LCSW 01/16/25 [id number]                     I have discussed and reviewed this treatment plan with the patient.

## 2025-01-16 NOTE — PLAN OF CARE
Discussed care plan and collaborated with patient on identifying appropriate goals and objectives to address mental health concerns.

## 2025-01-21 ENCOUNTER — OFFICE VISIT (OUTPATIENT)
Dept: FAMILY MEDICINE CLINIC | Facility: CLINIC | Age: 55
End: 2025-01-21
Payer: COMMERCIAL

## 2025-01-21 VITALS
OXYGEN SATURATION: 96 % | BODY MASS INDEX: 44.49 KG/M2 | DIASTOLIC BLOOD PRESSURE: 86 MMHG | SYSTOLIC BLOOD PRESSURE: 132 MMHG | HEIGHT: 63 IN | WEIGHT: 251.1 LBS | HEART RATE: 100 BPM

## 2025-01-21 DIAGNOSIS — Z12.11 SCREENING FOR COLON CANCER: ICD-10-CM

## 2025-01-21 DIAGNOSIS — E66.01 CLASS 3 SEVERE OBESITY DUE TO EXCESS CALORIES WITH SERIOUS COMORBIDITY AND BODY MASS INDEX (BMI) OF 40.0 TO 44.9 IN ADULT: ICD-10-CM

## 2025-01-21 DIAGNOSIS — R79.89 ABNORMAL THYROID BLOOD TEST: ICD-10-CM

## 2025-01-21 DIAGNOSIS — R06.02 SHORTNESS OF BREATH: ICD-10-CM

## 2025-01-21 DIAGNOSIS — E78.5 HYPERLIPIDEMIA LDL GOAL <70: ICD-10-CM

## 2025-01-21 DIAGNOSIS — E55.9 VITAMIN D DEFICIENCY: ICD-10-CM

## 2025-01-21 DIAGNOSIS — E11.29 TYPE 2 DIABETES MELLITUS WITH DIABETIC MICROALBUMINURIA, WITHOUT LONG-TERM CURRENT USE OF INSULIN: ICD-10-CM

## 2025-01-21 DIAGNOSIS — Z00.00 GENERAL MEDICAL EXAM: Primary | ICD-10-CM

## 2025-01-21 DIAGNOSIS — R60.0 BILATERAL LEG EDEMA: ICD-10-CM

## 2025-01-21 DIAGNOSIS — R80.9 TYPE 2 DIABETES MELLITUS WITH DIABETIC MICROALBUMINURIA, WITHOUT LONG-TERM CURRENT USE OF INSULIN: ICD-10-CM

## 2025-01-21 DIAGNOSIS — F39 MOOD DISORDER: ICD-10-CM

## 2025-01-21 DIAGNOSIS — B37.2 CUTANEOUS CANDIDIASIS: ICD-10-CM

## 2025-01-21 DIAGNOSIS — E66.813 CLASS 3 SEVERE OBESITY DUE TO EXCESS CALORIES WITH SERIOUS COMORBIDITY AND BODY MASS INDEX (BMI) OF 40.0 TO 44.9 IN ADULT: ICD-10-CM

## 2025-01-21 DIAGNOSIS — Z79.899 HIGH RISK MEDICATION USE: ICD-10-CM

## 2025-01-21 PROBLEM — R73.03 PREDIABETES: Status: RESOLVED | Noted: 2023-05-31 | Resolved: 2025-01-21

## 2025-01-21 LAB
POC CREATININE URINE: ABNORMAL
POC MICROALBUMIN URINE: 100

## 2025-01-21 PROCEDURE — 93000 ELECTROCARDIOGRAM COMPLETE: CPT | Performed by: PHYSICIAN ASSISTANT

## 2025-01-21 PROCEDURE — 1126F AMNT PAIN NOTED NONE PRSNT: CPT | Performed by: PHYSICIAN ASSISTANT

## 2025-01-21 PROCEDURE — 3051F HG A1C>EQUAL 7.0%<8.0%: CPT | Performed by: PHYSICIAN ASSISTANT

## 2025-01-21 PROCEDURE — 82044 UR ALBUMIN SEMIQUANTITATIVE: CPT | Performed by: PHYSICIAN ASSISTANT

## 2025-01-21 PROCEDURE — 99396 PREV VISIT EST AGE 40-64: CPT | Performed by: PHYSICIAN ASSISTANT

## 2025-01-21 RX ORDER — FUROSEMIDE 20 MG/1
20 TABLET ORAL EVERY MORNING
Qty: 30 TABLET | Refills: 0 | Status: SHIPPED | OUTPATIENT
Start: 2025-01-21

## 2025-01-21 RX ORDER — NYSTATIN 100000 U/G
1 CREAM TOPICAL 2 TIMES DAILY
Qty: 30 G | Refills: 2 | Status: SHIPPED | OUTPATIENT
Start: 2025-01-21

## 2025-01-21 RX ORDER — POTASSIUM CHLORIDE 750 MG/1
10 TABLET, EXTENDED RELEASE ORAL 2 TIMES DAILY
Qty: 60 TABLET | Refills: 0 | Status: SHIPPED | OUTPATIENT
Start: 2025-01-21

## 2025-01-21 RX ORDER — CHOLECALCIFEROL (VITAMIN D3) 50 MCG
2000 TABLET ORAL DAILY
Qty: 90 TABLET | Refills: 3 | Status: SHIPPED | OUTPATIENT
Start: 2025-01-21 | End: 2026-01-21

## 2025-01-21 RX ORDER — SEMAGLUTIDE 0.68 MG/ML
INJECTION, SOLUTION SUBCUTANEOUS
Qty: 9 ML | Refills: 0 | Status: SHIPPED | OUTPATIENT
Start: 2025-01-21 | End: 2025-05-19

## 2025-01-21 NOTE — ASSESSMENT & PLAN NOTE
A1C 7.0, continues to increase in spite of maximum dose metformin, start Ozempic and follow-up in 2 months.   Last refill 2/22/19 last visit  11/20/18

## 2025-01-21 NOTE — ASSESSMENT & PLAN NOTE
Patient had colonoscopy in 2022, was supposed to have repeated in 2023 due to poor prep.  This was done at [facility] in [place].  Patient does not have transportation to [place] and is requesting to see someone in [place].

## 2025-01-21 NOTE — ASSESSMENT & PLAN NOTE
Patient's (Body mass index is 44.48 kg/m².) indicates that they are morbidly/severely obese (BMI > 40 or > 35 with obesity - related health condition) with health conditions that include diabetes mellitus and dyslipidemias . Weight is worsening. BMI  is above average; BMI management plan is completed. We discussed low calorie, low carb based diet program, portion control, and increasing exercise.

## 2025-01-21 NOTE — ASSESSMENT & PLAN NOTE
Normal TSH but increased from previous, free T4 is a little low.  Will repeat a TSH, free T4 check thyroid antibodies.  This could be contributing to her edema and weight gain.

## 2025-01-21 NOTE — PROGRESS NOTES
Annual Physical-Preventive Visit     Patient Name: Iliana Gray  : 1970   MRN: [id number]     Chief Complaint:    Chief Complaint   Patient presents with   • Annual Exam   • Diabetes       History of Present Illness: Iliana Gray is a 54 y.o. female who is here today for their annual health maintenance and physical.  She also needs to follow-up on her diabetes, she complains of increased shortness of breath, having to sleep sitting up and says her legs are swelling.  She stopped Vraylar because of weight gain.  She continues to lose weight.  She is interested in Ozempic for her diabetes control.    Subjective      Review of Systems:   Review of Systems   Constitutional:  Positive for fatigue. Negative for fever.   HENT:  Negative for hearing loss.    Eyes:  Negative for visual disturbance.   Respiratory:  Positive for shortness of breath.    Cardiovascular:  Positive for leg swelling. Negative for chest pain and palpitations.   Gastrointestinal:  Negative for abdominal pain, blood in stool, constipation and diarrhea.   Genitourinary:  Negative for difficulty urinating.   Musculoskeletal:  Negative for arthralgias and myalgias.   Skin:  Positive for rash (Rash inframammary area, nystatin powder not resolving.).   Allergic/Immunologic: Negative for immunocompromised state.   Psychiatric/Behavioral:  Negative for dysphoric mood. The patient is not nervous/anxious.         Past History:  Medical History: has a past medical history of Anxiety, Arthritis, Asthma, Diffuse large B cell lymphoma, and Sleep apnea.   Surgical History: has a past surgical history that includes Hysterectomy; Tumor excision (Right, 2021); and Venous Access Device (Port) (Right, 2021).   Family History: family history is not on file.   Social History: reports that she quit smoking about 12 months ago. Her smoking use included cigarettes. She started smoking about 43 years ago. She has a 42 pack-year smoking  history. She has been exposed to tobacco smoke. She has never used smokeless tobacco. She reports that she does not currently use alcohol. She reports that she does not use drugs.    Health Maintenance   Topic Date Due   • DIABETIC FOOT EXAM  Never done   • ANNUAL PHYSICAL  Never done   • COLORECTAL CANCER SCREENING  09/19/2023   • MAMMOGRAM  02/28/2025 (Originally 10/24/2024)   • DIABETIC EYE EXAM  01/21/2026 (Originally 12/5/2024)   • ZOSTER VACCINE (1 of 2) 01/21/2026 (Originally 8/15/1987)   • HEMOGLOBIN A1C  07/14/2025   • LUNG CANCER SCREENING  11/05/2025   • LIPID PANEL  01/14/2026   • URINE MICROALBUMIN  01/21/2026   • BMI FOLLOWUP  01/21/2026   • TDAP/TD VACCINES (3 - Td or Tdap) 05/31/2033   • HEPATITIS C SCREENING  Completed   • Pneumococcal Vaccine 0-64  Completed   • INFLUENZA VACCINE  Completed   • Hepatitis B  Discontinued   • COVID-19 Vaccine  Discontinued        Immunization History   Administered Date(s) Administered   • COVID-19 (MODERNA) 1st,2nd,3rd Dose Monovalent 04/16/2021   • Fluzone  >6mos 12/10/2024   • MMR 08/05/1999, 05/26/2006   • Pneumococcal Conjugate 20-Valent (PCV20) 12/10/2024   • Td (TDVAX) 01/10/2001   • Tdap 05/31/2023       Medications:     Current Outpatient Medications:   •  albuterol (PROVENTIL) (2.5 MG/3ML) 0.083% nebulizer solution, Take 2.5 mg by nebulization., Disp: , Rfl:   •  aspirin 81 MG chewable tablet, Chew 1 tablet Daily., Disp: , Rfl:   •  clobetasol (TEMOVATE) 0.05 % ointment, PLEASE SEE ATTACHED FOR DETAILED DIRECTIONS, Disp: , Rfl:   •  estradiol (ESTRACE) 1 MG tablet, Take 1 tablet by mouth Daily., Disp: , Rfl:   •  fenofibrate (TRICOR) 145 MG tablet, Take 1 tablet by mouth Daily., Disp: 90 tablet, Rfl: 3  •  ipratropium (ATROVENT) 0.06 % nasal spray, INSTILL 2 SPRAYS INTRANASALLY 3 TIMES PER DAY FOR 5 DAYS, Disp: , Rfl:   •  metFORMIN ER (GLUCOPHAGE-XR) 500 MG 24 hr tablet, Take 2 tablets by mouth 2 (Two) Times a Day., Disp: 180 tablet, Rfl: 0  •   "rosuvastatin (CRESTOR) 40 MG tablet, Take 1 tablet by mouth Daily., Disp: 90 tablet, Rfl: 3  •  Symbicort 160-4.5 MCG/ACT inhaler, Inhale 2 puffs 2 (Two) Times a Day., Disp: , Rfl:   •  Ventolin  (90 Base) MCG/ACT inhaler, , Disp: , Rfl:   •  Cholecalciferol (Vitamin D) 50 MCG (2000 UT) tablet, Take 1 tablet by mouth Daily., Disp: 90 tablet, Rfl: 3  •  furosemide (Lasix) 20 MG tablet, Take 1 tablet by mouth Every Morning., Disp: 30 tablet, Rfl: 0  •  nystatin (MYCOSTATIN) 217639 UNIT/GM cream, Apply 1 Application topically to the appropriate area as directed 2 (Two) Times a Day., Disp: 30 g, Rfl: 2  •  potassium chloride (KLOR-CON M10) 10 MEQ CR tablet, Take 1 tablet by mouth 2 (Two) Times a Day., Disp: 60 tablet, Rfl: 0  •  Semaglutide,0.25 or 0.5MG/DOS, (Ozempic, 0.25 or 0.5 MG/DOSE,) 2 MG/3ML solution pen-injector, Inject 0.25 mg under the skin into the appropriate area as directed 1 (One) Time Per Week for 28 days, THEN 0.5 mg 1 (One) Time Per Week for 90 days., Disp: 9 mL, Rfl: 0    Allergies:   No Known Allergies    Depression: PHQ-2 Depression Screening  Little interest or pleasure in doing things?     Feeling down, depressed, or hopeless?     PHQ-2 Total Score        Predictive Model Details   No score data available for Risk of Fall         Objective     Physical Exam:  Vital Signs:   Vitals:    01/21/25 0929   BP: 132/86   BP Location: Left arm   Patient Position: Sitting   Cuff Size: Large Adult   Pulse: 100   SpO2: 96%   Weight: 114 kg (251 lb 1.6 oz)   Height: 160 cm (63\")   PainSc: 0-No pain     Body mass index is 44.48 kg/m².           Physical Exam  Constitutional:       General: She is not in acute distress.     Appearance: She is obese.   Cardiovascular:      Rate and Rhythm: Normal rate and regular rhythm.      Pulses:           Dorsalis pedis pulses are 2+ on the right side and 2+ on the left side.        Posterior tibial pulses are 2+ on the right side and 2+ on the left side. "   Pulmonary:      Effort: Pulmonary effort is normal.      Breath sounds: Normal breath sounds.   Musculoskeletal:      Cervical back: Normal range of motion and neck supple.      Right lower le+ Pitting Edema present.      Left lower le+ Pitting Edema present.      Right foot: No deformity.      Left foot: No deformity.   Feet:      Right foot:      Protective Sensation: 10 sites tested.  10 sites sensed.      Skin integrity: Skin integrity normal.      Toenail Condition: Right toenails are abnormally thick.      Left foot:      Protective Sensation: 10 sites tested.  10 sites sensed.      Skin integrity: Skin integrity normal.      Toenail Condition: Left toenails are abnormally thick.      Comments: Diabetic Foot Exam Performed and Monofilament Test Performed     Skin:     Comments: Faint erythematous rash inframammary folds.   Neurological:      Mental Status: She is alert and oriented to person, place, and time.   Psychiatric:         Mood and Affect: Mood normal.         Behavior: Behavior normal.         Thought Content: Thought content normal.         Judgment: Judgment normal.           ECG 12 Lead    Date/Time: 2025 12:13 PM  Performed by: Kiley Blackwell PA    Authorized by: Kiley Blackwell PA  Comparison: compared with previous ECG from 2024  Comparison to previous ECG: New, possible right ventricular conduction delay RSR (QR) in V1/V2 today.  Rhythm: sinus rhythm  BPM: 97  Conduction: non-specific intraventricular conduction delay  ST Segments: ST segments normal  T Waves: T waves normal  Other: no other findings    Clinical impression: non-specific ECG          Assessment / Plan      Assessment/Plan:   Diagnoses and all orders for this visit:    1. General medical exam (Primary)  Assessment & Plan:  Patient had colonoscopy in , was supposed to have repeated in  due to poor prep.  This was done at [facility] in [place].  Patient does not have transportation to [place] and is  requesting to see someone in [place].      2. Type 2 diabetes mellitus with diabetic microalbuminuria, without long-term current use of insulin  Assessment & Plan:  A1C 7.0, continues to increase in spite of maximum dose metformin, start Ozempic and follow-up in 2 months.    Orders:  -     POC Microalbumin  -     Semaglutide,0.25 or 0.5MG/DOS, (Ozempic, 0.25 or 0.5 MG/DOSE,) 2 MG/3ML solution pen-injector; Inject 0.25 mg under the skin into the appropriate area as directed 1 (One) Time Per Week for 28 days, THEN 0.5 mg 1 (One) Time Per Week for 90 days.  Dispense: 9 mL; Refill: 0    3. Cutaneous candidiasis  -     nystatin (MYCOSTATIN) 540137 UNIT/GM cream; Apply 1 Application topically to the appropriate area as directed 2 (Two) Times a Day.  Dispense: 30 g; Refill: 2    4. Vitamin D deficiency  Assessment & Plan:  Vitamin D slightly low, start vitamin D 2000 IUs daily.    Orders:  -     Cholecalciferol (Vitamin D) 50 MCG (2000 UT) tablet; Take 1 tablet by mouth Daily.  Dispense: 90 tablet; Refill: 3    5. Shortness of breath  Assessment & Plan:  BNP, start low-dose furosemide with potassium.  If BNP abnormal will refer back to cardiology.    Orders:  -     Cancel: BNP ([facility] Only); Future  -     Cancel: BNP ([facility] Only)  -     BNP ([facility] Only)  -     ECG 12 Lead    6. Abnormal thyroid blood test  Assessment & Plan:  Normal TSH but increased from previous, free T4 is a little low.  Will repeat a TSH, free T4 check thyroid antibodies.  This could be contributing to her edema and weight gain.    Orders:  -     TSH  -     T4, Free  -     Thyroid Antibodies; Future    7. Bilateral leg edema  -     Cancel: BNP ([facility] Only); Future  -     furosemide (Lasix) 20 MG tablet; Take 1 tablet by mouth Every Morning.  Dispense: 30 tablet; Refill: 0  -     Cancel: BNP ([facility] Only)  -     BNP ([facility] Only)  -     ECG 12 Lead    8. High risk medication use  -     potassium chloride (KLOR-CON M10) 10 MEQ CR tablet; Take  1 tablet by mouth 2 (Two) Times a Day.  Dispense: 60 tablet; Refill: 0    9. Screening for colon cancer  -     Ambulatory Referral For Screening Colonoscopy    10. Hyperlipidemia LDL goal <70  Assessment & Plan:   Followed by cardiology.      11. Class 3 severe obesity due to excess calories with serious comorbidity and body mass index (BMI) of 40.0 to 44.9 in adult  Assessment & Plan:  Patient's (Body mass index is 44.48 kg/m².) indicates that they are morbidly/severely obese (BMI > 40 or > 35 with obesity - related health condition) with health conditions that include diabetes mellitus and dyslipidemias . Weight is worsening. BMI  is above average; BMI management plan is completed. We discussed low calorie, low carb based diet program, portion control, and increasing exercise.       12. Mood disorder  Assessment & Plan:  Followed by psychiatry.               Current Outpatient Medications:   •  albuterol (PROVENTIL) (2.5 MG/3ML) 0.083% nebulizer solution, Take 2.5 mg by nebulization., Disp: , Rfl:   •  aspirin 81 MG chewable tablet, Chew 1 tablet Daily., Disp: , Rfl:   •  clobetasol (TEMOVATE) 0.05 % ointment, PLEASE SEE ATTACHED FOR DETAILED DIRECTIONS, Disp: , Rfl:   •  estradiol (ESTRACE) 1 MG tablet, Take 1 tablet by mouth Daily., Disp: , Rfl:   •  fenofibrate (TRICOR) 145 MG tablet, Take 1 tablet by mouth Daily., Disp: 90 tablet, Rfl: 3  •  ipratropium (ATROVENT) 0.06 % nasal spray, INSTILL 2 SPRAYS INTRANASALLY 3 TIMES PER DAY FOR 5 DAYS, Disp: , Rfl:   •  metFORMIN ER (GLUCOPHAGE-XR) 500 MG 24 hr tablet, Take 2 tablets by mouth 2 (Two) Times a Day., Disp: 180 tablet, Rfl: 0  •  rosuvastatin (CRESTOR) 40 MG tablet, Take 1 tablet by mouth Daily., Disp: 90 tablet, Rfl: 3  •  Symbicort 160-4.5 MCG/ACT inhaler, Inhale 2 puffs 2 (Two) Times a Day., Disp: , Rfl:   •  Ventolin  (90 Base) MCG/ACT inhaler, , Disp: , Rfl:   •  Cholecalciferol (Vitamin D) 50 MCG (2000 UT) tablet, Take 1 tablet by mouth Daily.,  Disp: 90 tablet, Rfl: 3  •  furosemide (Lasix) 20 MG tablet, Take 1 tablet by mouth Every Morning., Disp: 30 tablet, Rfl: 0  •  nystatin (MYCOSTATIN) 211783 UNIT/GM cream, Apply 1 Application topically to the appropriate area as directed 2 (Two) Times a Day., Disp: 30 g, Rfl: 2  •  potassium chloride (KLOR-CON M10) 10 MEQ CR tablet, Take 1 tablet by mouth 2 (Two) Times a Day., Disp: 60 tablet, Rfl: 0  •  Semaglutide,0.25 or 0.5MG/DOS, (Ozempic, 0.25 or 0.5 MG/DOSE,) 2 MG/3ML solution pen-injector, Inject 0.25 mg under the skin into the appropriate area as directed 1 (One) Time Per Week for 28 days, THEN 0.5 mg 1 (One) Time Per Week for 90 days., Disp: 9 mL, Rfl: 0    Follow Up:   Return in about 4 weeks (around 2/18/2025) for 15 minute med recheck with labs one week prior.    Healthcare Maintenance:   Counseling provided on healthy diet and exercise.  Iliana Gray voices understanding and acceptance of this advice.  AVS with preventive healthcare tips printed for patient.     Kiley Blackwell PA-C  [facility]      NOTE TO PATIENT: The 21st Century Cures Act makes medical notes like these available to patients in the interest of transparency. However, be advised this is a medical document. It is intended as peer to peer communication. It is written in medical language and may contain abbreviations or verbiage that are unfamiliar. It may appear blunt or direct. Medical documents are intended to carry relevant information, facts as evident, and the clinical opinion of the practitioner.

## 2025-01-22 LAB
BNP SERPL-MCNC: 2.5 PG/ML (ref 0–100)
TSH SERPL DL<=0.005 MIU/L-ACNC: 2.15 UIU/ML (ref 0.45–4.5)

## 2025-01-24 ENCOUNTER — PRIOR AUTHORIZATION (OUTPATIENT)
Dept: FAMILY MEDICINE CLINIC | Facility: CLINIC | Age: 55
End: 2025-01-24
Payer: COMMERCIAL

## 2025-01-27 ENCOUNTER — PATIENT MESSAGE (OUTPATIENT)
Dept: FAMILY MEDICINE CLINIC | Facility: CLINIC | Age: 55
End: 2025-01-27
Payer: COMMERCIAL

## 2025-02-06 ENCOUNTER — OFFICE VISIT (OUTPATIENT)
Dept: BEHAVIORAL HEALTH | Facility: CLINIC | Age: 55
End: 2025-02-06
Payer: COMMERCIAL

## 2025-02-06 VITALS
WEIGHT: 249 LBS | BODY MASS INDEX: 44.12 KG/M2 | DIASTOLIC BLOOD PRESSURE: 80 MMHG | OXYGEN SATURATION: 93 % | SYSTOLIC BLOOD PRESSURE: 128 MMHG | HEART RATE: 111 BPM | HEIGHT: 63 IN

## 2025-02-06 DIAGNOSIS — F41.1 GENERALIZED ANXIETY DISORDER: Primary | ICD-10-CM

## 2025-02-06 DIAGNOSIS — F25.0 SCHIZOAFFECTIVE DISORDER, BIPOLAR TYPE: ICD-10-CM

## 2025-02-06 DIAGNOSIS — F90.2 ATTENTION DEFICIT HYPERACTIVITY DISORDER (ADHD), COMBINED TYPE: ICD-10-CM

## 2025-02-06 DIAGNOSIS — G47.20 CIRCADIAN RHYTHM SLEEP DISORDER, UNSPECIFIED TYPE: ICD-10-CM

## 2025-02-06 DIAGNOSIS — F43.10 POST TRAUMATIC STRESS DISORDER (PTSD): ICD-10-CM

## 2025-02-06 DIAGNOSIS — F33.1 MAJOR DEPRESSIVE DISORDER, RECURRENT EPISODE, MODERATE: ICD-10-CM

## 2025-02-06 DIAGNOSIS — F42.2 MIXED OBSESSIONAL THOUGHTS AND ACTS: ICD-10-CM

## 2025-02-06 LAB
BUPRENORPHINE SERPL-MCNC: NEGATIVE NG/ML
MDMA UR QL SCN: NEGATIVE
POC AMPHETAMINES: NEGATIVE
POC BARBITURATES: NEGATIVE
POC BENZODIAZEPHINES: NEGATIVE
POC COCAINE: NEGATIVE
POC METHADONE: NEGATIVE
POC METHAMPHETAMINE SCREEN URINE: NEGATIVE
POC OPIATES: NEGATIVE
POC OXYCODONE: NEGATIVE
POC PHENCYCLIDINE: NEGATIVE
POC THC: NEGATIVE

## 2025-02-06 NOTE — PROGRESS NOTES
Follow Up Office Visit      Patient Name: Iliana Gray  : 1970   MRN: [id number]     Referring Provider: Kiley Blackwell PA    Chief Complaint:      ICD-10-CM ICD-9-CM   1. Generalized anxiety disorder  F41.1 300.02   2. Major depressive disorder, recurrent episode, moderate  F33.1 296.32   3. Schizoaffective disorder, bipolar type  F25.0 295.70   4. Post traumatic stress disorder (PTSD)  F43.10 309.81   5. Circadian rhythm sleep disorder, unspecified type  G47.20 327.30   6. Attention deficit hyperactivity disorder (ADHD), combined type  F90.2 314.01   7. Mixed obsessional thoughts and acts  F42.2 300.3        History of Present Illness:   Iliana Gray is a 54 y.o. female who is here today for follow up with psychiatric medications.    Subjective      Patient Reports:   I stopped taking my medicines but I am back on the vraylar now, been a couple weeks.  I was feeling pretty bad without it.  Sleeping pretty good now, I take tylenol pm instead of the trazodone.  Can I get a refill of my valium?  I don't use it that often but sometimes I need it.  I had to stop the trazodone and wellbutrin, it was making my hair fall out.    Review of Systems:   Review of Systems   Psychiatric/Behavioral:  Positive for stress. The patient is nervous/anxious.        Screening Scores:   PHQ-9 : 7  DEO-7 : did not answer screener    RISK ASSESSMENT:  Patient denies any high risk factors today.    Medications:     Current Outpatient Medications:     albuterol (PROVENTIL) (2.5 MG/3ML) 0.083% nebulizer solution, Take 2.5 mg by nebulization., Disp: , Rfl:     aspirin 81 MG chewable tablet, Chew 1 tablet Daily., Disp: , Rfl:     Cholecalciferol (Vitamin D) 50 MCG ( UT) tablet, Take 1 tablet by mouth Daily., Disp: 90 tablet, Rfl: 3    clobetasol (TEMOVATE) 0.05 % ointment, PLEASE SEE ATTACHED FOR DETAILED DIRECTIONS, Disp: , Rfl:     estradiol (ESTRACE) 1 MG tablet, Take 1 tablet by mouth Daily., Disp: ,  "Rfl:     fenofibrate (TRICOR) 145 MG tablet, Take 1 tablet by mouth Daily., Disp: 90 tablet, Rfl: 3    furosemide (Lasix) 20 MG tablet, Take 1 tablet by mouth Every Morning., Disp: 30 tablet, Rfl: 0    ipratropium (ATROVENT) 0.06 % nasal spray, INSTILL 2 SPRAYS INTRANASALLY 3 TIMES PER DAY FOR 5 DAYS, Disp: , Rfl:     nystatin (MYCOSTATIN) 905790 UNIT/GM cream, Apply 1 Application topically to the appropriate area as directed 2 (Two) Times a Day., Disp: 30 g, Rfl: 2    potassium chloride (KLOR-CON M10) 10 MEQ CR tablet, Take 1 tablet by mouth 2 (Two) Times a Day., Disp: 60 tablet, Rfl: 0    rosuvastatin (CRESTOR) 40 MG tablet, Take 1 tablet by mouth Daily., Disp: 90 tablet, Rfl: 3    Semaglutide,0.25 or 0.5MG/DOS, (Ozempic, 0.25 or 0.5 MG/DOSE,) 2 MG/3ML solution pen-injector, Inject 0.25 mg under the skin into the appropriate area as directed 1 (One) Time Per Week for 28 days, THEN 0.5 mg 1 (One) Time Per Week for 90 days., Disp: 9 mL, Rfl: 0    Symbicort 160-4.5 MCG/ACT inhaler, Inhale 2 puffs 2 (Two) Times a Day., Disp: , Rfl:     Ventolin  (90 Base) MCG/ACT inhaler, , Disp: , Rfl:     Cariprazine HCl (Vraylar) 3 MG capsule capsule, Take 1 capsule by mouth Daily., Disp: 30 capsule, Rfl: 2    metFORMIN ER (GLUCOPHAGE-XR) 500 MG 24 hr tablet, Take 2 tablets by mouth 2 (Two) Times a Day. (Patient not taking: Reported on 2/6/2025), Disp: 180 tablet, Rfl: 0    Medication Considerations:  ANA reviewed and appropriate.      Allergies:   No Known Allergies    Results Reviewed:   screeners     Objective     Physical Exam:  Vital Signs:   Vitals:    02/06/25 0926   BP: 128/80   Pulse: 111   SpO2: 93%   Weight: 113 kg (249 lb)   Height: 160 cm (63\")     Body mass index is 44.11 kg/m².     Mental Status Exam:   Hygiene:   good  Cooperation:  Cooperative  Eye Contact:  Good  Psychomotor Behavior:  Appropriate  Affect:  Restricted  Mood: anxious  Speech:  Normal  Thought Process:  Goal directed and Linear  Thought " Content:  Normal  Suicidal:  None  Homicidal:  None  Hallucinations:  None  Delusion:  None  Memory:  Intact  Orientation:  Grossly intact  Reliability:  good  Insight:  Fair  Judgement:  Fair  Impulse Control:  Fair  Physical/Medical Issues:   nothing reported today      Assessment / Plan      Visit Diagnosis/Orders Placed This Visit:  Diagnoses and all orders for this visit:    1. Generalized anxiety disorder (Primary)  -     POC Urine Drug Screen, Triage; Future  -     POC Urine Drug Screen, Triage    2. Major depressive disorder, recurrent episode, moderate  -     Cariprazine HCl (Vraylar) 3 MG capsule capsule; Take 1 capsule by mouth Daily.  Dispense: 30 capsule; Refill: 2    3. Schizoaffective disorder, bipolar type  -     Cariprazine HCl (Vraylar) 3 MG capsule capsule; Take 1 capsule by mouth Daily.  Dispense: 30 capsule; Refill: 2    4. Post traumatic stress disorder (PTSD)    5. Circadian rhythm sleep disorder, unspecified type    6. Attention deficit hyperactivity disorder (ADHD), combined type    7. Mixed obsessional thoughts and acts         Functional Status: Moderate impairment     Prognosis: Fair with Ongoing Treatment     Impression/Formulation:  Patient appeared alert and oriented. Patient is receptive to assistance with maintaining a stable lifestyle.  Patient presents with history of     ICD-10-CM ICD-9-CM   1. Generalized anxiety disorder  F41.1 300.02   2. Major depressive disorder, recurrent episode, moderate  F33.1 296.32   3. Schizoaffective disorder, bipolar type  F25.0 295.70   4. Post traumatic stress disorder (PTSD)  F43.10 309.81   5. Circadian rhythm sleep disorder, unspecified type  G47.20 327.30   6. Attention deficit hyperactivity disorder (ADHD), combined type  F90.2 314.01   7. Mixed obsessional thoughts and acts  F42.2 300.3     Patient reports restarting vraylar 2 weeks ago after she had stopped taking all her medications.  She had stopped the trazodone and wellbutrin after her  dermatologist told her that her hair loss could be from a combination of those medications. She is taking tylenol PM to replace the trazodone.  Her depression and moods were significantly affected without the vraylar so she reinitiated the 3 mg dose from her home supply.  Patient also requests a refill of valium for prn anxiety.  She will need to repeat her UDS today, last one was November of 2023.   Patient denies any manic episodes since she was here last.    Treatment Plan:   Reinitiate vraylar 3 mg  Continue diazepam prn for anxietywith appropriate UDS    Patient will continue supportive psychotherapy efforts and medications as indicated. Clinic will obtain release of information for current treatment team for continuity of care as needed. Patient will contact this office, call 911 or present to the nearest emergency room should suicidal or homicidal ideations occur.  Discussed medication options and treatment plan of prescribed medication(s) as well as the risks, benefits, and potential side effects. Patient ackowledged and verbally consented to continue with current treatment plan and was educated on the importance of compliance with treatment and follow-up appointments.     Follow Up:   Return in about 3 months (around 5/6/2025) for Med Check.        MAN Win, HUGOP-BC  [facility]     This is electronically signed by MAN Win, JULIAN-BC  [unfilled] 15:53 EST

## 2025-02-10 ENCOUNTER — TELEPHONE (OUTPATIENT)
Dept: CARDIOLOGY | Facility: CLINIC | Age: 55
End: 2025-02-10
Payer: COMMERCIAL

## 2025-02-10 NOTE — TELEPHONE ENCOUNTER
PATIENT IS HAVING SOB AND SWELLING IN LEGS     STATES IT HAS BEEN GOING ON FOR A COUPLE OF MONTHS - HOWEVER OFFICE WASN'T INFORMED     SCHEDULED HER FOR WEDNESDAY     PLEASE ADVISE PT

## 2025-02-12 ENCOUNTER — OFFICE VISIT (OUTPATIENT)
Dept: CARDIOLOGY | Facility: CLINIC | Age: 55
End: 2025-02-12
Payer: COMMERCIAL

## 2025-02-12 VITALS
BODY MASS INDEX: 44.3 KG/M2 | HEART RATE: 104 BPM | SYSTOLIC BLOOD PRESSURE: 124 MMHG | OXYGEN SATURATION: 99 % | HEIGHT: 63 IN | WEIGHT: 250 LBS | RESPIRATION RATE: 18 BRPM | TEMPERATURE: 97 F | DIASTOLIC BLOOD PRESSURE: 84 MMHG

## 2025-02-12 DIAGNOSIS — R80.9 TYPE 2 DIABETES MELLITUS WITH DIABETIC MICROALBUMINURIA, WITHOUT LONG-TERM CURRENT USE OF INSULIN: ICD-10-CM

## 2025-02-12 DIAGNOSIS — G47.33 OBSTRUCTIVE SLEEP APNEA: ICD-10-CM

## 2025-02-12 DIAGNOSIS — R06.02 SHORTNESS OF BREATH: Primary | ICD-10-CM

## 2025-02-12 DIAGNOSIS — E11.29 TYPE 2 DIABETES MELLITUS WITH DIABETIC MICROALBUMINURIA, WITHOUT LONG-TERM CURRENT USE OF INSULIN: ICD-10-CM

## 2025-02-12 DIAGNOSIS — R60.0 BILATERAL LEG EDEMA: ICD-10-CM

## 2025-02-12 RX ORDER — FUROSEMIDE 40 MG/1
40 TABLET ORAL EVERY MORNING
Qty: 90 TABLET | Refills: 1 | Status: SHIPPED | OUTPATIENT
Start: 2025-02-12

## 2025-02-12 NOTE — PROGRESS NOTES
[facility]  [facility]  [address] [place] [address]  Dept: [phone]  Dept Fax: [phone]    Iliana Gray  1970    Follow Up Office Visit Note    History of Present Illness:  Iliana Gray is a 54 y.o. female who presents to the clinic for Follow-up.SOB and edema,- for the last 2 months has noticed more SOB and also edema, lower extremities, went to see PCP and started on lasix, BNP is very low, she was smoker for 40 years,  denies any chest pain, she has had a CT chest with no calcifications of the coronary arteries,  BP is 110.70, EKG sinus Hr 99.  Her LDL is 91 and Tri are high 421, on Crestor 40 mg and Vascepa., she has developed diabetes, and now on Ozempic, recently started, will get a BNP, troponin, d dimers, she denies any history of DVT or pulmonary embolism, she has  COPD and SELENE,  she has fatty liver per CT     The following portions of the patient's history were reviewed and updated as appropriate: allergies, current medications, past family history, past medical history, past social history, past surgical history, and problem list.    Medications:  albuterol  aspirin  Cariprazine HCl capsule  clobetasol  estradiol  fenofibrate  furosemide  ipratropium  metFORMIN ER  nystatin cream  Ozempic (0.25 or 0.5 MG/DOSE) solution pen-injector  potassium chloride  rosuvastatin  Symbicort aerosol  Ventolin HFA aerosol solution  Vitamin D    Subjective  No Known Allergies     Past Medical History:   Diagnosis Date   • Anxiety    • Arthritis    • Asthma    • Diffuse large B cell lymphoma    • Sleep apnea     cpap       Past Surgical History:   Procedure Laterality Date   • HYSTERECTOMY     • TUMOR EXCISION Right 04/2021    lymph node removal/biopsy/right inner thigh   • VENOUS ACCESS DEVICE (PORT) INSERTION Right 04/2021       History reviewed. No pertinent family history.     Social History     Socioeconomic History   • Marital status:  "   Tobacco Use   • Smoking status: Former     Current packs/day: 0.00     Average packs/day: 1 pack/day for 42.0 years (42.0 ttl pk-yrs)     Types: Cigarettes     Start date:      Quit date:      Years since quittin.1     Passive exposure: Past   • Smokeless tobacco: Never   Vaping Use   • Vaping status: Never Used   Substance and Sexual Activity   • Alcohol use: Not Currently   • Drug use: Never   • Sexual activity: Yes     Partners: Male       Review of Systems   Constitutional: Negative.    HENT: Negative.     Respiratory: Negative.  Positive for shortness of breath.    Cardiovascular: Negative.  Positive for leg swelling.   Endocrine: Negative.    Genitourinary: Negative.    Musculoskeletal: Negative.    Skin: Negative.    Allergic/Immunologic: Negative.    Neurological: Negative.    Hematological: Negative.    Psychiatric/Behavioral: Negative.       Cardiovascular Procedures    ECHO/MUGA:  STRESS TESTS:   CARDIAC CATH:   DEVICES:   HOLTER:   CT/MRI:   VASCULAR:   CARDIOTHORACIC:     Objective  Vitals:    25 1108   BP: 124/84   BP Location: Right arm   Patient Position: Lying   Cuff Size: Adult   Pulse: 104   Resp: 18   Temp: 97 °F (36.1 °C)   TempSrc: Infrared   SpO2: 99%   Weight: 113 kg (250 lb)   Height: 160 cm (63\")   PainSc: 0-No pain     Body mass index is 44.29 kg/m².     Physical Exam  Constitutional:       Appearance: Healthy appearance. Not in distress.   Neck:      Vascular: No JVR. JVD normal.   Pulmonary:      Effort: Pulmonary effort is normal.      Breath sounds: Normal breath sounds. No wheezing. No rhonchi. No rales.   Chest:      Chest wall: Not tender to palpatation.   Cardiovascular:      PMI at left midclavicular line. Normal rate. Regular rhythm. Normal S1. Normal S2.       Murmurs: There is no murmur.      No gallop.  No click. No rub.   Pulses:     Intact distal pulses.   Edema:     Thigh: bilateral 2+ edema of the thigh.     Pretibial: bilateral 2+ edema of the " pretibial area.     Ankle: bilateral 2+ edema of the ankle.     Feet: bilateral 2+ edema of the feet.  Abdominal:      General: Bowel sounds are normal.      Palpations: Abdomen is soft.      Tenderness: There is no abdominal tenderness.   Musculoskeletal: Normal range of motion.         General: No tenderness. Skin:     General: Skin is warm and dry.   Neurological:      General: No focal deficit present.      Mental Status: Alert and oriented to person, place and time.        Diagnostic Data    ECG 12 Lead    Date/Time: 2/12/2025 12:57 PM  Performed by: Ralph Mg MD    Authorized by: Ralph Mg MD  Comparison: compared with previous ECG from 1/21/2025  Similar to previous ECG  Rhythm: sinus rhythm  Rate: normal  BPM: 99  Conduction: non-specific intraventricular conduction delay  QRS axis: normal    Clinical impression: normal ECG        Assessment and Plan  Diagnoses and all orders for this visit:    Shortness of breath- will increase Lasix to 40 mg daily, will repeat a BNP, troponin and dimer, will get an echo, has mild diastolic dysfunction in 2021, we need to evaluate the pulmonary pressure and RV   -     Adult Transthoracic Echo Complete W/ Cont if Necessary Per Protocol; Future  -     proBNP  -     D-dimer, Quantitative  -     Troponin T  -     Protein / Creatinine Ratio, Urine - Urine, Clean Catch    Obstructive sleep apnea- she uses the CPAP not daily   -     Troponin T    Bilateral leg edema- as above  -     furosemide (Lasix) 40 MG tablet; Take 1 tablet by mouth Every Morning.  -     Adult Transthoracic Echo Complete W/ Cont if Necessary Per Protocol; Future  -     proBNP  -     Protein / Creatinine Ratio, Urine - Urine, Clean Catch    Type 2 diabetes mellitus with diabetic microalbuminuria, without long-term current use of insulin         Return in 1 month (on 3/12/2025) for Recheck with Dr. Mg.    Ralph Mg MD  02/12/2025

## 2025-02-12 NOTE — PROGRESS NOTES
Venipuncture Blood Specimen Collection  Venipuncture performed in clinic by Penny Abreu RN with good hemostasis. Patient tolerated the procedure well without complications.   02/12/25   Penny Abreu RN

## 2025-02-13 ENCOUNTER — LAB (OUTPATIENT)
Dept: FAMILY MEDICINE CLINIC | Facility: CLINIC | Age: 55
End: 2025-02-13
Payer: COMMERCIAL

## 2025-02-13 ENCOUNTER — TELEPHONE (OUTPATIENT)
Dept: CARDIOLOGY | Facility: CLINIC | Age: 55
End: 2025-02-13
Payer: COMMERCIAL

## 2025-02-13 DIAGNOSIS — R79.89 ABNORMAL THYROID BLOOD TEST: ICD-10-CM

## 2025-02-13 LAB
CREAT UR-MCNC: 146.5 MG/DL
D DIMER PPP FEU-MCNC: 0.45 MG/L FEU (ref 0–0.49)
NT-PROBNP SERPL-MCNC: <36 PG/ML (ref 0–287)
PROT UR-MCNC: 62.1 MG/DL
PROT/CREAT UR: 424 MG/G CREAT (ref 0–200)
TROPONIN T SERPL HS-MCNC: 8 NG/L (ref 0–14)

## 2025-02-13 NOTE — TELEPHONE ENCOUNTER
Spoke with Mrs Gray and advised her that her BNP and Ddimer were normal.  Your urine did show some protein, however, not enough to cause the swelling.  Troponin has not resulted today so I will call you when it does.  Haritha advised we will wait to see what the echo shows.

## 2025-02-13 NOTE — TELEPHONE ENCOUNTER
----- Message from Ralph Mg sent at 2/13/2025  8:55 AM EST -----  She has some protein in the urine but not much to explain the edema,  BNP is also normal, will see the others test echo,

## 2025-02-14 ENCOUNTER — TELEPHONE (OUTPATIENT)
Dept: CARDIOLOGY | Facility: CLINIC | Age: 55
End: 2025-02-14
Payer: COMMERCIAL

## 2025-02-14 LAB
THYROGLOB AB SERPL-ACNC: <1 IU/ML (ref 0–0.9)
THYROPEROXIDASE AB SERPL-ACNC: 9 IU/ML (ref 0–34)

## 2025-02-15 NOTE — PROGRESS NOTES
Labs were all normal.  Please keep any follow-up visits that were recommended during your recent visit.

## 2025-02-18 ENCOUNTER — OFFICE VISIT (OUTPATIENT)
Dept: FAMILY MEDICINE CLINIC | Facility: CLINIC | Age: 55
End: 2025-02-18
Payer: COMMERCIAL

## 2025-02-18 VITALS
OXYGEN SATURATION: 94 % | HEART RATE: 108 BPM | BODY MASS INDEX: 43.98 KG/M2 | DIASTOLIC BLOOD PRESSURE: 82 MMHG | SYSTOLIC BLOOD PRESSURE: 134 MMHG | HEIGHT: 63 IN | WEIGHT: 248.2 LBS

## 2025-02-18 DIAGNOSIS — N18.32 TYPE 2 DIABETES MELLITUS WITH STAGE 3B CHRONIC KIDNEY DISEASE, WITHOUT LONG-TERM CURRENT USE OF INSULIN: Primary | ICD-10-CM

## 2025-02-18 DIAGNOSIS — E55.9 VITAMIN D DEFICIENCY: ICD-10-CM

## 2025-02-18 DIAGNOSIS — Z79.899 HIGH RISK MEDICATION USE: ICD-10-CM

## 2025-02-18 DIAGNOSIS — E11.22 TYPE 2 DIABETES MELLITUS WITH STAGE 3B CHRONIC KIDNEY DISEASE, WITHOUT LONG-TERM CURRENT USE OF INSULIN: Primary | ICD-10-CM

## 2025-02-18 PROCEDURE — 99214 OFFICE O/P EST MOD 30 MIN: CPT | Performed by: PHYSICIAN ASSISTANT

## 2025-02-18 PROCEDURE — 3051F HG A1C>EQUAL 7.0%<8.0%: CPT | Performed by: PHYSICIAN ASSISTANT

## 2025-02-18 PROCEDURE — 1126F AMNT PAIN NOTED NONE PRSNT: CPT | Performed by: PHYSICIAN ASSISTANT

## 2025-02-18 RX ORDER — SEMAGLUTIDE 1.34 MG/ML
1 INJECTION, SOLUTION SUBCUTANEOUS WEEKLY
Qty: 9 ML | Refills: 1 | Status: SHIPPED | OUTPATIENT
Start: 2025-02-18

## 2025-02-18 RX ORDER — POTASSIUM CHLORIDE 750 MG/1
10 TABLET, EXTENDED RELEASE ORAL 2 TIMES DAILY
Qty: 60 TABLET | Refills: 5 | Status: SHIPPED | OUTPATIENT
Start: 2025-02-18

## 2025-02-18 NOTE — ASSESSMENT & PLAN NOTE
I started patient on furosemide, cardiology increased but BNP was normal.  Patient taking potassium but just started the increased dose of furosemide 2 days ago.  Patient will return next week for a BMP to make sure getting adequate potassium supplement.

## 2025-02-18 NOTE — PROGRESS NOTES
Patient Office Visit      Patient Name: Iliana Gray  : 1970   MRN: [id number]     Chief Complaint:    Chief Complaint   Patient presents with    Diabetes       History of Present Illness: Iliana Gray is a 54 y.o. female who is here today for follow-up.  She has seen cardiology and had some testing.  No cardiac reason found for shortness of breath.  Now she has appointment coming up with pulmonology.  She consulted regarding her colonoscopy but says cardiology recommended waiting until her breathing issues are addressed.  She is tolerating starting dose of Ozempic and willing to titrate to the 1 mg dose.  TSH normal with a slightly decreased free T4 and negative thyroid antibodies.  Vitamin D level is just a little bit low.    Subjective      Review of Systems:         Past Medical History:   Past Medical History:   Diagnosis Date    Anxiety     Arthritis     Asthma     Diffuse large B cell lymphoma     Sleep apnea     cpap       Past Surgical History:   Past Surgical History:   Procedure Laterality Date    HYSTERECTOMY      TUMOR EXCISION Right 2021    lymph node removal/biopsy/right inner thigh    VENOUS ACCESS DEVICE (PORT) INSERTION Right 2021       Family History: History reviewed. No pertinent family history.    Social History:   Social History     Socioeconomic History    Marital status:    Tobacco Use    Smoking status: Former     Current packs/day: 0.00     Average packs/day: 1 pack/day for 42.0 years (42.0 ttl pk-yrs)     Types: Cigarettes     Start date:      Quit date:      Years since quittin.1     Passive exposure: Past    Smokeless tobacco: Never   Vaping Use    Vaping status: Never Used   Substance and Sexual Activity    Alcohol use: Not Currently    Drug use: Never    Sexual activity: Yes     Partners: Male       Allergies:   No Known Allergies    Objective     Physical Exam:  Vital Signs:   Vitals:    25 0837   BP: 134/82   BP  "Location: Right arm   Patient Position: Sitting   Cuff Size: Adult   Pulse: 108   SpO2: 94%   Weight: 113 kg (248 lb 3.2 oz)   Height: 160 cm (62.99\")   PainSc: 0-No pain     Body mass index is 43.98 kg/m².           Physical Exam  Constitutional:       General: She is not in acute distress.     Appearance: Normal appearance.   Neurological:      Mental Status: She is alert.   Psychiatric:         Mood and Affect: Mood normal.         Behavior: Behavior normal.         Thought Content: Thought content normal.         Judgment: Judgment normal.         Procedures    Assessment / Plan      Assessment/Plan:   Diagnoses and all orders for this visit:    1. Type 2 diabetes mellitus with stage 3b chronic kidney disease, without long-term current use of insulin (Primary)  Assessment & Plan:  Continue to titrate Ozempic to 1 mg weekly and follow-up in 6 months.    Orders:  -     Microalbumin / Creatinine Urine Ratio - Urine, Clean Catch  -     Semaglutide, 1 MG/DOSE, (Ozempic, 1 MG/DOSE,) 4 MG/3ML solution pen-injector; Inject 1 mg under the skin into the appropriate area as directed 1 (One) Time Per Week.  Dispense: 9 mL; Refill: 1    2. Vitamin D deficiency  Assessment & Plan:  Patient has started daily vitamin D, will recheck check vitamin D level next visit.      3. High risk medication use  Assessment & Plan:  I started patient on furosemide, cardiology increased but BNP was normal.  Patient taking potassium but just started the increased dose of furosemide 2 days ago.  Patient will return next week for a BMP to make sure getting adequate potassium supplement.    Orders:  -     potassium chloride (KLOR-CON M10) 10 MEQ CR tablet; Take 1 tablet by mouth 2 (Two) Times a Day.  Dispense: 60 tablet; Refill: 5  -     Basic Metabolic Panel; Future           Medications:     Current Outpatient Medications:     albuterol (PROVENTIL) (2.5 MG/3ML) 0.083% nebulizer solution, Take 2.5 mg by nebulization., Disp: , Rfl:     aspirin 81 " MG chewable tablet, Chew 1 tablet Daily., Disp: , Rfl:     Cariprazine HCl (Vraylar) 3 MG capsule capsule, Take 1 capsule by mouth Daily., Disp: 30 capsule, Rfl: 2    Cholecalciferol (Vitamin D) 50 MCG (2000 UT) tablet, Take 1 tablet by mouth Daily., Disp: 90 tablet, Rfl: 3    clobetasol (TEMOVATE) 0.05 % ointment, PLEASE SEE ATTACHED FOR DETAILED DIRECTIONS, Disp: , Rfl:     estradiol (ESTRACE) 1 MG tablet, Take 1 tablet by mouth Daily., Disp: , Rfl:     fenofibrate (TRICOR) 145 MG tablet, Take 1 tablet by mouth Daily., Disp: 90 tablet, Rfl: 3    furosemide (Lasix) 40 MG tablet, Take 1 tablet by mouth Every Morning., Disp: 90 tablet, Rfl: 1    ipratropium (ATROVENT) 0.06 % nasal spray, INSTILL 2 SPRAYS INTRANASALLY 3 TIMES PER DAY FOR 5 DAYS, Disp: , Rfl:     nystatin (MYCOSTATIN) 415572 UNIT/GM cream, Apply 1 Application topically to the appropriate area as directed 2 (Two) Times a Day., Disp: 30 g, Rfl: 2    Potassium 99 MG tablet, Take 1 tablet by mouth Daily., Disp: , Rfl:     potassium chloride (KLOR-CON M10) 10 MEQ CR tablet, Take 1 tablet by mouth 2 (Two) Times a Day., Disp: 60 tablet, Rfl: 5    rosuvastatin (CRESTOR) 40 MG tablet, Take 1 tablet by mouth Daily., Disp: 90 tablet, Rfl: 3    Semaglutide,0.25 or 0.5MG/DOS, (Ozempic, 0.25 or 0.5 MG/DOSE,) 2 MG/3ML solution pen-injector, Inject 0.25 mg under the skin into the appropriate area as directed 1 (One) Time Per Week for 28 days, THEN 0.5 mg 1 (One) Time Per Week for 90 days., Disp: 9 mL, Rfl: 0    Symbicort 160-4.5 MCG/ACT inhaler, Inhale 2 puffs 2 (Two) Times a Day., Disp: , Rfl:     Ventolin  (90 Base) MCG/ACT inhaler, , Disp: , Rfl:     Semaglutide, 1 MG/DOSE, (Ozempic, 1 MG/DOSE,) 4 MG/3ML solution pen-injector, Inject 1 mg under the skin into the appropriate area as directed 1 (One) Time Per Week., Disp: 9 mL, Rfl: 1        Follow Up:   Return in about 6 months (around 8/18/2025) for Recheck.    Kiley Blackwell PA-C   [facility]  [place]     NOTE TO PATIENT: The 21st Century Cures Act makes medical notes like these available to patients in the interest of transparency. However, be advised this is a medical document. It is intended as peer to peer communication. It is written in medical language and may contain abbreviations or verbiage that are unfamiliar. It may appear blunt or direct. Medical documents are intended to carry relevant information, facts as evident, and the clinical opinion of the practitioner.

## 2025-02-19 LAB
ALBUMIN/CREAT UR: 433 MG/G CREAT (ref 0–29)
CREAT UR-MCNC: 202.5 MG/DL
MICROALBUMIN UR-MCNC: 877.4 UG/ML

## 2025-02-24 ENCOUNTER — LAB (OUTPATIENT)
Dept: FAMILY MEDICINE CLINIC | Facility: CLINIC | Age: 55
End: 2025-02-24
Payer: COMMERCIAL

## 2025-02-24 DIAGNOSIS — Z79.899 HIGH RISK MEDICATION USE: ICD-10-CM

## 2025-02-25 ENCOUNTER — OFFICE VISIT (OUTPATIENT)
Dept: BEHAVIORAL HEALTH | Facility: CLINIC | Age: 55
End: 2025-02-25
Payer: COMMERCIAL

## 2025-02-25 ENCOUNTER — TELEPHONE (OUTPATIENT)
Dept: FAMILY MEDICINE CLINIC | Facility: CLINIC | Age: 55
End: 2025-02-25
Payer: COMMERCIAL

## 2025-02-25 ENCOUNTER — TELEPHONE (OUTPATIENT)
Dept: BEHAVIORAL HEALTH | Facility: CLINIC | Age: 55
End: 2025-02-25
Payer: COMMERCIAL

## 2025-02-25 DIAGNOSIS — F39 MOOD DISORDER: Primary | ICD-10-CM

## 2025-02-25 DIAGNOSIS — F41.1 GENERALIZED ANXIETY DISORDER: Primary | ICD-10-CM

## 2025-02-25 DIAGNOSIS — F41.1 GENERALIZED ANXIETY DISORDER: ICD-10-CM

## 2025-02-25 DIAGNOSIS — F42.2 MIXED OBSESSIONAL THOUGHTS AND ACTS: ICD-10-CM

## 2025-02-25 DIAGNOSIS — F90.2 ATTENTION DEFICIT HYPERACTIVITY DISORDER (ADHD), COMBINED TYPE: ICD-10-CM

## 2025-02-25 DIAGNOSIS — F43.10 POST TRAUMATIC STRESS DISORDER (PTSD): ICD-10-CM

## 2025-02-25 LAB
BUN SERPL-MCNC: 12 MG/DL (ref 6–24)
BUN/CREAT SERPL: 19 (ref 9–23)
CALCIUM SERPL-MCNC: 9.2 MG/DL (ref 8.7–10.2)
CHLORIDE SERPL-SCNC: 101 MMOL/L (ref 96–106)
CO2 SERPL-SCNC: 24 MMOL/L (ref 20–29)
CREAT SERPL-MCNC: 0.64 MG/DL (ref 0.57–1)
EGFRCR SERPLBLD CKD-EPI 2021: 104 ML/MIN/1.73
GLUCOSE SERPL-MCNC: 127 MG/DL (ref 70–99)
POTASSIUM SERPL-SCNC: 4.7 MMOL/L (ref 3.5–5.2)
SODIUM SERPL-SCNC: 141 MMOL/L (ref 134–144)

## 2025-02-25 NOTE — TELEPHONE ENCOUNTER
Patient called and requested a refill on diazepam. Diazepam in not in active medication list. Patient reports at her last appointment you were going to give her a refill on this.

## 2025-02-25 NOTE — PROGRESS NOTES
"     Follow Up Adult Note     Date:2025   Patient Name: Iliana Gray  : 1970   MRN: [id number]   Time IN: 10:48 am    Time OUT: 11:46 am     Referring Provider: Kiley Blackwell PA    Chief Complaint:      ICD-10-CM ICD-9-CM   1. Mood disorder  F39 296.90   2. Attention deficit hyperactivity disorder (ADHD), combined type  F90.2 314.01   3. Generalized anxiety disorder  F41.1 300.02   4. Post traumatic stress disorder (PTSD)  F43.10 309.81   5. Mixed obsessional thoughts and acts  F42.2 300.3        History of Present Illness:   Iliana Gray is a 54 y.o. female who is being seen today for scheduled Psychotherapy session. Mood reported as \"alright I guess\" with somewhat anxious but overall cooperative affect. Patient presented as somewhat anxious and had a mild time remaining still but was overall calm, cooperative, engaged, and pleasant with provider. Patient denied any current SI/HI/AVH. Patient reports recently experiencing \"a manic episode the last two weeks\" describing increased energy and impulsive behaviors along with difficulties concentrating and later stated, \"But yesterday I was a lot more down\". Patient reports ongoing stressors around work and overall fabian in life reporting she \"procrastinates things a lot\". Furthermore, patient reported feeling \"a little weird\" as her grandchildren wanted to meet their grandfather (patients ex ) who was just released from spending 32 years in [facility] for murder.       Subjective     PHQ-9 Depression Screening  PHQ-9 Total Score:  Not completed at this time     DEO-7   Not completed at this time    Patient's Support Network Includes:  children, extended family, and friends    Functional Status: Moderate impairment     Progress toward goal: Not at goal    Prognosis: Good with Ongoing Treatment     Medications:     Current Outpatient Medications:     albuterol (PROVENTIL) (2.5 MG/3ML) 0.083% nebulizer solution, Take 2.5 mg by " nebulization., Disp: , Rfl:     aspirin 81 MG chewable tablet, Chew 1 tablet Daily., Disp: , Rfl:     Cariprazine HCl (Vraylar) 3 MG capsule capsule, Take 1 capsule by mouth Daily., Disp: 30 capsule, Rfl: 2    Cholecalciferol (Vitamin D) 50 MCG (2000 UT) tablet, Take 1 tablet by mouth Daily., Disp: 90 tablet, Rfl: 3    clobetasol (TEMOVATE) 0.05 % ointment, PLEASE SEE ATTACHED FOR DETAILED DIRECTIONS, Disp: , Rfl:     estradiol (ESTRACE) 1 MG tablet, Take 1 tablet by mouth Daily., Disp: , Rfl:     fenofibrate (TRICOR) 145 MG tablet, Take 1 tablet by mouth Daily., Disp: 90 tablet, Rfl: 3    furosemide (Lasix) 40 MG tablet, Take 1 tablet by mouth Every Morning., Disp: 90 tablet, Rfl: 1    ipratropium (ATROVENT) 0.06 % nasal spray, INSTILL 2 SPRAYS INTRANASALLY 3 TIMES PER DAY FOR 5 DAYS, Disp: , Rfl:     nystatin (MYCOSTATIN) 831293 UNIT/GM cream, Apply 1 Application topically to the appropriate area as directed 2 (Two) Times a Day., Disp: 30 g, Rfl: 2    Potassium 99 MG tablet, Take 1 tablet by mouth Daily., Disp: , Rfl:     potassium chloride (KLOR-CON M10) 10 MEQ CR tablet, Take 1 tablet by mouth 2 (Two) Times a Day., Disp: 60 tablet, Rfl: 5    rosuvastatin (CRESTOR) 40 MG tablet, Take 1 tablet by mouth Daily., Disp: 90 tablet, Rfl: 3    Semaglutide, 1 MG/DOSE, (Ozempic, 1 MG/DOSE,) 4 MG/3ML solution pen-injector, Inject 1 mg under the skin into the appropriate area as directed 1 (One) Time Per Week., Disp: 9 mL, Rfl: 1    Semaglutide,0.25 or 0.5MG/DOS, (Ozempic, 0.25 or 0.5 MG/DOSE,) 2 MG/3ML solution pen-injector, Inject 0.25 mg under the skin into the appropriate area as directed 1 (One) Time Per Week for 28 days, THEN 0.5 mg 1 (One) Time Per Week for 90 days., Disp: 9 mL, Rfl: 0    Symbicort 160-4.5 MCG/ACT inhaler, Inhale 2 puffs 2 (Two) Times a Day., Disp: , Rfl:     Ventolin  (90 Base) MCG/ACT inhaler, , Disp: , Rfl:     Allergies:   No Known Allergies    Objective     Mental Status Exam:   MENTAL  "STATUS EXAM   General Appearance:  Cleanly groomed and dressed  Eye Contact:  Good eye contact  Attitude:  Cooperative and polite  Motor Activity:  Fidgety and foot tapping  Muscle Strength:  Normal  Speech:  Normal rate, tone, volume  Language:  Spontaneous  Mood and affect:  Other  Other Comment:  Mood reported as \"alright I guess\" with somewhat anxious but overall cooperative affect.  Hopelessness:  3  Loneliness: 3  Thought Process:  Linear  Associations/ Thought Content:  No delusions  Hallucinations:  None  Suicidal Ideations:  Not present  Homicidal Ideation:  Not present  Sensorium:  Alert and clear  Orientation:  Person, place, time and situation  Immediate Recall, Recent, and Remote Memory:  Intact  Attention Span/ Concentration:  Good  Fund of Knowledge:  Appropriate for age and educational level  Intellectual Functioning:  Average range  Insight:  Fair  Judgement:  Fair  Reliability:  Fair  Impulse Control:  Fair       Assessment / Plan      Visit Diagnosis/Orders Placed This Visit:    ICD-10-CM ICD-9-CM   1. Mood disorder  F39 296.90   2. Attention deficit hyperactivity disorder (ADHD), combined type  F90.2 314.01   3. Generalized anxiety disorder  F41.1 300.02   4. Post traumatic stress disorder (PTSD)  F43.10 309.81   5. Mixed obsessional thoughts and acts  F42.2 300.3        PLAN:  Safety: No acute safety concerns  Risk Assessment: Risk of self-harm acutely is low. Risk of self-harm chronically is also low, but could be further elevated in the event of treatment noncompliance and/or AODA.    Treatment Plan/Goals: Continue supportive psychotherapy efforts and medications as indicated. Treatment and medication options discussed during today's visit. Patient ackowledged and verbally consented to continue with current treatment plan and was educated on the importance of compliance with treatment and follow-up appointments. Patient seems reasonably able to adhere to treatment plan.      Assisted Patient " in processing above session content; acknowledged and normalized patient’s thoughts, feelings, and concerns. Assisted patient in processing recent stressors/emotions/feelings and utilized Socratic Questioning techniques and open ended questions to encourage reflection. Processed recent stressors while also focusing on patients strengths and identifying future aspirations. Discussed identifying healthy supports to reach out to when feeling impulsive.       Allowed Patient to freely discuss issues  without interruption or judgement with unconditional positive regard, active listening skills, and empathy. Therapist provided a safe, confidential environment to facilitate the development of a positive therapeutic relationship and encouraged open, honest communication. Assisted Patient in identifying risk factors which would indicate the need for higher level of care including thoughts to harm self or others and/or self-harming behavior and encouraged Patient to contact this office, call 911, or present to the nearest emergency room should any of these events occur. Discussed crisis intervention services and means to access. Patient adamantly and convincingly denies current suicidal or homicidal ideation or perceptual disturbance. Assisted Patient in processing session content; acknowledged and normalized Patient’s thoughts, feelings, and concerns by utilizing a person-centered approach in efforts to build appropriate rapport and a positive therapeutic relationship with open and honest communication. .     Follow Up:   Return in about 2 weeks (around 3/11/2025) for Therapy session.    Clive Magallanes LCSW [facility]

## 2025-02-26 RX ORDER — DIAZEPAM 5 MG/1
5 TABLET ORAL DAILY PRN
Qty: 30 TABLET | Refills: 0 | Status: SHIPPED | OUTPATIENT
Start: 2025-02-26

## 2025-03-04 ENCOUNTER — TELEPHONE (OUTPATIENT)
Dept: ONCOLOGY | Facility: CLINIC | Age: 55
End: 2025-03-04
Payer: COMMERCIAL

## 2025-03-04 DIAGNOSIS — Z85.72 HISTORY OF DIFFUSE LARGE B-CELL LYMPHOMA: Primary | ICD-10-CM

## 2025-03-04 NOTE — TELEPHONE ENCOUNTER
RN discussed with Dr. Gerardo. New orders entered for CT Chest, Abd, and pelvis. Dr. Gerardo would like scans schd with office f/u. RN tried to call pt again with no answer. RN will try call again.

## 2025-03-04 NOTE — TELEPHONE ENCOUNTER
Patient left a voicemail she has an enlarged lymph node under her right arm, ultrasound is clear, but they said to call and report hits.

## 2025-03-05 NOTE — TELEPHONE ENCOUNTER
Called and left patient a detailed message informing her that Dr. Gerardo would like to do CT scans and see her for follow up after scans. Left call back number for questions.

## 2025-03-10 ENCOUNTER — APPOINTMENT (OUTPATIENT)
Dept: CT IMAGING | Facility: HOSPITAL | Age: 55
End: 2025-03-10
Payer: COMMERCIAL

## 2025-03-10 ENCOUNTER — HOSPITAL ENCOUNTER (OUTPATIENT)
Dept: CT IMAGING | Facility: HOSPITAL | Age: 55
Discharge: HOME | End: 2025-03-10
Admitting: INTERNAL MEDICINE
Payer: COMMERCIAL

## 2025-03-10 DIAGNOSIS — Z85.72 HISTORY OF DIFFUSE LARGE B-CELL LYMPHOMA: ICD-10-CM

## 2025-03-10 PROCEDURE — 71260 CT THORAX DX C+: CPT

## 2025-03-10 PROCEDURE — 25510000001 IOPAMIDOL 61 % SOLUTION: Performed by: INTERNAL MEDICINE

## 2025-03-10 PROCEDURE — 74177 CT ABD & PELVIS W/CONTRAST: CPT

## 2025-03-10 RX ORDER — IOPAMIDOL 612 MG/ML
100 INJECTION, SOLUTION INTRAVASCULAR
Status: COMPLETED | OUTPATIENT
Start: 2025-03-10 | End: 2025-03-10

## 2025-03-10 RX ADMIN — IOPAMIDOL 90 ML: 612 INJECTION, SOLUTION INTRAVENOUS at 09:45

## 2025-03-11 ENCOUNTER — OFFICE VISIT (OUTPATIENT)
Dept: BEHAVIORAL HEALTH | Facility: CLINIC | Age: 55
End: 2025-03-11
Payer: COMMERCIAL

## 2025-03-11 DIAGNOSIS — F41.1 GENERALIZED ANXIETY DISORDER: Primary | ICD-10-CM

## 2025-03-11 DIAGNOSIS — F33.1 MAJOR DEPRESSIVE DISORDER, RECURRENT EPISODE, MODERATE: ICD-10-CM

## 2025-03-11 DIAGNOSIS — F43.10 POST TRAUMATIC STRESS DISORDER (PTSD): ICD-10-CM

## 2025-03-11 NOTE — PLAN OF CARE
Discussed care plan and collaborated with patient on identifying goals and objectives to address mental health concerns.

## 2025-03-11 NOTE — TREATMENT PLAN
Multi-Disciplinary Problems (from Behavioral Health Treatment Plan)      Active Problems       Problem: Anxiety  Start Date: 03/11/25      Problem Details: The patient self-scales this problem as a 5 with 10 being the worst.          Goal Priority Start Date Expected End Date End Date    Patient will develop and implement behavioral and cognitive strategies to reduce anxiety and irrational fears. -- 03/11/25 09/09/25 --    Goal Details: Progress toward goal:  The patient self-scales their progress related to this goal as a 5 with 10 being the worst.        Goal Intervention Frequency Start Date End Date    Help patient explore past emotional issues in relation to present anxiety. Q3 Months 03/11/25 --    Intervention Details: Duration of treatment until  patient is able to identify experiences causing distress and anxiety and develop a more realistic understanding of presenting anxiety and cultivate a sense of acceptance towards health concerns and focus on living a meaningful life..        Goal Intervention Frequency Start Date End Date    Help patient develop an awareness of their cognitive and physical responses to anxiety. Q3 Months 03/11/25 --    Intervention Details: Duration of treatment until patient is able to challenge negative thought patterns and identify and replace unhelpful or distorted thoughts around anxiety with more realistic and positive perspectives and is able to develop, and identify, an understanding of the physical, emotional, and cognitive symptoms of anxiety along with identifying triggers for anxiety and learning strategies for managing triggers and anxiety symptoms.                Problem: Grief and Loss  Start Date: 03/11/25      Problem Details: The patient self-scales this problem as a 5 with 10 being the worst.          Goal Priority Start Date Expected End Date End Date    Patient will process feelings and identify and implement specific ways to facilitate the grieving process. --  03/11/25 09/09/25 --    Goal Details: Progress toward goal:  The patient self-scales their progress related to this goal as a 5 with 10 being the worst.        Goal Intervention Frequency Start Date End Date    Assist patient in identifying and processing feelings about losses. Q3 Months 03/11/25 --    Intervention Details: Duration of treatment until patient is able to identify and acknowledge his feelings related to the loss of her family members and identify and discuss any unresolved issues or difficult emotions related to the loss such as regret or anger, and work towards resolution or acceptance via exploration and processing during therapy sessions.        Goal Intervention Frequency Start Date End Date    Identify ways to grieve effectively and utilize healthy support systems Q3 Months 03/11/25 --    Intervention Details: Duration of treatment until patient develops coping strategies to manage the emotional and psychological effects of grief and a sense of closure to loss and identifies supports.                Problem: Medical Issue  Start Date: 03/11/25      Problem Details: The patient self-scales this problem as a 7 with 10 being the worst.          Goal Priority Start Date Expected End Date End Date    Patient will verbalize emotional, cognitive and behavioral changes needed to address health issues. -- 03/11/25 09/09/25 --    Goal Details: Progress toward goal:  The patient self-scales their progress related to this goal as a 7 with 10 being the worst.        Goal Intervention Frequency Start Date End Date    Reinforce emotional stability, behavioral responsibility and positive self talk that reduces risk to health. Q3 Months 03/11/25 --    Intervention Details: Duration of treatment until patient is able to demonstrate healthy coping strategies to reduce stress and improve overall well-being and utilize effective communication skills to express their needs and advocate for their health.                 Problem: Mood Instability  Start Date: 03/11/25      Problem Details: The patient self-scales this problem as a 6 with 10 being the worst.          Goal Priority Start Date Expected End Date End Date    Patient will achieve mood stability as evidenced by controlled behavior and a more deliberate thought process -- 03/11/25 09/09/25 --    Goal Details: Progress toward goal:  The patient self-scales their progress related to this goal as a 6 with 10 being the worst.        Goal Intervention Frequency Start Date End Date    Provide structure and focus to patient's thoughts and actions by establishing plans and routine. Q3 Months 03/11/25 --    Intervention Details: Duration of treatment until  patient develops an understanding of how mood regulation can impact daily life and relationships, is able to identify the connection between thoughts, emotions, and behaviors; and be able to utilize problem solving skills to cope with challenging situations and emotions.                        Reviewed By       Clive Magallanes LCSW 03/11/25 [id number]                     I have discussed and reviewed this treatment plan with the patient.

## 2025-03-11 NOTE — PROGRESS NOTES
"     Follow Up Adult Note     Date:2025   Patient Name: Iliana Gray  : 1970   MRN: [id number]   Time IN: 10:48 am    Time OUT: 11:43 am     Referring Provider: Kiley Blackwell PA    Chief Complaint:      ICD-10-CM ICD-9-CM   1. Generalized anxiety disorder  F41.1 300.02   2. Major depressive disorder, recurrent episode, moderate  F33.1 296.32   3. Post traumatic stress disorder (PTSD)  F43.10 309.81        History of Present Illness:   Iliana Gray is a 54 y.o., single, employed (part time)  female who is being seen today for scheduled Psychotherapy session. Mood reported as \"not feeling too good\" with somewhat anxious but overall engaged and cooperative affect. Patient presented with worries and some anxiety around medical and health concerns but remained overall calm, cooperative, engaged, and pleasant with provider. Patient denied any current SI/HI/AVH. Patient reports recent generalized anxiety around medical and health concerns affecting overall mood and lifestyle reporting \"nervousness\" about recent concerns with breathing and testing for cancer, only exacerbating ongoing symptom burdens and causing intrusive thoughts from past experiences dealing with cancer treatment. Furthermore, patient reported recently discovering \"having a brother in [place] I never even knew about\" and is nervous and excited to meed. Patient reports ongoing stressors with part time employment and finding general pleasure in day to day life.       Subjective     PHQ-9 Depression Screening  PHQ-9 Total Score:  Not completed at this time     DEO-7   Not completed at this time    Patient's Support Network Includes:  children, extended family, and friends    Functional Status: Moderate impairment     Progress toward goal: Not at goal    Prognosis: Good with Ongoing Treatment     Medications:     Current Outpatient Medications:     albuterol (PROVENTIL) (2.5 MG/3ML) 0.083% nebulizer solution, Take 2.5 " mg by nebulization., Disp: , Rfl:     aspirin 81 MG chewable tablet, Chew 1 tablet Daily., Disp: , Rfl:     Cariprazine HCl (Vraylar) 3 MG capsule capsule, Take 1 capsule by mouth Daily., Disp: 30 capsule, Rfl: 2    Cholecalciferol (Vitamin D) 50 MCG (2000 UT) tablet, Take 1 tablet by mouth Daily., Disp: 90 tablet, Rfl: 3    clobetasol (TEMOVATE) 0.05 % ointment, PLEASE SEE ATTACHED FOR DETAILED DIRECTIONS, Disp: , Rfl:     diazePAM (VALIUM) 5 MG tablet, Take 1 tablet by mouth Daily As Needed for Anxiety. Intended as 6 month supply., Disp: 30 tablet, Rfl: 0    estradiol (ESTRACE) 1 MG tablet, Take 1 tablet by mouth Daily., Disp: , Rfl:     fenofibrate (TRICOR) 145 MG tablet, Take 1 tablet by mouth Daily., Disp: 90 tablet, Rfl: 3    furosemide (Lasix) 40 MG tablet, Take 1 tablet by mouth Every Morning., Disp: 90 tablet, Rfl: 1    ipratropium (ATROVENT) 0.06 % nasal spray, INSTILL 2 SPRAYS INTRANASALLY 3 TIMES PER DAY FOR 5 DAYS, Disp: , Rfl:     nystatin (MYCOSTATIN) 730062 UNIT/GM cream, Apply 1 Application topically to the appropriate area as directed 2 (Two) Times a Day., Disp: 30 g, Rfl: 2    potassium chloride (KLOR-CON M10) 10 MEQ CR tablet, Take 1 tablet by mouth 2 (Two) Times a Day., Disp: 60 tablet, Rfl: 5    rosuvastatin (CRESTOR) 40 MG tablet, Take 1 tablet by mouth Daily., Disp: 90 tablet, Rfl: 3    Semaglutide, 1 MG/DOSE, (Ozempic, 1 MG/DOSE,) 4 MG/3ML solution pen-injector, Inject 1 mg under the skin into the appropriate area as directed 1 (One) Time Per Week., Disp: 9 mL, Rfl: 1    Semaglutide,0.25 or 0.5MG/DOS, (Ozempic, 0.25 or 0.5 MG/DOSE,) 2 MG/3ML solution pen-injector, Inject 0.25 mg under the skin into the appropriate area as directed 1 (One) Time Per Week for 28 days, THEN 0.5 mg 1 (One) Time Per Week for 90 days., Disp: 9 mL, Rfl: 0    Symbicort 160-4.5 MCG/ACT inhaler, Inhale 2 puffs 2 (Two) Times a Day., Disp: , Rfl:     Ventolin  (90 Base) MCG/ACT inhaler, , Disp: , Rfl:  "    Allergies:   No Known Allergies    Objective     Mental Status Exam:   MENTAL STATUS EXAM   General Appearance:  Cleanly groomed and dressed  Eye Contact:  Good eye contact  Attitude:  Cooperative and polite  Motor Activity:  Normal gait, posture  Muscle Strength:  Normal  Speech:  Normal rate, tone, volume  Language:  Spontaneous  Mood and affect:  Other  Other Comment:  Mood reported as \"not feeling too good\" with somewhat anxious but overall engaged and cooperative affect.  Hopelessness:  Optimistic  Loneliness: 3  Thought Process:  Goal-directed and linear  Associations/ Thought Content:  No delusions  Hallucinations:  None  Suicidal Ideations:  Not present  Homicidal Ideation:  Not present  Sensorium:  Alert and clear  Orientation:  Person, place, time and situation  Immediate Recall, Recent, and Remote Memory:  Intact  Attention Span/ Concentration:  Good  Fund of Knowledge:  Appropriate for age and educational level  Intellectual Functioning:  Average range  Insight:  Fair  Judgement:  Fair  Reliability:  Good  Impulse Control:  Good       Assessment / Plan      Visit Diagnosis/Orders Placed This Visit:    ICD-10-CM ICD-9-CM   1. Generalized anxiety disorder  F41.1 300.02   2. Major depressive disorder, recurrent episode, moderate  F33.1 296.32   3. Post traumatic stress disorder (PTSD)  F43.10 309.81        PLAN:  Safety: No acute safety concerns  Risk Assessment: Risk of self-harm acutely is low. Risk of self-harm chronically is also low, but could be further elevated in the event of treatment noncompliance and/or AODA.    Treatment Plan/Goals: Continue supportive psychotherapy efforts and medications as indicated. Treatment and medication options discussed during today's visit. Patient ackowledged and verbally consented to continue with current treatment plan and was educated on the importance of compliance with treatment and follow-up appointments. Patient seems reasonably able to adhere to treatment " plan.      Assisted Patient in processing above session content; acknowledged and normalized patient’s thoughts, feelings, and concerns. Assisted patient in processing recent stressors/emotions/feelings and utilized CBT techniques to assist patient with challenging negative/irrational thoughts and beliefs and replacing them with rational ones while also utilizing Socratic Questioning techniques and open ended questions to encourage reflection. Explored thoughts and emotions around recent health concerns while processing past experiences related to health concerns and collaborating on coping techniques to reduce levels of stress. Discussed the importance of self care and ways to live a healthier lifestyle. Reviewed and updated care plan/treatment plan.      Allowed Patient to freely discuss issues  without interruption or judgement with unconditional positive regard, active listening skills, and empathy. Therapist provided a safe, confidential environment to facilitate the development of a positive therapeutic relationship and encouraged open, honest communication. Assisted Patient in identifying risk factors which would indicate the need for higher level of care including thoughts to harm self or others and/or self-harming behavior and encouraged Patient to contact this office, call 911, or present to the nearest emergency room should any of these events occur. Discussed crisis intervention services and means to access. Patient adamantly and convincingly denies current suicidal or homicidal ideation or perceptual disturbance. Assisted Patient in processing session content; acknowledged and normalized Patient’s thoughts, feelings, and concerns by utilizing a person-centered approach in efforts to build appropriate rapport and a positive therapeutic relationship with open and honest communication. .     Follow Up:   Return in about 2 weeks (around 3/25/2025) for Therapy session.    SONIA Parks  [facility] [place]

## 2025-03-12 ENCOUNTER — OFFICE VISIT (OUTPATIENT)
Dept: CARDIOLOGY | Facility: CLINIC | Age: 55
End: 2025-03-12
Payer: COMMERCIAL

## 2025-03-12 VITALS
HEIGHT: 63 IN | SYSTOLIC BLOOD PRESSURE: 128 MMHG | WEIGHT: 238 LBS | BODY MASS INDEX: 42.17 KG/M2 | DIASTOLIC BLOOD PRESSURE: 80 MMHG | HEART RATE: 100 BPM | RESPIRATION RATE: 18 BRPM | OXYGEN SATURATION: 99 % | TEMPERATURE: 97 F

## 2025-03-12 DIAGNOSIS — R60.0 BILATERAL LEG EDEMA: ICD-10-CM

## 2025-03-12 DIAGNOSIS — G47.33 OBSTRUCTIVE SLEEP APNEA: ICD-10-CM

## 2025-03-12 DIAGNOSIS — R06.02 SHORTNESS OF BREATH: Primary | ICD-10-CM

## 2025-03-12 DIAGNOSIS — E78.5 HYPERLIPIDEMIA LDL GOAL <70: ICD-10-CM

## 2025-03-12 PROCEDURE — 1159F MED LIST DOCD IN RCRD: CPT | Performed by: INTERNAL MEDICINE

## 2025-03-12 PROCEDURE — 99214 OFFICE O/P EST MOD 30 MIN: CPT | Performed by: INTERNAL MEDICINE

## 2025-03-12 PROCEDURE — 1160F RVW MEDS BY RX/DR IN RCRD: CPT | Performed by: INTERNAL MEDICINE

## 2025-03-12 NOTE — PROGRESS NOTES
[facility] [place]  [facility] CARDIOLOGY  [address] [place] [address]  Dept: [phone]  Dept Fax: [phone]    Iliana Gray  1970    Follow Up Office Visit Note    History of Present Illness:  Iliana Gary is a 54 y.o. female who presents to the clinic for Follow-up. Shortness of breath and edema, she is better on lasix 40 mg, echo normal RV normal pulmonary pressure, . And no significant valvular disease, her BNP and troponin were also normal, as she was smoker will get a Lexiscan nuclear ., she does have mild diastolic dysfunction but E/E` was normal    The following portions of the patient's history were reviewed and updated as appropriate: allergies, current medications, past family history, past medical history, past social history, past surgical history, and problem list.    Medications:  albuterol  aspirin  Cariprazine HCl capsule  clobetasol  diazePAM  estradiol  fenofibrate  furosemide  ipratropium  nystatin cream  Ozempic (0.25 or 0.5 MG/DOSE) solution pen-injector  Ozempic (1 MG/DOSE) solution pen-injector  potassium chloride  rosuvastatin  Symbicort aerosol  Ventolin HFA aerosol solution  Vitamin D    Subjective  No Known Allergies     Past Medical History:   Diagnosis Date    Anxiety     Arthritis     Asthma     Diffuse large B cell lymphoma     Sleep apnea     cpap       Past Surgical History:   Procedure Laterality Date    HYSTERECTOMY      TUMOR EXCISION Right 2021    lymph node removal/biopsy/right inner thigh    VENOUS ACCESS DEVICE (PORT) INSERTION Right 2021       History reviewed. No pertinent family history.     Social History     Socioeconomic History    Marital status:    Tobacco Use    Smoking status: Former     Current packs/day: 0.00     Average packs/day: 1 pack/day for 42.0 years (42.0 ttl pk-yrs)     Types: Cigarettes     Start date:      Quit date:      Years since quittin.1     Passive exposure:  "Past    Smokeless tobacco: Never   Vaping Use    Vaping status: Never Used   Substance and Sexual Activity    Alcohol use: Not Currently    Drug use: Never    Sexual activity: Yes     Partners: Male       Review of Systems   Constitutional: Negative.    HENT: Negative.     Respiratory:  Positive for shortness of breath.    Cardiovascular:  Positive for leg swelling.   Endocrine: Negative.    Genitourinary: Negative.    Musculoskeletal: Negative.    Skin: Negative.    Allergic/Immunologic: Negative.    Neurological: Negative.    Hematological: Negative.    Psychiatric/Behavioral: Negative.       Cardiovascular Procedures    ECHO/MUGA:  STRESS TESTS:   CARDIAC CATH:   DEVICES:   HOLTER:   CT/MRI:   VASCULAR:   CARDIOTHORACIC:     Objective  Vitals:    03/12/25 1140   BP: 128/80   BP Location: Right arm   Patient Position: Lying   Cuff Size: Adult   Pulse: 100   Resp: 18   Temp: 97 °F (36.1 °C)   TempSrc: Infrared   SpO2: 99%   Weight: 108 kg (238 lb)   Height: 160 cm (62.99\")   PainSc: 0-No pain     Body mass index is 42.17 kg/m².     Physical Exam  Vitals reviewed.   Constitutional:       Appearance: Healthy appearance. Not in distress.   Neck:      Vascular: No JVR. JVD normal.   Pulmonary:      Effort: Pulmonary effort is normal.      Breath sounds: Normal breath sounds. No wheezing. No rhonchi. No rales.   Chest:      Chest wall: Not tender to palpatation.   Cardiovascular:      PMI at left midclavicular line. Normal rate. Regular rhythm. Normal S1. Normal S2.       Murmurs: There is no murmur.      No gallop.  No click. No rub.   Pulses:     Carotid: 1+ bilaterally.     Radial: 1+ bilaterally.     Femoral: 1+ bilaterally.     Dorsalis pedis: 1+ bilaterally.     Posterior tibial: 1+ bilaterally.  Edema:     Thigh: bilateral 1+ edema of the thigh.     Pretibial: bilateral 1+ edema of the pretibial area.     Ankle: bilateral 1+ edema of the ankle.     Feet: bilateral 1+ edema of the feet.  Abdominal:      General: " Bowel sounds are normal.      Palpations: Abdomen is soft.      Tenderness: There is no abdominal tenderness.   Musculoskeletal: Normal range of motion.         General: No tenderness. Skin:     General: Skin is warm and dry.   Neurological:      General: No focal deficit present.      Mental Status: Alert and oriented to person, place and time.        Diagnostic Data  Procedures    Assessment and Plan  Diagnoses and all orders for this visit:    Shortness of breath- Better on Lasix 40 mg still SOB walking and even resting,, echo normal, BNP and troponin negative, has COPD SELENE , and is overweight, will see a stress nuclear,     Obstructive sleep apnea- On CPAP    Hyperlipidemia LDL goal <70- On Crestor and vascepa     Bilateral leg edema- better on Lasix 40 mg she also has fatty liver          Return in about 3 months (around 6/12/2025).    Ralph Mg MD  03/12/2025

## 2025-03-17 ENCOUNTER — TELEPHONE (OUTPATIENT)
Dept: ONCOLOGY | Facility: CLINIC | Age: 55
End: 2025-03-17
Payer: COMMERCIAL

## 2025-03-17 NOTE — TELEPHONE ENCOUNTER
Caller: Iliana Gray    Relationship to patient: Self    Best call back number: [phone]    Type of visit: F/U     Requested date: CALL PATIENT TO R/S    If rescheduling, when is the original appointment: 3/18

## 2025-03-24 ENCOUNTER — LAB (OUTPATIENT)
Dept: FAMILY MEDICINE CLINIC | Facility: CLINIC | Age: 55
End: 2025-03-24
Payer: COMMERCIAL

## 2025-03-24 DIAGNOSIS — R79.89 ABNORMAL THYROID BLOOD TEST: Primary | ICD-10-CM

## 2025-03-25 ENCOUNTER — OFFICE VISIT (OUTPATIENT)
Dept: ONCOLOGY | Facility: CLINIC | Age: 55
End: 2025-03-25
Payer: COMMERCIAL

## 2025-03-25 ENCOUNTER — OFFICE VISIT (OUTPATIENT)
Dept: BEHAVIORAL HEALTH | Facility: CLINIC | Age: 55
End: 2025-03-25
Payer: COMMERCIAL

## 2025-03-25 ENCOUNTER — RESULTS FOLLOW-UP (OUTPATIENT)
Dept: CARDIOLOGY | Facility: CLINIC | Age: 55
End: 2025-03-25

## 2025-03-25 VITALS
DIASTOLIC BLOOD PRESSURE: 93 MMHG | BODY MASS INDEX: 42.52 KG/M2 | HEART RATE: 110 BPM | WEIGHT: 240 LBS | HEIGHT: 63 IN | RESPIRATION RATE: 18 BRPM | TEMPERATURE: 98.4 F | SYSTOLIC BLOOD PRESSURE: 146 MMHG | OXYGEN SATURATION: 95 %

## 2025-03-25 DIAGNOSIS — F41.1 GENERALIZED ANXIETY DISORDER: Primary | ICD-10-CM

## 2025-03-25 DIAGNOSIS — F43.10 POST TRAUMATIC STRESS DISORDER (PTSD): ICD-10-CM

## 2025-03-25 DIAGNOSIS — Z85.72 HISTORY OF DIFFUSE LARGE B-CELL LYMPHOMA: Primary | ICD-10-CM

## 2025-03-25 DIAGNOSIS — F33.1 MAJOR DEPRESSIVE DISORDER, RECURRENT EPISODE, MODERATE: ICD-10-CM

## 2025-03-25 DIAGNOSIS — F42.2 MIXED OBSESSIONAL THOUGHTS AND ACTS: ICD-10-CM

## 2025-03-25 LAB
CREAT BLDA-MCNC: 0.7 MG/DL (ref 0.6–1.3)
T4 FREE SERPL-MCNC: 0.89 NG/DL (ref 0.82–1.77)

## 2025-03-25 PROCEDURE — 1160F RVW MEDS BY RX/DR IN RCRD: CPT | Performed by: INTERNAL MEDICINE

## 2025-03-25 PROCEDURE — 1159F MED LIST DOCD IN RCRD: CPT | Performed by: INTERNAL MEDICINE

## 2025-03-25 PROCEDURE — 1126F AMNT PAIN NOTED NONE PRSNT: CPT | Performed by: INTERNAL MEDICINE

## 2025-03-25 PROCEDURE — 99214 OFFICE O/P EST MOD 30 MIN: CPT | Performed by: INTERNAL MEDICINE

## 2025-03-25 NOTE — LETTER
March 25, 2025     JULIO Kenyon  [address] [facility] [place] [address]    Patient: Iliana Gray   YOB: 1970   Date of Visit: 3/25/2025     Dear JULIO Kenyon:       Thank you for referring Iliana Gray to me for evaluation. Below are the relevant portions of my assessment and plan of care.    If you have questions, please do not hesitate to call me. I look forward to following Iliana along with you.         Sincerely,        Rodríguez Gerardo MD        CC: MD Eric Cochran MD Andrea Semones, APRBERA Magallanes, [facility]    Rodríguez Gerardo MD  03/25/25 0895  Sign when Signing Visit  CHIEF COMPLAINT: No new somatic complaints    Problem List:  Oncology/Hematology History Overview Note   1.  Right Medial thigh stage IIa nonbulky less than 7.5 cm diffuse large b cell non-hodgkin lymphoma with component of background follicular lymphoma plan for R-CHOP x3 followed by ISRT.  Had CR after 3 courses of R-CHOP x3 ending 6/8/2021 per PET 6/22/2021.    -Completed radiation 8/16/2021 to the right pelvic and inguinofemoral nodes  -Fatigue and rash (eczema per dermatology) starting around August 2022 with negative CT chest abdomen pelvis.  2.  Incidental finding on CT chest 7/2/2024 of 0.4 cm left upper lobe solid pulmonary nodule  -11/5/2024 CT chest showed no acute findings, previously mentioned small left upper lobe micronodule seen on CT 7/2/2024 no longer present.  3.  Hepatic steatosis  4.  History of tobacco abuse, greater than 40-year.  Stop smoking July 2024  5.  Sleep apnea    Oncology history timeline:  -2/18/2021 hemoglobin 12.7 with normal white count platelet count differential.  CMP unremarkable.  Urinalysis with microscopic hematuria 20-30 red blood cells per milliliter.  AP single view chest x-ray emergency room [facility] showed no acute findings.  CT abdomen pelvis with IV contrast emergency room [facility] showed calcified granuloma right lung base, fatty liver  infiltration, subcentimeter hypodensity left hepatic lobe too small to categorize, normal size spleen, no adrenal masses, no pancreatic mass, normal kidneys, and no retroperitoneal adenopathy or ascites or pneumoperitoneum.  There was a smooth marginated homogeneous enhancing 4.5 x 5 x 3.3 cm mass in the superficial soft tissues of the anterior upper right thigh.  -2/25/2021 office note from Dr. Gong mentions presentation to emergency room 2/18/2021 complaining of right upper thigh swelling for the preceding week of 2/12/2021.  CT scan abdomen pelvis to include the thighs performed at [facility] emergency room revealing 5 cm smoothly marginated right upper thigh mass below the groin crease suspicious for pathologic lymph node but could be neoplastic of other nature given no prodromal illness, infections, or inflammation.  Recommendation for biopsy was made.  Per Dr. Gong note, she had been sick for the better part of the winter and had been urgent treatment centers told that she had a viral infection flu and coronavirus negative with fevers and chills that subsequently resolved but for fatigue that did not resolve with hypersomnolence as well.  Says she feels hot all the time but no ronny night sweats.  -3/12/2021 right thigh mass needle core tissue sample extremely small and predominantly crushed limiting adequate evaluation.  Differential diagnosis for this B-cell lymphoma is diffuse large B cell, follicular lymphoma versus other.  Further differentiation could not be performed on this crush small sample.  -3/25/2021 [facility] hematology oncology initial consultation: I, Rodríguez Gerardo, saw for the first time today.  Reviewed the above story with her.  Still fatigued without night sweats or unexplained weight loss.  She has this mass in the soft tissue medial right thigh about 5 cm in size that does seem to move somewhat beneath the skin but also to my exam feels like it is attached to the deeper  structures of the thigh as well.  I feel no other adenopathy or hepatosplenomegaly.  I will get a PET scan.  I spoken with Dr. Gong who will look at these images again with the CTs in [place].  If there is clear indication that this is not coming from soft tissue nonnodal structures, then excisional biopsy for adequate tissue diagnosis is important to determine the type of therapy.  This is growing since her biopsy 3/12/2021 but not daily like a very high-grade lymphoma.  To my hand, this does not feel like a typical stas structure and my other concern is, even though this does not venessa with any markers to suggest sarcoma or the like, is to be sure that this is not something other than lymphoma and I have spoken with Dr. Pino who will do desmin stains and other markers before doing an excisional biopsy next Wednesday with Dr. Gong.  If those additional stains suggest sarcomatoid features that can appear in the medial aspect of the thigh such as this, then she probably needs to have excision by Humberto Vera orthopedic oncologist at .  In the meantime, presuming this to be some form of lymphoma which is the highest likelihood based on the pathology from Dr. Pino where this is CD20 positive, CD10 positive, BCL6 40% positive, MUM1 40% positive, CD23 strong and diffuse positive, and BCL-2 positive.  Ki-67 is 50%.  CD30 and only 5%.  C-Myc is weakly expressed in 10% of cells.  BRIAN by TAYLER is negative.  Cyclin D1 negative.  CD5 indeterminate for B and T-cell coexpression.  LEF 1 presumably staining T cells.  Ultimate decision for treatment depends on the specific pathologic subtype and the staging and I will get her prognostic labs going as well.  Given the relatively initial high-grade features of this that might require Adriamycin I will also proceed with echocardiogram.  -3/25/2021 CBC normal.  CMP unremarkable.  Sedimentation rate normal 27 with C-reactive protein elevated 1.7 upper limit of  normal 0.5.  Beta-2 microglobulin normal 1.6.  Uric acid normal 4.3.  LDH normal 162.  Hepatitis B and C serologies negative.  -3/29/2021 echocardiogram ejection fraction 61 to 65%.  -3/31/21 excisional node biopsy shows diffuse large B cell non-Hodgkin lymphoma  -4/2/2021 PET shows postsurgical changes right proximal thigh and superficial inguinal region adjacent to adenopathy with SUV 4.0.  Right internal iliac lymph node mildly enlarged SUV 2.6 additionally noted.  No other adenopathy outside this region.  No mention of any abnormal marrow signal.  -4/5/2021 [facility] medical oncology follow-up visit: Per Dr. Arun Garcia pathologist, this is a diffuse large B cell non-Hodgkin lymphoma.  He is not sure whether this is double hit or not yet.  He is not sure if there is a background of follicular lymphoma and hence cannot say whether this is transformed.  Clinically her IPI score is 0 with her age less than 60, normal LDH, ECOG 0, no extranodal involvement on PET, and only stage II at worse per PET with all nodes within the same region.  I will get a bone marrow biopsy.  If this is stage IV, double hit, or has evidence of transition from follicular lymphoma into diffuse large B-cell lymphoma, then I would give R-CHOP x6.  If the marrow is not involved, this is not double hit, and there is no evidence of follicular transformation into diffuse large B-cell lymphoma, then I would treat this as a stage II nonbulky diffuse large B-cell lymphoma and consider R-CHOP x3 followed by involved site radiation therapy.  I reviewed her PET and biopsy reports and echocardiogram which shows good ejection fraction.  She will need to get her [employer] Covid vaccine this week, family doctor established for the possibility of prednisone-induced diabetes, CT-guided bone marrow biopsy, chemo preparation visit, and a port placement with Dr. Gong.  We will present her at multidisciplinary tumor board this Friday.  I went into  detail regarding the side effects of R-CHOP but she will have these reviewed at her chemo preparation visit.  Though not bulky, I still would treat her with allopurinol.  First course of therapy will occur in our [place] office and if all goes well then the subsequent Rituxan doses can be faster and performed in [place] office.    -4/14/2021 pathology update Dr. Garcia: Node pathology negative for double hit by FISH    -4/21/2021 bone marrow biopsy negative for lymphomatous involvement with scant stainable iron.      -4/26/2021 [facility] medical oncology follow-up visit: Given lack of marrow involvement, negative for double hit, and possible background follicular lymphoma , I will treat her as stage IIa nonbulky (original tumor 5 cm on CT, I.  E.  Less than 7.5 cm) diffuse large B-cell lymphoma right proximal thigh SUV 4.0 mass incisionally biopsied (with palpable residua) with mild right internal iliac node enlargement SUV 2.6 as well.  We will treat her with planned R-CHOP x3 followed by involved site radiation therapy.  This was consensus of multidisciplinary tumor board 4/16/2021 as well.  First course [place].  Second course will occur in [place].  With the small component of background follicular lymphoma, there is potential risk of not only relapse of diffuse large B-cell lymphoma but later relapse from follicular lymphoma and we will keep that in mind in terms of following her up past the usual 3 years post definitive therapy presuming we get a complete remission on subsequent imaging and exams.  We will arrange radiation in [place] and I have made referral.  This will not start until after chemotherapy.    -5/13/2021 radiation oncology consultation Dr. Jl Infante for preparation of future involved field radiation therapy    -5/18/2021 [facility] oncology clinic follow-up: Had moderate amount of nausea after her first cycle, will add Zyprexa to Zofran and Compazine that she is already taking. She was  also anxious, added Ativan to her care plan premeds. Prescription for Augmentin sent in for sinus infection. Clinically good response with reduction of right medial thigh mass. We will continue treatment in [place] due to mild infusion reaction with first Rituxan but she was able to complete treatment with additional medications. Today is cycle #2 of a planned 3 courses and then will repeat restaging scan to assess for response and proceed to radiation as outlined above.    -6/8/2021 course #3 Rituxan CHOP    -6/22/2021 PET shows resolution of right inguinal and iliac adenopathy.  Negative PET/CT.    -6/28/2021 [facility] medical oncology follow-up visit: I reviewed images and reports of PET as above.  Status post Rituxan CHOP, PET is essentially negative with complete clinical remission.With the small component of background follicular lymphoma, there is potential risk of not only relapse of diffuse large B-cell lymphoma but later relapse from follicular lymphoma and we will keep that in mind in terms of following her up past the usual 3 years post definitive therapy presuming we get a complete remission on subsequent imaging and exams.  We will arrange radiation in [place] and Dr. Infante saw in mid May.  I called Dr. Infante to get her in to start involved field radiation and we will repeat PET and labs prior to return in 3 months for survivorship visit with my nurse practitioner who will lay out the follow-up according to NCCN guidelines.  Other than for modest anemia hemoglobin in the tens and glucose 150s, no other remarkable findings on CBC and CMP 6/8/2021.    -6/8/2021 completed course #3 of R-CHOP with resolution of right inguinal and iliac adenopathy on 6/22/2021 PET    -8/16/2021 completed 36 Gray in 18 fractions involved field right pelvic/inguinal femoral nodes Dr. Infante    -10/21/2021 [facility] [place] Oncology clinic follow-up/survivorship visit: PET scan from 10/14/2021 was negative along with normal CBC  and CMP.  She completed radiation in August without complications.  We will get her back to Dr. Gong for port removal.  We will continue surveillance per NCCN guidelines as follows:  -H&P and labs every 3-6 months for 5 years and then annually or as clinically indicated.    -We will do surveillance imaging post completion of treatment with CT chest, abdomen and pelvis with contrast every 6 months for 2 years and then annually or as clinically indicated.    -1/28/2022 CT chest abdomen pelvis with contrast shows no adenopathy chest abdomen pelvis and no acute process.  Hepatic steatosis with sigmoid diverticulosis.  Hemoglobin 13.6 with normal CBC and differential.  CMP entirely normal with creatinine 0.65.    -2/1/2022 [facility] oncology follow-up visit: I reviewed images and reports of scans as outlined above as well as labs.  No evidence of recurrence.  As outlined by my nurse practitioner survivorship visit, she will get an H&P every 3 to 6 months out to April 2026 and then annually thereafter and we will do surveillance CT chest abdomen pelvis every 6 months until April 2023 and then annually as clinically indicated.  As I have stated previously, we would want to continue to follow her clinically past the usual 2 years of radiographic follow-up for signs or symptoms of follicular lymphoma as there was a small background follicular lymphomatous component that could recur late.  She will see my nurse practitioner back in 6 months with CTs just prior to return.  No further CBC or CMP in the absence of signs or symptoms given normalization of her counts.    -7/28/2022 CT chest, abdomen and pelvis with no evidence of lymphadenopathy in the chest, abdomen or pelvis, diffuse hepatic steatosis, sigmoid diverticulosis.    - 8/4/2022 [facility] clinic follow-up: Iliana has been feeling poorly since treatment ended a year ago for her history of lymphoma.  Current CT chest, abdomen and pelvis show no evidence  of disease recurrence.  Her symptoms unfortunately have not improved since treatment ended and she is having more bad days than good.  She has a rash on the dorsum of her hands and foot that appear to be possibly eczema versus psoriasis.  We will get her to dermatology for further evaluation, I have asked her to call her insurance company to let us know the name of a provider that she would like to see and then we can put in a referral.  She has ongoing fatigue that has not improved with time, she is unable to do most activities without having to rest for the remainder of the day afterwards.  She also has nausea and loose stool with urgency.  I will get her to gastroenterology for an EGD and colonoscopy to rule out any GI involvement of her lymphoma as it did have follicular component.  I will also get labs today with CBC, CMP, we will also check HARRIS in light of her rash, will check inflammatory markers with sedimentation rate and CRP and an LDH.  We will see her back in a few months to go over her labs and GI evaluation.  She does have a history of hepatic steatosis shown on all prior CTs therefore would benefit from GI evaluation regarding that also, has had normal LFTs on prior labs.      - 8/4/2022 data: CMP unremarkable.  LDH normal 174.  CRP mildly elevated 1.5 upper limit 0.9.  Sedimentation rate normal 9.  CBC normal with normal differential.  HARRIS negative    -9/19/2022 EGD and colonoscopy [facility] random colon biopsy negative.  Transverse colon polyp sessile serrated adenoma.  Duodenal biopsy with normal architecture.  Stomach biopsy mild chronic inactive gastritis with no dysplasia or metaplasia or H. pylori.  There was a lot of stool in the entire colon interfering with visualization.  Internal hemorrhoids.  EGD showed no gross lesions.    -9/27/2022 [facility] medical oncology follow-up: No clear-cut evidence of lymphoma on CTs or EGD and colonoscopy and labs are unremarkable and negative HARRIS.  Her  dermatologist told her she had eczema.  Nonetheless she is still periodically so fatigued that she ends up going to the bed for 2 or 3 days and then rebounds and feels normal for a while and then this waxing and waning of performance status and fatigue continues.  We will get a PET through her thighs as that was the original site of her disease.  I will also have her get EBV and CMV and HIV and thyroid functions.    -10/7/2022 whole-body PET is essentially negative.  There is a solitary borderline focus SUV 2.8 near the left lung apex without CT correlate favored to be infectious/inflammatory change.  No hypermetabolism in particular of the lower extremities where her original disease resided.    -10/11/2022 [facility] medical oncology follow-up: States she had COVID September 24 which was before I last saw her but did not know that when last I saw her.  She did not get her EBV, CMV, HIV, thyroid functions but says she is now getting over the COVID as well as some sinusitis and she thinks that is the fatigue.  Her PET was entirely negative..  We will continue to check her whole-body PET until April 2023 which is when we will see her back and then we will do that annually only as clinically indicated on annual H&P.    -10/27/2022 CBC normal with normal differential and unremarkable CMP, B12, folate, and normal thyroid function. HIV nonreactive.  CMV DNA quantitation negative.  EBV quantitation PCR negative and EBV antibody profile shows negative VCA IgM with increased IgG VCA of 122 and nuclear antigen IgG 68.8 commensurate with prior infection.  C-reactive protein 21.  C4 complement normal.  C3 complement slightly elevated to 27 upper limit 167.  ANCA panel negative.  HARRIS negative.  CCP negative.  Rheumatoid factor not drawn.  Sedimentation rate normal 24.  Anti-SSA and SSB negative.      -12/1/2022 Platelets minimally elevated 384,000 with otherwise unremarkable CBC and CMP.  C-reactive protein elevated 13.1.   Sedimentation rate 30 barely elevated.  Rheumatoid factor negative.  Moderate external hemoglobin and urine.  4-10 red cells per high-power field.    -4/11/2023 CT chest abdomen pelvis Showed no evidence of adenopathy or splenomegaly.  Atherosclerotic changes.  Hepatic steatosis with small liver cyst.    -4/18/2023 [facility] hematology oncology follow-up: No radiographic evidence of recurrence.  Viral studies all negative.  CBC unremarkable.  No significant inflammatory markers and LDH normal. As outlined in her survivorship visit, she has completed routine surveillance CTs per NCCN guidelines and at this point would only image as clinically indicated.  We will continue H&P every 6 months out till April 2026.  I would probably continue to follow her clinically out past April 2026 for signs or symptoms of late recurrences there was a component of follicular lymphoma within the higher grade lymphoma and late recurrences may occur but at this point no further labs or scans in the absence of symptoms.    -8/23/2023 CBC And CMP ordered by primary care unremarkable    -10/24/2023 [facility] hematology oncology follow-up: No new somatic complaints to suggest recurrence.  No palpable cervical or axillary or inguinal adenopathy.  I reviewed labs ordered by primary care in August which were unremarkable.  I will have her see my nurse practitioner every 6 months and a survivorship mode until April 2026 at least.  At that point, given that there was a component of follicular lymphoma within the higher grade lymphoma there is a slightly higher risk of late relapse and I would consider continuing H&P surveillance with oncology every 6 to 12 months after April 2026.  No plans for scans or labs in the absence of symptoms.    -7/2/2024 patient presented to [facility] with shortness of breath and history of recent pneumonia, CT chest showed no acute thoracic process, incidental finding of 0.4 cm left upper lobe  solid pulmonary nodule which is indeterminate.  Hepatic steatosis.    -10/30/2024 [facility] oncology clinic follow-up: Iliana overall is doing well, she has no lymphadenopathy or symptoms concerning for recurrent lymphoma.  Her CBC and CMP from 9/3/2024 were unremarkable.  I did review CT chest without contrast that was performed on 7/2/2024 ER visit to [facility] that showed 0.4 cm left upper lobe solid pulmonary nodule, I reviewed previous CT scans performed at [facility] for the past few years and could find no mention of left upper lobe nodule.  I will repeat her CT chest now with contrast for follow-up.  If the nodule is stable then I will get her to our lung nodule clinic for further recommendations.  At a minimum she would be eligible for annual low-dose CT chest for screening in light of her history of tobacco abuse.  I did congratulate her on her smoking cessation, she stopped smoking about 3 months ago.  I will see her back in 1 to 2 weeks to go over her CT chest and further plan of care.    -11/5/2024 CT chest with contrast shows no acute or suspicious findings.  Tiny questionable subsolid micronodule in the left upper lobe seen on prior outside CT 7/2/2024 is no longer present.  Sequelae of healed granulomatous disease within the chest, no lymphadenopathy.    -11/7/2024 [facility] hematology oncology APRN telemedicine follow-up:On repeat CT chest there are no acute findings, the tiny questionable micronodule seen previously in July is no longer present.  Iliana was happy to hear this news.  She remains tobacco free.  We discussed that she would need to continue annual low-dose chest CT screening in light of her history of tobacco abuse.  I will plan on seeing her back in 6 months for follow-up for surveillance in light of her history of lymphoma.       -1/14/2025 CBC and CMP unremarkable save for glucose 139.    -3/10/2025 CT chest abdomen pelvis negative for metastasis.   Calcified granuloma right lower lobe.    -3/25/2025 [facility] hematology oncology follow-up: No evidence of recurrence of lymphoma or of lung primary on current CT and blood counts in January unremarkable.As stated in my note of October 2023, she will continue to see us every 6 months for an H&P through April 2026 and given the component of follicular lymphoma within the high-grade lymphoma I would continue to watch her every 6 to 12 months after that with an H&P with no plans for scans in the absence of symptoms.  I will ask her to see my nurse practitioner back in 6 months for H&P and to get her set up for screening and annual low-dose CT chest in light of her smoking history which she has now stopped.  She will follow-up with my nurse practitioner into the future.     Diffuse large B-cell lymphoma of lymph nodes of inguinal region (Resolved)   3/29/2021 -  Other Event    Echocardiogram  Left ventricular ejection fraction appears to be 61 - 65%. Left ventricular systolic function is normal.  Normal global longitudinal strain at -23.5%.  Left ventricular diastolic function was normal.  No significant structural or functional valvular disease.     4/2/2021 Imaging    PET/CT IMPRESSION:  Postsurgical changes right proximal thigh and superficial  inguinal region adjacent to adenopathy with abnormal hypermetabolism  maximum SUV 4.0. Right internal iliac lymph node is mildly enlarged with  maximum SUV 2.6 additionally noted. No soft tissue mass or adenopathy  outside of this region.     4/21/2021 Biopsy    Marrow biopsy negative     4/26/2021 - 6/28/2021 Chemotherapy    Completed course #3 6/8/2021  OP LYMPHOMA R-CHOP RiTUXimab / Cyclophosphamide / DOXOrubicin / VinCRIStine / PredniSONE     4/26/2021 Cancer Staged    Staging form: Hodgkin And Non-Hodgkin Lymphoma, AJCC 8th Edition  - Clinical: Stage II (Diffuse large B-cell lymphoma) - Signed by Rodríguez Gerardo MD on 4/26/2021 6/22/2021 Imaging     PET shows resolution  of right inguinal and iliac adenopathy.  Negative PET/CT.     7/22/2021 - 8/16/2021 Radiation    Radiation OncologyTreatment Course:  Iliana Gray received 36 cGy in 18 fractions to right pelvic and inguinofemoral nodes (involved field) under the care of Dr. Jl Infante.     10/14/2021 Imaging    PET/CT: Stable negative PET/CT with no new hypermetabolic adenopathy to suggest lymphomatous disease progression.     1/28/2022 Imaging    CT chest abdomen pelvis with contrast shows no adenopathy chest abdomen pelvis and no acute process.  Hepatic steatosis with sigmoid diverticulosis.  Hemoglobin 13.6 with normal CBC and differential.  CMP entirely normal with creatinine 0.65     7/28/2022 Imaging    CT chest, abdomen and pelvis:  1. No evidence of lymphadenopathy in the chest abdomen or pelvis.  2. Diffuse hepatic steatosis.  3. Sigmoid diverticulosis.  4. Hysterectomy  5. Postoperative changes of prior right inguinal lymph node resection.     4/11/2023 Imaging    CT chest abdomen pelvis Showed no evidence of adenopathy or splenomegaly.  Atherosclerotic changes.  Hepatic steatosis with small liver cyst.         HISTORY OF PRESENT ILLNESS:  The patient is a 54 y.o. female, here for follow up on management of history of lymphoma and lung nodule    Past Medical History:   Diagnosis Date   • Anxiety    • Arthritis    • Asthma    • Diffuse large B cell lymphoma    • Sleep apnea     cpap     Past Surgical History:   Procedure Laterality Date   • HYSTERECTOMY     • TUMOR EXCISION Right 04/2021    lymph node removal/biopsy/right inner thigh   • VENOUS ACCESS DEVICE (PORT) INSERTION Right 04/2021       No Known Allergies    Family History and Social History reviewed and changed as necessary    REVIEW OF SYSTEM:   No new somatic complaints.  No B symptoms    PHYSICAL EXAM:  No jaundice icterus or pallor.  No respiratory distress.    Vitals:    03/25/25 0824   BP: 146/93   Pulse: 110   Resp: 18   Temp: 98.4 °F (36.9 °C)  "  SpO2: 95%   Weight: 109 kg (240 lb)   Height: 160 cm (63\")     Vitals:    03/25/25 0824   PainSc: 0-No pain          ECOG score: 0           Vitals reviewed.    ECOG: (0) Fully Active - Able to Carry On All Pre-disease Performance Without Restriction    Lab Results   Component Value Date    HGB 12.0 01/14/2025    HCT 36.4 01/14/2025    MCV 89 01/14/2025     01/14/2025    WBC 6.9 01/14/2025    NEUTROABS 3.6 01/14/2025    LYMPHSABS 2.5 01/14/2025    MONOSABS 0.5 01/14/2025    EOSABS 0.2 01/14/2025    BASOSABS 0.0 01/14/2025       Lab Results   Component Value Date    GLUCOSE 127 (H) 02/24/2025    BUN 12 02/24/2025    CREATININE 0.64 02/24/2025     02/24/2025    K 4.7 02/24/2025     02/24/2025    CO2 24 02/24/2025    CALCIUM 9.2 02/24/2025    PROTEINTOT 6.8 01/14/2025    ALBUMIN 4.2 01/14/2025    BILITOT 0.2 01/14/2025    ALKPHOS 73 01/14/2025    AST 25 01/14/2025    ALT 24 01/14/2025             ASSESSMENT & PLAN:  1.  Right Medial thigh stage IIa nonbulky less than 7.5 cm diffuse large b cell non-hodgkin lymphoma with component of background follicular lymphoma plan for R-CHOP x3 followed by ISRT.  Had CR after 3 courses of R-CHOP x3 ending 6/8/2021 per PET 6/22/2021.    -Completed radiation 8/16/2021 to the right pelvic and inguinofemoral nodes  -Fatigue and rash (eczema per dermatology) starting around August 2022 with negative CT chest abdomen pelvis.  2.  Incidental finding on CT chest 7/2/2024 of 0.4 cm left upper lobe solid pulmonary nodule  -11/5/2024 CT chest showed no acute findings, previously mentioned small left upper lobe micronodule seen on CT 7/2/2024 no longer present.  3.  Hepatic steatosis  4.  History of tobacco abuse, greater than 40-year.  Stop smoking July 2024  5.  Sleep apnea    Oncology history timeline:  -2/18/2021 hemoglobin 12.7 with normal white count platelet count differential.  CMP unremarkable.  Urinalysis with microscopic hematuria 20-30 red blood cells per " milliliter.  AP single view chest x-ray emergency room [facility] showed no acute findings.  CT abdomen pelvis with IV contrast emergency room [facility] showed calcified granuloma right lung base, fatty liver infiltration, subcentimeter hypodensity left hepatic lobe too small to categorize, normal size spleen, no adrenal masses, no pancreatic mass, normal kidneys, and no retroperitoneal adenopathy or ascites or pneumoperitoneum.  There was a smooth marginated homogeneous enhancing 4.5 x 5 x 3.3 cm mass in the superficial soft tissues of the anterior upper right thigh.  -2/25/2021 office note from Dr. Gong mentions presentation to emergency room 2/18/2021 complaining of right upper thigh swelling for the preceding week of 2/12/2021.  CT scan abdomen pelvis to include the thighs performed at [facility] emergency room revealing 5 cm smoothly marginated right upper thigh mass below the groin crease suspicious for pathologic lymph node but could be neoplastic of other nature given no prodromal illness, infections, or inflammation.  Recommendation for biopsy was made.  Per Dr. Gong note, she had been sick for the better part of the winter and had been urgent treatment centers told that she had a viral infection flu and coronavirus negative with fevers and chills that subsequently resolved but for fatigue that did not resolve with hypersomnolence as well.  Says she feels hot all the time but no ronny night sweats.  -3/12/2021 right thigh mass needle core tissue sample extremely small and predominantly crushed limiting adequate evaluation.  Differential diagnosis for this B-cell lymphoma is diffuse large B cell, follicular lymphoma versus other.  Further differentiation could not be performed on this crush small sample.  -3/25/2021 [facility] initial consultation: I, Rodríguez Gerardo, saw for the first time today.  Reviewed the above story with her.  Still fatigued without night sweats or  unexplained weight loss.  She has this mass in the soft tissue medial right thigh about 5 cm in size that does seem to move somewhat beneath the skin but also to my exam feels like it is attached to the deeper structures of the thigh as well.  I feel no other adenopathy or hepatosplenomegaly.  I will get a PET scan.  I spoken with Dr. Gong who will look at these images again with the CTs in [place].  If there is clear indication that this is not coming from soft tissue nonnodal structures, then excisional biopsy for adequate tissue diagnosis is important to determine the type of therapy.  This is growing since her biopsy 3/12/2021 but not daily like a very high-grade lymphoma.  To my hand, this does not feel like a typical stas structure and my other concern is, even though this does not venessa with any markers to suggest sarcoma or the like, is to be sure that this is not something other than lymphoma and I have spoken with Dr. Pino who will do desmin stains and other markers before doing an excisional biopsy next Wednesday with Dr. Gong.  If those additional stains suggest sarcomatoid features that can appear in the medial aspect of the thigh such as this, then she probably needs to have excision by Humberto Vera orthopedic oncologist at .  In the meantime, presuming this to be some form of lymphoma which is the highest likelihood based on the pathology from Dr. Pino where this is CD20 positive, CD10 positive, BCL6 40% positive, MUM1 40% positive, CD23 strong and diffuse positive, and BCL-2 positive.  Ki-67 is 50%.  CD30 and only 5%.  C-Myc is weakly expressed in 10% of cells.  BRIAN by TAYLER is negative.  Cyclin D1 negative.  CD5 indeterminate for B and T-cell coexpression.  LEF 1 presumably staining T cells.  Ultimate decision for treatment depends on the specific pathologic subtype and the staging and I will get her prognostic labs going as well.  Given the relatively initial high-grade features  of this that might require Adriamycin I will also proceed with echocardiogram.  -3/25/2021 CBC normal.  CMP unremarkable.  Sedimentation rate normal 27 with C-reactive protein elevated 1.7 upper limit of normal 0.5.  Beta-2 microglobulin normal 1.6.  Uric acid normal 4.3.  LDH normal 162.  Hepatitis B and C serologies negative.  -3/29/2021 echocardiogram ejection fraction 61 to 65%.  -3/31/21 excisional node biopsy shows diffuse large B cell non-Hodgkin lymphoma  -4/2/2021 PET shows postsurgical changes right proximal thigh and superficial inguinal region adjacent to adenopathy with SUV 4.0.  Right internal iliac lymph node mildly enlarged SUV 2.6 additionally noted.  No other adenopathy outside this region.  No mention of any abnormal marrow signal.  -4/5/2021 [facility] medical oncology follow-up visit: Per Dr. Arun Garcia pathologist, this is a diffuse large B cell non-Hodgkin lymphoma.  He is not sure whether this is double hit or not yet.  He is not sure if there is a background of follicular lymphoma and hence cannot say whether this is transformed.  Clinically her IPI score is 0 with her age less than 60, normal LDH, ECOG 0, no extranodal involvement on PET, and only stage II at worse per PET with all nodes within the same region.  I will get a bone marrow biopsy.  If this is stage IV, double hit, or has evidence of transition from follicular lymphoma into diffuse large B-cell lymphoma, then I would give R-CHOP x6.  If the marrow is not involved, this is not double hit, and there is no evidence of follicular transformation into diffuse large B-cell lymphoma, then I would treat this as a stage II nonbulky diffuse large B-cell lymphoma and consider R-CHOP x3 followed by involved site radiation therapy.  I reviewed her PET and biopsy reports and echocardiogram which shows good ejection fraction.  She will need to get her [employer] Covid vaccine this week, family doctor established for the possibility of  prednisone-induced diabetes, CT-guided bone marrow biopsy, chemo preparation visit, and a port placement with Dr. Gong.  We will present her at multidisciplinary tumor board this Friday.  I went into detail regarding the side effects of R-CHOP but she will have these reviewed at her chemo preparation visit.  Though not bulky, I still would treat her with allopurinol.  First course of therapy will occur in our [place] office and if all goes well then the subsequent Rituxan doses can be faster and performed in [place] office.    -4/14/2021 pathology update Dr. Garcia: Node pathology negative for double hit by FISH    -4/21/2021 bone marrow biopsy negative for lymphomatous involvement with scant stainable iron.      -4/26/2021 [facility] medical oncology follow-up visit: Given lack of marrow involvement, negative for double hit, and possible background follicular lymphoma , I will treat her as stage IIa nonbulky (original tumor 5 cm on CT, I.  E.  Less than 7.5 cm) diffuse large B-cell lymphoma right proximal thigh SUV 4.0 mass incisionally biopsied (with palpable residua) with mild right internal iliac node enlargement SUV 2.6 as well.  We will treat her with planned R-CHOP x3 followed by involved site radiation therapy.  This was consensus of multidisciplinary tumor board 4/16/2021 as well.  First course [place].  Second course will occur in [place].  With the small component of background follicular lymphoma, there is potential risk of not only relapse of diffuse large B-cell lymphoma but later relapse from follicular lymphoma and we will keep that in mind in terms of following her up past the usual 3 years post definitive therapy presuming we get a complete remission on subsequent imaging and exams.  We will arrange radiation in [place] and I have made referral.  This will not start until after chemotherapy.    -5/13/2021 radiation oncology consultation Dr. Jl Infante for preparation of future involved  field radiation therapy    -5/18/2021 [facility] oncology clinic follow-up: Had moderate amount of nausea after her first cycle, will add Zyprexa to Zofran and Compazine that she is already taking. She was also anxious, added Ativan to her care plan premeds. Prescription for Augmentin sent in for sinus infection. Clinically good response with reduction of right medial thigh mass. We will continue treatment in [place] due to mild infusion reaction with first Rituxan but she was able to complete treatment with additional medications. Today is cycle #2 of a planned 3 courses and then will repeat restaging scan to assess for response and proceed to radiation as outlined above.    -6/8/2021 course #3 Rituxan CHOP    -6/22/2021 PET shows resolution of right inguinal and iliac adenopathy.  Negative PET/CT.    -6/28/2021 [facility] medical oncology follow-up visit: I reviewed images and reports of PET as above.  Status post Rituxan CHOP, PET is essentially negative with complete clinical remission.With the small component of background follicular lymphoma, there is potential risk of not only relapse of diffuse large B-cell lymphoma but later relapse from follicular lymphoma and we will keep that in mind in terms of following her up past the usual 3 years post definitive therapy presuming we get a complete remission on subsequent imaging and exams.  We will arrange radiation in [place] and Dr. Infante saw in mid May.  I called Dr. Infante to get her in to start involved field radiation and we will repeat PET and labs prior to return in 3 months for survivorship visit with my nurse practitioner who will lay out the follow-up according to NCCN guidelines.  Other than for modest anemia hemoglobin in the tens and glucose 150s, no other remarkable findings on CBC and CMP 6/8/2021.    -6/8/2021 completed course #3 of R-CHOP with resolution of right inguinal and iliac adenopathy on 6/22/2021 PET    -8/16/2021 completed 36 Naik in 18  fractions involved field right pelvic/inguinal femoral nodes Dr. Infante    -10/21/2021 [facility] Oncology clinic follow-up/survivorship visit: PET scan from 10/14/2021 was negative along with normal CBC and CMP.  She completed radiation in August without complications.  We will get her back to Dr. Gong for port removal.  We will continue surveillance per NCCN guidelines as follows:  -H&P and labs every 3-6 months for 5 years and then annually or as clinically indicated.    -We will do surveillance imaging post completion of treatment with CT chest, abdomen and pelvis with contrast every 6 months for 2 years and then annually or as clinically indicated.    -1/28/2022 CT chest abdomen pelvis with contrast shows no adenopathy chest abdomen pelvis and no acute process.  Hepatic steatosis with sigmoid diverticulosis.  Hemoglobin 13.6 with normal CBC and differential.  CMP entirely normal with creatinine 0.65.    -2/1/2022 [facility] medical oncology follow-up visit: I reviewed images and reports of scans as outlined above as well as labs.  No evidence of recurrence.  As outlined by my nurse practitioner survivorship visit, she will get an H&P every 3 to 6 months out to April 2026 and then annually thereafter and we will do surveillance CT chest abdomen pelvis every 6 months until April 2023 and then annually as clinically indicated.  As I have stated previously, we would want to continue to follow her clinically past the usual 2 years of radiographic follow-up for signs or symptoms of follicular lymphoma as there was a small background follicular lymphomatous component that could recur late.  She will see my nurse practitioner back in 6 months with CTs just prior to return.  No further CBC or CMP in the absence of signs or symptoms given normalization of her counts.    -7/28/2022 CT chest, abdomen and pelvis with no evidence of lymphadenopathy in the chest, abdomen or pelvis, diffuse hepatic steatosis, sigmoid  diverticulosis.    - 8/4/2022 [facility] Oncology clinic follow-up: Iliana has been feeling poorly since treatment ended a year ago for her history of lymphoma.  Current CT chest, abdomen and pelvis show no evidence of disease recurrence.  Her symptoms unfortunately have not improved since treatment ended and she is having more bad days than good.  She has a rash on the dorsum of her hands and foot that appear to be possibly eczema versus psoriasis.  We will get her to dermatology for further evaluation, I have asked her to call her insurance company to let us know the name of a provider that she would like to see and then we can put in a referral.  She has ongoing fatigue that has not improved with time, she is unable to do most activities without having to rest for the remainder of the day afterwards.  She also has nausea and loose stool with urgency.  I will get her to gastroenterology for an EGD and colonoscopy to rule out any GI involvement of her lymphoma as it did have follicular component.  I will also get labs today with CBC, CMP, we will also check HARRIS in light of her rash, will check inflammatory markers with sedimentation rate and CRP and an LDH.  We will see her back in a few months to go over her labs and GI evaluation.  She does have a history of hepatic steatosis shown on all prior CTs therefore would benefit from GI evaluation regarding that also, has had normal LFTs on prior labs.      - 8/4/2022 data: CMP unremarkable.  LDH normal 174.  CRP mildly elevated 1.5 upper limit 0.9.  Sedimentation rate normal 9.  CBC normal with normal differential.  HARRIS negative    -9/19/2022 EGD and colonoscopy Mani Cristofer random colon biopsy negative.  Transverse colon polyp sessile serrated adenoma.  Duodenal biopsy with normal architecture.  Stomach biopsy mild chronic inactive gastritis with no dysplasia or metaplasia or H. pylori.  There was a lot of stool in the entire colon interfering with visualization.   Internal hemorrhoids.  EGD showed no gross lesions.    -9/27/2022 [facility] medical oncology follow-up: No clear-cut evidence of lymphoma on CTs or EGD and colonoscopy and labs are unremarkable and negative HARRIS.  Her dermatologist told her she had eczema.  Nonetheless she is still periodically so fatigued that she ends up going to the bed for 2 or 3 days and then rebounds and feels normal for a while and then this waxing and waning of performance status and fatigue continues.  We will get a PET through her thighs as that was the original site of her disease.  I will also have her get EBV and CMV and HIV and thyroid functions.    -10/7/2022 whole-body PET is essentially negative.  There is a solitary borderline focus SUV 2.8 near the left lung apex without CT correlate favored to be infectious/inflammatory change.  No hypermetabolism in particular of the lower extremities where her original disease resided.    -10/11/2022 [facility] medical oncology follow-up: States she had COVID September 24 which was before I last saw her but did not know that when last I saw her.  She did not get her EBV, CMV, HIV, thyroid functions but says she is now getting over the COVID as well as some sinusitis and she thinks that is the fatigue.  Her PET was entirely negative..  We will continue to check her whole-body PET until April 2023 which is when we will see her back and then we will do that annually only as clinically indicated on annual H&P.    -10/27/2022 CBC normal with normal differential and unremarkable CMP, B12, folate, and normal thyroid function. HIV nonreactive.  CMV DNA quantitation negative.  EBV quantitation PCR negative and EBV antibody profile shows negative VCA IgM with increased IgG VCA of 122 and nuclear antigen IgG 68.8 commensurate with prior infection.  C-reactive protein 21.  C4 complement normal.  C3 complement slightly elevated to 27 upper limit 167.  ANCA panel negative.  HARRIS negative.  CCP negative.  Rheumatoid  factor not drawn.  Sedimentation rate normal 24.  Anti-SSA and SSB negative.      -12/1/2022 Platelets minimally elevated 384,000 with otherwise unremarkable CBC and CMP.  C-reactive protein elevated 13.1.  Sedimentation rate 30 barely elevated.  Rheumatoid factor negative.  Moderate external hemoglobin and urine.  4-10 red cells per high-power field.    -4/11/2023 CT chest abdomen pelvis Showed no evidence of adenopathy or splenomegaly.  Atherosclerotic changes.  Hepatic steatosis with small liver cyst.    -4/18/2023 [facility] hematology oncology follow-up: No radiographic evidence of recurrence.  Viral studies all negative.  CBC unremarkable.  No significant inflammatory markers and LDH normal. As outlined in her survivorship visit, she has completed routine surveillance CTs per NCCN guidelines and at this point would only image as clinically indicated.  We will continue H&P every 6 months out till April 2026.  I would probably continue to follow her clinically out past April 2026 for signs or symptoms of late recurrences there was a component of follicular lymphoma within the higher grade lymphoma and late recurrences may occur but at this point no further labs or scans in the absence of symptoms.    -8/23/2023 CBC And CMP ordered by primary care unremarkable    -10/24/2023 [facility] hematology oncology follow-up: No new somatic complaints to suggest recurrence.  No palpable cervical or axillary or inguinal adenopathy.  I reviewed labs ordered by primary care in August which were unremarkable.  I will have her see my nurse practitioner every 6 months and a survivorship mode until April 2026 at least.  At that point, given that there was a component of follicular lymphoma within the higher grade lymphoma there is a slightly higher risk of late relapse and I would consider continuing H&P surveillance with oncology every 6 to 12 months after April 2026.  No plans for scans or labs in the absence of  symptoms.    -7/2/2024 patient presented to [facility] with shortness of breath and history of recent pneumonia, CT chest showed no acute thoracic process, incidental finding of 0.4 cm left upper lobe solid pulmonary nodule which is indeterminate.  Hepatic steatosis.    -10/30/2024 [facility] oncology clinic follow-up: Iliana overall is doing well, she has no lymphadenopathy or symptoms concerning for recurrent lymphoma.  Her CBC and CMP from 9/3/2024 were unremarkable.  I did review CT chest without contrast that was performed on 7/2/2024 ER visit to [facility] that showed 0.4 cm left upper lobe solid pulmonary nodule, I reviewed previous CT scans performed at [facility] for the past few years and could find no mention of left upper lobe nodule.  I will repeat her CT chest now with contrast for follow-up.  If the nodule is stable then I will get her to our lung nodule clinic for further recommendations.  At a minimum she would be eligible for annual low-dose CT chest for screening in light of her history of tobacco abuse.  I did congratulate her on her smoking cessation, she stopped smoking about 3 months ago.  I will see her back in 1 to 2 weeks to go over her CT chest and further plan of care.    -11/5/2024 CT chest with contrast shows no acute or suspicious findings.  Tiny questionable subsolid micronodule in the left upper lobe seen on prior outside CT 7/2/2024 is no longer present.  Sequelae of healed granulomatous disease within the chest, no lymphadenopathy.    -11/7/2024 [facility] hematology oncology APRN telemedicine follow-up:On repeat CT chest there are no acute findings, the tiny questionable micronodule seen previously in July is no longer present.  Iliana was happy to hear this news.  She remains tobacco free.  We discussed that she would need to continue annual low-dose chest CT screening in light of her history of tobacco abuse.  I will plan on  seeing her back in 6 months for follow-up for surveillance in light of her history of lymphoma.     -1/14/2025 CBC and CMP unremarkable save for glucose 139.    -3/10/2025 CT chest abdomen pelvis negative for metastasis.  Calcified granuloma right lower lobe.    -3/25/2025 [facility] hematology oncology follow-up: No evidence of recurrence of lymphoma or of lung primary on current CT and blood counts in January unremarkable.As stated in my note of October 2023, she will continue to see us every 6 months for an H&P through April 2026 and given the component of follicular lymphoma within the high-grade lymphoma I would continue to watch her every 6 to 12 months after that with an H&P with no plans for scans in the absence of symptoms.  I will ask her to see my nurse practitioner back in 6 months for H&P and to get her set up for screening and annual low-dose CT chest in light of her smoking history which she has now stopped.  She will follow-up with my nurse practitioner into the future.    Total time of care today inclusive of time spent today prior to her arrival reviewing interval data and images and during visit translating patient putting forth a plan as outlined after visit instituting this plan took 35 minutes patient care time throughout the day today.  Rodríguez Gerardo MD    03/25/2025

## 2025-03-25 NOTE — PROGRESS NOTES
"     Follow Up Adult Note     Date:2025   Patient Name: Iliana Gray  : 1970   MRN: [id number]   Time IN: 10:55 am     Time OUT: 11:40 am     Referring Provider: Kiley Blackwell PA    Chief Complaint:      ICD-10-CM ICD-9-CM   1. Generalized anxiety disorder  F41.1 300.02   2. Major depressive disorder, recurrent episode, moderate  F33.1 296.32   3. Post traumatic stress disorder (PTSD)  F43.10 309.81   4. Mixed obsessional thoughts and acts  F42.2 300.3        History of Present Illness:   Iliana Gray is a 54 y.o., single, employed (part time)  female who is being seen today for scheduled Psychotherapy session. Mood reported as \"doing alright\" with mostly anxious but overall cooperative affect. Patient presented with signs of anxiety and some worry around health concerns but would minimize at times and was overall calm, cooperative, somewhat engaged, and pleasant with provider. Patient denied any current SI/HI/AVH. Patient reports generalized anxiety and depressive symptoms with symptoms of worrying, frustration, shortness of breath, fatigue, and at times feeling overwhelmed with medical concerns only exacerbating symptom burdens. Furthermore, patient reports minimal interaction with others outside of her daughter, granddaughter, and a few family members and stated, \"I do need more friends and things to do\". Patient reports her results from cancer testing came back \"good to go\" which was a \"relief\" but continues to report concerns with breathing and awaiting for testing, only exacerbating worry and anxiety. Patient reported ongoing communication with brother she recently discovered she has.       Subjective     PHQ-9 Depression Screening  PHQ-9 Total Score:  Not completed at this time     DEO-7   Not completed at this time    Patient's Support Network Includes:  children and extended family    Functional Status: Moderate impairment     Progress toward goal: Not at " goal    Prognosis: Fair with Ongoing Treatment     Medications:     Current Outpatient Medications:     albuterol (PROVENTIL) (2.5 MG/3ML) 0.083% nebulizer solution, Take 2.5 mg by nebulization., Disp: , Rfl:     aspirin 81 MG chewable tablet, Chew 1 tablet Daily., Disp: , Rfl:     Cariprazine HCl (Vraylar) 3 MG capsule capsule, Take 1 capsule by mouth Daily., Disp: 30 capsule, Rfl: 2    Cholecalciferol (Vitamin D) 50 MCG (2000 UT) tablet, Take 1 tablet by mouth Daily., Disp: 90 tablet, Rfl: 3    clobetasol (TEMOVATE) 0.05 % ointment, PLEASE SEE ATTACHED FOR DETAILED DIRECTIONS, Disp: , Rfl:     diazePAM (VALIUM) 5 MG tablet, Take 1 tablet by mouth Daily As Needed for Anxiety. Intended as 6 month supply., Disp: 30 tablet, Rfl: 0    estradiol (ESTRACE) 1 MG tablet, Take 1 tablet by mouth Daily., Disp: , Rfl:     fenofibrate (TRICOR) 145 MG tablet, Take 1 tablet by mouth Daily., Disp: 90 tablet, Rfl: 3    furosemide (Lasix) 40 MG tablet, Take 1 tablet by mouth Every Morning., Disp: 90 tablet, Rfl: 1    ipratropium (ATROVENT) 0.06 % nasal spray, INSTILL 2 SPRAYS INTRANASALLY 3 TIMES PER DAY FOR 5 DAYS, Disp: , Rfl:     nystatin (MYCOSTATIN) 664755 UNIT/GM cream, Apply 1 Application topically to the appropriate area as directed 2 (Two) Times a Day., Disp: 30 g, Rfl: 2    potassium chloride (KLOR-CON M10) 10 MEQ CR tablet, Take 1 tablet by mouth 2 (Two) Times a Day., Disp: 60 tablet, Rfl: 5    rosuvastatin (CRESTOR) 40 MG tablet, Take 1 tablet by mouth Daily., Disp: 90 tablet, Rfl: 3    Semaglutide, 1 MG/DOSE, (Ozempic, 1 MG/DOSE,) 4 MG/3ML solution pen-injector, Inject 1 mg under the skin into the appropriate area as directed 1 (One) Time Per Week., Disp: 9 mL, Rfl: 1    Semaglutide,0.25 or 0.5MG/DOS, (Ozempic, 0.25 or 0.5 MG/DOSE,) 2 MG/3ML solution pen-injector, Inject 0.25 mg under the skin into the appropriate area as directed 1 (One) Time Per Week for 28 days, THEN 0.5 mg 1 (One) Time Per Week for 90 days., Disp: 9  "mL, Rfl: 0    Symbicort 160-4.5 MCG/ACT inhaler, Inhale 2 puffs 2 (Two) Times a Day., Disp: , Rfl:     Ventolin  (90 Base) MCG/ACT inhaler, , Disp: , Rfl:   No current facility-administered medications for this visit.    Allergies:   No Known Allergies    Objective     Mental Status Exam:   MENTAL STATUS EXAM   General Appearance:  Cleanly groomed and dressed  Eye Contact:  Good eye contact  Attitude:  Other  Other Comment:  Somewhat guarded and anxious but overall cooperative and polite  Motor Activity:  Normal gait, posture and fidgety  Muscle Strength:  Normal  Speech:  Normal rate, tone, volume  Language:  Spontaneous  Mood and affect:  Other  Other Comment:  Mood reported as \"doing alright\" with mostly anxious but overall cooperative affect.  Hopelessness:  Optimistic  Loneliness: 3  Thought Process:  Goal-directed and linear  Associations/ Thought Content:  No delusions  Hallucinations:  None  Suicidal Ideations:  Not present  Homicidal Ideation:  Not present  Sensorium:  Alert and clear  Orientation:  Person, place, time and situation  Immediate Recall, Recent, and Remote Memory:  Intact  Attention Span/ Concentration:  Good  Fund of Knowledge:  Appropriate for age and educational level  Intellectual Functioning:  Average range  Insight:  Fair  Judgement:  Fair  Reliability:  Good  Impulse Control:  Good       Assessment / Plan      Visit Diagnosis/Orders Placed This Visit:    ICD-10-CM ICD-9-CM   1. Generalized anxiety disorder  F41.1 300.02   2. Major depressive disorder, recurrent episode, moderate  F33.1 296.32   3. Post traumatic stress disorder (PTSD)  F43.10 309.81   4. Mixed obsessional thoughts and acts  F42.2 300.3        PLAN:  Safety: No acute safety concerns  Risk Assessment: Risk of self-harm acutely is low. Risk of self-harm chronically is also low, but could be further elevated in the event of treatment noncompliance and/or AODA.    Treatment Plan/Goals: Continue supportive " psychotherapy efforts and medications as indicated. Treatment and medication options discussed during today's visit. Patient ackowledged and verbally consented to continue with current treatment plan and was educated on the importance of compliance with treatment and follow-up appointments. Patient seems reasonably able to adhere to treatment plan.      Assisted Patient in processing above session content; acknowledged and normalized patient’s thoughts, feelings, and concerns. Assisted patient in processing recent stressors/emotions/feelings and utilized Socratic Questioning techniques and open ended questions to encourage reflection. Challenged patient to identify recent moods and thoughts around presenting stressors (medical concerns). Discussed and provided psychoeducation on the connection between the mind and body while exploring the benefits of healthy lifestyles such as diet and exercise. Collaborated on ways to address loneness such as reaching out to groups on Facebook and connecting with old friends.      Allowed Patient to freely discuss issues  without interruption or judgement with unconditional positive regard, active listening skills, and empathy. Therapist provided a safe, confidential environment to facilitate the development of a positive therapeutic relationship and encouraged open, honest communication. Assisted Patient in identifying risk factors which would indicate the need for higher level of care including thoughts to harm self or others and/or self-harming behavior and encouraged Patient to contact this office, call 911, or present to the nearest emergency room should any of these events occur. Discussed crisis intervention services and means to access. Patient adamantly and convincingly denies current suicidal or homicidal ideation or perceptual disturbance. Assisted Patient in processing session content; acknowledged and normalized Patient’s thoughts, feelings, and concerns by utilizing a  person-centered approach in efforts to build appropriate rapport and a positive therapeutic relationship with open and honest communication. .     Follow Up:   Return in about 2 weeks (around 4/8/2025) for Therapy session.    KONG ParksW  [facility]

## 2025-03-25 NOTE — PROGRESS NOTES
CHIEF COMPLAINT: No new somatic complaints    Problem List:  Oncology/Hematology History Overview Note   1.  Right Medial thigh stage IIa nonbulky less than 7.5 cm diffuse large b cell non-hodgkin lymphoma with component of background follicular lymphoma plan for R-CHOP x3 followed by ISRT.  Had CR after 3 courses of R-CHOP x3 ending 6/8/2021 per PET 6/22/2021.    -Completed radiation 8/16/2021 to the right pelvic and inguinofemoral nodes  -Fatigue and rash (eczema per dermatology) starting around August 2022 with negative CT chest abdomen pelvis.  2.  Incidental finding on CT chest 7/2/2024 of 0.4 cm left upper lobe solid pulmonary nodule  -11/5/2024 CT chest showed no acute findings, previously mentioned small left upper lobe micronodule seen on CT 7/2/2024 no longer present.  3.  Hepatic steatosis  4.  History of tobacco abuse, greater than 40-year.  Stop smoking July 2024  5.  Sleep apnea    Oncology history timeline:  -2/18/2021 hemoglobin 12.7 with normal white count platelet count differential.  CMP unremarkable.  Urinalysis with microscopic hematuria 20-30 red blood cells per milliliter.  AP single view chest x-ray emergency room [facility] showed no acute findings.  CT abdomen pelvis with IV contrast emergency room [facility] showed calcified granuloma right lung base, fatty liver infiltration, subcentimeter hypodensity left hepatic lobe too small to categorize, normal size spleen, no adrenal masses, no pancreatic mass, normal kidneys, and no retroperitoneal adenopathy or ascites or pneumoperitoneum.  There was a smooth marginated homogeneous enhancing 4.5 x 5 x 3.3 cm mass in the superficial soft tissues of the anterior upper right thigh.  -2/25/2021 office note from Dr. Gong mentions presentation to emergency room 2/18/2021 complaining of right upper thigh swelling for the preceding week of 2/12/2021.  CT scan abdomen pelvis to include the thighs performed at [facility] revealing  5 cm smoothly marginated right upper thigh mass below the groin crease suspicious for pathologic lymph node but could be neoplastic of other nature given no prodromal illness, infections, or inflammation.  Recommendation for biopsy was made.  Per Dr. Gong note, she had been sick for the better part of the winter and had been urgent treatment centers told that she had a viral infection flu and coronavirus negative with fevers and chills that subsequently resolved but for fatigue that did not resolve with hypersomnolence as well.  Says she feels hot all the time but no ronny night sweats.  -3/12/2021 right thigh mass needle core tissue sample extremely small and predominantly crushed limiting adequate evaluation.  Differential diagnosis for this B-cell lymphoma is diffuse large B cell, follicular lymphoma versus other.  Further differentiation could not be performed on this crush small sample.  -3/25/2021 [facility] hematology oncology initial consultation: I, Rodríguez Gerardo, saw for the first time today.  Reviewed the above story with her.  Still fatigued without night sweats or unexplained weight loss.  She has this mass in the soft tissue medial right thigh about 5 cm in size that does seem to move somewhat beneath the skin but also to my exam feels like it is attached to the deeper structures of the thigh as well.  I feel no other adenopathy or hepatosplenomegaly.  I will get a PET scan.  I spoken with Dr. Gong who will look at these images again with the CTs in [place].  If there is clear indication that this is not coming from soft tissue nonnodal structures, then excisional biopsy for adequate tissue diagnosis is important to determine the type of therapy.  This is growing since her biopsy 3/12/2021 but not daily like a very high-grade lymphoma.  To my hand, this does not feel like a typical stas structure and my other concern is, even though this does not venessa with any markers to suggest sarcoma or the  like, is to be sure that this is not something other than lymphoma and I have spoken with Dr. Pino who will do desmin stains and other markers before doing an excisional biopsy next Wednesday with Dr. Gong.  If those additional stains suggest sarcomatoid features that can appear in the medial aspect of the thigh such as this, then she probably needs to have excision by Humberto Vera orthopedic oncologist at .  In the meantime, presuming this to be some form of lymphoma which is the highest likelihood based on the pathology from Dr. Pino where this is CD20 positive, CD10 positive, BCL6 40% positive, MUM1 40% positive, CD23 strong and diffuse positive, and BCL-2 positive.  Ki-67 is 50%.  CD30 and only 5%.  C-Myc is weakly expressed in 10% of cells.  BRIAN by TAYLER is negative.  Cyclin D1 negative.  CD5 indeterminate for B and T-cell coexpression.  LEF 1 presumably staining T cells.  Ultimate decision for treatment depends on the specific pathologic subtype and the staging and I will get her prognostic labs going as well.  Given the relatively initial high-grade features of this that might require Adriamycin I will also proceed with echocardiogram.  -3/25/2021 CBC normal.  CMP unremarkable.  Sedimentation rate normal 27 with C-reactive protein elevated 1.7 upper limit of normal 0.5.  Beta-2 microglobulin normal 1.6.  Uric acid normal 4.3.  LDH normal 162.  Hepatitis B and C serologies negative.  -3/29/2021 echocardiogram ejection fraction 61 to 65%.  -3/31/21 excisional node biopsy shows diffuse large B cell non-Hodgkin lymphoma  -4/2/2021 PET shows postsurgical changes right proximal thigh and superficial inguinal region adjacent to adenopathy with SUV 4.0.  Right internal iliac lymph node mildly enlarged SUV 2.6 additionally noted.  No other adenopathy outside this region.  No mention of any abnormal marrow signal.  -4/5/2021 [facility] medical oncology follow-up visit: Per Dr. Arun Garcia pathologist, this  is a diffuse large B cell non-Hodgkin lymphoma.  He is not sure whether this is double hit or not yet.  He is not sure if there is a background of follicular lymphoma and hence cannot say whether this is transformed.  Clinically her IPI score is 0 with her age less than 60, normal LDH, ECOG 0, no extranodal involvement on PET, and only stage II at worse per PET with all nodes within the same region.  I will get a bone marrow biopsy.  If this is stage IV, double hit, or has evidence of transition from follicular lymphoma into diffuse large B-cell lymphoma, then I would give R-CHOP x6.  If the marrow is not involved, this is not double hit, and there is no evidence of follicular transformation into diffuse large B-cell lymphoma, then I would treat this as a stage II nonbulky diffuse large B-cell lymphoma and consider R-CHOP x3 followed by involved site radiation therapy.  I reviewed her PET and biopsy reports and echocardiogram which shows good ejection fraction.  She will need to get her [employer] Covid vaccine this week, family doctor established for the possibility of prednisone-induced diabetes, CT-guided bone marrow biopsy, chemo preparation visit, and a port placement with Dr. Gong.  We will present her at multidisciplinary tumor board this Friday.  I went into detail regarding the side effects of R-CHOP but she will have these reviewed at her chemo preparation visit.  Though not bulky, I still would treat her with allopurinol.  First course of therapy will occur in our [place] office and if all goes well then the subsequent Rituxan doses can be faster and performed in [place] office.    -4/14/2021 pathology update Dr. Garcia: Node pathology negative for double hit by FISH    -4/21/2021 bone marrow biopsy negative for lymphomatous involvement with scant stainable iron.      -4/26/2021 [facility] medical oncology follow-up visit: Given lack of marrow involvement, negative for double hit, and possible  background follicular lymphoma , I will treat her as stage IIa nonbulky (original tumor 5 cm on CT, I.  E.  Less than 7.5 cm) diffuse large B-cell lymphoma right proximal thigh SUV 4.0 mass incisionally biopsied (with palpable residua) with mild right internal iliac node enlargement SUV 2.6 as well.  We will treat her with planned R-CHOP x3 followed by involved site radiation therapy.  This was consensus of multidisciplinary tumor board 4/16/2021 as well.  First course [place].  Second course will occur in [place].  With the small component of background follicular lymphoma, there is potential risk of not only relapse of diffuse large B-cell lymphoma but later relapse from follicular lymphoma and we will keep that in mind in terms of following her up past the usual 3 years post definitive therapy presuming we get a complete remission on subsequent imaging and exams.  We will arrange radiation in [place] and I have made referral.  This will not start until after chemotherapy.    -5/13/2021 radiation oncology consultation Dr. Jl Infante for preparation of future involved field radiation therapy    -5/18/2021 [facility] oncology clinic follow-up: Had moderate amount of nausea after her first cycle, will add Zyprexa to Zofran and Compazine that she is already taking. She was also anxious, added Ativan to her care plan premeds. Prescription for Augmentin sent in for sinus infection. Clinically good response with reduction of right medial thigh mass. We will continue treatment in [place] due to mild infusion reaction with first Rituxan but she was able to complete treatment with additional medications. Today is cycle #2 of a planned 3 courses and then will repeat restaging scan to assess for response and proceed to radiation as outlined above.    -6/8/2021 course #3 Rituxan CHOP    -6/22/2021 PET shows resolution of right inguinal and iliac adenopathy.  Negative PET/CT.    -6/28/2021 [facility] medical oncology  follow-up visit: I reviewed images and reports of PET as above.  Status post Rituxan CHOP, PET is essentially negative with complete clinical remission.With the small component of background follicular lymphoma, there is potential risk of not only relapse of diffuse large B-cell lymphoma but later relapse from follicular lymphoma and we will keep that in mind in terms of following her up past the usual 3 years post definitive therapy presuming we get a complete remission on subsequent imaging and exams.  We will arrange radiation in [place] and Dr. Infante saw in mid May.  I called Dr. Infante to get her in to start involved field radiation and we will repeat PET and labs prior to return in 3 months for survivorship visit with my nurse practitioner who will lay out the follow-up according to NCCN guidelines.  Other than for modest anemia hemoglobin in the tens and glucose 150s, no other remarkable findings on CBC and CMP 6/8/2021.    -6/8/2021 completed course #3 of R-CHOP with resolution of right inguinal and iliac adenopathy on 6/22/2021 PET    -8/16/2021 completed 36 Gray in 18 fractions involved field right pelvic/inguinal femoral nodes Dr. Infante    -10/21/2021 [facility] clinic follow-up/survivorship visit: PET scan from 10/14/2021 was negative along with normal CBC and CMP.  She completed radiation in August without complications.  We will get her back to Dr. Gong for port removal.  We will continue surveillance per NCCN guidelines as follows:  -H&P and labs every 3-6 months for 5 years and then annually or as clinically indicated.    -We will do surveillance imaging post completion of treatment with CT chest, abdomen and pelvis with contrast every 6 months for 2 years and then annually or as clinically indicated.    -1/28/2022 CT chest abdomen pelvis with contrast shows no adenopathy chest abdomen pelvis and no acute process.  Hepatic steatosis with sigmoid diverticulosis.  Hemoglobin 13.6 with  normal CBC and differential.  CMP entirely normal with creatinine 0.65.    -2/1/2022 [facility] medical oncology follow-up visit: I reviewed images and reports of scans as outlined above as well as labs.  No evidence of recurrence.  As outlined by my nurse practitioner survivorship visit, she will get an H&P every 3 to 6 months out to April 2026 and then annually thereafter and we will do surveillance CT chest abdomen pelvis every 6 months until April 2023 and then annually as clinically indicated.  As I have stated previously, we would want to continue to follow her clinically past the usual 2 years of radiographic follow-up for signs or symptoms of follicular lymphoma as there was a small background follicular lymphomatous component that could recur late.  She will see my nurse practitioner back in 6 months with CTs just prior to return.  No further CBC or CMP in the absence of signs or symptoms given normalization of her counts.    -7/28/2022 CT chest, abdomen and pelvis with no evidence of lymphadenopathy in the chest, abdomen or pelvis, diffuse hepatic steatosis, sigmoid diverticulosis.    - 8/4/2022 [facility] Oncology clinic follow-up: Iliana has been feeling poorly since treatment ended a year ago for her history of lymphoma.  Current CT chest, abdomen and pelvis show no evidence of disease recurrence.  Her symptoms unfortunately have not improved since treatment ended and she is having more bad days than good.  She has a rash on the dorsum of her hands and foot that appear to be possibly eczema versus psoriasis.  We will get her to dermatology for further evaluation, I have asked her to call her insurance company to let us know the name of a provider that she would like to see and then we can put in a referral.  She has ongoing fatigue that has not improved with time, she is unable to do most activities without having to rest for the remainder of the day afterwards.  She also has nausea and loose stool with  urgency.  I will get her to gastroenterology for an EGD and colonoscopy to rule out any GI involvement of her lymphoma as it did have follicular component.  I will also get labs today with CBC, CMP, we will also check HARRIS in light of her rash, will check inflammatory markers with sedimentation rate and CRP and an LDH.  We will see her back in a few months to go over her labs and GI evaluation.  She does have a history of hepatic steatosis shown on all prior CTs therefore would benefit from GI evaluation regarding that also, has had normal LFTs on prior labs.      - 8/4/2022 data: CMP unremarkable.  LDH normal 174.  CRP mildly elevated 1.5 upper limit 0.9.  Sedimentation rate normal 9.  CBC normal with normal differential.  HARRIS negative    -9/19/2022 EGD and colonoscopy [facility] random colon biopsy negative.  Transverse colon polyp sessile serrated adenoma.  Duodenal biopsy with normal architecture.  Stomach biopsy mild chronic inactive gastritis with no dysplasia or metaplasia or H. pylori.  There was a lot of stool in the entire colon interfering with visualization.  Internal hemorrhoids.  EGD showed no gross lesions.    -9/27/2022 [facility] medical oncology follow-up: No clear-cut evidence of lymphoma on CTs or EGD and colonoscopy and labs are unremarkable and negative HARRIS.  Her dermatologist told her she had eczema.  Nonetheless she is still periodically so fatigued that she ends up going to the bed for 2 or 3 days and then rebounds and feels normal for a while and then this waxing and waning of performance status and fatigue continues.  We will get a PET through her thighs as that was the original site of her disease.  I will also have her get EBV and CMV and HIV and thyroid functions.    -10/7/2022 whole-body PET is essentially negative.  There is a solitary borderline focus SUV 2.8 near the left lung apex without CT correlate favored to be infectious/inflammatory change.  No hypermetabolism in particular of  the lower extremities where her original disease resided.    -10/11/2022 [facility] medical oncology follow-up: States she had COVID September 24 which was before I last saw her but did not know that when last I saw her.  She did not get her EBV, CMV, HIV, thyroid functions but says she is now getting over the COVID as well as some sinusitis and she thinks that is the fatigue.  Her PET was entirely negative..  We will continue to check her whole-body PET until April 2023 which is when we will see her back and then we will do that annually only as clinically indicated on annual H&P.    -10/27/2022 CBC normal with normal differential and unremarkable CMP, B12, folate, and normal thyroid function. HIV nonreactive.  CMV DNA quantitation negative.  EBV quantitation PCR negative and EBV antibody profile shows negative VCA IgM with increased IgG VCA of 122 and nuclear antigen IgG 68.8 commensurate with prior infection.  C-reactive protein 21.  C4 complement normal.  C3 complement slightly elevated to 27 upper limit 167.  ANCA panel negative.  HARRIS negative.  CCP negative.  Rheumatoid factor not drawn.  Sedimentation rate normal 24.  Anti-SSA and SSB negative.      -12/1/2022 Platelets minimally elevated 384,000 with otherwise unremarkable CBC and CMP.  C-reactive protein elevated 13.1.  Sedimentation rate 30 barely elevated.  Rheumatoid factor negative.  Moderate external hemoglobin and urine.  4-10 red cells per high-power field.    -4/11/2023 CT chest abdomen pelvis Showed no evidence of adenopathy or splenomegaly.  Atherosclerotic changes.  Hepatic steatosis with small liver cyst.    -4/18/2023 [facility] hematology oncology follow-up: No radiographic evidence of recurrence.  Viral studies all negative.  CBC unremarkable.  No significant inflammatory markers and LDH normal. As outlined in her survivorship visit, she has completed routine surveillance CTs per NCCN guidelines and at this point would only image as clinically  indicated.  We will continue H&P every 6 months out till April 2026.  I would probably continue to follow her clinically out past April 2026 for signs or symptoms of late recurrences there was a component of follicular lymphoma within the higher grade lymphoma and late recurrences may occur but at this point no further labs or scans in the absence of symptoms.    -8/23/2023 CBC And CMP ordered by primary care unremarkable    -10/24/2023 [facility] hematology oncology follow-up: No new somatic complaints to suggest recurrence.  No palpable cervical or axillary or inguinal adenopathy.  I reviewed labs ordered by primary care in August which were unremarkable.  I will have her see my nurse practitioner every 6 months and a survivorship mode until April 2026 at least.  At that point, given that there was a component of follicular lymphoma within the higher grade lymphoma there is a slightly higher risk of late relapse and I would consider continuing H&P surveillance with oncology every 6 to 12 months after April 2026.  No plans for scans or labs in the absence of symptoms.    -7/2/2024 patient presented to [facility] with shortness of breath and history of recent pneumonia, CT chest showed no acute thoracic process, incidental finding of 0.4 cm left upper lobe solid pulmonary nodule which is indeterminate.  Hepatic steatosis.    -10/30/2024 [facility] oncology clinic follow-up: Iliana overall is doing well, she has no lymphadenopathy or symptoms concerning for recurrent lymphoma.  Her CBC and CMP from 9/3/2024 were unremarkable.  I did review CT chest without contrast that was performed on 7/2/2024 ER visit to [facility] that showed 0.4 cm left upper lobe solid pulmonary nodule, I reviewed previous CT scans performed at [facility] for the past few years and could find no mention of left upper lobe nodule.  I will repeat her CT chest now with contrast for follow-up.   If the nodule is stable then I will get her to our lung nodule clinic for further recommendations.  At a minimum she would be eligible for annual low-dose CT chest for screening in light of her history of tobacco abuse.  I did congratulate her on her smoking cessation, she stopped smoking about 3 months ago.  I will see her back in 1 to 2 weeks to go over her CT chest and further plan of care.    -11/5/2024 CT chest with contrast shows no acute or suspicious findings.  Tiny questionable subsolid micronodule in the left upper lobe seen on prior outside CT 7/2/2024 is no longer present.  Sequelae of healed granulomatous disease within the chest, no lymphadenopathy.    -11/7/2024 [facility] hematology oncology APRN telemedicine follow-up:On repeat CT chest there are no acute findings, the tiny questionable micronodule seen previously in July is no longer present.  Iliana was happy to hear this news.  She remains tobacco free.  We discussed that she would need to continue annual low-dose chest CT screening in light of her history of tobacco abuse.  I will plan on seeing her back in 6 months for follow-up for surveillance in light of her history of lymphoma.       -1/14/2025 CBC and CMP unremarkable save for glucose 139.    -3/10/2025 CT chest abdomen pelvis negative for metastasis.  Calcified granuloma right lower lobe.    -3/25/2025 [facility] hematology oncology follow-up: No evidence of recurrence of lymphoma or of lung primary on current CT and blood counts in January unremarkable.As stated in my note of October 2023, she will continue to see us every 6 months for an H&P through April 2026 and given the component of follicular lymphoma within the high-grade lymphoma I would continue to watch her every 6 to 12 months after that with an H&P with no plans for scans in the absence of symptoms.  I will ask her to see my nurse practitioner back in 6 months for H&P and to get her set up for screening and annual low-dose CT chest  in light of her smoking history which she has now stopped.  She will follow-up with my nurse practitioner into the future.     Diffuse large B-cell lymphoma of lymph nodes of inguinal region (Resolved)   3/29/2021 -  Other Event    Echocardiogram  Left ventricular ejection fraction appears to be 61 - 65%. Left ventricular systolic function is normal.  Normal global longitudinal strain at -23.5%.  Left ventricular diastolic function was normal.  No significant structural or functional valvular disease.     4/2/2021 Imaging    PET/CT IMPRESSION:  Postsurgical changes right proximal thigh and superficial  inguinal region adjacent to adenopathy with abnormal hypermetabolism  maximum SUV 4.0. Right internal iliac lymph node is mildly enlarged with  maximum SUV 2.6 additionally noted. No soft tissue mass or adenopathy  outside of this region.     4/21/2021 Biopsy    Marrow biopsy negative     4/26/2021 - 6/28/2021 Chemotherapy    Completed course #3 6/8/2021  OP LYMPHOMA R-CHOP RiTUXimab / Cyclophosphamide / DOXOrubicin / VinCRIStine / PredniSONE     4/26/2021 Cancer Staged    Staging form: Hodgkin And Non-Hodgkin Lymphoma, AJCC 8th Edition  - Clinical: Stage II (Diffuse large B-cell lymphoma) - Signed by Rodríguez Gerardo MD on 4/26/2021 6/22/2021 Imaging     PET shows resolution of right inguinal and iliac adenopathy.  Negative PET/CT.     7/22/2021 - 8/16/2021 Radiation    Radiation OncologyTreatment Course:  Iliana Gray received 36 cGy in 18 fractions to right pelvic and inguinofemoral nodes (involved field) under the care of Dr. Jl Infante.     10/14/2021 Imaging    PET/CT: Stable negative PET/CT with no new hypermetabolic adenopathy to suggest lymphomatous disease progression.     1/28/2022 Imaging    CT chest abdomen pelvis with contrast shows no adenopathy chest abdomen pelvis and no acute process.  Hepatic steatosis with sigmoid diverticulosis.  Hemoglobin 13.6 with normal CBC and differential.  CMP  "entirely normal with creatinine 0.65     7/28/2022 Imaging    CT chest, abdomen and pelvis:  1. No evidence of lymphadenopathy in the chest abdomen or pelvis.  2. Diffuse hepatic steatosis.  3. Sigmoid diverticulosis.  4. Hysterectomy  5. Postoperative changes of prior right inguinal lymph node resection.     4/11/2023 Imaging    CT chest abdomen pelvis Showed no evidence of adenopathy or splenomegaly.  Atherosclerotic changes.  Hepatic steatosis with small liver cyst.         HISTORY OF PRESENT ILLNESS:  The patient is a 54 y.o. female, here for follow up on management of history of lymphoma and lung nodule    Past Medical History:   Diagnosis Date    Anxiety     Arthritis     Asthma     Diffuse large B cell lymphoma     Sleep apnea     cpap     Past Surgical History:   Procedure Laterality Date    HYSTERECTOMY      TUMOR EXCISION Right 04/2021    lymph node removal/biopsy/right inner thigh    VENOUS ACCESS DEVICE (PORT) INSERTION Right 04/2021       No Known Allergies    Family History and Social History reviewed and changed as necessary    REVIEW OF SYSTEM:   No new somatic complaints.  No B symptoms    PHYSICAL EXAM:  No jaundice icterus or pallor.  No respiratory distress.    Vitals:    03/25/25 0824   BP: 146/93   Pulse: 110   Resp: 18   Temp: 98.4 °F (36.9 °C)   SpO2: 95%   Weight: 109 kg (240 lb)   Height: 160 cm (63\")     Vitals:    03/25/25 0824   PainSc: 0-No pain          ECOG score: 0           Vitals reviewed.    ECOG: (0) Fully Active - Able to Carry On All Pre-disease Performance Without Restriction    Lab Results   Component Value Date    HGB 12.0 01/14/2025    HCT 36.4 01/14/2025    MCV 89 01/14/2025     01/14/2025    WBC 6.9 01/14/2025    NEUTROABS 3.6 01/14/2025    LYMPHSABS 2.5 01/14/2025    MONOSABS 0.5 01/14/2025    EOSABS 0.2 01/14/2025    BASOSABS 0.0 01/14/2025       Lab Results   Component Value Date    GLUCOSE 127 (H) 02/24/2025    BUN 12 02/24/2025    CREATININE 0.64 02/24/2025    "  02/24/2025    K 4.7 02/24/2025     02/24/2025    CO2 24 02/24/2025    CALCIUM 9.2 02/24/2025    PROTEINTOT 6.8 01/14/2025    ALBUMIN 4.2 01/14/2025    BILITOT 0.2 01/14/2025    ALKPHOS 73 01/14/2025    AST 25 01/14/2025    ALT 24 01/14/2025             ASSESSMENT & PLAN:  1.  Right Medial thigh stage IIa nonbulky less than 7.5 cm diffuse large b cell non-hodgkin lymphoma with component of background follicular lymphoma plan for R-CHOP x3 followed by ISRT.  Had CR after 3 courses of R-CHOP x3 ending 6/8/2021 per PET 6/22/2021.    -Completed radiation 8/16/2021 to the right pelvic and inguinofemoral nodes  -Fatigue and rash (eczema per dermatology) starting around August 2022 with negative CT chest abdomen pelvis.  2.  Incidental finding on CT chest 7/2/2024 of 0.4 cm left upper lobe solid pulmonary nodule  -11/5/2024 CT chest showed no acute findings, previously mentioned small left upper lobe micronodule seen on CT 7/2/2024 no longer present.  3.  Hepatic steatosis  4.  History of tobacco abuse, greater than 40-year.  Stop smoking July 2024  5.  Sleep apnea    Oncology history timeline:  -2/18/2021 hemoglobin 12.7 with normal white count platelet count differential.  CMP unremarkable.  Urinalysis with microscopic hematuria 20-30 red blood cells per milliliter.  AP single view chest x-ray emergency room [place] showed no acute findings.  CT abdomen pelvis with IV contrast emergency room [place] showed calcified granuloma right lung base, fatty liver infiltration, subcentimeter hypodensity left hepatic lobe too small to categorize, normal size spleen, no adrenal masses, no pancreatic mass, normal kidneys, and no retroperitoneal adenopathy or ascites or pneumoperitoneum.  There was a smooth marginated homogeneous enhancing 4.5 x 5 x 3.3 cm mass in the superficial soft tissues of the anterior upper right thigh.  -2/25/2021 office note from Dr. Gong mentions presentation to emergency room 2/18/2021  complaining of right upper thigh swelling for the preceding week of 2/12/2021.  CT scan abdomen pelvis to include the thighs performed at [place] [facility] revealing 5 cm smoothly marginated right upper thigh mass below the groin crease suspicious for pathologic lymph node but could be neoplastic of other nature given no prodromal illness, infections, or inflammation.  Recommendation for biopsy was made.  Per Dr. Gong note, she had been sick for the better part of the winter and had been urgent treatment centers told that she had a viral infection flu and coronavirus negative with fevers and chills that subsequently resolved but for fatigue that did not resolve with hypersomnolence as well.  Says she feels hot all the time but no ronny night sweats.  -3/12/2021 right thigh mass needle core tissue sample extremely small and predominantly crushed limiting adequate evaluation.  Differential diagnosis for this B-cell lymphoma is diffuse large B cell, follicular lymphoma versus other.  Further differentiation could not be performed on this crush small sample.  -3/25/2021 [facility] hematology oncology initial consultation: I, Rodríguez Gerardo, saw for the first time today.  Reviewed the above story with her.  Still fatigued without night sweats or unexplained weight loss.  She has this mass in the soft tissue medial right thigh about 5 cm in size that does seem to move somewhat beneath the skin but also to my exam feels like it is attached to the deeper structures of the thigh as well.  I feel no other adenopathy or hepatosplenomegaly.  I will get a PET scan.  I spoken with Dr. Gong who will look at these images again with the CTs in [place].  If there is clear indication that this is not coming from soft tissue nonnodal structures, then excisional biopsy for adequate tissue diagnosis is important to determine the type of therapy.  This is growing since her biopsy 3/12/2021 but not daily like a very  high-grade lymphoma.  To my hand, this does not feel like a typical stas structure and my other concern is, even though this does not venessa with any markers to suggest sarcoma or the like, is to be sure that this is not something other than lymphoma and I have spoken with Dr. Pino who will do desmin stains and other markers before doing an excisional biopsy next Wednesday with Dr. Gong.  If those additional stains suggest sarcomatoid features that can appear in the medial aspect of the thigh such as this, then she probably needs to have excision by Humberto Vera orthopedic oncologist at .  In the meantime, presuming this to be some form of lymphoma which is the highest likelihood based on the pathology from Dr. Pino where this is CD20 positive, CD10 positive, BCL6 40% positive, MUM1 40% positive, CD23 strong and diffuse positive, and BCL-2 positive.  Ki-67 is 50%.  CD30 and only 5%.  C-Myc is weakly expressed in 10% of cells.  BRIAN by TAYLER is negative.  Cyclin D1 negative.  CD5 indeterminate for B and T-cell coexpression.  LEF 1 presumably staining T cells.  Ultimate decision for treatment depends on the specific pathologic subtype and the staging and I will get her prognostic labs going as well.  Given the relatively initial high-grade features of this that might require Adriamycin I will also proceed with echocardiogram.  -3/25/2021 CBC normal.  CMP unremarkable.  Sedimentation rate normal 27 with C-reactive protein elevated 1.7 upper limit of normal 0.5.  Beta-2 microglobulin normal 1.6.  Uric acid normal 4.3.  LDH normal 162.  Hepatitis B and C serologies negative.  -3/29/2021 echocardiogram ejection fraction 61 to 65%.  -3/31/21 excisional node biopsy shows diffuse large B cell non-Hodgkin lymphoma  -4/2/2021 PET shows postsurgical changes right proximal thigh and superficial inguinal region adjacent to adenopathy with SUV 4.0.  Right internal iliac lymph node mildly enlarged SUV 2.6 additionally  noted.  No other adenopathy outside this region.  No mention of any abnormal marrow signal.  -4/5/2021 [facility] medical oncology follow-up visit: Per Dr. Arun Garcia pathologist, this is a diffuse large B cell non-Hodgkin lymphoma.  He is not sure whether this is double hit or not yet.  He is not sure if there is a background of follicular lymphoma and hence cannot say whether this is transformed.  Clinically her IPI score is 0 with her age less than 60, normal LDH, ECOG 0, no extranodal involvement on PET, and only stage II at worse per PET with all nodes within the same region.  I will get a bone marrow biopsy.  If this is stage IV, double hit, or has evidence of transition from follicular lymphoma into diffuse large B-cell lymphoma, then I would give R-CHOP x6.  If the marrow is not involved, this is not double hit, and there is no evidence of follicular transformation into diffuse large B-cell lymphoma, then I would treat this as a stage II nonbulky diffuse large B-cell lymphoma and consider R-CHOP x3 followed by involved site radiation therapy.  I reviewed her PET and biopsy reports and echocardiogram which shows good ejection fraction.  She will need to get her [employer] Covid vaccine this week, family doctor established for the possibility of prednisone-induced diabetes, CT-guided bone marrow biopsy, chemo preparation visit, and a port placement with Dr. Gong.  We will present her at multidisciplinary tumor board this Friday.  I went into detail regarding the side effects of R-CHOP but she will have these reviewed at her chemo preparation visit.  Though not bulky, I still would treat her with allopurinol.  First course of therapy will occur in our [place] office and if all goes well then the subsequent Rituxan doses can be faster and performed in [place] office.    -4/14/2021 pathology update Dr. Garcia: Node pathology negative for double hit by FISH    -4/21/2021 bone marrow biopsy negative  for lymphomatous involvement with scant stainable iron.      -4/26/2021 [facility] medical oncology follow-up visit: Given lack of marrow involvement, negative for double hit, and possible background follicular lymphoma , I will treat her as stage IIa nonbulky (original tumor 5 cm on CT, I.  E.  Less than 7.5 cm) diffuse large B-cell lymphoma right proximal thigh SUV 4.0 mass incisionally biopsied (with palpable residua) with mild right internal iliac node enlargement SUV 2.6 as well.  We will treat her with planned R-CHOP x3 followed by involved site radiation therapy.  This was consensus of multidisciplinary tumor board 4/16/2021 as well.  First course [place].  Second course will occur in [place].  With the small component of background follicular lymphoma, there is potential risk of not only relapse of diffuse large B-cell lymphoma but later relapse from follicular lymphoma and we will keep that in mind in terms of following her up past the usual 3 years post definitive therapy presuming we get a complete remission on subsequent imaging and exams.  We will arrange radiation in [place] and I have made referral.  This will not start until after chemotherapy.    -5/13/2021 radiation oncology consultation Dr. Jl Infante for preparation of future involved field radiation therapy    -5/18/2021 [facility] oncology clinic follow-up: Had moderate amount of nausea after her first cycle, will add Zyprexa to Zofran and Compazine that she is already taking. She was also anxious, added Ativan to her care plan premeds. Prescription for Augmentin sent in for sinus infection. Clinically good response with reduction of right medial thigh mass. We will continue treatment in [place] due to mild infusion reaction with first Rituxan but she was able to complete treatment with additional medications. Today is cycle #2 of a planned 3 courses and then will repeat restaging scan to assess for response and proceed to radiation as  outlined above.    -6/8/2021 course #3 Rituxan CHOP    -6/22/2021 PET shows resolution of right inguinal and iliac adenopathy.  Negative PET/CT.    -6/28/2021 [facility] medical oncology follow-up visit: I reviewed images and reports of PET as above.  Status post Rituxan CHOP, PET is essentially negative with complete clinical remission.With the small component of background follicular lymphoma, there is potential risk of not only relapse of diffuse large B-cell lymphoma but later relapse from follicular lymphoma and we will keep that in mind in terms of following her up past the usual 3 years post definitive therapy presuming we get a complete remission on subsequent imaging and exams.  We will arrange radiation in [place] and Dr. Infante saw in mid May.  I called Dr. Infante to get her in to start involved field radiation and we will repeat PET and labs prior to return in 3 months for survivorship visit with my nurse practitioner who will lay out the follow-up according to NCCN guidelines.  Other than for modest anemia hemoglobin in the tens and glucose 150s, no other remarkable findings on CBC and CMP 6/8/2021.    -6/8/2021 completed course #3 of R-CHOP with resolution of right inguinal and iliac adenopathy on 6/22/2021 PET    -8/16/2021 completed 36 Gray in 18 fractions involved field right pelvic/inguinal femoral nodes Dr. Infante    -10/21/2021 [facility] Oncology clinic follow-up/survivorship visit: PET scan from 10/14/2021 was negative along with normal CBC and CMP.  She completed radiation in August without complications.  We will get her back to Dr. Gong for port removal.  We will continue surveillance per NCCN guidelines as follows:  -H&P and labs every 3-6 months for 5 years and then annually or as clinically indicated.    -We will do surveillance imaging post completion of treatment with CT chest, abdomen and pelvis with contrast every 6 months for 2 years and then annually or as clinically  indicated.    -1/28/2022 CT chest abdomen pelvis with contrast shows no adenopathy chest abdomen pelvis and no acute process.  Hepatic steatosis with sigmoid diverticulosis.  Hemoglobin 13.6 with normal CBC and differential.  CMP entirely normal with creatinine 0.65.    -2/1/2022 [facility] follow-up visit: I reviewed images and reports of scans as outlined above as well as labs.  No evidence of recurrence.  As outlined by my nurse practitioner survivorship visit, she will get an H&P every 3 to 6 months out to April 2026 and then annually thereafter and we will do surveillance CT chest abdomen pelvis every 6 months until April 2023 and then annually as clinically indicated.  As I have stated previously, we would want to continue to follow her clinically past the usual 2 years of radiographic follow-up for signs or symptoms of follicular lymphoma as there was a small background follicular lymphomatous component that could recur late.  She will see my nurse practitioner back in 6 months with CTs just prior to return.  No further CBC or CMP in the absence of signs or symptoms given normalization of her counts.    -7/28/2022 CT chest, abdomen and pelvis with no evidence of lymphadenopathy in the chest, abdomen or pelvis, diffuse hepatic steatosis, sigmoid diverticulosis.    - 8/4/2022 [facility] follow-up: Iliana has been feeling poorly since treatment ended a year ago for her history of lymphoma.  Current CT chest, abdomen and pelvis show no evidence of disease recurrence.  Her symptoms unfortunately have not improved since treatment ended and she is having more bad days than good.  She has a rash on the dorsum of her hands and foot that appear to be possibly eczema versus psoriasis.  We will get her to dermatology for further evaluation, I have asked her to call her insurance company to let us know the name of a provider that she would like to see and then we can put in a referral.  She has  ongoing fatigue that has not improved with time, she is unable to do most activities without having to rest for the remainder of the day afterwards.  She also has nausea and loose stool with urgency.  I will get her to gastroenterology for an EGD and colonoscopy to rule out any GI involvement of her lymphoma as it did have follicular component.  I will also get labs today with CBC, CMP, we will also check HARRIS in light of her rash, will check inflammatory markers with sedimentation rate and CRP and an LDH.  We will see her back in a few months to go over her labs and GI evaluation.  She does have a history of hepatic steatosis shown on all prior CTs therefore would benefit from GI evaluation regarding that also, has had normal LFTs on prior labs.      - 8/4/2022 data: CMP unremarkable.  LDH normal 174.  CRP mildly elevated 1.5 upper limit 0.9.  Sedimentation rate normal 9.  CBC normal with normal differential.  HARRIS negative    -9/19/2022 EGD and colonoscopy Mani [facility] random colon biopsy negative.  Transverse colon polyp sessile serrated adenoma.  Duodenal biopsy with normal architecture.  Stomach biopsy mild chronic inactive gastritis with no dysplasia or metaplasia or H. pylori.  There was a lot of stool in the entire colon interfering with visualization.  Internal hemorrhoids.  EGD showed no gross lesions.    -9/27/2022 [facility] medical oncology follow-up: No clear-cut evidence of lymphoma on CTs or EGD and colonoscopy and labs are unremarkable and negative HARRIS.  Her dermatologist told her she had eczema.  Nonetheless she is still periodically so fatigued that she ends up going to the bed for 2 or 3 days and then rebounds and feels normal for a while and then this waxing and waning of performance status and fatigue continues.  We will get a PET through her thighs as that was the original site of her disease.  I will also have her get EBV and CMV and HIV and thyroid functions.    -10/7/2022 whole-body PET is  essentially negative.  There is a solitary borderline focus SUV 2.8 near the left lung apex without CT correlate favored to be infectious/inflammatory change.  No hypermetabolism in particular of the lower extremities where her original disease resided.    -10/11/2022 [facility] medical oncology follow-up: States she had COVID September 24 which was before I last saw her but did not know that when last I saw her.  She did not get her EBV, CMV, HIV, thyroid functions but says she is now getting over the COVID as well as some sinusitis and she thinks that is the fatigue.  Her PET was entirely negative..  We will continue to check her whole-body PET until April 2023 which is when we will see her back and then we will do that annually only as clinically indicated on annual H&P.    -10/27/2022 CBC normal with normal differential and unremarkable CMP, B12, folate, and normal thyroid function. HIV nonreactive.  CMV DNA quantitation negative.  EBV quantitation PCR negative and EBV antibody profile shows negative VCA IgM with increased IgG VCA of 122 and nuclear antigen IgG 68.8 commensurate with prior infection.  C-reactive protein 21.  C4 complement normal.  C3 complement slightly elevated to 27 upper limit 167.  ANCA panel negative.  HARRIS negative.  CCP negative.  Rheumatoid factor not drawn.  Sedimentation rate normal 24.  Anti-SSA and SSB negative.      -12/1/2022 Platelets minimally elevated 384,000 with otherwise unremarkable CBC and CMP.  C-reactive protein elevated 13.1.  Sedimentation rate 30 barely elevated.  Rheumatoid factor negative.  Moderate external hemoglobin and urine.  4-10 red cells per high-power field.    -4/11/2023 CT chest abdomen pelvis Showed no evidence of adenopathy or splenomegaly.  Atherosclerotic changes.  Hepatic steatosis with small liver cyst.    -4/18/2023 [facility] hematology oncology follow-up: No radiographic evidence of recurrence.  Viral studies all negative.  CBC unremarkable.  No  significant inflammatory markers and LDH normal. As outlined in her survivorship visit, she has completed routine surveillance CTs per NCCN guidelines and at this point would only image as clinically indicated.  We will continue H&P every 6 months out till April 2026.  I would probably continue to follow her clinically out past April 2026 for signs or symptoms of late recurrences there was a component of follicular lymphoma within the higher grade lymphoma and late recurrences may occur but at this point no further labs or scans in the absence of symptoms.    -8/23/2023 CBC And CMP ordered by primary care unremarkable    -10/24/2023 [facility] hematology oncology follow-up: No new somatic complaints to suggest recurrence.  No palpable cervical or axillary or inguinal adenopathy.  I reviewed labs ordered by primary care in August which were unremarkable.  I will have her see my nurse practitioner every 6 months and a survivorship mode until April 2026 at least.  At that point, given that there was a component of follicular lymphoma within the higher grade lymphoma there is a slightly higher risk of late relapse and I would consider continuing H&P surveillance with oncology every 6 to 12 months after April 2026.  No plans for scans or labs in the absence of symptoms.    -7/2/2024 patient presented to [facility] with shortness of breath and history of recent pneumonia, CT chest showed no acute thoracic process, incidental finding of 0.4 cm left upper lobe solid pulmonary nodule which is indeterminate.  Hepatic steatosis.    -10/30/2024 [facility] oncology clinic follow-up: Iliana overall is doing well, she has no lymphadenopathy or symptoms concerning for recurrent lymphoma.  Her CBC and CMP from 9/3/2024 were unremarkable.  I did review CT chest without contrast that was performed on 7/2/2024 ER visit to [facility] that showed 0.4 cm left upper lobe solid pulmonary nodule, I  reviewed previous CT scans performed at [facility] for the past few years and could find no mention of left upper lobe nodule.  I will repeat her CT chest now with contrast for follow-up.  If the nodule is stable then I will get her to our lung nodule clinic for further recommendations.  At a minimum she would be eligible for annual low-dose CT chest for screening in light of her history of tobacco abuse.  I did congratulate her on her smoking cessation, she stopped smoking about 3 months ago.  I will see her back in 1 to 2 weeks to go over her CT chest and further plan of care.    -11/5/2024 CT chest with contrast shows no acute or suspicious findings.  Tiny questionable subsolid micronodule in the left upper lobe seen on prior outside CT 7/2/2024 is no longer present.  Sequelae of healed granulomatous disease within the chest, no lymphadenopathy.    -11/7/2024 [facility] hematology oncology APRN telemedicine follow-up:On repeat CT chest there are no acute findings, the tiny questionable micronodule seen previously in July is no longer present.  Iliana was happy to hear this news.  She remains tobacco free.  We discussed that she would need to continue annual low-dose chest CT screening in light of her history of tobacco abuse.  I will plan on seeing her back in 6 months for follow-up for surveillance in light of her history of lymphoma.     -1/14/2025 CBC and CMP unremarkable save for glucose 139.    -3/10/2025 CT chest abdomen pelvis negative for metastasis.  Calcified granuloma right lower lobe.    -3/25/2025 [facility] hematology oncology follow-up: No evidence of recurrence of lymphoma or of lung primary on current CT and blood counts in January unremarkable.As stated in my note of October 2023, she will continue to see us every 6 months for an H&P through April 2026 and given the component of follicular lymphoma within the high-grade lymphoma I would continue to watch her every 6 to 12 months after  that with an H&P with no plans for scans in the absence of symptoms.  I will ask her to see my nurse practitioner back in 6 months for H&P and to get her set up for screening and annual low-dose CT chest in light of her smoking history which she has now stopped.  She will follow-up with my nurse practitioner into the future.    Total time of care today inclusive of time spent today prior to her arrival reviewing interval data and images and during visit translating patient putting forth a plan as outlined after visit instituting this plan took 35 minutes patient care time throughout the day today.  Rodríguez Gerardo MD    03/25/2025

## 2025-04-08 ENCOUNTER — OFFICE VISIT (OUTPATIENT)
Dept: BEHAVIORAL HEALTH | Facility: CLINIC | Age: 55
End: 2025-04-08
Payer: COMMERCIAL

## 2025-04-08 DIAGNOSIS — F39 MOOD DISORDER: ICD-10-CM

## 2025-04-08 DIAGNOSIS — F33.1 MAJOR DEPRESSIVE DISORDER, RECURRENT EPISODE, MODERATE: ICD-10-CM

## 2025-04-08 DIAGNOSIS — F43.10 POST TRAUMATIC STRESS DISORDER (PTSD): ICD-10-CM

## 2025-04-08 DIAGNOSIS — F41.1 GENERALIZED ANXIETY DISORDER: Primary | ICD-10-CM

## 2025-04-08 NOTE — PROGRESS NOTES
"     Follow Up Adult Note     Date:2025   Patient Name: Iliana Gray  : 1970   MRN: [id number]   Time IN: 10:48 am      Time OUT: 11:36 am     Referring Provider: Kiley Blackwell PA    Chief Complaint:      ICD-10-CM ICD-9-CM   1. Generalized anxiety disorder  F41.1 300.02   2. Major depressive disorder, recurrent episode, moderate  F33.1 296.32   3. Mood disorder  F39 296.90   4. Post traumatic stress disorder (PTSD)  F43.10 309.81        History of Present Illness:   Iliana Gray is a 54 y.o., single, employed (part-time)  female who is being seen today for scheduled Psychotherapy session. Mood reported as \"kind of pissy\" with somewhat irritable and anxious affect. Patient presented with some anxiety around medical concerns and overall pleasure in daily life experiences but remained overall calm, cooperative, engaged, and pleasant with provider. Patient denied any current SI/HI/AVH. Patient reports experiencing generalized anxiety along with feeling worried and down along with increased agitation and irritable moods and stated, \"My anxiety has just been bad the past few days, and I have been more agitated and just don't feel like doing anything\". Patient reported stress around her oldest daughter voicing to her \"that she wants to move, and I always worry about her in general\" along with reports that she has had increased anxiety around \"driving and being around people in general\". Furthermore, patient reported depressive symptoms stating, \"I used to be a social butterfly, and I do want to connect with people, but I am just embarrassed of my weight and just don't want to put the effort into much\".       Subjective     PHQ-9 Depression Screening  PHQ-9 Total Score:  Not completed at this time     DEO-7   Not completed at this time    Patient's Support Network Includes:  children and extended family    Functional Status: Moderate impairment     Progress toward goal: Not at " goal    Prognosis: Fair with Ongoing Treatment     Medications:     Current Outpatient Medications:     albuterol (PROVENTIL) (2.5 MG/3ML) 0.083% nebulizer solution, Take 2.5 mg by nebulization., Disp: , Rfl:     aspirin 81 MG chewable tablet, Chew 1 tablet Daily., Disp: , Rfl:     Cariprazine HCl (Vraylar) 3 MG capsule capsule, Take 1 capsule by mouth Daily., Disp: 30 capsule, Rfl: 2    Cholecalciferol (Vitamin D) 50 MCG (2000 UT) tablet, Take 1 tablet by mouth Daily., Disp: 90 tablet, Rfl: 3    clobetasol (TEMOVATE) 0.05 % ointment, PLEASE SEE ATTACHED FOR DETAILED DIRECTIONS, Disp: , Rfl:     diazePAM (VALIUM) 5 MG tablet, Take 1 tablet by mouth Daily As Needed for Anxiety. Intended as 6 month supply., Disp: 30 tablet, Rfl: 0    estradiol (ESTRACE) 1 MG tablet, Take 1 tablet by mouth Daily., Disp: , Rfl:     fenofibrate (TRICOR) 145 MG tablet, Take 1 tablet by mouth Daily., Disp: 90 tablet, Rfl: 3    furosemide (Lasix) 40 MG tablet, Take 1 tablet by mouth Every Morning., Disp: 90 tablet, Rfl: 1    ipratropium (ATROVENT) 0.06 % nasal spray, INSTILL 2 SPRAYS INTRANASALLY 3 TIMES PER DAY FOR 5 DAYS, Disp: , Rfl:     nystatin (MYCOSTATIN) 193500 UNIT/GM cream, Apply 1 Application topically to the appropriate area as directed 2 (Two) Times a Day., Disp: 30 g, Rfl: 2    potassium chloride (KLOR-CON M10) 10 MEQ CR tablet, Take 1 tablet by mouth 2 (Two) Times a Day., Disp: 60 tablet, Rfl: 5    rosuvastatin (CRESTOR) 40 MG tablet, Take 1 tablet by mouth Daily., Disp: 90 tablet, Rfl: 3    Semaglutide, 1 MG/DOSE, (Ozempic, 1 MG/DOSE,) 4 MG/3ML solution pen-injector, Inject 1 mg under the skin into the appropriate area as directed 1 (One) Time Per Week., Disp: 9 mL, Rfl: 1    Semaglutide,0.25 or 0.5MG/DOS, (Ozempic, 0.25 or 0.5 MG/DOSE,) 2 MG/3ML solution pen-injector, Inject 0.25 mg under the skin into the appropriate area as directed 1 (One) Time Per Week for 28 days, THEN 0.5 mg 1 (One) Time Per Week for 90 days., Disp: 9  "mL, Rfl: 0    Symbicort 160-4.5 MCG/ACT inhaler, Inhale 2 puffs 2 (Two) Times a Day., Disp: , Rfl:     Ventolin  (90 Base) MCG/ACT inhaler, , Disp: , Rfl:     Allergies:   No Known Allergies    Objective     Mental Status Exam:   MENTAL STATUS EXAM   General Appearance:  Cleanly groomed and dressed  Eye Contact:  Good eye contact  Attitude:  Cooperative and polite  Motor Activity:  Other  Other Comment:  Intermittently fidgety but oveall appropriate for setting  Muscle Strength:  Normal  Speech:  Normal rate, tone, volume  Language:  Spontaneous  Mood and affect:  Other  Other Comment:  Mood reported as \"kind of pissy\" with somewhat irritable and anxious affect.  Hopelessness:  2  Loneliness: 4  Thought Process:  Goal-directed and linear  Associations/ Thought Content:  No delusions  Hallucinations:  None  Suicidal Ideations:  Not present  Homicidal Ideation:  Not present  Sensorium:  Alert and clear  Orientation:  Person, place, time and situation  Immediate Recall, Recent, and Remote Memory:  Intact  Attention Span/ Concentration:  Good  Fund of Knowledge:  Appropriate for age and educational level  Intellectual Functioning:  Average range  Insight:  Fair  Judgement:  Fair  Reliability:  Good  Impulse Control:  Good       Assessment / Plan      Visit Diagnosis/Orders Placed This Visit:    ICD-10-CM ICD-9-CM   1. Generalized anxiety disorder  F41.1 300.02   2. Major depressive disorder, recurrent episode, moderate  F33.1 296.32   3. Mood disorder  F39 296.90   4. Post traumatic stress disorder (PTSD)  F43.10 309.81        PLAN:  Safety: No acute safety concerns  Risk Assessment: Risk of self-harm acutely is low. Risk of self-harm chronically is also low, but could be further elevated in the event of treatment noncompliance and/or AODA.    Treatment Plan/Goals: Continue supportive psychotherapy efforts and medications as indicated. Treatment and medication options discussed during today's visit. Patient " ackowledged and verbally consented to continue with current treatment plan and was educated on the importance of compliance with treatment and follow-up appointments. Patient seems reasonably able to adhere to treatment plan.      Assisted Patient in processing above session content; acknowledged and normalized patient’s thoughts, feelings, and concerns.  Assisted patient in processing recent stressors/emotions/feelings and utilized Socratic Questioning techniques and open ended questions to encourage reflection. Guided patient in exploring recent moods and events to better identify triggers. Utilized CBT techniques to challenge negative thoughts and utilized Socratic Questioning techniques to challenge patient to identify more realistic and positive thoughts. Encouraged patient to become more active, socially and physically. Will explore and update treatment plan at follow up session.      Allowed Patient to freely discuss issues  without interruption or judgement with unconditional positive regard, active listening skills, and empathy. Therapist provided a safe, confidential environment to facilitate the development of a positive therapeutic relationship and encouraged open, honest communication. Assisted Patient in identifying risk factors which would indicate the need for higher level of care including thoughts to harm self or others and/or self-harming behavior and encouraged Patient to contact this office, call 911, or present to the nearest emergency room should any of these events occur. Discussed crisis intervention services and means to access. Patient adamantly and convincingly denies current suicidal or homicidal ideation or perceptual disturbance. Assisted Patient in processing session content; acknowledged and normalized Patient’s thoughts, feelings, and concerns by utilizing a person-centered approach in efforts to build appropriate rapport and a positive therapeutic relationship with open and honest  communication. .     Follow Up:   Return in about 2 weeks (around 4/22/2025) for Therapy session.    Clive Magallanes, [facility]  [facility]

## 2025-04-22 ENCOUNTER — OFFICE VISIT (OUTPATIENT)
Dept: BEHAVIORAL HEALTH | Facility: CLINIC | Age: 55
End: 2025-04-22
Payer: COMMERCIAL

## 2025-04-22 DIAGNOSIS — F39 MOOD DISORDER: Primary | ICD-10-CM

## 2025-04-22 DIAGNOSIS — F41.1 GENERALIZED ANXIETY DISORDER: ICD-10-CM

## 2025-04-22 DIAGNOSIS — F33.1 MAJOR DEPRESSIVE DISORDER, RECURRENT EPISODE, MODERATE: ICD-10-CM

## 2025-04-22 DIAGNOSIS — F43.10 POST TRAUMATIC STRESS DISORDER (PTSD): ICD-10-CM

## 2025-04-22 NOTE — PROGRESS NOTES
"     Follow Up Adult Note     Date:2025   Patient Name: Iliana Gray  : 1970   MRN: [id number]   Time IN: 10:55 am    Time OUT: 11:50 am     Referring Provider: Kiley Blackwell PA    Chief Complaint:      ICD-10-CM ICD-9-CM   1. Mood disorder  F39 296.90   2. Generalized anxiety disorder  F41.1 300.02   3. Major depressive disorder, recurrent episode, moderate  F33.1 296.32   4. Post traumatic stress disorder (PTSD)  F43.10 309.81        History of Present Illness:   Iliana Gray is a 54 y.o., single, employed (part-time)  female who is being seen today for scheduled Psychotherapy session. Mood reported as \"I'm alright, little frustrated I guess\" with congruent but overall engaged and cooperative affect. Patient presented with some mild anxiety and agitation that showed improvement as session progressed and remained overall calm, cooperative, more engaged, and pleasant with provider. Patient denied any current SI/HI/AVH. Patient reports recent mood swings including irritability, agitation and heightened anxiety with some difficulties regulating emotions and feeling overwhelmed and stated, \"People have just been getting on my nerves in general\" and shared recently experiencing \"being a bit manic for a few days\" just before [place]. Furthermore, patient reports feeling like she \"is restless and have to move, like I'm crawling in my skin\" and was focused on contributing such symptoms to current medications (focus on Vraylar). Patient reported her recent lung test \"was good\" and that she is continuing to work with her provider to discover why she is having \"a hard time with my breathing\", all of which only exacerbate ongoing anxiety about medical concerns. Patient reports having minimal peer to peer interaction outside of her daughter, granddaughter.       Subjective     PHQ-9 Depression Screening  PHQ-9 Total Score:  Completed at this time     DEO-7   Completed at this " time    Patient's Support Network Includes:   Daughter, granddaughter, some extended family and friends.     Functional Status: Moderate impairment     Progress toward goal: Not at goal    Prognosis: Fair with Ongoing Treatment     Medications:     Current Outpatient Medications:     albuterol (PROVENTIL) (2.5 MG/3ML) 0.083% nebulizer solution, Take 2.5 mg by nebulization., Disp: , Rfl:     aspirin 81 MG chewable tablet, Chew 1 tablet Daily., Disp: , Rfl:     Cariprazine HCl (Vraylar) 3 MG capsule capsule, Take 1 capsule by mouth Daily., Disp: 30 capsule, Rfl: 2    Cholecalciferol (Vitamin D) 50 MCG (2000 UT) tablet, Take 1 tablet by mouth Daily., Disp: 90 tablet, Rfl: 3    clobetasol (TEMOVATE) 0.05 % ointment, PLEASE SEE ATTACHED FOR DETAILED DIRECTIONS, Disp: , Rfl:     diazePAM (VALIUM) 5 MG tablet, Take 1 tablet by mouth Daily As Needed for Anxiety. Intended as 6 month supply., Disp: 30 tablet, Rfl: 0    estradiol (ESTRACE) 1 MG tablet, Take 1 tablet by mouth Daily., Disp: , Rfl:     fenofibrate (TRICOR) 145 MG tablet, Take 1 tablet by mouth Daily., Disp: 90 tablet, Rfl: 3    furosemide (Lasix) 40 MG tablet, Take 1 tablet by mouth Every Morning., Disp: 90 tablet, Rfl: 1    ipratropium (ATROVENT) 0.06 % nasal spray, INSTILL 2 SPRAYS INTRANASALLY 3 TIMES PER DAY FOR 5 DAYS, Disp: , Rfl:     nystatin (MYCOSTATIN) 796480 UNIT/GM cream, Apply 1 Application topically to the appropriate area as directed 2 (Two) Times a Day., Disp: 30 g, Rfl: 2    potassium chloride (KLOR-CON M10) 10 MEQ CR tablet, Take 1 tablet by mouth 2 (Two) Times a Day., Disp: 60 tablet, Rfl: 5    rosuvastatin (CRESTOR) 40 MG tablet, Take 1 tablet by mouth Daily., Disp: 90 tablet, Rfl: 3    Semaglutide, 1 MG/DOSE, (Ozempic, 1 MG/DOSE,) 4 MG/3ML solution pen-injector, Inject 1 mg under the skin into the appropriate area as directed 1 (One) Time Per Week., Disp: 9 mL, Rfl: 1    Semaglutide,0.25 or 0.5MG/DOS, (Ozempic, 0.25 or 0.5 MG/DOSE,) 2 MG/3ML  "solution pen-injector, Inject 0.25 mg under the skin into the appropriate area as directed 1 (One) Time Per Week for 28 days, THEN 0.5 mg 1 (One) Time Per Week for 90 days., Disp: 9 mL, Rfl: 0    Symbicort 160-4.5 MCG/ACT inhaler, Inhale 2 puffs 2 (Two) Times a Day., Disp: , Rfl:     Ventolin  (90 Base) MCG/ACT inhaler, , Disp: , Rfl:     Allergies:   No Known Allergies    Objective     Mental Status Exam:   MENTAL STATUS EXAM   General Appearance:  Cleanly groomed and dressed  Eye Contact:  Good eye contact  Attitude:  Cooperative and polite  Motor Activity:  Normal gait, posture  Muscle Strength:  Normal  Speech:  Normal rate, tone, volume  Language:  Spontaneous  Mood and affect:  Other  Other Comment:  Mood reported as \"I'm alright, little frustrated I guess\" with congruent but overall engaged and cooperative affect.  Hopelessness:  4  Loneliness: 4  Thought Process:  Goal-directed and linear  Associations/ Thought Content:  No delusions  Hallucinations:  None  Suicidal Ideations:  Not present  Homicidal Ideation:  Not present  Sensorium:  Alert and clear  Orientation:  Person, place, time and situation  Immediate Recall, Recent, and Remote Memory:  Intact  Attention Span/ Concentration:  Good  Fund of Knowledge:  Appropriate for age and educational level  Intellectual Functioning:  Average range  Insight:  Fair  Judgement:  Fair  Reliability:  Good  Impulse Control:  Good       Assessment / Plan      Visit Diagnosis/Orders Placed This Visit:    ICD-10-CM ICD-9-CM   1. Mood disorder  F39 296.90   2. Generalized anxiety disorder  F41.1 300.02   3. Major depressive disorder, recurrent episode, moderate  F33.1 296.32   4. Post traumatic stress disorder (PTSD)  F43.10 309.81        PLAN:  Safety: No acute safety concerns  Risk Assessment: Risk of self-harm acutely is low. Risk of self-harm chronically is also low, but could be further elevated in the event of treatment noncompliance and/or AODA.    Treatment " "Plan/Goals: Continue supportive psychotherapy efforts and medications as indicated. Treatment and medication options discussed during today's visit. Patient ackowledged and verbally consented to continue with current treatment plan and was educated on the importance of compliance with treatment and follow-up appointments. Patient seems reasonably able to adhere to treatment plan.      Assisted Patient in processing above session content; acknowledged and normalized patient’s thoughts, feelings, and concerns. Assisted patient in processing recent stressors/emotions/feelings and utilized Socratic Questioning techniques and open ended questions to encourage reflection. Reviewed recent symptom burdens and processed how medical stressors can affect the psychology of an individual and create more physical symptom burdens and worry. Explored stress management techniques such as deep breathing of which the patient reported \"helps\". Continued to discuss the importance of self care and physical activities and challenged patient to identify activities and interpersonal relationships to improve on.       Allowed Patient to freely discuss issues  without interruption or judgement with unconditional positive regard, active listening skills, and empathy. Therapist provided a safe, confidential environment to facilitate the development of a positive therapeutic relationship and encouraged open, honest communication. Assisted Patient in identifying risk factors which would indicate the need for higher level of care including thoughts to harm self or others and/or self-harming behavior and encouraged Patient to contact this office, call 911, or present to the nearest emergency room should any of these events occur. Discussed crisis intervention services and means to access. Patient adamantly and convincingly denies current suicidal or homicidal ideation or perceptual disturbance. Assisted Patient in processing session content; " acknowledged and normalized Patient’s thoughts, feelings, and concerns by utilizing a person-centered approach in efforts to build appropriate rapport and a positive therapeutic relationship with open and honest communication. .     Follow Up:   Return in about 2 weeks (around 5/6/2025) for Therapy session.    Clive Magallanes, [facility]  [facility]

## 2025-04-27 ENCOUNTER — PATIENT MESSAGE (OUTPATIENT)
Dept: FAMILY MEDICINE CLINIC | Facility: CLINIC | Age: 55
End: 2025-04-27
Payer: COMMERCIAL

## 2025-04-28 RX ORDER — SEMAGLUTIDE 2.68 MG/ML
2 INJECTION, SOLUTION SUBCUTANEOUS WEEKLY
Qty: 9 ML | Refills: 1 | Status: SHIPPED | OUTPATIENT
Start: 2025-04-28

## 2025-05-01 ENCOUNTER — PRIOR AUTHORIZATION (OUTPATIENT)
Dept: FAMILY MEDICINE CLINIC | Facility: CLINIC | Age: 55
End: 2025-05-01
Payer: COMMERCIAL

## 2025-05-06 ENCOUNTER — OFFICE VISIT (OUTPATIENT)
Dept: BEHAVIORAL HEALTH | Facility: CLINIC | Age: 55
End: 2025-05-06
Payer: COMMERCIAL

## 2025-05-06 VITALS
HEIGHT: 63 IN | HEART RATE: 106 BPM | OXYGEN SATURATION: 97 % | SYSTOLIC BLOOD PRESSURE: 130 MMHG | DIASTOLIC BLOOD PRESSURE: 92 MMHG | BODY MASS INDEX: 41.82 KG/M2 | WEIGHT: 236 LBS

## 2025-05-06 DIAGNOSIS — F42.2 MIXED OBSESSIONAL THOUGHTS AND ACTS: ICD-10-CM

## 2025-05-06 DIAGNOSIS — F41.1 GENERALIZED ANXIETY DISORDER: Primary | ICD-10-CM

## 2025-05-06 DIAGNOSIS — G47.20 CIRCADIAN RHYTHM SLEEP DISORDER, UNSPECIFIED TYPE: ICD-10-CM

## 2025-05-06 DIAGNOSIS — F90.2 ATTENTION DEFICIT HYPERACTIVITY DISORDER (ADHD), COMBINED TYPE: ICD-10-CM

## 2025-05-06 DIAGNOSIS — F43.10 POST TRAUMATIC STRESS DISORDER (PTSD): ICD-10-CM

## 2025-05-06 DIAGNOSIS — F25.0 SCHIZOAFFECTIVE DISORDER, BIPOLAR TYPE: ICD-10-CM

## 2025-05-06 DIAGNOSIS — F33.1 MAJOR DEPRESSIVE DISORDER, RECURRENT EPISODE, MODERATE: ICD-10-CM

## 2025-05-06 RX ORDER — DIAZEPAM 5 MG/1
5 TABLET ORAL DAILY PRN
Qty: 30 TABLET | Refills: 0 | Status: SHIPPED | OUTPATIENT
Start: 2025-05-06

## 2025-05-06 NOTE — PROGRESS NOTES
Follow Up Office Visit      Patient Name: Iliana Gray  : 1970   MRN: [id number]     Referring Provider: Kiley Blackwell PA    Chief Complaint:      ICD-10-CM ICD-9-CM   1. Generalized anxiety disorder  F41.1 300.02   2. Major depressive disorder, recurrent episode, moderate  F33.1 296.32   3. Schizoaffective disorder, bipolar type  F25.0 295.70   4. Post traumatic stress disorder (PTSD)  F43.10 309.81   5. Attention deficit hyperactivity disorder (ADHD), combined type  F90.2 314.01   6. Circadian rhythm sleep disorder, unspecified type  G47.20 327.30   7. Mixed obsessional thoughts and acts  F42.2 300.3        History of Present Illness:   Iliana Gray is a 54 y.o. female who is here today for follow up with psychiatric medications.    Subjective      Patient Reports: I like the vraylar but it makes me eat a little more and my restless legs gets worse at night.  I have been taking it at bedtime.    I also quit smoking last July which also made me eat more.  Used nicotine patch to quit.  I am on ozempic and it seems to be helping me lose weight slowly.  Makes me nauseated for a couple days.    I have been taking tylenol pm to sleep lately.  The valium doesn't seem to work like it used to when I would use it for sleep.  I want to stop taking the tylenol though, I know its not good for me.    Review of Systems:   Review of Systems   Psychiatric/Behavioral:  Positive for sleep disturbance.        Screening Scores:   PHQ-9 : 9  DEO-7 : 6    RISK ASSESSMENT:  Patient denies any high risk factors today.    Medications:     Current Outpatient Medications:     albuterol (PROVENTIL) (2.5 MG/3ML) 0.083% nebulizer solution, Take 2.5 mg by nebulization., Disp: , Rfl:     aspirin 81 MG chewable tablet, Chew 1 tablet Daily., Disp: , Rfl:     Cariprazine HCl (Vraylar) 3 MG capsule capsule, Take 1 capsule by mouth Daily., Disp: 30 capsule, Rfl: 2    Cholecalciferol (Vitamin D) 50 MCG (2000)  "tablet, Take 1 tablet by mouth Daily., Disp: 90 tablet, Rfl: 3    clobetasol (TEMOVATE) 0.05 % ointment, PLEASE SEE ATTACHED FOR DETAILED DIRECTIONS, Disp: , Rfl:     diazePAM (VALIUM) 5 MG tablet, Take 1 tablet by mouth Daily As Needed for Anxiety. Intended as 3 month supply., Disp: 30 tablet, Rfl: 0    estradiol (ESTRACE) 1 MG tablet, Take 1 tablet by mouth Daily., Disp: , Rfl:     fenofibrate (TRICOR) 145 MG tablet, Take 1 tablet by mouth Daily., Disp: 90 tablet, Rfl: 3    furosemide (Lasix) 40 MG tablet, Take 1 tablet by mouth Every Morning., Disp: 90 tablet, Rfl: 1    ipratropium (ATROVENT) 0.06 % nasal spray, INSTILL 2 SPRAYS INTRANASALLY 3 TIMES PER DAY FOR 5 DAYS, Disp: , Rfl:     nystatin (MYCOSTATIN) 925716 UNIT/GM cream, Apply 1 Application topically to the appropriate area as directed 2 (Two) Times a Day., Disp: 30 g, Rfl: 2    potassium chloride (KLOR-CON M10) 10 MEQ CR tablet, Take 1 tablet by mouth 2 (Two) Times a Day., Disp: 60 tablet, Rfl: 5    rosuvastatin (CRESTOR) 40 MG tablet, Take 1 tablet by mouth Daily., Disp: 90 tablet, Rfl: 3    Semaglutide, 2 MG/DOSE, (Ozempic, 2 MG/DOSE,) 8 MG/3ML solution pen-injector, Inject 2 mg under the skin into the appropriate area as directed 1 (One) Time Per Week., Disp: 9 mL, Rfl: 1    Symbicort 160-4.5 MCG/ACT inhaler, Inhale 2 puffs 2 (Two) Times a Day., Disp: , Rfl:     Ventolin  (90 Base) MCG/ACT inhaler, , Disp: , Rfl:     Medication Considerations:  ANA reviewed and appropriate.      Allergies:   No Known Allergies    Results Reviewed:   screeners     Objective     Physical Exam:  Vital Signs:   Vitals:    05/06/25 0857   BP: 130/92   Pulse: 106   SpO2: 97%   Weight: 107 kg (236 lb)   Height: 160 cm (63\")     Body mass index is 41.81 kg/m².     Mental Status Exam:   Hygiene:   good  Cooperation:  Cooperative  Eye Contact:  Good  Psychomotor Behavior:  Appropriate  Affect:  Blunted  Mood: normal  Speech:  Normal  Thought Process:  Linear  Thought " Content:  Normal  Suicidal:  None  Homicidal:  None  Hallucinations:  Auditory and Not demonstrated today  Delusion:  None  Memory:  Intact  Orientation:  Grossly intact  Reliability:  good  Insight:  Fair  Judgement:  Fair  Impulse Control:  Fair  Physical/Medical Issues:   nothing reported today      Assessment / Plan      Visit Diagnosis/Orders Placed This Visit:  Diagnoses and all orders for this visit:    1. Generalized anxiety disorder (Primary)  -     diazePAM (VALIUM) 5 MG tablet; Take 1 tablet by mouth Daily As Needed for Anxiety. Intended as 3 month supply.  Dispense: 30 tablet; Refill: 0    2. Major depressive disorder, recurrent episode, moderate  -     Cariprazine HCl (Vraylar) 3 MG capsule capsule; Take 1 capsule by mouth Daily.  Dispense: 30 capsule; Refill: 2    3. Schizoaffective disorder, bipolar type  -     Cariprazine HCl (Vraylar) 3 MG capsule capsule; Take 1 capsule by mouth Daily.  Dispense: 30 capsule; Refill: 2    4. Post traumatic stress disorder (PTSD)    5. Attention deficit hyperactivity disorder (ADHD), combined type    6. Circadian rhythm sleep disorder, unspecified type    7. Mixed obsessional thoughts and acts         Functional Status: Mild impairment     Prognosis: Fair with Ongoing Treatment     Impression/Formulation:  Patient appeared alert and oriented. Patient is receptive to assistance with maintaining a stable lifestyle.  Patient presents with history of     ICD-10-CM ICD-9-CM   1. Generalized anxiety disorder  F41.1 300.02   2. Major depressive disorder, recurrent episode, moderate  F33.1 296.32   3. Schizoaffective disorder, bipolar type  F25.0 295.70   4. Post traumatic stress disorder (PTSD)  F43.10 309.81   5. Attention deficit hyperactivity disorder (ADHD), combined type  F90.2 314.01   6. Circadian rhythm sleep disorder, unspecified type  G47.20 327.30   7. Mixed obsessional thoughts and acts  F42.2 300.3   Patient screened positive for depression based on a PHQ-9  score of 9 on 5/6/2025. Follow-up recommendations include: Continue with medication management.  Vraylar has been very effective for managing patients schizoaffective disorder but it causes an increased appetite and irritates her restless legs.  She is now on ozempic and losing weight slowly.     We discussed benefits of vraylar out weighing the side effects and patient agrees.  Advised patient to move vraylar to morning dosing and try the diazepam again at night when she needs it for sleep and stop the tylenol pm.   If this fails we may look at switching diazepam to clonazepam.    Treatment Plan:   Continue diazepam prn for sleep/anxiety at night  Continue vraylar-move to morning dosing.    Patient will continue supportive psychotherapy efforts and medications as indicated. Clinic will obtain release of information for current treatment team for continuity of care as needed. Patient will contact this office, call 911 or present to the nearest emergency room should suicidal or homicidal ideations occur.  Discussed medication options and treatment plan of prescribed medication(s) as well as the risks, benefits, and potential side effects. Patient ackowledged and verbally consented to continue with current treatment plan and was educated on the importance of compliance with treatment and follow-up appointments.     Follow Up:   Return in about 3 months (around 8/6/2025) for Med Check.        MAN Win, JULIAN-BC  [facility]     This is electronically signed by MAN Win, JAMES  [unfilled] 09:37 EDT

## 2025-05-27 ENCOUNTER — OFFICE VISIT (OUTPATIENT)
Dept: BEHAVIORAL HEALTH | Facility: CLINIC | Age: 55
End: 2025-05-27
Payer: COMMERCIAL

## 2025-05-27 DIAGNOSIS — F42.2 MIXED OBSESSIONAL THOUGHTS AND ACTS: ICD-10-CM

## 2025-05-27 DIAGNOSIS — F41.1 GENERALIZED ANXIETY DISORDER: Primary | ICD-10-CM

## 2025-05-27 DIAGNOSIS — F43.10 POST TRAUMATIC STRESS DISORDER (PTSD): ICD-10-CM

## 2025-05-27 DIAGNOSIS — F33.1 MAJOR DEPRESSIVE DISORDER, RECURRENT EPISODE, MODERATE: ICD-10-CM

## 2025-05-27 NOTE — PROGRESS NOTES
"     Follow Up Adult Note     Date:2025   Patient Name: Iliana Gray  : 1970   MRN: [id number]   Time IN: 10:50 am    Time OUT: 11:45 am     Referring Provider: Kiley Blackwell PA    Chief Complaint:      ICD-10-CM ICD-9-CM   1. Generalized anxiety disorder  F41.1 300.02   2. Mixed obsessional thoughts and acts  F42.2 300.3   3. Post traumatic stress disorder (PTSD)  F43.10 309.81   4. Major depressive disorder, recurrent episode, moderate  F33.1 296.32        History of Present Illness:   Iliana Gray is a 54 y.o., single, employed (part time) female who is being seen today for scheduled Psychotherapy session. Mood reported as \"alright\" with intermittently mildly anxious but overall cooperative affect. Patient presented with some mild anxiety reporting, \"some elevated anxiety and general worry\" with a focus on health concerns around \"my breathing get's shallow\" causing panic and \"feeling claustrophobic\" and \"just general fears\" along with her granddaughter having a knee surgery scheduled. Patient remained calm, cooperative, engaged, and pleasant with provider and denied any current SI/HI/AVH.      Subjective     PHQ-9 Depression Screening  PHQ-9 Total Score:  Not completed at this time     DEO-7   Not completed at this time    Patient's Support Network Includes:  daughter and extended family    Functional Status: Moderate impairment     Progress toward goal: Not at goal    Prognosis: Good with Ongoing Treatment     Medications:     Current Outpatient Medications:     albuterol (PROVENTIL) (2.5 MG/3ML) 0.083% nebulizer solution, Take 2.5 mg by nebulization., Disp: , Rfl:     aspirin 81 MG chewable tablet, Chew 1 tablet Daily., Disp: , Rfl:     Cariprazine HCl (Vraylar) 3 MG capsule capsule, Take 1 capsule by mouth Daily., Disp: 30 capsule, Rfl: 2    Cholecalciferol (Vitamin D) 50 MCG ( UT) tablet, Take 1 tablet by mouth Daily., Disp: 90 tablet, Rfl: 3    clobetasol (TEMOVATE) " "0.05 % ointment, PLEASE SEE ATTACHED FOR DETAILED DIRECTIONS, Disp: , Rfl:     diazePAM (VALIUM) 5 MG tablet, Take 1 tablet by mouth Daily As Needed for Anxiety. Intended as 3 month supply., Disp: 30 tablet, Rfl: 0    estradiol (ESTRACE) 1 MG tablet, Take 1 tablet by mouth Daily., Disp: , Rfl:     fenofibrate (TRICOR) 145 MG tablet, Take 1 tablet by mouth Daily., Disp: 90 tablet, Rfl: 3    furosemide (Lasix) 40 MG tablet, Take 1 tablet by mouth Every Morning., Disp: 90 tablet, Rfl: 1    ipratropium (ATROVENT) 0.06 % nasal spray, INSTILL 2 SPRAYS INTRANASALLY 3 TIMES PER DAY FOR 5 DAYS, Disp: , Rfl:     nystatin (MYCOSTATIN) 945345 UNIT/GM cream, Apply 1 Application topically to the appropriate area as directed 2 (Two) Times a Day., Disp: 30 g, Rfl: 2    potassium chloride (KLOR-CON M10) 10 MEQ CR tablet, Take 1 tablet by mouth 2 (Two) Times a Day., Disp: 60 tablet, Rfl: 5    rosuvastatin (CRESTOR) 40 MG tablet, Take 1 tablet by mouth Daily., Disp: 90 tablet, Rfl: 3    Semaglutide, 2 MG/DOSE, (Ozempic, 2 MG/DOSE,) 8 MG/3ML solution pen-injector, Inject 2 mg under the skin into the appropriate area as directed 1 (One) Time Per Week., Disp: 9 mL, Rfl: 1    Symbicort 160-4.5 MCG/ACT inhaler, Inhale 2 puffs 2 (Two) Times a Day., Disp: , Rfl:     Ventolin  (90 Base) MCG/ACT inhaler, , Disp: , Rfl:     Allergies:   No Known Allergies    Objective     Mental Status Exam:   MENTAL STATUS EXAM   General Appearance:  Cleanly groomed and dressed  Eye Contact:  Good eye contact  Attitude:  Cooperative and polite  Motor Activity:  Normal gait, posture  Muscle Strength:  Normal  Speech:  Normal rate, tone, volume  Language:  Spontaneous  Mood and affect:  Other  Other Comment:  Mood reported as \"alright\" with intermittently mildly anxious but overall cooperative affect.  Hopelessness:  Denies  Loneliness: 3  Thought Process:  Goal-directed and linear  Associations/ Thought Content:  No delusions  Hallucinations:  " None  Suicidal Ideations:  Not present  Homicidal Ideation:  Not present  Sensorium:  Alert and clear  Orientation:  Person, place, time and situation  Immediate Recall, Recent, and Remote Memory:  Intact  Attention Span/ Concentration:  Good  Fund of Knowledge:  Appropriate for age and educational level  Intellectual Functioning:  Average range  Insight:  Good  Judgement:  Good  Reliability:  Good  Impulse Control:  Good       Assessment / Plan      Visit Diagnosis/Orders Placed This Visit:    ICD-10-CM ICD-9-CM   1. Generalized anxiety disorder  F41.1 300.02   2. Mixed obsessional thoughts and acts  F42.2 300.3   3. Post traumatic stress disorder (PTSD)  F43.10 309.81   4. Major depressive disorder, recurrent episode, moderate  F33.1 296.32        PLAN:  Safety: No acute safety concerns  Risk Assessment: Risk of self-harm acutely is low. Risk of self-harm chronically is also low, but could be further elevated in the event of treatment noncompliance and/or AODA.    Treatment Plan/Goals: Continue supportive psychotherapy efforts and medications as indicated. Treatment and medication options discussed during today's visit. Patient ackowledged and verbally consented to continue with current treatment plan and was educated on the importance of compliance with treatment and follow-up appointments. Patient seems reasonably able to adhere to treatment plan.      Assisted Patient in processing above session content; acknowledged and normalized patient’s thoughts, feelings, and concerns. Assisted patient in processing recent stressors/emotions/feelings and utilized Socratic Questioning techniques and open ended questions to encourage reflection. Continued to utilize CBT techniques to challenge anxious thoughts and behaviors while discussing exposure therapy strategies to gradually increase tolerance around fears. Encouraged mindfulness and relaxation exercises while discussing the relationship between anxiety and physical  reactions. Patient was receptive to therapeutic feedback.    Allowed Patient to freely discuss issues  without interruption or judgement with unconditional positive regard, active listening skills, and empathy. Therapist provided a safe, confidential environment to facilitate the development of a positive therapeutic relationship and encouraged open, honest communication. Assisted Patient in identifying risk factors which would indicate the need for higher level of care including thoughts to harm self or others and/or self-harming behavior and encouraged Patient to contact this office, call 911, or present to the nearest emergency room should any of these events occur. Discussed crisis intervention services and means to access. Patient adamantly and convincingly denies current suicidal or homicidal ideation or perceptual disturbance. Assisted Patient in processing session content; acknowledged and normalized Patient’s thoughts, feelings, and concerns by utilizing a person-centered approach in efforts to build appropriate rapport and a positive therapeutic relationship with open and honest communication. .     Follow Up:   Return in about 2 weeks (around 6/10/2025) for Therapy session.    Clive Magallanes [facility]  [facility]

## 2025-06-24 ENCOUNTER — OFFICE VISIT (OUTPATIENT)
Dept: BEHAVIORAL HEALTH | Facility: CLINIC | Age: 55
End: 2025-06-24
Payer: COMMERCIAL

## 2025-06-24 DIAGNOSIS — F33.1 MAJOR DEPRESSIVE DISORDER, RECURRENT EPISODE, MODERATE: ICD-10-CM

## 2025-06-24 DIAGNOSIS — F39 MOOD DISORDER: ICD-10-CM

## 2025-06-24 DIAGNOSIS — F43.10 POST TRAUMATIC STRESS DISORDER (PTSD): ICD-10-CM

## 2025-06-24 DIAGNOSIS — F42.2 MIXED OBSESSIONAL THOUGHTS AND ACTS: ICD-10-CM

## 2025-06-24 DIAGNOSIS — F41.1 GENERALIZED ANXIETY DISORDER: Primary | ICD-10-CM

## 2025-06-24 NOTE — PROGRESS NOTES
"     Follow Up Adult Note     Date:2025   Patient Name: Iliana Gray  : 1970   MRN: [id number]   Time IN: 11:03 am    Time OUT: 11:44 am     Referring Provider: Kiley Blackwell PA    Chief Complaint:      ICD-10-CM ICD-9-CM   1. Generalized anxiety disorder  F41.1 300.02   2. Mixed obsessional thoughts and acts  F42.2 300.3   3. Mood disorder  F39 296.90   4. Post traumatic stress disorder (PTSD)  F43.10 309.81   5. Major depressive disorder, recurrent episode, moderate  F33.1 296.32        History of Present Illness:   Iliana Gray is a 54 y.o., single, employed (part time)  female who is being seen today for scheduled Psychotherapy session. Mood reported as \"doing alright\" with somewhat anxious and worried but overall engaged and cooperative affect. Patient presented with intermittent anxiety and appeared nervous/worried at times when speaking about recent symptom burdens of anxiety but remained overall calm, cooperative, engaged, and pleasant with provider. Patient denied any current SI/HI/AVH. Patient reports experiencing significantly more anxious thoughts/feelings over the past few weeks, citing increased intrusive and irrational thoughts around \"fears, my health to include dying, and someone I love getting hurt\". Patient reports difficulty sleeping due to racing thoughts and also voiced she \"thinks the anxiety might be related to my medications\" (specifically her weight management medication).     \"My anxiety has been horrible-My brain is always going and all over the place\"  \"I have just been really worrying about everything, like literally everything\"  \"My thoughts have been more irrational and just bothersome\"      Subjective     PHQ-9 Depression Screening  PHQ-9 Total Score:  Not completed at this time     DEO-7   Not completed at this time    Patient's Support Network Includes:  daughter and extended family    Functional Status: Moderate impairment     Progress " toward goal: Not at goal    Prognosis: Fair with Ongoing Treatment     Medications:     Current Outpatient Medications:     albuterol (PROVENTIL) (2.5 MG/3ML) 0.083% nebulizer solution, Take 2.5 mg by nebulization., Disp: , Rfl:     aspirin 81 MG chewable tablet, Chew 1 tablet Daily., Disp: , Rfl:     Cariprazine HCl (Vraylar) 3 MG capsule capsule, Take 1 capsule by mouth Daily., Disp: 30 capsule, Rfl: 2    Cholecalciferol (Vitamin D) 50 MCG (2000 UT) tablet, Take 1 tablet by mouth Daily., Disp: 90 tablet, Rfl: 3    clobetasol (TEMOVATE) 0.05 % ointment, PLEASE SEE ATTACHED FOR DETAILED DIRECTIONS, Disp: , Rfl:     diazePAM (VALIUM) 5 MG tablet, Take 1 tablet by mouth Daily As Needed for Anxiety. Intended as 3 month supply., Disp: 30 tablet, Rfl: 0    estradiol (ESTRACE) 1 MG tablet, Take 1 tablet by mouth Daily., Disp: , Rfl:     fenofibrate (TRICOR) 145 MG tablet, Take 1 tablet by mouth Daily., Disp: 90 tablet, Rfl: 3    furosemide (Lasix) 40 MG tablet, Take 1 tablet by mouth Every Morning., Disp: 90 tablet, Rfl: 1    ipratropium (ATROVENT) 0.06 % nasal spray, INSTILL 2 SPRAYS INTRANASALLY 3 TIMES PER DAY FOR 5 DAYS, Disp: , Rfl:     nystatin (MYCOSTATIN) [id number] UNIT/GM cream, Apply 1 Application topically to the appropriate area as directed 2 (Two) Times a Day., Disp: 30 g, Rfl: 2    potassium chloride (KLOR-CON M10) 10 MEQ CR tablet, Take 1 tablet by mouth 2 (Two) Times a Day., Disp: 60 tablet, Rfl: 5    rosuvastatin (CRESTOR) 40 MG tablet, Take 1 tablet by mouth Daily., Disp: 90 tablet, Rfl: 3    Semaglutide, 2 MG/DOSE, (Ozempic, 2 MG/DOSE,) 8 MG/3ML solution pen-injector, Inject 2 mg under the skin into the appropriate area as directed 1 (One) Time Per Week., Disp: 9 mL, Rfl: 1    Symbicort 160-4.5 MCG/ACT inhaler, Inhale 2 puffs 2 (Two) Times a Day., Disp: , Rfl:     Ventolin  (90 Base) MCG/ACT inhaler, , Disp: , Rfl:     Allergies:   No Known Allergies    Objective     Mental Status Exam:   MENTAL  "STATUS EXAM   General Appearance:  Cleanly groomed and dressed and well developed  Eye Contact:  Good eye contact  Attitude:  Cooperative and polite  Motor Activity:  Normal gait, posture and fidgety  Muscle Strength:  Normal  Speech:  Normal rate, tone, volume  Language:  Spontaneous  Mood and affect:  Other  Other Comment:  Mood reported as \"doing alright\" with somewhat anxious and worried but overall engaged and cooperative affect.  Hopelessness:  2  Loneliness: 2  Thought Process:  Goal-directed and linear  Associations/ Thought Content:  No delusions  Hallucinations:  None  Suicidal Ideations:  Not present  Homicidal Ideation:  Not present  Sensorium:  Alert and clear  Orientation:  Person, place, time and situation  Immediate Recall, Recent, and Remote Memory:  Intact  Attention Span/ Concentration:  Good  Fund of Knowledge:  Appropriate for age and educational level  Intellectual Functioning:  Average range  Insight:  Fair  Judgement:  Good  Reliability:  Good  Impulse Control:  Good       Assessment / Plan      Visit Diagnosis/Orders Placed This Visit:    ICD-10-CM ICD-9-CM   1. Generalized anxiety disorder  F41.1 300.02   2. Mixed obsessional thoughts and acts  F42.2 300.3   3. Mood disorder  F39 296.90   4. Post traumatic stress disorder (PTSD)  F43.10 309.81   5. Major depressive disorder, recurrent episode, moderate  F33.1 296.32        PLAN:  Safety: No acute safety concerns  Risk Assessment: Risk of self-harm acutely is low. Risk of self-harm chronically is also low, but could be further elevated in the event of treatment noncompliance and/or AODA.    Treatment Plan/Goals: Continue supportive psychotherapy efforts and medications as indicated. Treatment and medication options discussed during today's visit. Patient ackowledged and verbally consented to continue with current treatment plan and was educated on the importance of compliance with treatment and follow-up appointments. Patient seems " reasonably able to adhere to treatment plan.      Assisted Patient in processing above session content; acknowledged and normalized patient’s thoughts, feelings, and concerns. Assisted patient in processing recent stressors/emotions/feelings and utilized Socratic Questioning techniques and open ended questions to encourage reflection. Continued to implement CBT techniques to challenge irrational beliefs and thoughts related to recent increase in worrying and obsessions. Reviewed medications and challenged patient to process timeframes for recent anxiety. Continued to encourage physical activities to help with overall mood. Patient was receptive to therapeutic feedback. Reviewed treatment plan    Allowed Patient to freely discuss issues  without interruption or judgement with unconditional positive regard, active listening skills, and empathy. Therapist provided a safe, confidential environment to facilitate the development of a positive therapeutic relationship and encouraged open, honest communication. Assisted Patient in identifying risk factors which would indicate the need for higher level of care including thoughts to harm self or others and/or self-harming behavior and encouraged Patient to contact this office, call 911, or present to the nearest emergency room should any of these events occur. Discussed crisis intervention services and means to access. Patient adamantly and convincingly denies current suicidal or homicidal ideation or perceptual disturbance. Assisted Patient in processing session content; acknowledged and normalized Patient’s thoughts, feelings, and concerns by utilizing a person-centered approach in efforts to build appropriate rapport and a positive therapeutic relationship with open and honest communication. .     Follow Up:   Return in about 2 weeks (around 7/8/2025) for Therapy session.    Clive Magallanes LCSW [facility]

## 2025-06-25 NOTE — PLAN OF CARE
Discussed care/treatment plan and updated to meet current mental health needs. Patient continues to voice anxiety as ongoing largest concern.

## 2025-06-25 NOTE — TREATMENT PLAN
Multi-Disciplinary Problems (from Behavioral Health Treatment Plan)      Active Problems       Problem: Anxiety  Start Date: 06/25/25      Problem Details: The patient self-scales this problem as a 7 with 10 being the worst.          Goal Priority Start Date Expected End Date End Date    Patient will develop and implement behavioral and cognitive strategies to reduce anxiety and irrational fears. -- 06/25/25 12/24/25 --    Goal Details: Progress toward goal:  The patient self-scales their progress related to this goal as a 7 with 10 being the worst.        Goal Intervention Frequency Start Date End Date    Help patient explore past emotional issues in relation to present anxiety. Q3 Months 06/25/25 --    Intervention Details: Duration of treatment until patient is able to identify experiences causing distress and anxiety and develop a more realistic understanding of presenting anxiety and cultivate a sense of acceptance towards health concerns and fears and focus on living a meaningful life.        Goal Intervention Frequency Start Date End Date    Help patient develop an awareness of their cognitive and physical responses to anxiety. Q3 Months 06/25/25 --    Intervention Details: Duration of treatment until patient is able to develop, and identify an understanding of the physical, emotional, and cognitive symptoms of anxiety along with identifying triggers for anxiety and learning strategies for managing triggers and anxiety symptoms.                Problem: Depression  Start Date: 06/25/25      Problem Details: The patient self-scales this problem as a 5 with 10 being the worst.          Goal Priority Start Date Expected End Date End Date    Patient will demonstrate the ability to initiate new constructive life skills outside of sessions on a consistent basis. -- 06/25/25 12/24/25 --    Goal Details: Progress toward goal:  The patient self-scales their progress related to this goal as a 5 with 10 being the  worst.        Goal Intervention Frequency Start Date End Date    Assist patient in setting attainable activities of daily living goals. Q3 Months 06/25/25 --    Intervention Details: Client will identify and engage in enjoyable activities that promote relaxation and positive emotions to include hobbies and/or socializing        Goal Intervention Frequency Start Date End Date    Provide education about depression Q3 Months 06/25/25 --    Intervention Details: Duration of treatment until patient is able to identify physical, cognitive, and psychological symptoms and triggers of depression.        Goal Intervention Frequency Start Date End Date    Assist patient in developing healthy coping strategies. Q3 Months 06/25/25 --    Intervention Details: Duration of treatment until patient is able to identify and effectively practice stress management skills such as deep breathing, visualization, mindfulness, to manage negative emotions and triggers related to depression.                Problem: Grief and Loss  Start Date: 06/25/25      Problem Details: The patient self-scales this problem as a 3 with 10 being the worst.          Goal Priority Start Date Expected End Date End Date    Patient will process feelings and identify and implement specific ways to facilitate the grieving process. -- 06/25/25 12/24/25 --    Goal Details: Progress toward goal:  The patient self-scales their progress related to this goal as a 3 with 10 being the worst.        Goal Intervention Frequency Start Date End Date    Assist patient in identifying and processing feelings about losses. Q3 Months 06/25/25 --    Intervention Details: Duration of treatment until patient is able to identify and acknowledge his feelings related to the loss of her family members and identify and discuss any unresolved issues or difficult emotions related to the loss such as regret or anger, and work towards resolution or acceptance via exploration and processing  during therapy sessions.        Goal Intervention Frequency Start Date End Date    Identify ways to grieve effectively and utilize healthy support systems Q3 Months 06/25/25 --    Intervention Details: Duration of treatment until patient develops coping strategies to manage the emotional and psychological effects of grief and a sense of closure to loss and identifies supports.                Problem: Mood Instability  Start Date: 06/25/25      Problem Details: The patient self-scales this problem as a 5 with 10 being the worst.          Goal Priority Start Date Expected End Date End Date    Patient will achieve mood stability as evidenced by controlled behavior and a more deliberate thought process -- 06/25/25 12/24/25 --    Goal Details: Progress toward goal:  The patient self-scales their progress related to this goal as a 5 with 10 being the worst.        Goal Intervention Frequency Start Date End Date    Provide structure and focus to patient's thoughts and actions by establishing plans and routine. Q3 Months 06/25/25 --    Intervention Details: Duration of treatment until patient develops an understanding of how mood regulation can impact daily life and relationships, is able to identify the connection between thoughts, emotions, and behaviors; and be able to utilize problem solving skills to cope with challenging situations and emotions.                Problem: Trauma and Stressor Related Disorders  Start Date: 06/25/25      Problem Details: The patient self-scales this problem as a 4 with 10 being the worst.          Goal Priority Start Date Expected End Date End Date    Patient will process and move through trauma in a way that improves self regard and the patients ability to function optimally in the world around them. -- 06/25/25 12/24/25 --    Goal Details: Progress toward goal:  The patient self-scales their progress related to this goal as a 3 with 10 being the worst.        Goal Intervention Frequency  Start Date End Date    Process trauma in the context of the safe session environment. Q3 Months 06/25/25 --    Intervention Details: Duration of treatment until patient is able to identify three emotions that are often triggered by the trauma and practice coping skills to manage them during sessions.        Goal Intervention Frequency Start Date End Date    Develop a plan of behavior changes that will reduce the stress of the trauma. Q3 Months 06/25/25 --    Intervention Details: Duration of treatment until patient identifies a healthy self care plan that includes at least three activities that promote positive emotions and demonstrate the ability to utilize healthy coping techniques.                Problem: Self Esteem  Start Date: 06/25/25      Problem Details: The patient self-scales this problem as a 6 with 10 being the worst.          Goal Priority Start Date Expected End Date End Date    Patient will demonstrate the ability to internalize positive cognitive messages that is also reflected in behavioral changes. -- 06/25/25 12/24/25 --    Goal Details: Progress toward goal:  The patient self-scales their progress related to this goal as a 6 with 10 being the worst.        Goal Intervention Frequency Start Date End Date    Reinforce use of more realistic positive messages about to self in interpreting life events. Q3 Months 06/25/25 --    Intervention Details: Duration of treatment until patient is able to identify and challenge negative self talk and beliefs and replace them with positive affirmations and work on developing positive relationships with peers and engaging in activities that promote self esteem.                        Reviewed By       Clive Magallanes LCSW 06/25/25 1129                     I have discussed and reviewed this treatment plan with the patient.

## 2025-07-07 ENCOUNTER — TELEPHONE (OUTPATIENT)
Dept: CARDIOLOGY | Facility: CLINIC | Age: 55
End: 2025-07-07

## 2025-07-07 ENCOUNTER — OFFICE VISIT (OUTPATIENT)
Dept: CARDIOLOGY | Facility: CLINIC | Age: 55
End: 2025-07-07
Payer: COMMERCIAL

## 2025-07-07 VITALS
DIASTOLIC BLOOD PRESSURE: 82 MMHG | HEIGHT: 63 IN | SYSTOLIC BLOOD PRESSURE: 154 MMHG | WEIGHT: 237 LBS | BODY MASS INDEX: 41.99 KG/M2 | RESPIRATION RATE: 18 BRPM | HEART RATE: 84 BPM | OXYGEN SATURATION: 99 % | TEMPERATURE: 97 F

## 2025-07-07 DIAGNOSIS — R80.9 TYPE 2 DIABETES MELLITUS WITH DIABETIC MICROALBUMINURIA, WITHOUT LONG-TERM CURRENT USE OF INSULIN: ICD-10-CM

## 2025-07-07 DIAGNOSIS — R60.0 BILATERAL LEG EDEMA: Primary | ICD-10-CM

## 2025-07-07 DIAGNOSIS — E78.5 HYPERLIPIDEMIA LDL GOAL <70: ICD-10-CM

## 2025-07-07 DIAGNOSIS — E66.813 CLASS 3 SEVERE OBESITY WITH SERIOUS COMORBIDITY AND BODY MASS INDEX (BMI) OF 40.0 TO 44.9 IN ADULT: ICD-10-CM

## 2025-07-07 DIAGNOSIS — G47.33 OBSTRUCTIVE SLEEP APNEA: ICD-10-CM

## 2025-07-07 DIAGNOSIS — R06.02 SHORTNESS OF BREATH: ICD-10-CM

## 2025-07-07 DIAGNOSIS — E11.29 TYPE 2 DIABETES MELLITUS WITH DIABETIC MICROALBUMINURIA, WITHOUT LONG-TERM CURRENT USE OF INSULIN: ICD-10-CM

## 2025-07-07 PROBLEM — Z00.00 GENERAL MEDICAL EXAM: Status: RESOLVED | Noted: 2025-01-21 | Resolved: 2025-07-07

## 2025-07-07 PROCEDURE — 93000 ELECTROCARDIOGRAM COMPLETE: CPT | Performed by: INTERNAL MEDICINE

## 2025-07-07 PROCEDURE — 1160F RVW MEDS BY RX/DR IN RCRD: CPT | Performed by: INTERNAL MEDICINE

## 2025-07-07 PROCEDURE — 99214 OFFICE O/P EST MOD 30 MIN: CPT | Performed by: INTERNAL MEDICINE

## 2025-07-07 PROCEDURE — 1159F MED LIST DOCD IN RCRD: CPT | Performed by: INTERNAL MEDICINE

## 2025-07-07 RX ORDER — SPIRONOLACTONE 25 MG/1
25 TABLET ORAL DAILY
Qty: 90 TABLET | Refills: 3 | Status: SHIPPED | OUTPATIENT
Start: 2025-07-07

## 2025-07-07 NOTE — TELEPHONE ENCOUNTER
Caller: Iliana Gray    Relationship: Self    Best call back number: [phone]    What is the best time to reach you: ANY    Who are you requesting to speak with (clinical staff, provider,  specific staff member):       What was the call regarding: DR. MARTINEZ'S OFFICE IS REQUESTING HER EKG FROM [facility]. THEIR FAX IS  FAX: [phone]  THIS IS PER PT CALLING IN ASKING FOR THIS TO BE SENT OVER.     NOT SURE WHO DR. MARTINEZ IS, PLEASE CLARIFY WITH PT IF NEEDING MORE INFORMATION.

## 2025-07-07 NOTE — PROGRESS NOTES
[facility] [place]  [facility] CARDIOLOGY  [address] [place] [address]  Dept: [phone]  Dept Fax: [phone]    Iliana Gray  1970    Follow Up Office Visit Note    History of Present Illness:  Iliana Gray is a 54 y.o. female who presents to the clinic for Follow-up, Shortness of Breath, and Edema.  She persist having the SOB, her stress nuclear was normal BP is 135.80,she has 2 plus edema, has SELENE but does not uses the CPAP regularly her Ef is normal, as her BP is now elevated will use Aldactone 25mg    The following portions of the patient's history were reviewed and updated as appropriate: allergies, current medications, past family history, past medical history, past social history, past surgical history, and problem list.    Medications:  albuterol  aspirin  Cariprazine HCl capsule  clobetasol  diazePAM  estradiol  fenofibrate  furosemide  ipratropium  nystatin cream  Ozempic (2 MG/DOSE) solution pen-injector  potassium chloride  rosuvastatin  spironolactone  Vitamin D    Subjective  No Known Allergies     Past Medical History:   Diagnosis Date    Anxiety     Arthritis     Asthma     Diffuse large B cell lymphoma     Sleep apnea     cpap       Past Surgical History:   Procedure Laterality Date    HYSTERECTOMY      TUMOR EXCISION Right 2021    lymph node removal/biopsy/right inner thigh    VENOUS ACCESS DEVICE (PORT) INSERTION Right 2021       History reviewed. No pertinent family history.     Social History     Socioeconomic History    Marital status:    Tobacco Use    Smoking status: Former     Current packs/day: 0.00     Average packs/day: 1 pack/day for 42.0 years (42.0 ttl pk-yrs)     Types: Cigarettes     Start date:      Quit date:      Years since quittin.5     Passive exposure: Past    Smokeless tobacco: Never   Vaping Use    Vaping status: Never Used   Substance and Sexual Activity    Alcohol use: Not Currently     "Drug use: Never    Sexual activity: Yes     Partners: Male       Review of Systems   Constitutional: Negative.    HENT: Negative.     Respiratory:  Positive for shortness of breath.    Cardiovascular:  Positive for leg swelling.   Endocrine: Negative.    Genitourinary: Negative.    Musculoskeletal: Negative.    Skin: Negative.    Allergic/Immunologic: Negative.    Neurological: Negative.    Hematological: Negative.    Psychiatric/Behavioral: Negative.         Cardiovascular Procedures    ECHO/MUGA:  STRESS TESTS:   CARDIAC CATH:   DEVICES:   HOLTER:   CT/MRI:   VASCULAR:   CARDIOTHORACIC:     Objective  Vitals:    07/07/25 1138   BP: 154/82   BP Location: Right arm   Patient Position: Sitting   Cuff Size: Adult   Pulse: 84   Resp: 18   Temp: 97 °F (36.1 °C)   TempSrc: Infrared   SpO2: 99%   Weight: 108 kg (237 lb)   Height: 160 cm (63\")   PainSc: 0-No pain     Body mass index is 41.98 kg/m².     Physical Exam  Vitals reviewed.   Constitutional:       Appearance: Healthy appearance. Not in distress.   Neck:      Vascular: No JVR. JVD normal.   Pulmonary:      Effort: Pulmonary effort is normal.      Breath sounds: Normal breath sounds. No wheezing. No rhonchi. No rales.   Chest:      Chest wall: Not tender to palpatation.   Cardiovascular:      PMI at left midclavicular line. Normal rate. Regular rhythm. Normal S1. Normal S2.       Murmurs: There is no murmur.      No gallop.  No click. No rub.   Pulses:     Intact distal pulses.   Edema:     Thigh: bilateral 2+ edema of the thigh.     Pretibial: bilateral 2+ edema of the pretibial area.     Ankle: bilateral 2+ edema of the ankle.     Feet: bilateral 2+ edema of the feet.  Abdominal:      General: Bowel sounds are normal.      Palpations: Abdomen is soft.      Tenderness: There is no abdominal tenderness.   Musculoskeletal: Normal range of motion.         General: No tenderness. Skin:     General: Skin is warm and dry.   Neurological:      General: No focal deficit " present.      Mental Status: Alert and oriented to person, place and time.        Diagnostic Data    ECG 12 Lead    Date/Time: 7/7/2025 12:58 PM  Performed by: Ralph Mg MD    Authorized by: Ralph Mg MD  Comparison: compared with previous ECG from 2/12/2025  Similar to previous ECG  Rate: normal  BPM: 99  QRS axis: normal    Clinical impression: normal ECG        Assessment and Plan  Diagnoses and all orders for this visit:    Bilateral leg edema- 2 plus, likely related to Obesity, SELENE advised to use the CPAP she is on lasix 40 mg daily  -     Comprehensive Metabolic Panel  -     proBNP    Class 3 severe obesity with serious comorbidity and body mass index (BMI) of 40.0 to 44.9 in adult  -     Comprehensive Metabolic Panel    Hyperlipidemia LDL goal <70- on Crestor 40 mg and also Fenofibrate  -     Comprehensive Metabolic Panel    Obstructive sleep apnea- needs to use the CPAP  -     Comprehensive Metabolic Panel    Type 2 diabetes mellitus with diabetic microalbuminuria, without long-term current use of insulin    Shortness of breath- Likely related to COPD, Asthma and also SELENE  -     Cancel: Basic Metabolic Panel  -     proBNP    Other orders  -     spironolactone (ALDACTONE) 25 MG tablet; Take 1 tablet by mouth Daily.         Return in about 6 months (around 1/7/2026) for Recheck with Dr. Mg.    Ralph Mg MD  07/07/2025

## 2025-07-08 LAB
ALBUMIN SERPL-MCNC: 4 G/DL (ref 3.8–4.9)
ALP SERPL-CCNC: 73 IU/L (ref 44–121)
ALT SERPL-CCNC: 15 IU/L (ref 0–32)
AST SERPL-CCNC: 20 IU/L (ref 0–40)
BILIRUB SERPL-MCNC: 0.3 MG/DL (ref 0–1.2)
BUN SERPL-MCNC: 12 MG/DL (ref 6–24)
BUN/CREAT SERPL: 21 (ref 9–23)
CALCIUM SERPL-MCNC: 9 MG/DL (ref 8.7–10.2)
CHLORIDE SERPL-SCNC: 103 MMOL/L (ref 96–106)
CO2 SERPL-SCNC: 25 MMOL/L (ref 20–29)
CREAT SERPL-MCNC: 0.58 MG/DL (ref 0.57–1)
EGFRCR SERPLBLD CKD-EPI 2021: 106 ML/MIN/1.73
GLOBULIN SER CALC-MCNC: 2.5 G/DL (ref 1.5–4.5)
GLUCOSE SERPL-MCNC: 124 MG/DL (ref 70–99)
NT-PROBNP SERPL-MCNC: <36 PG/ML (ref 0–287)
POTASSIUM SERPL-SCNC: 4.3 MMOL/L (ref 3.5–5.2)
PROT SERPL-MCNC: 6.5 G/DL (ref 6–8.5)
SODIUM SERPL-SCNC: 143 MMOL/L (ref 134–144)

## 2025-07-14 NOTE — PROGRESS NOTES
- Patient with sinus tachycardia for the last couple of days at rest.  - Patient does not have overt symptoms of infection, although procalcitonin was elevated;   - Lung exam benign  > Will start with bilateral lower extremity Dopplers per IM with low threshold to order CT PE study if concerning for clot  > Replace magnesium as above   CHIEF COMPLAINT: Shortness of breath    Problem List:  Oncology/Hematology History Overview Note   1.  Right Medial thigh stage IIa nonbulky less than 7.5 cm diffuse large b cell non-hodgkin lymphoma with component of background follicular lymphoma plan for R-CHOP x3 followed by ISRT.  Had CR after 3 courses of R-CHOP x3 ending 6/8/2021 per PET 6/22/2021.    -Completed radiation 8/16/2021 to the right pelvic and inguinofemoral nodes  -Fatigue and rash (eczema per dermatology) starting around August 2022 with negative CT chest abdomen pelvis.    Oncology history timeline:  -2/18/2021 hemoglobin 12.7 with normal white count platelet count differential.  CMP unremarkable.  Urinalysis with microscopic hematuria 20-30 red blood cells per milliliter.  AP single view chest x-ray emergency room [place] showed no acute findings.  CT abdomen pelvis with IV contrast emergency room [place] showed calcified granuloma right lung base, fatty liver infiltration, subcentimeter hypodensity left hepatic lobe too small to categorize, normal size spleen, no adrenal masses, no pancreatic mass, normal kidneys, and no retroperitoneal adenopathy or ascites or pneumoperitoneum.  There was a smooth marginated homogeneous enhancing 4.5 x 5 x 3.3 cm mass in the superficial soft tissues of the anterior upper right thigh.  -2/25/2021 office note from Dr. Gong mentions presentation to emergency room 2/18/2021 complaining of right upper thigh swelling for the preceding week of 2/12/2021.  CT scan abdomen pelvis to include the thighs performed at [place] [facility] emergency room revealing 5 cm smoothly marginated right upper thigh mass below the groin crease suspicious for pathologic lymph node but could be neoplastic of other nature given no prodromal illness, infections, or inflammation.  Recommendation for biopsy was made.  Per Dr. Gong note, she had been sick for the better part of the winter and had been urgent treatment centers  told that she had a viral infection flu and coronavirus negative with fevers and chills that subsequently resolved but for fatigue that did not resolve with hypersomnolence as well.  Says she feels hot all the time but no ronny night sweats.  -3/12/2021 right thigh mass needle core tissue sample extremely small and predominantly crushed limiting adequate evaluation.  Differential diagnosis for this B-cell lymphoma is diffuse large B cell, follicular lymphoma versus other.  Further differentiation could not be performed on this crush small sample.  -3/25/2021 [facility] hematology oncology initial consultation: I, Rodríguez Gerardo, saw for the first time today.  Reviewed the above story with her.  Still fatigued without night sweats or unexplained weight loss.  She has this mass in the soft tissue medial right thigh about 5 cm in size that does seem to move somewhat beneath the skin but also to my exam feels like it is attached to the deeper structures of the thigh as well.  I feel no other adenopathy or hepatosplenomegaly.  I will get a PET scan.  I spoken with Dr. Gong who will look at these images again with the CTs in [place].  If there is clear indication that this is not coming from soft tissue nonnodal structures, then excisional biopsy for adequate tissue diagnosis is important to determine the type of therapy.  This is growing since her biopsy 3/12/2021 but not daily like a very high-grade lymphoma.  To my hand, this does not feel like a typical stas structure and my other concern is, even though this does not venessa with any markers to suggest sarcoma or the like, is to be sure that this is not something other than lymphoma and I have spoken with Dr. Pino who will do desmin stains and other markers before doing an excisional biopsy next Wednesday with Dr. Gong.  If those additional stains suggest sarcomatoid features that can appear in the medial aspect of the thigh such as this, then she probably needs to  have excision by Humberto Vera orthopedic oncologist at .  In the meantime, presuming this to be some form of lymphoma which is the highest likelihood based on the pathology from Dr. Pino where this is CD20 positive, CD10 positive, BCL6 40% positive, MUM1 40% positive, CD23 strong and diffuse positive, and BCL-2 positive.  Ki-67 is 50%.  CD30 and only 5%.  C-Myc is weakly expressed in 10% of cells.  BRIAN by TAYLER is negative.  Cyclin D1 negative.  CD5 indeterminate for B and T-cell coexpression.  LEF 1 presumably staining T cells.  Ultimate decision for treatment depends on the specific pathologic subtype and the staging and I will get her prognostic labs going as well.  Given the relatively initial high-grade features of this that might require Adriamycin I will also proceed with echocardiogram.  -3/25/2021 CBC normal.  CMP unremarkable.  Sedimentation rate normal 27 with C-reactive protein elevated 1.7 upper limit of normal 0.5.  Beta-2 microglobulin normal 1.6.  Uric acid normal 4.3.  LDH normal 162.  Hepatitis B and C serologies negative.  -3/29/2021 echocardiogram ejection fraction 61 to 65%.  -3/31/21 excisional node biopsy shows diffuse large B cell non-Hodgkin lymphoma  -4/2/2021 PET shows postsurgical changes right proximal thigh and superficial inguinal region adjacent to adenopathy with SUV 4.0.  Right internal iliac lymph node mildly enlarged SUV 2.6 additionally noted.  No other adenopathy outside this region.  No mention of any abnormal marrow signal.  -4/5/2021 [facility] medical oncology follow-up visit: Per Dr. Arun Garcia pathologist, this is a diffuse large B cell non-Hodgkin lymphoma.  He is not sure whether this is double hit or not yet.  He is not sure if there is a background of follicular lymphoma and hence cannot say whether this is transformed.  Clinically her IPI score is 0 with her age less than 60, normal LDH, ECOG 0, no extranodal involvement on PET, and only stage II at worse per  PET with all nodes within the same region.  I will get a bone marrow biopsy.  If this is stage IV, double hit, or has evidence of transition from follicular lymphoma into diffuse large B-cell lymphoma, then I would give R-CHOP x6.  If the marrow is not involved, this is not double hit, and there is no evidence of follicular transformation into diffuse large B-cell lymphoma, then I would treat this as a stage II nonbulky diffuse large B-cell lymphoma and consider R-CHOP x3 followed by involved site radiation therapy.  I reviewed her PET and biopsy reports and echocardiogram which shows good ejection fraction.  She will need to get her [employer] Covid vaccine this week, family doctor established for the possibility of prednisone-induced diabetes, CT-guided bone marrow biopsy, chemo preparation visit, and a port placement with Dr. Gong.  We will present her at multidisciplinary tumor board this Friday.  I went into detail regarding the side effects of R-CHOP but she will have these reviewed at her chemo preparation visit.  Though not bulky, I still would treat her with allopurinol.  First course of therapy will occur in our [place] office and if all goes well then the subsequent Rituxan doses can be faster and performed in [place] office.    -4/14/2021 pathology update Dr. Garcia: Node pathology negative for double hit by FISH    -4/21/2021 bone marrow biopsy negative for lymphomatous involvement with scant stainable iron.      -4/26/2021 [facility] medical oncology follow-up visit: Given lack of marrow involvement, negative for double hit, and possible background follicular lymphoma , I will treat her as stage IIa nonbulky (original tumor 5 cm on CT, I.  E.  Less than 7.5 cm) diffuse large B-cell lymphoma right proximal thigh SUV 4.0 mass incisionally biopsied (with palpable residua) with mild right internal iliac node enlargement SUV 2.6 as well.  We will treat her with planned R-CHOP x3 followed by  involved site radiation therapy.  This was consensus of multidisciplinary tumor board 4/16/2021 as well.  First course [place].  Second course will occur in [place].  With the small component of background follicular lymphoma, there is potential risk of not only relapse of diffuse large B-cell lymphoma but later relapse from follicular lymphoma and we will keep that in mind in terms of following her up past the usual 3 years post definitive therapy presuming we get a complete remission on subsequent imaging and exams.  We will arrange radiation in [place] and I have made referral.  This will not start until after chemotherapy.    -5/13/2021 radiation oncology consultation Dr. Jl Infante for preparation of future involved field radiation therapy    -5/18/2021 [facility] oncology clinic follow-up: Had moderate amount of nausea after her first cycle, will add Zyprexa to Zofran and Compazine that she is already taking. She was also anxious, added Ativan to her care plan premeds. Prescription for Augmentin sent in for sinus infection. Clinically good response with reduction of right medial thigh mass. We will continue treatment in [place] due to mild infusion reaction with first Rituxan but she was able to complete treatment with additional medications. Today is cycle #2 of a planned 3 courses and then will repeat restaging scan to assess for response and proceed to radiation as outlined above.    -6/8/2021 course #3 Rituxan CHOP    -6/22/2021 PET shows resolution of right inguinal and iliac adenopathy.  Negative PET/CT.    -6/28/2021 [facility] medical oncology follow-up visit: I reviewed images and reports of PET as above.  Status post Rituxan CHOP, PET is essentially negative with complete clinical remission.With the small component of background follicular lymphoma, there is potential risk of not only relapse of diffuse large B-cell lymphoma but later relapse from follicular lymphoma and we will keep that in mind  in terms of following her up past the usual 3 years post definitive therapy presuming we get a complete remission on subsequent imaging and exams.  We will arrange radiation in [place] and Dr. Infante saw in mid May.  I called Dr. Infante to get her in to start involved field radiation and we will repeat PET and labs prior to return in 3 months for survivorship visit with my nurse practitioner who will lay out the follow-up according to NCCN guidelines.  Other than for modest anemia hemoglobin in the tens and glucose 150s, no other remarkable findings on CBC and CMP 6/8/2021.    -6/8/2021 completed course #3 of R-CHOP with resolution of right inguinal and iliac adenopathy on 6/22/2021 PET    -8/16/2021 completed 36 Gray in 18 fractions involved field right pelvic/inguinal femoral nodes Dr. Infante    -10/21/2021 [facility] Oncology clinic follow-up/survivorship visit: PET scan from 10/14/2021 was negative along with normal CBC and CMP.  She completed radiation in August without complications.  We will get her back to Dr. Gong for port removal.  We will continue surveillance per NCCN guidelines as follows:  -H&P and labs every 3-6 months for 5 years and then annually or as clinically indicated.    -We will do surveillance imaging post completion of treatment with CT chest, abdomen and pelvis with contrast every 6 months for 2 years and then annually or as clinically indicated.    -1/28/2022 CT chest abdomen pelvis with contrast shows no adenopathy chest abdomen pelvis and no acute process.  Hepatic steatosis with sigmoid diverticulosis.  Hemoglobin 13.6 with normal CBC and differential.  CMP entirely normal with creatinine 0.65.    -2/1/2022 [facility] medical oncology follow-up visit: I reviewed images and reports of scans as outlined above as well as labs.  No evidence of recurrence.  As outlined by my nurse practitioner survivorship visit, she will get an H&P every 3 to 6 months out to April 2026 and then annually  thereafter and we will do surveillance CT chest abdomen pelvis every 6 months until April 2023 and then annually as clinically indicated.  As I have stated previously, we would want to continue to follow her clinically past the usual 2 years of radiographic follow-up for signs or symptoms of follicular lymphoma as there was a small background follicular lymphomatous component that could recur late.  She will see my nurse practitioner back in 6 months with CTs just prior to return.  No further CBC or CMP in the absence of signs or symptoms given normalization of her counts.    -7/28/2022 CT chest, abdomen and pelvis with no evidence of lymphadenopathy in the chest, abdomen or pelvis, diffuse hepatic steatosis, sigmoid diverticulosis.    - 8/4/2022 [facility] Oncology clinic follow-up: Iliana has been feeling poorly since treatment ended a year ago for her history of lymphoma.  Current CT chest, abdomen and pelvis show no evidence of disease recurrence.  Her symptoms unfortunately have not improved since treatment ended and she is having more bad days than good.  She has a rash on the dorsum of her hands and foot that appear to be possibly eczema versus psoriasis.  We will get her to dermatology for further evaluation, I have asked her to call her insurance company to let us know the name of a provider that she would like to see and then we can put in a referral.  She has ongoing fatigue that has not improved with time, she is unable to do most activities without having to rest for the remainder of the day afterwards.  She also has nausea and loose stool with urgency.  I will get her to gastroenterology for an EGD and colonoscopy to rule out any GI involvement of her lymphoma as it did have follicular component.  I will also get labs today with CBC, CMP, we will also check HARRIS in light of her rash, will check inflammatory markers with sedimentation rate and CRP and an LDH.  We will see her back in a few months to go over  her labs and GI evaluation.  She does have a history of hepatic steatosis shown on all prior CTs therefore would benefit from GI evaluation regarding that also, has had normal LFTs on prior labs.      - 8/4/2022 data: CMP unremarkable.  LDH normal 174.  CRP mildly elevated 1.5 upper limit 0.9.  Sedimentation rate normal 9.  CBC normal with normal differential.  HARRIS negative    -9/19/2022 EGD and colonoscopy Mani Cleveland random colon biopsy negative.  Transverse colon polyp sessile serrated adenoma.  Duodenal biopsy with normal architecture.  Stomach biopsy mild chronic inactive gastritis with no dysplasia or metaplasia or H. pylori.  There was a lot of stool in the entire colon interfering with visualization.  Internal hemorrhoids.  EGD showed no gross lesions.    -9/27/2022 [facility] medical oncology follow-up: No clear-cut evidence of lymphoma on CTs or EGD and colonoscopy and labs are unremarkable and negative HARRIS.  Her dermatologist told her she had eczema.  Nonetheless she is still periodically so fatigued that she ends up going to the bed for 2 or 3 days and then rebounds and feels normal for a while and then this waxing and waning of performance status and fatigue continues.  We will get a PET through her thighs as that was the original site of her disease.  I will also have her get EBV and CMV and HIV and thyroid functions.    -10/7/2022 whole-body PET is essentially negative.  There is a solitary borderline focus SUV 2.8 near the left lung apex without CT correlate favored to be infectious/inflammatory change.  No hypermetabolism in particular of the lower extremities where her original disease resided.    -10/11/2022 [facility] medical oncology follow-up: States she had COVID September 24 which was before I last saw her but did not know that when last I saw her.  She did not get her EBV, CMV, HIV, thyroid functions but says she is now getting over the COVID as well as some sinusitis and she thinks that is the  fatigue.  Her PET was entirely negative..  We will continue to check her whole-body PET until April 2023 which is when we will see her back and then we will do that annually only as clinically indicated on annual H&P.    -10/27/2022 CBC normal with normal differential and unremarkable CMP, B12, folate, and normal thyroid function. HIV nonreactive.  CMV DNA quantitation negative.  EBV quantitation PCR negative and EBV antibody profile shows negative VCA IgM with increased IgG VCA of 122 and nuclear antigen IgG 68.8 commensurate with prior infection.  C-reactive protein 21.  C4 complement normal.  C3 complement slightly elevated to 27 upper limit 167.  ANCA panel negative.  HARRIS negative.  CCP negative.  Rheumatoid factor not drawn.  Sedimentation rate normal 24.  Anti-SSA and SSB negative.      -12/1/2022 Platelets minimally elevated 384,000 with otherwise unremarkable CBC and CMP.  C-reactive protein elevated 13.1.  Sedimentation rate 30 barely elevated.  Rheumatoid factor negative.  Moderate external hemoglobin and urine.  4-10 red cells per high-power field.    -4/11/2023 CT chest abdomen pelvis Showed no evidence of adenopathy or splenomegaly.  Atherosclerotic changes.  Hepatic steatosis with small liver cyst.    -4/18/2023 [facility] hematology oncology follow-up: No radiographic evidence of recurrence.  Viral studies all negative.  CBC unremarkable.  No significant inflammatory markers and LDH normal. As outlined in her survivorship visit, she has completed routine surveillance CTs per NCCN guidelines and at this point would only image as clinically indicated.  We will continue H&P every 6 months out till April 2026.  I would probably continue to follow her clinically out past April 2026 for signs or symptoms of late recurrences there was a component of follicular lymphoma within the higher grade lymphoma and late recurrences may occur but at this point no further labs or scans in the absence of  symptoms.    -8/23/2023 CBC And CMP ordered by primary care unremarkable    -10/24/2023 [facility] [place] hematology oncology follow-up: No new somatic complaints to suggest recurrence.  No palpable cervical or axillary or inguinal adenopathy.  I reviewed labs ordered by primary care in August which were unremarkable.  I will have her see my nurse practitioner every 6 months and a survivorship mode until April 2026 at least.  At that point, given that there was a component of follicular lymphoma within the higher grade lymphoma there is a slightly higher risk of late relapse and I would consider continuing H&P surveillance with oncology every 6 to 12 months after April 2026.  No plans for scans or labs in the absence of symptoms.    -7/2/2024 patient presented to [facility] with shortness of breath and history of recent pneumonia, CT chest showed no acute thoracic process, incidental finding of 0.4 cm left upper lobe solid pulmonary nodule which is indeterminate.  Hepatic steatosis.     Diffuse large B-cell lymphoma of lymph nodes of inguinal region (Resolved)   3/29/2021 -  Other Event    Echocardiogram  Left ventricular ejection fraction appears to be 61 - 65%. Left ventricular systolic function is normal.  Normal global longitudinal strain at -23.5%.  Left ventricular diastolic function was normal.  No significant structural or functional valvular disease.     4/2/2021 Imaging    PET/CT IMPRESSION:  Postsurgical changes right proximal thigh and superficial  inguinal region adjacent to adenopathy with abnormal hypermetabolism  maximum SUV 4.0. Right internal iliac lymph node is mildly enlarged with  maximum SUV 2.6 additionally noted. No soft tissue mass or adenopathy  outside of this region.     4/21/2021 Biopsy    Marrow biopsy negative     4/26/2021 - 6/28/2021 Chemotherapy    Completed course #3 6/8/2021  OP LYMPHOMA R-CHOP RiTUXimab / Cyclophosphamide / DOXOrubicin / VinCRIStine / PredniSONE      4/26/2021 Cancer Staged    Staging form: Hodgkin And Non-Hodgkin Lymphoma, AJCC 8th Edition  - Clinical: Stage II (Diffuse large B-cell lymphoma) - Signed by Rodríguez Gerardo MD on 4/26/2021 6/22/2021 Imaging     PET shows resolution of right inguinal and iliac adenopathy.  Negative PET/CT.     7/22/2021 - 8/16/2021 Radiation    Radiation OncologyTreatment Course:  Iliana Gray received 36 cGy in 18 fractions to right pelvic and inguinofemoral nodes (involved field) under the care of Dr. Jl Infante.     10/14/2021 Imaging    PET/CT: Stable negative PET/CT with no new hypermetabolic adenopathy to suggest lymphomatous disease progression.     1/28/2022 Imaging    CT chest abdomen pelvis with contrast shows no adenopathy chest abdomen pelvis and no acute process.  Hepatic steatosis with sigmoid diverticulosis.  Hemoglobin 13.6 with normal CBC and differential.  CMP entirely normal with creatinine 0.65     7/28/2022 Imaging    CT chest, abdomen and pelvis:  1. No evidence of lymphadenopathy in the chest abdomen or pelvis.  2. Diffuse hepatic steatosis.  3. Sigmoid diverticulosis.  4. Hysterectomy  5. Postoperative changes of prior right inguinal lymph node resection.     4/11/2023 Imaging    CT chest abdomen pelvis Showed no evidence of adenopathy or splenomegaly.  Atherosclerotic changes.  Hepatic steatosis with small liver cyst.         HISTORY OF PRESENT ILLNESS:  The patient is a 54 y.o. female, here for follow up on management of history of diffuse large B cell lymphoma of the right medial thigh, stage IIa, status post chemotherapy and radiation completed August 2021.  Iliana is here today for 6-month follow-up.  She reports that she has had ongoing intermittent shortness of breath both at rest and with activity.  She states that she feels she has a difficult time getting a deep breath.  No wheezing that she has been aware of.  She has occasional cough with phlegm but she thinks this is due to  "quitting smoking.  She stopped smoking about 3 months ago.  She denies any fevers or chills.  She denies any chest pain.  She reports that she did go to the ER here locally in July and CT scan mentioned a nodule, she is asking about follow-up on that.  Her appetite is normal, she has had no abnormal weight loss.  No lymphadenopathy that she is aware of.  She wears her CPAP at night faithfully.    Past Medical History:   Diagnosis Date    Anxiety     Arthritis     Asthma     Diffuse large B cell lymphoma     Sleep apnea     cpap     Past Surgical History:   Procedure Laterality Date    HYSTERECTOMY      TUMOR EXCISION Right 04/2021    lymph node removal/biopsy/right inner thigh    VENOUS ACCESS DEVICE (PORT) INSERTION Right 04/2021       No Known Allergies    Family History and Social History reviewed and changed as necessary    REVIEW OF SYSTEM:   Shortness of breath at rest and with activity intermittently.    PHYSICAL EXAM:  Well-developed, well-nourished appearing female in no distress  No cervical, supraclavicular, axillary or inguinal adenopathy on exam  Respirations regular and unlabored, lungs clear to auscultation bilaterally    Vitals:    10/30/24 1015   BP: 147/84   Pulse: 97   Resp: 18   Temp: 97.8 °F (36.6 °C)   SpO2: 97%   Weight: 107 kg (235 lb)   Height: 160 cm (63\")     Vitals:    10/30/24 1015   PainSc: 0-No pain          ECOG score: 0           Vitals reviewed.  Labs reviewed from 9/3/2024, I also obtained report of CT chest performed at [facility] 7/2/2024 at time of visit    ECOG: (0) Fully Active - Able to Carry On All Pre-disease Performance Without Restriction    Lab Results   Component Value Date    HGB 13.2 09/03/2024    HCT 40.6 09/03/2024    MCV 92 09/03/2024     09/03/2024    WBC 5.5 09/03/2024    NEUTROABS 2.5 09/03/2024    LYMPHSABS 2.3 09/03/2024    MONOSABS 0.4 09/03/2024    EOSABS 0.2 09/03/2024    BASOSABS 0.0 09/03/2024       Lab Results   Component " Value Date    GLUCOSE 132 (H) 09/03/2024    BUN 9 09/03/2024    CREATININE 0.59 09/03/2024     09/03/2024    K 4.1 09/03/2024     09/03/2024    CO2 24 09/03/2024    CALCIUM 8.9 09/03/2024    PROTEINTOT 7.0 01/28/2022    ALBUMIN 4.2 09/03/2024    BILITOT 0.4 09/03/2024    ALKPHOS 78 09/03/2024    AST 25 09/03/2024    ALT 23 09/03/2024             ASSESSMENT & PLAN:    1.  History of right Medial thigh stage IIa nonbulky less than 7.5 cm diffuse large b cell non-hodgkin lymphoma with component of background follicular lymphoma plan for R-CHOP x3 followed by ISRT.  Had CR after 3 courses of R-CHOP x3 ending 6/8/2021 per PET 6/22/2021.    -Completed radiation 8/16/2021 to the right pelvic and inguinofemoral nodes  2.  Shortness of breath  3.  Incidental finding on CT chest 7/2/2024 of 0.4 cm left upper lobe solid pulmonary nodule  4.  Hepatic steatosis  5.  History of tobacco abuse, greater than 40-year.  Stop smoking July 2024    Discussion: Iliana overall is doing well, she has no lymphadenopathy or symptoms concerning for recurrent lymphoma.  Her CBC and CMP from 9/3/2024 were unremarkable.  I did review CT chest without contrast that was performed on 7/2/2024 ER visit to [facility] that showed 0.4 cm left upper lobe solid pulmonary nodule, I reviewed previous CT scans performed at [facility] for the past few years and could find no mention of left upper lobe nodule.  I will repeat her CT chest now with contrast for follow-up.  If the nodule is stable then I will get her to our lung nodule clinic for further recommendations.  At a minimum she would be eligible for annual low-dose CT chest for screening in light of her history of tobacco abuse.  I did congratulate her on her smoking cessation, she stopped smoking about 3 months ago.  I will see her back in 1 to 2 weeks to go over her CT chest and further plan of care.    I spent 31 minutes caring for Iliana on this date  of service. This time includes time spent by me in the following activities: preparing for the visit, reviewing tests, obtaining and/or reviewing a separately obtained history, performing a medically appropriate examination and/or evaluation, ordering medications, tests, or procedures, documenting information in the medical record, and independently interpreting results and communicating that information with the patient/family/caregiver.     Anitra Shelley, APRN    10/30/2024

## 2025-07-22 ENCOUNTER — OFFICE VISIT (OUTPATIENT)
Dept: BEHAVIORAL HEALTH | Facility: CLINIC | Age: 55
End: 2025-07-22
Payer: COMMERCIAL

## 2025-07-22 DIAGNOSIS — F33.1 MAJOR DEPRESSIVE DISORDER, RECURRENT EPISODE, MODERATE: ICD-10-CM

## 2025-07-22 DIAGNOSIS — F41.1 GENERALIZED ANXIETY DISORDER: Primary | ICD-10-CM

## 2025-07-22 DIAGNOSIS — F25.0 SCHIZOAFFECTIVE DISORDER, BIPOLAR TYPE: ICD-10-CM

## 2025-07-22 NOTE — PROGRESS NOTES
"     Follow Up Adult Note     Date:2025   Patient Name: Iliana Gray  : 1970   MRN: [id number]   Time IN: 10:55 am    Time OUT: 11:28 am     Referring Provider: Kiley Blackwell PA    Chief Complaint:      ICD-10-CM ICD-9-CM   1. Generalized anxiety disorder  F41.1 300.02   2. Major depressive disorder, recurrent episode, moderate  F33.1 296.32   3. Schizoaffective disorder, bipolar type  F25.0 295.70        History of Present Illness:   Iliana Gray is a 54 y.o. female who is being seen today for scheduled Psychotherapy session. Mood reported as \"alright\" with somewhat reserved but overall cooperative affect. Patient presented as somewhat reserved at with some intermittent anxiety but remained overall calm, cooperative, engaged, when asked questions, and polite with provider. Patient denied any current SI/HI/AVH. Patient reports anxiety around health concerns, primarily reported breathing concerns, along with financial stressors, all of which exacerbate depressive symptoms. Patient reported she recently re-started medications for weight management and appears to be handling them well at this time. Patient reported she plans to apply for disability due to mental health and physical health concerns. Patient reported minimal interaction with peers.       Subjective     PHQ-9 Depression Screening  PHQ-9 Total Score:  Not completed at this time     DEO-7   Not completed at this time    Patient's Support Network Includes:  children and extended family    Functional Status: Moderate impairment     Progress toward goal: Not at goal    Prognosis: Fair with Ongoing Treatment     Medications:     Current Outpatient Medications:     albuterol (PROVENTIL) (2.5 MG/3ML) 0.083% nebulizer solution, Take 2.5 mg by nebulization., Disp: , Rfl:     aspirin 81 MG chewable tablet, Chew 1 tablet Daily., Disp: , Rfl:     Cariprazine HCl (Vraylar) 3 MG capsule capsule, Take 1 capsule by mouth Daily., Disp: 30 " "capsule, Rfl: 2    Cholecalciferol (Vitamin D) 50 MCG (2000 UT) tablet, Take 1 tablet by mouth Daily., Disp: 90 tablet, Rfl: 3    clobetasol (TEMOVATE) 0.05 % ointment, PLEASE SEE ATTACHED FOR DETAILED DIRECTIONS, Disp: , Rfl:     diazePAM (VALIUM) 5 MG tablet, Take 1 tablet by mouth Daily As Needed for Anxiety. Intended as 3 month supply., Disp: 30 tablet, Rfl: 0    estradiol (ESTRACE) 1 MG tablet, Take 1 tablet by mouth Daily., Disp: , Rfl:     fenofibrate (TRICOR) 145 MG tablet, Take 1 tablet by mouth Daily., Disp: 90 tablet, Rfl: 3    furosemide (Lasix) 40 MG tablet, Take 1 tablet by mouth Every Morning., Disp: 90 tablet, Rfl: 1    ipratropium (ATROVENT) 0.06 % nasal spray, INSTILL 2 SPRAYS INTRANASALLY 3 TIMES PER DAY FOR 5 DAYS, Disp: , Rfl:     nystatin (MYCOSTATIN) 843888 UNIT/GM cream, Apply 1 Application topically to the appropriate area as directed 2 (Two) Times a Day., Disp: 30 g, Rfl: 2    potassium chloride (KLOR-CON M10) 10 MEQ CR tablet, Take 1 tablet by mouth 2 (Two) Times a Day., Disp: 60 tablet, Rfl: 5    rosuvastatin (CRESTOR) 40 MG tablet, Take 1 tablet by mouth Daily., Disp: 90 tablet, Rfl: 3    Semaglutide, 2 MG/DOSE, (Ozempic, 2 MG/DOSE,) 8 MG/3ML solution pen-injector, Inject 2 mg under the skin into the appropriate area as directed 1 (One) Time Per Week., Disp: 9 mL, Rfl: 1    spironolactone (ALDACTONE) 25 MG tablet, Take 1 tablet by mouth Daily., Disp: 90 tablet, Rfl: 3    Allergies:   No Known Allergies    Objective     Mental Status Exam:   MENTAL STATUS EXAM   General Appearance:  Cleanly groomed and dressed and well developed  Eye Contact:  Good eye contact  Attitude:  Cooperative and polite  Motor Activity:  Normal gait, posture and foot tapping  Muscle Strength:  Normal  Speech:  Normal rate, tone, volume  Language:  Spontaneous  Mood and affect:  Other  Other Comment:  Mood reported as \"alright\" with somewhat reserved but overall cooperative affect.  Hopelessness:  2  Loneliness: " 3  Thought Process:  Goal-directed and linear  Associations/ Thought Content:  No delusions  Hallucinations:  None  Suicidal Ideations:  Not present  Homicidal Ideation:  Not present  Sensorium:  Alert and clear  Orientation:  Person, place, time and situation  Immediate Recall, Recent, and Remote Memory:  Intact  Attention Span/ Concentration:  Good  Fund of Knowledge:  Appropriate for age and educational level  Intellectual Functioning:  Average range  Insight:  Fair  Judgement:  Fair  Reliability:  Good  Impulse Control:  Good       Assessment / Plan      Visit Diagnosis/Orders Placed This Visit:    ICD-10-CM ICD-9-CM   1. Generalized anxiety disorder  F41.1 300.02   2. Major depressive disorder, recurrent episode, moderate  F33.1 296.32   3. Schizoaffective disorder, bipolar type  F25.0 295.70        PLAN:  Safety: No acute safety concerns  Risk Assessment: Risk of self-harm acutely is low. Risk of self-harm chronically is also low, but could be further elevated in the event of treatment noncompliance and/or AODA.    Treatment Plan/Goals: Continue supportive psychotherapy efforts and medications as indicated. Treatment and medication options discussed during today's visit. Patient ackowledged and verbally consented to continue with current treatment plan and was educated on the importance of compliance with treatment and follow-up appointments. Patient seems reasonably able to adhere to treatment plan.      Assisted Patient in processing above session content; acknowledged and normalized patient’s thoughts, feelings, and concerns. Assisted patient in processing recent stressors/emotions/feelings and utilized Socratic Questioning techniques and open ended questions to encourage reflection. Reviewed resent symptom burdens while exploring the correlation between lifestyle and mental health distress. Utilized Socratic Questioning techniques to challenge patient to identify rational solutions to presenting concerns  and encouraged further engagement in enjoyable activities. Patient was receptive to therapeutic feedback.     Allowed Patient to freely discuss issues  without interruption or judgement with unconditional positive regard, active listening skills, and empathy. Therapist provided a safe, confidential environment to facilitate the development of a positive therapeutic relationship and encouraged open, honest communication. Assisted Patient in identifying risk factors which would indicate the need for higher level of care including thoughts to harm self or others and/or self-harming behavior and encouraged Patient to contact this office, call 911, or present to the nearest emergency room should any of these events occur. Discussed crisis intervention services and means to access. Patient adamantly and convincingly denies current suicidal or homicidal ideation or perceptual disturbance. Assisted Patient in processing session content; acknowledged and normalized Patient’s thoughts, feelings, and concerns by utilizing a person-centered approach in efforts to build appropriate rapport and a positive therapeutic relationship with open and honest communication. .     Follow Up:   Return in about 2 weeks (around 8/5/2025) for Therapy session.    Clive Magallanes [facility]  [facility]

## 2025-08-05 ENCOUNTER — OFFICE VISIT (OUTPATIENT)
Dept: BEHAVIORAL HEALTH | Facility: CLINIC | Age: 55
End: 2025-08-05
Payer: COMMERCIAL

## 2025-08-05 DIAGNOSIS — F90.2 ATTENTION DEFICIT HYPERACTIVITY DISORDER (ADHD), COMBINED TYPE: ICD-10-CM

## 2025-08-05 DIAGNOSIS — F41.1 GENERALIZED ANXIETY DISORDER: ICD-10-CM

## 2025-08-05 DIAGNOSIS — F33.1 MAJOR DEPRESSIVE DISORDER, RECURRENT EPISODE, MODERATE: Primary | ICD-10-CM

## 2025-08-05 DIAGNOSIS — F43.10 POST TRAUMATIC STRESS DISORDER (PTSD): ICD-10-CM

## 2025-08-05 DIAGNOSIS — F25.0 SCHIZOAFFECTIVE DISORDER, BIPOLAR TYPE: ICD-10-CM

## 2025-08-05 PROCEDURE — 1159F MED LIST DOCD IN RCRD: CPT

## 2025-08-05 PROCEDURE — 90834 PSYTX W PT 45 MINUTES: CPT

## 2025-08-05 PROCEDURE — 1160F RVW MEDS BY RX/DR IN RCRD: CPT

## 2025-08-06 ENCOUNTER — OFFICE VISIT (OUTPATIENT)
Dept: BEHAVIORAL HEALTH | Facility: CLINIC | Age: 55
End: 2025-08-06
Payer: COMMERCIAL

## 2025-08-06 VITALS
HEIGHT: 63 IN | DIASTOLIC BLOOD PRESSURE: 80 MMHG | BODY MASS INDEX: 40.93 KG/M2 | OXYGEN SATURATION: 95 % | SYSTOLIC BLOOD PRESSURE: 130 MMHG | HEART RATE: 107 BPM | WEIGHT: 231 LBS

## 2025-08-06 DIAGNOSIS — G47.20 CIRCADIAN RHYTHM SLEEP DISORDER, UNSPECIFIED TYPE: Primary | ICD-10-CM

## 2025-08-06 DIAGNOSIS — F41.1 GENERALIZED ANXIETY DISORDER: ICD-10-CM

## 2025-08-06 DIAGNOSIS — F43.10 POST TRAUMATIC STRESS DISORDER (PTSD): ICD-10-CM

## 2025-08-06 DIAGNOSIS — F25.0 SCHIZOAFFECTIVE DISORDER, BIPOLAR TYPE: ICD-10-CM

## 2025-08-06 DIAGNOSIS — F33.1 MAJOR DEPRESSIVE DISORDER, RECURRENT EPISODE, MODERATE: ICD-10-CM

## 2025-08-06 RX ORDER — MIRTAZAPINE 15 MG/1
15 TABLET, FILM COATED ORAL NIGHTLY
Qty: 30 TABLET | Refills: 2 | Status: SHIPPED | OUTPATIENT
Start: 2025-08-06

## 2025-08-06 RX ORDER — DIAZEPAM 5 MG/1
5 TABLET ORAL DAILY PRN
Qty: 30 TABLET | Refills: 0 | Status: SHIPPED | OUTPATIENT
Start: 2025-08-06

## 2025-08-14 DIAGNOSIS — R60.0 BILATERAL LEG EDEMA: ICD-10-CM

## 2025-08-14 RX ORDER — FUROSEMIDE 40 MG/1
40 TABLET ORAL EVERY MORNING
Qty: 90 TABLET | Refills: 1 | Status: SHIPPED | OUTPATIENT
Start: 2025-08-14